# Patient Record
Sex: MALE | Race: WHITE | NOT HISPANIC OR LATINO | Employment: OTHER | URBAN - METROPOLITAN AREA
[De-identification: names, ages, dates, MRNs, and addresses within clinical notes are randomized per-mention and may not be internally consistent; named-entity substitution may affect disease eponyms.]

---

## 2017-10-02 ENCOUNTER — HOSPITAL ENCOUNTER (EMERGENCY)
Facility: HOSPITAL | Age: 62
Discharge: PSYCHIATRIC OR SUBSTANCE USE FACILITY | End: 2017-10-02
Attending: EMERGENCY MEDICINE | Admitting: EMERGENCY MEDICINE

## 2017-10-02 ENCOUNTER — HOSPITAL ENCOUNTER (INPATIENT)
Facility: HOSPITAL | Age: 62
LOS: 11 days | Discharge: NURSING, REHAB OR LONG-TERM CARE | End: 2017-10-13
Attending: SPECIALIST | Admitting: SPECIALIST

## 2017-10-02 VITALS
OXYGEN SATURATION: 98 % | DIASTOLIC BLOOD PRESSURE: 67 MMHG | SYSTOLIC BLOOD PRESSURE: 130 MMHG | HEART RATE: 68 BPM | BODY MASS INDEX: 24.11 KG/M2 | TEMPERATURE: 96.7 F | WEIGHT: 150 LBS | RESPIRATION RATE: 16 BRPM | HEIGHT: 66 IN

## 2017-10-02 DIAGNOSIS — F29 PSYCHOSIS, UNSPECIFIED PSYCHOSIS TYPE (HCC): Primary | ICD-10-CM

## 2017-10-02 DIAGNOSIS — R45.851 SUICIDAL IDEATION: ICD-10-CM

## 2017-10-02 LAB
ALBUMIN SERPL-MCNC: 3.9 G/DL (ref 3.5–5.2)
ALBUMIN/GLOB SERPL: 1.3 G/DL
ALP SERPL-CCNC: 68 U/L (ref 39–117)
ALT SERPL W P-5'-P-CCNC: 9 U/L (ref 1–41)
ANION GAP SERPL CALCULATED.3IONS-SCNC: 12.5 MMOL/L
AST SERPL-CCNC: 19 U/L (ref 1–40)
BASOPHILS # BLD AUTO: 0.02 10*3/MM3 (ref 0–0.2)
BASOPHILS NFR BLD AUTO: 0.3 % (ref 0–1.5)
BILIRUB SERPL-MCNC: 0.3 MG/DL (ref 0.1–1.2)
BILIRUB UR QL STRIP: NEGATIVE
BUN BLD-MCNC: 16 MG/DL (ref 8–23)
BUN/CREAT SERPL: 15.8 (ref 7–25)
CALCIUM SPEC-SCNC: 9.4 MG/DL (ref 8.6–10.5)
CHLORIDE SERPL-SCNC: 102 MMOL/L (ref 98–107)
CLARITY UR: ABNORMAL
CO2 SERPL-SCNC: 27.5 MMOL/L (ref 22–29)
COLOR UR: YELLOW
CREAT BLD-MCNC: 1.01 MG/DL (ref 0.76–1.27)
DEPRECATED RDW RBC AUTO: 43.5 FL (ref 37–54)
EOSINOPHIL # BLD AUTO: 0.06 10*3/MM3 (ref 0–0.7)
EOSINOPHIL NFR BLD AUTO: 1 % (ref 0.3–6.2)
ERYTHROCYTE [DISTWIDTH] IN BLOOD BY AUTOMATED COUNT: 12.7 % (ref 11.5–14.5)
GFR SERPL CREATININE-BSD FRML MDRD: 75 ML/MIN/1.73
GLOBULIN UR ELPH-MCNC: 2.9 GM/DL
GLUCOSE BLD-MCNC: 144 MG/DL (ref 65–99)
GLUCOSE UR STRIP-MCNC: NEGATIVE MG/DL
HCT VFR BLD AUTO: 42.4 % (ref 40.4–52.2)
HGB BLD-MCNC: 14.3 G/DL (ref 13.7–17.6)
HGB UR QL STRIP.AUTO: NEGATIVE
IMM GRANULOCYTES # BLD: 0 10*3/MM3 (ref 0–0.03)
IMM GRANULOCYTES NFR BLD: 0 % (ref 0–0.5)
KETONES UR QL STRIP: ABNORMAL
LEUKOCYTE ESTERASE UR QL STRIP.AUTO: NEGATIVE
LYMPHOCYTES # BLD AUTO: 1.52 10*3/MM3 (ref 0.9–4.8)
LYMPHOCYTES NFR BLD AUTO: 25.6 % (ref 19.6–45.3)
MCH RBC QN AUTO: 31.6 PG (ref 27–32.7)
MCHC RBC AUTO-ENTMCNC: 33.7 G/DL (ref 32.6–36.4)
MCV RBC AUTO: 93.8 FL (ref 79.8–96.2)
MONOCYTES # BLD AUTO: 0.48 10*3/MM3 (ref 0.2–1.2)
MONOCYTES NFR BLD AUTO: 8.1 % (ref 5–12)
NEUTROPHILS # BLD AUTO: 3.85 10*3/MM3 (ref 1.9–8.1)
NEUTROPHILS NFR BLD AUTO: 65 % (ref 42.7–76)
NITRITE UR QL STRIP: NEGATIVE
PH UR STRIP.AUTO: 7 [PH] (ref 5–8)
PHENYTOIN SERPL-MCNC: <0.8 MCG/ML (ref 10–20)
PLATELET # BLD AUTO: 189 10*3/MM3 (ref 140–500)
PMV BLD AUTO: 10.3 FL (ref 6–12)
POTASSIUM BLD-SCNC: 4 MMOL/L (ref 3.5–5.2)
PROT SERPL-MCNC: 6.8 G/DL (ref 6–8.5)
PROT UR QL STRIP: NEGATIVE
RBC # BLD AUTO: 4.52 10*6/MM3 (ref 4.6–6)
SODIUM BLD-SCNC: 142 MMOL/L (ref 136–145)
SP GR UR STRIP: 1.02 (ref 1–1.03)
UROBILINOGEN UR QL STRIP: ABNORMAL
WBC NRBC COR # BLD: 5.93 10*3/MM3 (ref 4.5–10.7)

## 2017-10-02 PROCEDURE — 93010 ELECTROCARDIOGRAM REPORT: CPT | Performed by: INTERNAL MEDICINE

## 2017-10-02 RX ORDER — ACETAMINOPHEN 325 MG/1
650 TABLET ORAL EVERY 4 HOURS PRN
Status: DISCONTINUED | OUTPATIENT
Start: 2017-10-02 | End: 2017-10-13 | Stop reason: HOSPADM

## 2017-10-02 RX ORDER — ALUMINA, MAGNESIA, AND SIMETHICONE 2400; 2400; 240 MG/30ML; MG/30ML; MG/30ML
15 SUSPENSION ORAL EVERY 6 HOURS PRN
Status: DISCONTINUED | OUTPATIENT
Start: 2017-10-02 | End: 2017-10-13 | Stop reason: HOSPADM

## 2017-10-02 RX ORDER — OLANZAPINE 10 MG/1
2.5 INJECTION, POWDER, LYOPHILIZED, FOR SOLUTION INTRAMUSCULAR ONCE
Status: COMPLETED | OUTPATIENT
Start: 2017-10-02 | End: 2017-10-02

## 2017-10-02 RX ADMIN — OLANZAPINE 2.5 MG: 10 INJECTION, POWDER, FOR SOLUTION INTRAMUSCULAR at 23:12

## 2017-10-02 NOTE — ED NOTES
Pt belonging placed in belonging bag. All cords were removed from Pts bedside. Light cover in place. Room made as safe as possible for Pt. Hospital security at bedside     Kathi Villalba RN  10/02/17 1932

## 2017-10-02 NOTE — ED PROVIDER NOTES
" EMERGENCY DEPARTMENT ENCOUNTER    CHIEF COMPLAINT  Chief Complaint: aggressive behavior  History given by: EMS  History limited by: uncooperative, dementia  Room Number: 08/08  PMD: Mo Chavarria DO      HPI:  Pt is a 61 y.o. male with history of depression who was sent to the ED from a NH for aggressive behavior. EMS reports NH staff reports pt has had suicidal ideation in the past.  No reported history of trauma, fever, vomiting, weakness.    Duration:  PTA  Onset: gradual  Timing: constant  Location: N/A  Radiation: N/A  Quality: \"aggressive behavior\"  Intensity/Severity: moderate  Progression: unchanged  Associated Symptoms: suicidal ideation  Aggravating Factors: unknown  Alleviating Factors: unknown  Previous Episodes: Pt has depression  Treatment before arrival: unknown    PAST MEDICAL HISTORY  Active Ambulatory Problems     Diagnosis Date Noted   • Dementia with behavioral disturbance 09/17/2016   • Gastroesophageal reflux disease without esophagitis 09/17/2016   • Coronary artery disease involving native coronary artery of native heart without angina pectoris 09/17/2016   • Seizure disorder 09/17/2016   • Cognitive impairment 09/17/2016   • Closed head injury 09/17/2016     Resolved Ambulatory Problems     Diagnosis Date Noted   • No Resolved Ambulatory Problems     Past Medical History:   Diagnosis Date   • Back pain    • Convulsion disorder    • Coronary artery disease    • Dementia    • Hyperlipidemia    • Hypertension    • Seizures        PAST SURGICAL HISTORY  Past Surgical History:   Procedure Laterality Date   • CARDIAC CATHETERIZATION     • CORONARY ARTERY BYPASS GRAFT Left    • VASCULAR SURGERY Left        FAMILY HISTORY  Family History   Problem Relation Age of Onset   • Arthritis Mother    • Depression Mother    • Alcohol abuse Mother    • Emphysema Mother    • Alzheimer's disease Mother    • Alzheimer's disease Father    • Alcohol abuse Father    • Bipolar disorder Sister    • Heart disease " Sister    • Cancer Brother    • Alcohol abuse Brother    • Drug abuse Brother    • Bipolar disorder Son    • Anxiety disorder Son    • Stroke Brother    • Cancer Brother    • Hypertension Brother    • Bipolar disorder Brother    • Anxiety disorder Brother    • Neuropathy Brother    • Heart disease Brother    • Drug abuse Brother    • Alcohol abuse Brother        SOCIAL HISTORY  Social History     Social History   • Marital status:      Spouse name: N/A   • Number of children: N/A   • Years of education: N/A     Occupational History   • Not on file.     Social History Main Topics   • Smoking status: Former Smoker   • Smokeless tobacco: Not on file   • Alcohol use No   • Drug use: Not on file   • Sexual activity: Not on file     Other Topics Concern   • Not on file     Social History Narrative       ALLERGIES  Review of patient's allergies indicates no known allergies.    REVIEW OF SYSTEMS  Review of Systems   Constitutional: Negative for activity change, appetite change and fever.   HENT: Negative for congestion and sore throat.    Eyes: Negative.    Respiratory: Negative for cough and shortness of breath.    Cardiovascular: Negative for chest pain and leg swelling.   Gastrointestinal: Negative for abdominal pain, diarrhea and vomiting.   Endocrine: Negative.    Genitourinary: Negative for decreased urine volume and dysuria.   Musculoskeletal: Negative for neck pain.   Skin: Negative for rash and wound.   Allergic/Immunologic: Negative.    Neurological: Negative for weakness, numbness and headaches.   Hematological: Negative.    Psychiatric/Behavioral: Positive for suicidal ideas.        Aggressive behavior   All other systems reviewed and are negative.      PHYSICAL EXAM  ED Triage Vitals   Temp Heart Rate Resp BP SpO2   10/02/17 1822 10/02/17 1822 10/02/17 1822 10/02/17 1822 10/02/17 1822   96.7 °F (35.9 °C) 77 18 130/60 97 %      Temp src Heart Rate Source Patient Position BP Location FiO2 (%)   10/02/17  1822 10/02/17 1822 -- -- --   Tympanic Monitor          Physical Exam   Constitutional: He appears distressed.   HENT:   Head: Normocephalic and atraumatic.   Eyes: EOM are normal. Pupils are equal, round, and reactive to light.   Neck: Normal range of motion.   Cardiovascular: Normal rate, regular rhythm and normal heart sounds.    No murmur heard.  Pulses:       Posterior tibial pulses are 2+ on the right side, and 2+ on the left side.   Pulmonary/Chest: Effort normal and breath sounds normal. No respiratory distress. He has no wheezes.   Abdominal: Soft. Bowel sounds are normal. There is no tenderness. There is no rebound and no guarding.   Musculoskeletal: Normal range of motion. He exhibits no edema, tenderness or deformity.   Neurological: He is alert.   Follows simple commands but answers no questions   Skin: Skin is warm and dry.   Psychiatric:   Flat affect, no verbal interaction,    Nursing note and vitals reviewed.      LAB RESULTS  Lab Results (last 24 hours)     Procedure Component Value Units Date/Time    Phenytoin Level, Total [[id number]]  (Abnormal) Collected:  10/02/17 1855    Specimen:  Blood from Arm, Left Updated:  10/02/17 1948     Phenytoin Level <0.8 (L) mcg/mL     CBC & Differential [[id number]] Collected:  10/02/17 1855    Specimen:  Blood Updated:  10/02/17 1923    Narrative:       The following orders were created for panel order CBC & Differential.  Procedure                               Abnormality         Status                     ---------                               -----------         ------                     CBC Auto Differential[[id number]]        Abnormal            Final result                 Please view results for these tests on the individual orders.    Comprehensive Metabolic Panel [[id number]]  (Abnormal) Collected:  10/02/17 1855    Specimen:  Blood from Arm, Left Updated:  10/02/17 1934     Glucose 144 (H) mg/dL      BUN 16 mg/dL      Creatinine 1.01 mg/dL      Sodium  142 mmol/L      Potassium 4.0 mmol/L      Chloride 102 mmol/L      CO2 27.5 mmol/L      Calcium 9.4 mg/dL      Total Protein 6.8 g/dL      Albumin 3.90 g/dL      ALT (SGPT) 9 U/L      AST (SGOT) 19 U/L       Specimen hemolyzed.  Results may be affected.        Alkaline Phosphatase 68 U/L      Total Bilirubin 0.3 mg/dL      eGFR Non African Amer 75 mL/min/1.73      Globulin 2.9 gm/dL      A/G Ratio 1.3 g/dL      BUN/Creatinine Ratio 15.8     Anion Gap 12.5 mmol/L     CBC Auto Differential [[id number]]  (Abnormal) Collected:  10/02/17 1855    Specimen:  Blood from Arm, Left Updated:  10/02/17 1923     WBC 5.93 10*3/mm3      RBC 4.52 (L) 10*6/mm3      Hemoglobin 14.3 g/dL      Hematocrit 42.4 %      MCV 93.8 fL      MCH 31.6 pg      MCHC 33.7 g/dL      RDW 12.7 %      RDW-SD 43.5 fl      MPV 10.3 fL      Platelets 189 10*3/mm3      Neutrophil % 65.0 %      Lymphocyte % 25.6 %      Monocyte % 8.1 %      Eosinophil % 1.0 %      Basophil % 0.3 %      Immature Grans % 0.0 %      Neutrophils, Absolute 3.85 10*3/mm3      Lymphocytes, Absolute 1.52 10*3/mm3      Monocytes, Absolute 0.48 10*3/mm3      Eosinophils, Absolute 0.06 10*3/mm3      Basophils, Absolute 0.02 10*3/mm3      Immature Grans, Absolute 0.00 10*3/mm3     Urinalysis With / Culture If Indicated - Urine, Clean Catch [[id number]]  (Abnormal) Collected:  10/02/17 1914    Specimen:  Urine from Urine, Catheter Updated:  10/02/17 1956     Color, UA Yellow     Appearance, UA Cloudy (A)     pH, UA 7.0     Specific Gravity, UA 1.023     Glucose, UA Negative     Ketones, UA Trace (A)     Bilirubin, UA Negative     Blood, UA Negative     Protein, UA Negative     Leuk Esterase, UA Negative     Nitrite, UA Negative     Urobilinogen, UA 1.0 E.U./dL    Narrative:       Urine microscopic not indicated.          I ordered the above labs and reviewed the results    PROCEDURES  Procedures    EKG           EKG time: 2251  Rhythm/Rate: SR 50s  P waves and CT: normal  QRS, axis:  normal   ST and T waves: normal     Interpreted Contemporaneously by me, independently viewed  No priors for comparison      PROGRESS AND CONSULTS  ED Course     1838 - Ordered labs for further evaluation.    2052 - Placed call to Psych.    2228 - Discussed pt's case with access staff, Hoda, who staffed pt's case with Dr. Palacios ([facility]), who agreed to admit the pt.    2241 - Ordered EKG for further evaluation. Ordered Zyprexa.    MEDICAL DECISION MAKING  Results were reviewed/discussed with the patient and they were also made aware of online access. Pt also made aware that some labs, such as cultures, will not be resulted during ER visit and follow up with PMD is necessary.     MDM  Number of Diagnoses or Management Options     Amount and/or Complexity of Data Reviewed  Clinical lab tests: ordered and reviewed (Phenytoin Level <0.8)  Tests in the medicine section of CPT®: reviewed and ordered (See EKG procedure note)  Decide to obtain previous medical records or to obtain history from someone other than the patient: yes  Review and summarize past medical records: yes           DIAGNOSIS  Final diagnoses:   Psychosis, unspecified psychosis type   Suicidal ideation       DISPOSITION  ADMISSION    Discussed treatment plan and reason for admission with pt/family and admitting physician.  Pt/family voiced understanding of the plan for admission for further testing/treatment as needed.         Latest Documented Vital Signs:  As of 11:03 PM  BP- 126/64 HR- 68 Temp- 96.7 °F (35.9 °C) (Tympanic) O2 sat- 96%    --  Documentation assistance provided by dl Gonzalez for Dr. Antonio.  Information recorded by the scribe was done at my direction and has been verified and validated by me.       Rakesh Gonzalez  10/02/17 5538       Noris Antonio MD  10/04/17 4067
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
## 2017-10-02 NOTE — ED NOTES
Nursing home reports that pt has had SI/HI and attempted to pull sharps container off of wall to harm self. Pt alert and oriented to self.            Ashleigh Alejo RN  10/02/17 3102

## 2017-10-03 LAB
CHOLEST SERPL-MCNC: 213 MG/DL (ref 0–200)
HDLC SERPL-MCNC: 53 MG/DL (ref 40–60)
LDLC SERPL CALC-MCNC: 142 MG/DL (ref 0–100)
LDLC/HDLC SERPL: 2.68 {RATIO}
TRIGL SERPL-MCNC: 90 MG/DL (ref 0–150)
VLDLC SERPL-MCNC: 18 MG/DL (ref 5–40)

## 2017-10-03 PROCEDURE — 80061 LIPID PANEL: CPT | Performed by: SPECIALIST

## 2017-10-03 RX ORDER — THIAMINE MONONITRATE (VIT B1) 100 MG
100 TABLET ORAL DAILY
Status: DISCONTINUED | OUTPATIENT
Start: 2017-10-03 | End: 2017-10-13 | Stop reason: HOSPADM

## 2017-10-03 RX ORDER — ATORVASTATIN CALCIUM 20 MG/1
40 TABLET, FILM COATED ORAL NIGHTLY
Status: DISCONTINUED | OUTPATIENT
Start: 2017-10-03 | End: 2017-10-13 | Stop reason: HOSPADM

## 2017-10-03 RX ORDER — TAMSULOSIN HYDROCHLORIDE 0.4 MG/1
0.4 CAPSULE ORAL NIGHTLY
Status: DISCONTINUED | OUTPATIENT
Start: 2017-10-03 | End: 2017-10-13 | Stop reason: HOSPADM

## 2017-10-03 RX ORDER — DONEPEZIL HYDROCHLORIDE 10 MG/1
10 TABLET, FILM COATED ORAL NIGHTLY
Status: DISCONTINUED | OUTPATIENT
Start: 2017-10-03 | End: 2017-10-13 | Stop reason: HOSPADM

## 2017-10-03 RX ORDER — RISPERIDONE 1 MG/1
1 TABLET ORAL EVERY 12 HOURS SCHEDULED
Status: DISCONTINUED | OUTPATIENT
Start: 2017-10-03 | End: 2017-10-10

## 2017-10-03 RX ORDER — ZONISAMIDE 25 MG/1
100 CAPSULE ORAL DAILY
Status: DISCONTINUED | OUTPATIENT
Start: 2017-10-03 | End: 2017-10-13 | Stop reason: HOSPADM

## 2017-10-03 RX ORDER — MULTIPLE VITAMINS W/ MINERALS TAB 9MG-400MCG
1 TAB ORAL DAILY
Status: DISCONTINUED | OUTPATIENT
Start: 2017-10-03 | End: 2017-10-13 | Stop reason: HOSPADM

## 2017-10-03 RX ORDER — PHENYTOIN 50 MG/1
50 TABLET, CHEWABLE ORAL 4 TIMES DAILY
Status: DISPENSED | OUTPATIENT
Start: 2017-10-03 | End: 2017-10-13

## 2017-10-03 RX ORDER — PANTOPRAZOLE SODIUM 40 MG/1
40 TABLET, DELAYED RELEASE ORAL EVERY MORNING
Status: DISCONTINUED | OUTPATIENT
Start: 2017-10-03 | End: 2017-10-13 | Stop reason: HOSPADM

## 2017-10-03 RX ADMIN — PHENYTOIN 50 MG: 50 TABLET, CHEWABLE ORAL at 18:59

## 2017-10-03 RX ADMIN — ATORVASTATIN CALCIUM 40 MG: 20 TABLET, FILM COATED ORAL at 22:21

## 2017-10-03 RX ADMIN — RISPERIDONE 1 MG: 1 TABLET, FILM COATED ORAL at 22:21

## 2017-10-03 RX ADMIN — MULTIPLE VITAMINS W/ MINERALS TAB 1 TABLET: TAB at 12:15

## 2017-10-03 RX ADMIN — PHENYTOIN 50 MG: 50 TABLET, CHEWABLE ORAL at 13:16

## 2017-10-03 RX ADMIN — DEXTROMETHORPHAN HYDROBROMIDE AND QUINIDINE SULFATE 1 CAPSULE: 20; 10 CAPSULE, GELATIN COATED ORAL at 13:16

## 2017-10-03 RX ADMIN — TAMSULOSIN HYDROCHLORIDE 0.4 MG: 0.4 CAPSULE ORAL at 22:21

## 2017-10-03 RX ADMIN — Medication 100 MG: at 12:16

## 2017-10-03 RX ADMIN — ZONISAMIDE 100 MG: 100 CAPSULE ORAL at 13:16

## 2017-10-03 RX ADMIN — PHENYTOIN 50 MG: 50 TABLET, CHEWABLE ORAL at 22:21

## 2017-10-03 RX ADMIN — RIVAROXABAN 20 MG: 20 TABLET, FILM COATED ORAL at 18:59

## 2017-10-03 RX ADMIN — RISPERIDONE 1 MG: 1 TABLET, FILM COATED ORAL at 12:16

## 2017-10-03 RX ADMIN — DONEPEZIL HYDROCHLORIDE 10 MG: 10 TABLET, FILM COATED ORAL at 22:21
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
## 2017-10-03 NOTE — NURSING NOTE
"Pt arrived to floor by stretcher, I was told because he refused to sit in wheelchair. Upon arrival, pt refusing to walk to bed, slide from stretcher to bed. Pt refusing to answer questions. When asked about orientation he stares forward. When asked his birthday he replies \" I don't have one\". Posture is tense, pt holding head off of pillow, jaw clenched. Side rails padded, sitter at bedside. Will continue to assess and monitor.    "

## 2017-10-03 NOTE — PLAN OF CARE
Problem:  Patient Care Overview (Adult)  Goal: Discharge Needs Assessment  Outcome: Ongoing (interventions implemented as appropriate)    10/03/17 1209 10/03/17 1214   Discharge Needs Assessment   Concerns To Be Addressed homicidal concerns;suicidal concerns;cognitive/perceptual concerns;mental health concerns --    Discharge Planning Comments --  Pt came from [facility] & has a 14-day hold per Aliyah ann/Alena. Pt's treatment will be reviewed ongoing. SW will explore follow-up & discharge options.

## 2017-10-03 NOTE — DISCHARGE PLACEMENT REQUEST
"Drew Day (61 y.o. Male)     Date of Birth Social Security Number Address Home Phone MRN    1955  [address] [place] [address] [phone] [id number]    [employer] Marital Status          Unknown        Admission Date Admission Type Admitting Provider Attending Provider Department, Room/Bed    10/2/17 Emergency Jos Patel III, MD Bensenhaver, Charles Brook III, MD [facility] CRISIS MGMT, 3330/1    Discharge Date Discharge Disposition Discharge Destination                      Attending Provider: Jos Patel III, MD     Allergies:  No Known Allergies    Isolation:  None   Infection:  None   Code Status:  FULL    Ht:  66\" (167.6 cm)   Wt:  138 lb (62.6 kg)    Admission Cmt:  None   Principal Problem:  None                Active Insurance as of 10/2/2017     Primary Coverage     Payor Plan Insurance Group Employer/Plan Group    MEDICARE MEDICARE A & B      Payor Plan Address Payor Plan Phone Number Effective From Effective To    [address] [phone] 4/1/2000     [place] [address]       Subscriber Name Subscriber Birth Date Member ID       DREW DAY 1955 [id number]           Secondary Coverage     Payor Plan Insurance Group Employer/Plan Group    [place]      Payor Plan Address Payor Plan Phone Number Effective From Effective To    [address] [phone] 10/2/2017     [place] [address]       Subscriber Name Subscriber Birth Date Member ID       DREW DAY 1955 [id number]                 Emergency Contacts      (Rel.) Home Phone Work Phone Mobile Phone    Padmini Michael (Guardian) [phone] -- --    Christine Day (Relative) [phone] -- --    Ankush Day (Brother) [phone] -- --    Liliana Day (Relative) [phone] -- --              "

## 2017-10-03 NOTE — PLAN OF CARE
Problem:  Patient Care Overview (Adult)  Goal: Plan of Care Review  Outcome: Ongoing (interventions implemented as appropriate)    10/03/17 1210 10/03/17 1734   Coping/Psychosocial Response Interventions   Plan Of Care Reviewed With patient --    Coping/Psychosocial   Patient Agreement with Plan of Care refuses to participate --    Outcome Evaluation   Outcome Summary/Follow up Plan --  Pt irritable and resistent to care this shift. Very quiet and often refuses to answer questions posed by staff. Unable to assess SI/HI, anxiety, depression, and hallucinations. Pt gave RN name but unable to give birthdate. When pt does speak it is garbled and difficult to understand. Pt has refused meals. Attempted to give pt meds with water. Pt refused. Pt compliant with medications when given in chocolate ice cream. Pt came out into dayroom for some of afternoon. Currently resting in bed. Will continue to monitor and provide support.       Goal: Interdisciplinary Rounds/Family Conference  Outcome: Ongoing (interventions implemented as appropriate)  Goal: Individualization and Mutuality  Outcome: Ongoing (interventions implemented as appropriate)  Goal: Discharge Needs Assessment  Outcome: Ongoing (interventions implemented as appropriate)    Problem:  Overarching Goals  Goal: Adheres to Safety Considerations for Self and Others  Outcome: Ongoing (interventions implemented as appropriate)  Intervention: Develop/maintain Individualized Safety Plan    10/03/17 1210 10/03/17 1600   Safety   Safety Measures --  safety rounds completed   Mental Health Homicide Risk   Homicidal Ideation other (see comments)  (unable to assess; pt mumbled smthg incoherent) --    Provide Emotional/Physical Safety   Suicidal Ideation other (see comments)  (unable to assess; pt confuse; did not answer) --          Goal: Optimized Coping Skills in Response to Life Stressors  Outcome: Ongoing (interventions implemented as appropriate)  Intervention: Promote  Effective Coping Strategies    10/03/17 1210   Coping Strategies   Supportive Measures active listening utilized;verbalization of feelings encouraged;self-care encouraged;positive reinforcement provided         Goal: Develops/Participates in Therapeutic [place] to Support Successful Transition  Outcome: Ongoing (interventions implemented as appropriate)  Intervention: Foster Therapeutic [place]    10/03/17 1210   Coping Strategies   Trust Relationship/Rapport care explained;choices provided;emotional support provided;empathic listening provided;questions answered;questions encouraged;reassurance provided;thoughts/feelings acknowledged       Intervention: Mutually Develop Transition Plan    10/03/17 1210   OTHER   Transition Support crisis management plan promoted           Problem: Depression  Goal: Treatment Goal: Demonstrate behavioral control of depressive symptoms, verbalize feelings of improved mood/affect, and adopt new coping skills prior to discharge  Outcome: Ongoing (interventions implemented as appropriate)  Goal: Verbalize thoughts and feelings associated with:  Outcome: Ongoing (interventions implemented as appropriate)  Goal: Refrain from harming self  Outcome: Ongoing (interventions implemented as appropriate)  Goal: Refrain from isolation  Outcome: Ongoing (interventions implemented as appropriate)  Goal: Refrain from self-neglect  Outcome: Ongoing (interventions implemented as appropriate)  Goal: Attend and participate in unit activities, including therapeutic, recreational, and educational groups  Outcome: Ongoing (interventions implemented as appropriate)  Goal: Complete daily ADLs, including personal hygiene independently, as able  Outcome: Ongoing (interventions implemented as appropriate)    Problem: Risk for Violence/Aggression Toward Others  Goal: Treatment Goal: Refrain from acts of violence/aggression during length of stay, and demonstrate improved impulse control at the time of  discharge  Outcome: Ongoing (interventions implemented as appropriate)  Goal: Verbalize thoughts and feelings associated with:  Outcome: Ongoing (interventions implemented as appropriate)  Goal: Refrain from harming others  Outcome: Ongoing (interventions implemented as appropriate)  Goal: Refrain from destructive acts on the environment or property  Outcome: Ongoing (interventions implemented as appropriate)  Goal: Control angry outbursts  Outcome: Ongoing (interventions implemented as appropriate)  Goal: Attend and participate in unit activities, including therapeutic, recreational, and educational groups  Outcome: Ongoing (interventions implemented as appropriate)  Goal: Identify appropriate positive anger management techniques  Outcome: Ongoing (interventions implemented as appropriate)    Problem: Fall Risk (Adult)  Goal: Identify Related Risk Factors and Signs and Symptoms  Outcome: Ongoing (interventions implemented as appropriate)  Goal: Absence of Falls  Outcome: Ongoing (interventions implemented as appropriate)    10/03/17 1734   Fall Risk (Adult)   Absence of Falls achieves outcome  (pt did not fall this shift)         Problem: Risk for Self Injury/Neglect  Goal: Treatment Goal: Remain safe during length of stay, learn and adopt new coping skills, and be free of self-injurious ideation, impulses and acts at the time of discharge  Outcome: Ongoing (interventions implemented as appropriate)  Goal: Verbalize thoughts and feelings associated with:  Outcome: Ongoing (interventions implemented as appropriate)  Goal: Refrain from harming self  Outcome: Ongoing (interventions implemented as appropriate)  Goal: Attend and participate in unit activities, including therapeutic, recreational, and educational groups  Outcome: Ongoing (interventions implemented as appropriate)  Goal: Recognize maladaptive responses and adopt new coping mechanisms  Outcome: Ongoing (interventions implemented as appropriate)  Goal:  Complete daily ADLs, including personal hygiene independently, as able  Outcome: Ongoing (interventions implemented as appropriate)
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
## 2017-10-03 NOTE — PLAN OF CARE
Problem: BH Patient Care Overview (Adult)  Goal: Plan of Care Review  Outcome: Ongoing (interventions implemented as appropriate)    10/03/17 0423   Coping/Psychosocial Response Interventions   Plan Of Care Reviewed With patient   Coping/Psychosocial   Patient Agreement with Plan of Care refuses to participate   Patient Care Overview   Progress unable to show any progress toward functional goals   Outcome Evaluation   Outcome Summary/Follow up Plan Pt agitated upon arrival to unit. Refusing assessment, pt not answering staff questions. Unable to complete most of assessment at this time. Will continue to monitor.          Problem: Risk for Violence/Aggression Toward Others  Goal: Refrain from harming others  Outcome: Ongoing (interventions implemented as appropriate)    Problem: Fall Risk (Adult)  Goal: Absence of Falls  Outcome: Ongoing (interventions implemented as appropriate)

## 2017-10-03 NOTE — CONSULTS
"Inpatient Consult to Hospitalist  Consult performed by: TANNA TELLES  Consult ordered by: OTIS DURAN III  Reason for consult: medical evaluation in a psychiatric patient with HTN/CAD/chronic AC for PAD          Patient Care Team:  Mo Chavarria DO as PCP - General (Family Medicine)    Chief complaint: none for me    Subjective     HPI Comments: Pt is a 62yo gentleman admitted to [facility] for dementia with behavioral disturbance. We have been asked to see for management of his multiple medical issues. Currently he is resting comfortably in common area and has no complaints. He reports chronic pain in legs due to PAD (\"they been all cut up\").       Review of Systems   Constitutional: Negative for appetite change, chills, diaphoresis and fever.   HENT: Negative for sinus pressure, sore throat, tinnitus, trouble swallowing and voice change.    Eyes: Negative for pain and visual disturbance.   Respiratory: Negative for cough, choking, chest tightness and shortness of breath.    Cardiovascular: Negative for chest pain, palpitations and leg swelling.   Gastrointestinal: Negative for abdominal pain, blood in stool, diarrhea, nausea and vomiting.   Endocrine: Negative for cold intolerance and heat intolerance.   Genitourinary: Negative for decreased urine volume, difficulty urinating, dysuria and hematuria.   Musculoskeletal: Negative for arthralgias, back pain and neck pain.   Skin: Negative for color change, pallor, rash and wound.   Allergic/Immunologic: Negative for environmental allergies, food allergies and immunocompromised state.   Neurological: Negative for tremors, seizures, syncope, facial asymmetry, speech difficulty and headaches.   Hematological: Negative for adenopathy. Does not bruise/bleed easily.   Psychiatric/Behavioral: Positive for agitation and behavioral problems. Negative for hallucinations and self-injury.        Past Medical History:   Diagnosis Date   • Back pain    • Convulsion " disorder    • Coronary artery disease    • Dementia    • Hyperlipidemia    • Hypertension    • Seizures    ,   Past Surgical History:   Procedure Laterality Date   • CARDIAC CATHETERIZATION     • CORONARY ARTERY BYPASS GRAFT Left    • VASCULAR SURGERY Left    ,   Family History   Problem Relation Age of Onset   • Arthritis Mother    • Depression Mother    • Alcohol abuse Mother    • Emphysema Mother    • Alzheimer's disease Mother    • Alzheimer's disease Father    • Alcohol abuse Father    • Bipolar disorder Sister    • Heart disease Sister    • Cancer Brother    • Alcohol abuse Brother    • Drug abuse Brother    • Bipolar disorder Son    • Anxiety disorder Son    • Stroke Brother    • Cancer Brother    • Hypertension Brother    • Bipolar disorder Brother    • Anxiety disorder Brother    • Neuropathy Brother    • Heart disease Brother    • Drug abuse Brother    • Alcohol abuse Brother    ,   Social History   Substance Use Topics   • Smoking status: Former Smoker   • Smokeless tobacco: None   • Alcohol use No   ,   Prescriptions Prior to Admission   Medication Sig Dispense Refill Last Dose   • donepezil (ARICEPT) 10 MG tablet Take 1 tablet by mouth every night. 30 tablet 5    • Multiple Vitamins-Minerals (MULTIVITAMIN ADULT) tablet Take 1 tablet by mouth daily. 30 tablet 5    • omeprazole (PriLOSEC) 40 MG capsule Take 1 capsule by mouth daily. 30 capsule 5    • PHENYTOIN INFATABS 50 MG chewable tablet Take 4 tablets bid 240 tablet 5    • risperiDONE (RisperDAL) 0.5 MG tablet Take 1 tablet by mouth daily. 30 tablet 5    • tamsulosin (FLOMAX) 0.4 MG capsule 24 hr capsule Take 1 capsule by mouth every night. 30 capsule 5    • thiamine (VITAMINE B-1) 100 MG tablet Take 1 tablet by mouth daily. 30 tablet 5    • vitamin B-12 (CYANOCOBALAMIN) 500 MCG tablet Take 1 tablet by mouth 2 (two) times a day. 60 tablet 5    • XARELTO 20 MG tablet Take 1 tablet by mouth daily with dinner. 30 tablet 5    • zonisamide (ZONEGRAN) 100  MG capsule Take 1 capsule by mouth daily. 30 capsule 5     and Allergies:  Review of patient's allergies indicates no known allergies.    Objective      Vital Signs  Temp:  [96.7 °F (35.9 °C)-98.6 °F (37 °C)] 98.6 °F (37 °C)  Heart Rate:  [53-77] 53  Resp:  [16-18] 16  BP: (126-147)/(60-74) 138/73    Physical Exam   Constitutional: He appears well-developed and well-nourished. No distress.   HENT:   Head: Normocephalic and atraumatic.   Mouth/Throat: Oropharynx is clear and moist. No oropharyngeal exudate.   Eyes: EOM are normal. Pupils are equal, round, and reactive to light. Right eye exhibits no discharge. Left eye exhibits no discharge. No scleral icterus.   Neck: Normal range of motion. Neck supple. No tracheal deviation present. No thyromegaly present.   Cardiovascular: Normal rate, regular rhythm and normal heart sounds.    No murmur heard.  Pulmonary/Chest: Effort normal and breath sounds normal. No respiratory distress.   Abdominal: Soft. Bowel sounds are normal. He exhibits no distension and no mass. There is no tenderness. No hernia.   Musculoskeletal: Normal range of motion. He exhibits no edema.   Poor pulses, no coolness, no erythema, old surgical scars present   Lymphadenopathy:     He has no cervical adenopathy.   Neurological: He is alert. No cranial nerve deficit. He exhibits normal muscle tone.   Skin: Skin is warm and dry. No rash noted. He is not diaphoretic.   Psychiatric:   Flat affect, slowed thought processes, paucity of speech   Nursing note and vitals reviewed.      Results Review:    I reviewed the patient's new clinical results.  I reviewed the patient's other test results and agree with the interpretation  I personally viewed and interpreted the patient's EKG/Telemetry data  Discussed with patient        Assessment/Plan     Active Problems:    Dementia with behavioral disturbance      Assessment:  (1. CAD  2. PAD, chronic AC  3. COPD and chronic hypoxic resp failure  4. HTN  5.  Hyperlipidemia  6. GERD/Hdz's  7. BPH  8. Seizure d/o  9. H/o TBI and cognitive impairment  10. Dementia with behavioral disturbance  11. Nursing home pt/[facility]).     Plan:   (Thanks for consult!  At present he does not appear to be on a statin  I will start Lipitor now--not for prevention, but because of his significant arterial disease history  Continue Xarelto  No other changes to home meds).       I discussed the patients findings and my recommendations with patient    Lorenzo Pandey MD  10/03/17  3:35 PM    Time: 35min

## 2017-10-03 NOTE — PLAN OF CARE
Problem: BH Patient Care Overview (Adult)  Goal: Interdisciplinary Rounds/Family Conference  Outcome: Ongoing (interventions implemented as appropriate)    10/03/17 0938   Interdisciplinary Rounds/Family Conf   Summary Treatment team met to discuss plan of care.  to reach out to [facility] care and inquire about patient's ability to return after discharge.   Participants ;nursing;pastoral care;psychiatrist;social work      Patient/Guardian Signature: __________________________________             Psychiatrist Signature: ______________________________________             Therapist Signature: ________________________________________              Nurse Signature: ___________________________________________              Staff Signature: ____________________________________________             Staff Signature: ____________________________________________              Staff Signature: ____________________________________________              Staff Signature:                                                                                                      Goal: Individualization and Mutuality  Outcome: Ongoing (interventions implemented as appropriate)    10/03/17 0938   Behavioral Health Screens   Patient Personal Strengths stable living environment;family/social support   Individualization   Patient Specific Goals Long-term goal for patient to be compliant with medications and treatments without violence in 12 days.         Problem: Depression  Goal: Treatment Goal: Demonstrate behavioral control of depressive symptoms, verbalize feelings of improved mood/affect, and adopt new coping skills prior to discharge  Outcome: Ongoing (interventions implemented as appropriate)  Goal: Verbalize thoughts and feelings associated with:  Outcome: Ongoing (interventions implemented as appropriate)  Goal: Refrain from harming self  Outcome: Ongoing (interventions implemented as appropriate)  Goal:  Refrain from isolation  Outcome: Ongoing (interventions implemented as appropriate)  Goal: Refrain from self-neglect  Outcome: Ongoing (interventions implemented as appropriate)  Goal: Attend and participate in unit activities, including therapeutic, recreational, and educational groups  Outcome: Ongoing (interventions implemented as appropriate)  Goal: Complete daily ADLs, including personal hygiene independently, as able  Outcome: Ongoing (interventions implemented as appropriate)    Problem: Risk for Violence/Aggression Toward Others  Goal: Treatment Goal: Refrain from acts of violence/aggression during length of stay, and demonstrate improved impulse control at the time of discharge  Outcome: Ongoing (interventions implemented as appropriate)  Goal: Verbalize thoughts and feelings associated with:  Outcome: Ongoing (interventions implemented as appropriate)  Anger/depression  Goal: Refrain from harming others  Outcome: Ongoing (interventions implemented as appropriate)  Goal: Refrain from destructive acts on the environment or property  Outcome: Ongoing (interventions implemented as appropriate)  Goal: Control angry outbursts  Outcome: Ongoing (interventions implemented as appropriate)  Goal: Attend and participate in unit activities, including therapeutic, recreational, and educational groups  Outcome: Ongoing (interventions implemented as appropriate)  Goal: Identify appropriate positive anger management techniques  Outcome: Ongoing (interventions implemented as appropriate)    Problem: Risk for Self Injury/Neglect  Goal: Treatment Goal: Remain safe during length of stay, learn and adopt new coping skills, and be free of self-injurious ideation, impulses and acts at the time of discharge  Outcome: Ongoing (interventions implemented as appropriate)  Goal: Verbalize thoughts and feelings associated with:  Outcome: Ongoing (interventions implemented as appropriate)  Anger/depression  Goal: Refrain from harming  self  Outcome: Ongoing (interventions implemented as appropriate)  Goal: Attend and participate in unit activities, including therapeutic, recreational, and educational groups  Outcome: Ongoing (interventions implemented as appropriate)  Goal: Recognize maladaptive responses and adopt new coping mechanisms  Outcome: Ongoing (interventions implemented as appropriate)  Goal: Complete daily ADLs, including personal hygiene independently, as able  Outcome: Ongoing (interventions implemented as appropriate)

## 2017-10-04 RX ADMIN — PHENYTOIN 50 MG: 50 TABLET, CHEWABLE ORAL at 20:12

## 2017-10-04 RX ADMIN — PHENYTOIN 50 MG: 50 TABLET, CHEWABLE ORAL at 12:00

## 2017-10-04 RX ADMIN — RISPERIDONE 1 MG: 1 TABLET, FILM COATED ORAL at 10:09

## 2017-10-04 RX ADMIN — MULTIPLE VITAMINS W/ MINERALS TAB 1 TABLET: TAB at 10:10

## 2017-10-04 RX ADMIN — RISPERIDONE 1 MG: 1 TABLET, FILM COATED ORAL at 20:12

## 2017-10-04 RX ADMIN — PHENYTOIN 50 MG: 50 TABLET, CHEWABLE ORAL at 10:10

## 2017-10-04 RX ADMIN — DEXTROMETHORPHAN HYDROBROMIDE AND QUINIDINE SULFATE 1 CAPSULE: 20; 10 CAPSULE, GELATIN COATED ORAL at 10:10

## 2017-10-04 RX ADMIN — PHENYTOIN 50 MG: 50 TABLET, CHEWABLE ORAL at 19:17

## 2017-10-04 RX ADMIN — RIVAROXABAN 20 MG: 20 TABLET, FILM COATED ORAL at 19:17

## 2017-10-04 RX ADMIN — PANTOPRAZOLE SODIUM 40 MG: 40 TABLET, DELAYED RELEASE ORAL at 10:10

## 2017-10-04 RX ADMIN — Medication 100 MG: at 10:09

## 2017-10-04 RX ADMIN — DONEPEZIL HYDROCHLORIDE 10 MG: 10 TABLET, FILM COATED ORAL at 20:12

## 2017-10-04 RX ADMIN — PANTOPRAZOLE SODIUM 40 MG: 40 TABLET, DELAYED RELEASE ORAL at 07:30

## 2017-10-04 RX ADMIN — ZONISAMIDE 100 MG: 100 CAPSULE ORAL at 10:10

## 2017-10-04 RX ADMIN — ATORVASTATIN CALCIUM 40 MG: 20 TABLET, FILM COATED ORAL at 20:12

## 2017-10-04 RX ADMIN — TAMSULOSIN HYDROCHLORIDE 0.4 MG: 0.4 CAPSULE ORAL at 20:12

## 2017-10-04 NOTE — PROGRESS NOTES
Continued Stay Note  [facility]     Patient Name: Drew Day  MRN: [id number]  Today's Date: 10/4/2017    Admit Date: 10/2/2017          Discharge Plan       10/04/17 1206    Case Management/Social Work Plan    Additional Comments SOFI rec'd a call from Liliana Day, pt's sister, ph. [phone] wanting to discuss his treatment & placement.  Pt's sister stated that she would like him to be moved to [place] if possible.  SOFI informed him that it may be best to leave pt in facility due to illnesses but advised that she would check w/[facility] representative to see if they had a facility closer to [place].  SW spoke w/Aliyah from [facility] who stated that they do not have a facility closer to [place] & that it may be difficult to find a Medicaid bed in the area.  Aliyah confirmed that patient still  has a 14-day Medicaid bedhold.              Discharge Codes     None            YOUNG Lloyd

## 2017-10-04 NOTE — PROGRESS NOTES
The patient has been somewhat inappropriate in his actions with peers and staff, cursing and refusing treatment.  He is noted to be furiously masturbating in his room upon approach by this physician this morning.  We may consider a change to a more calming second generation antipsychotic such as Seroquel if our current pharmacotherapeutic strategy does not succeed in addressing the patient's problematic behaviors.  Dr. Carney will cover the patient in my absence.

## 2017-10-04 NOTE — PLAN OF CARE
Problem:  Patient Care Overview (Adult)  Goal: Plan of Care Review  Outcome: Ongoing (interventions implemented as appropriate)    10/04/17 0030 10/04/17 0550   Coping/Psychosocial Response Interventions   Plan Of Care Reviewed With patient --    Coping/Psychosocial   Patient Agreement with Plan of Care refuses to participate --    Patient Care Overview   Progress --  no change   Outcome Evaluation   Outcome Summary/Follow up Plan --  Unable to assess anxiety, depression, SI/HI, and hallucinations r/t confusion. Remained in room throughout shift. Cooperative and med compliant. Continue to monitor and assess.       Goal: Interdisciplinary Rounds/Family Conference  Outcome: Ongoing (interventions implemented as appropriate)  Goal: Individualization and Mutuality  Outcome: Ongoing (interventions implemented as appropriate)  Goal: Discharge Needs Assessment  Outcome: Ongoing (interventions implemented as appropriate)    Problem:  Overarching Goals  Goal: Adheres to Safety Considerations for Self and Others  Outcome: Ongoing (interventions implemented as appropriate)  Intervention: Develop/maintain Individualized Safety Plan    10/04/17 0030   Safety   Safety Measures safety rounds completed;suicide assessment completed   Mental Health Homicide Risk   Homicidal Ideation other (see comments)  (unable to assess)   Provide Emotional/Physical Safety   Suicidal Ideation other (see comments)  (unable to assess)         Goal: Optimized Coping Skills in Response to Life Stressors  Outcome: Ongoing (interventions implemented as appropriate)  Intervention: Promote Effective Coping Strategies    10/04/17 0030   Coping Strategies   Supportive Measures active listening utilized;self-care encouraged;self-reflection promoted;self-responsibility promoted;verbalization of feelings encouraged         Goal: Develops/Participates in Therapeutic [place] to Support Successful Transition  Outcome: Ongoing (interventions implemented as  appropriate)  Intervention: Foster Therapeutic [place]    10/04/17 0030   Coping Strategies   Trust Relationship/Rapport care explained;choices provided;emotional support provided;empathic listening provided;questions answered;questions encouraged;reassurance provided;thoughts/feelings acknowledged       Intervention: Mutually Develop Transition Plan    10/04/17 0030   OTHER   Transition Support crisis management plan promoted

## 2017-10-04 NOTE — PLAN OF CARE
Problem:  Patient Care Overview (Adult)  Goal: Plan of Care Review  Outcome: Ongoing (interventions implemented as appropriate)    10/04/17 0550 10/04/17 1100 10/04/17 1500   Coping/Psychosocial Response Interventions   Plan Of Care Reviewed With --  patient --    Coping/Psychosocial   Patient Agreement with Plan of Care --  refuses to participate --    Patient Care Overview   Progress no change --  --    Outcome Evaluation   Outcome Summary/Follow up Plan --  --  Pt has remained confused, stayed in room majority of shift. Unable to understand what pt is saying, garbled speech. Unable to assess SI/HI, anxiety and depression because pt is hard to understand and not cooperative. Pt was compliant with medications after they were crushed in chocolate milk. Pt was masturbating in room in view of hallway and was told by staff that it was inappropriate behavior. Pt pretended to be asleep and would not respond to nurse. Will continue to moniitor pt for safety and any change in condtion.         Problem:  Overarching Goals  Goal: Adheres to Safety Considerations for Self and Others  Outcome: Ongoing (interventions implemented as appropriate)  Intervention: Develop/maintain Individualized Safety Plan    10/04/17 1100 10/04/17 1400   Safety   Safety Measures --  safety rounds completed   Mental Health Homicide Risk   Homicidal Ideation other (see comments)  (pt would not verbalize) --    Provide Emotional/Physical Safety   Suicidal Ideation other (see comments)  (pt will not verbalize) --          Goal: Optimized Coping Skills in Response to Life Stressors  Outcome: Ongoing (interventions implemented as appropriate)  Intervention: Promote Effective Coping Strategies    10/04/17 1100   Coping Strategies   Supportive Measures active listening utilized;self-care encouraged;verbalization of feelings encouraged;self-responsibility promoted         Goal: Develops/Participates in Therapeutic [place] to Support Successful  Transition  Outcome: Ongoing (interventions implemented as appropriate)  Intervention: Foster Therapeutic [place]    10/04/17 1100   Coping Strategies   Trust Relationship/Rapport care explained;questions encouraged;choices provided       Intervention: Mutually Develop Transition Plan    10/04/17 1100   OTHER   Transition Support crisis management plan promoted

## 2017-10-04 NOTE — PROGRESS NOTES
Continued Stay Note  [facility]     Patient Name: Drew Day  MRN: [id number]  Today's Date: 10/4/2017    Admit Date: 10/2/2017          Discharge Plan       10/04/17 1256    Case Management/Social Work Plan    Additional Comments SW called & spoke w/Padmini Alec, Court appointed guardian,  ph. [phone] ext. [phone] to discuss pt's treatment.  Ms. Michael stated that he does have a bed hold in place but to contact her when pt is ready for discharge.      10/04/17 1206    Case Management/Social Work Plan    Additional Comments SW rec'd a call from Liliana Day, pt's sister, ph. [phone] wanting to discuss his treatment & placement.  Pt's sister stated that she would like him to be moved to [place] if possible.  SW informed him that it may be best to leave pt in facility due to illnesses but advised that she would check w/[facility] representative to see if they had a facility closer to [place].  SW spoke w/Aliyah from [facility] who stated that they do not have a facility closer to [place] & that it may be difficult to find a Medicaid bed in the area.  Aliyah confirmed that patient still  has a 14-day Medicaid bedhold.              Discharge Codes     None            YOUNG Lloyd

## 2017-10-05 RX ADMIN — DEXTROMETHORPHAN HYDROBROMIDE AND QUINIDINE SULFATE 1 CAPSULE: 20; 10 CAPSULE, GELATIN COATED ORAL at 08:57

## 2017-10-05 RX ADMIN — TAMSULOSIN HYDROCHLORIDE 0.4 MG: 0.4 CAPSULE ORAL at 20:13

## 2017-10-05 RX ADMIN — PHENYTOIN 50 MG: 50 TABLET, CHEWABLE ORAL at 20:13

## 2017-10-05 RX ADMIN — PHENYTOIN 50 MG: 50 TABLET, CHEWABLE ORAL at 08:57

## 2017-10-05 RX ADMIN — PHENYTOIN 50 MG: 50 TABLET, CHEWABLE ORAL at 18:25

## 2017-10-05 RX ADMIN — RISPERIDONE 1 MG: 1 TABLET, FILM COATED ORAL at 20:13

## 2017-10-05 RX ADMIN — RIVAROXABAN 20 MG: 20 TABLET, FILM COATED ORAL at 18:25

## 2017-10-05 RX ADMIN — PANTOPRAZOLE SODIUM 40 MG: 40 TABLET, DELAYED RELEASE ORAL at 08:57

## 2017-10-05 RX ADMIN — Medication 100 MG: at 08:57

## 2017-10-05 RX ADMIN — ZONISAMIDE 100 MG: 100 CAPSULE ORAL at 08:57

## 2017-10-05 RX ADMIN — ATORVASTATIN CALCIUM 40 MG: 20 TABLET, FILM COATED ORAL at 20:12

## 2017-10-05 RX ADMIN — PHENYTOIN 50 MG: 50 TABLET, CHEWABLE ORAL at 12:11

## 2017-10-05 RX ADMIN — DONEPEZIL HYDROCHLORIDE 10 MG: 10 TABLET, FILM COATED ORAL at 20:12

## 2017-10-05 RX ADMIN — MULTIPLE VITAMINS W/ MINERALS TAB 1 TABLET: TAB at 08:57

## 2017-10-05 RX ADMIN — RISPERIDONE 1 MG: 1 TABLET, FILM COATED ORAL at 08:57

## 2017-10-05 NOTE — PLAN OF CARE
Problem:  Patient Care Overview (Adult)  Goal: Plan of Care Review  Outcome: Ongoing (interventions implemented as appropriate)    10/05/17 1035 10/05/17 1524   Coping/Psychosocial Response Interventions   Plan Of Care Reviewed With patient --    Coping/Psychosocial   Patient Agreement with Plan of Care refuses to participate --    Outcome Evaluation   Outcome Summary/Follow up Plan --  Pt irritable and isolative to room throughout most of shift. Pt continues to exhibit confusion and poverty of speech. Pt refuses to answer question and could not tell this RN his date of birth. Unable to assess SI/HI, anxiety, depression, hallucinations, or pain. Continues to take medications in ice cream or chocolate milk. Pt walked halls and had to be redirected out of pt rooms. Sat in dayroom watching tv for some of shift. Will continue to monitor and provide support.       Goal: Interdisciplinary Rounds/Family Conference  Outcome: Ongoing (interventions implemented as appropriate)  Goal: Individualization and Mutuality  Outcome: Ongoing (interventions implemented as appropriate)  Goal: Discharge Needs Assessment  Outcome: Ongoing (interventions implemented as appropriate)    Problem:  Overarching Goals  Goal: Adheres to Safety Considerations for Self and Others  Outcome: Ongoing (interventions implemented as appropriate)  Intervention: Develop/maintain Individualized Safety Plan    10/05/17 1035 10/05/17 1400   Safety   Safety Measures --  safety rounds completed   Mental Health Homicide Risk   Homicidal Ideation other (see comments)  (unable to assess) --    Provide Emotional/Physical Safety   Suicidal Ideation other (see comments)  (unable to assess) --          Goal: Optimized Coping Skills in Response to Life Stressors  Outcome: Ongoing (interventions implemented as appropriate)  Intervention: Promote Effective Coping Strategies    10/05/17 1035   Coping Strategies   Supportive Measures active listening  utilized;self-responsibility promoted;verbalization of feelings encouraged;positive reinforcement provided;self-care encouraged         Goal: Develops/Participates in Therapeutic [place] to Support Successful Transition  Outcome: Ongoing (interventions implemented as appropriate)  Intervention: Foster Therapeutic [place]    10/05/17 1035   Coping Strategies   Trust Relationship/Rapport care explained;choices provided;emotional support provided;empathic listening provided;questions answered;questions encouraged;reassurance provided;thoughts/feelings acknowledged       Intervention: Mutually Develop Transition Plan    10/05/17 1035   OTHER   Transition Support crisis management plan promoted           Problem: Depression  Goal: Treatment Goal: Demonstrate behavioral control of depressive symptoms, verbalize feelings of improved mood/affect, and adopt new coping skills prior to discharge  Outcome: Ongoing (interventions implemented as appropriate)  Goal: Verbalize thoughts and feelings associated with:  Outcome: Ongoing (interventions implemented as appropriate)  Goal: Refrain from harming self  Outcome: Ongoing (interventions implemented as appropriate)  Goal: Refrain from isolation  Outcome: Ongoing (interventions implemented as appropriate)  Goal: Refrain from self-neglect  Outcome: Ongoing (interventions implemented as appropriate)  Goal: Attend and participate in unit activities, including therapeutic, recreational, and educational groups  Outcome: Ongoing (interventions implemented as appropriate)  Goal: Complete daily ADLs, including personal hygiene independently, as able  Outcome: Ongoing (interventions implemented as appropriate)    Problem: Risk for Violence/Aggression Toward Others  Goal: Treatment Goal: Refrain from acts of violence/aggression during length of stay, and demonstrate improved impulse control at the time of discharge  Outcome: Ongoing (interventions implemented as appropriate)  Goal:  Verbalize thoughts and feelings associated with:  Outcome: Ongoing (interventions implemented as appropriate)  Goal: Refrain from harming others  Outcome: Ongoing (interventions implemented as appropriate)  Goal: Refrain from destructive acts on the environment or property  Outcome: Ongoing (interventions implemented as appropriate)  Goal: Control angry outbursts  Outcome: Ongoing (interventions implemented as appropriate)  Goal: Attend and participate in unit activities, including therapeutic, recreational, and educational groups  Outcome: Ongoing (interventions implemented as appropriate)  Goal: Identify appropriate positive anger management techniques  Outcome: Ongoing (interventions implemented as appropriate)    Problem: Fall Risk (Adult)  Goal: Identify Related Risk Factors and Signs and Symptoms  Outcome: Ongoing (interventions implemented as appropriate)  Goal: Absence of Falls  Outcome: Ongoing (interventions implemented as appropriate)    10/05/17 1524   Fall Risk (Adult)   Absence of Falls achieves outcome  (pt did not fall this shift)         Problem: Risk for Self Injury/Neglect  Goal: Treatment Goal: Remain safe during length of stay, learn and adopt new coping skills, and be free of self-injurious ideation, impulses and acts at the time of discharge  Outcome: Ongoing (interventions implemented as appropriate)  Goal: Verbalize thoughts and feelings associated with:  Outcome: Ongoing (interventions implemented as appropriate)  Goal: Refrain from harming self  Outcome: Ongoing (interventions implemented as appropriate)  Goal: Attend and participate in unit activities, including therapeutic, recreational, and educational groups  Outcome: Ongoing (interventions implemented as appropriate)  Goal: Recognize maladaptive responses and adopt new coping mechanisms  Outcome: Ongoing (interventions implemented as appropriate)  Goal: Complete daily ADLs, including personal hygiene independently, as able  Outcome:  Ongoing (interventions implemented as appropriate)

## 2017-10-05 NOTE — PROGRESS NOTES
Patient is seen evaluate and chart reviewed.  Progress is discussed with staff.  The patient is seen for Dr. Patel.    Staff reports that the patient is irritable and isolative.  He was in appropriately cursing with nursing staff earlier today.    Vital signs are as follows: Blood pressure is 103/68 pulse is 83 temperature is 96.9 respirations are 16.    The patient is found laying in his bed he is disheveled and unkempt. He is easily awakened. He is alert only to person.   He is difficult to understand. He reports that he slept well last night.  He denies any overt suicidal ideation homicidal ideation or audiovisual hallucinations.  Thought processes are disorganized. Judgment insight is nil.    No medication side effects are noted or voiced.    The 72 hour hold expires today and the patient's court-appointed guardian will need to sign the patient in.  We will continue current medications therapy and inpatient treatment plan for safety and stabilization.

## 2017-10-05 NOTE — PLAN OF CARE
Problem:  Patient Care Overview (Adult)  Goal: Plan of Care Review  Outcome: Ongoing (interventions implemented as appropriate)    10/04/17 2130 10/05/17 0333   Coping/Psychosocial Response Interventions   Plan Of Care Reviewed With patient --    Coping/Psychosocial   Patient Agreement with Plan of Care refuses to participate --    Patient Care Overview   Progress --  no change   Outcome Evaluation   Outcome Summary/Follow up Plan --  Pt remained in room throughout shift. Unable to assess anxiety, depression, SI/HI, and hallucinations r/t confusion and incoherent speech. Cooperative and med compliant. Continue to monitor and assess.        Goal: Interdisciplinary Rounds/Family Conference  Outcome: Ongoing (interventions implemented as appropriate)  Goal: Individualization and Mutuality  Outcome: Ongoing (interventions implemented as appropriate)    10/03/17 0938   Behavioral Health Screens   Patient Personal Strengths stable living environment;family/social support   Individualization   Patient Specific Goals Long-term goal for patient to be compliant with medications and treatments without violence in 12 days.       Goal: Discharge Needs Assessment  Outcome: Ongoing (interventions implemented as appropriate)    Problem:  Overarching Goals  Goal: Adheres to Safety Considerations for Self and Others  Outcome: Ongoing (interventions implemented as appropriate)  Intervention: Develop/maintain Individualized Safety Plan    10/04/17 2130   Safety   Safety Measures safety rounds completed   Mental Health Homicide Risk   Homicidal Ideation other (see comments)  (unable to assess)   Provide Emotional/Physical Safety   Suicidal Ideation other (see comments)  (unable to assess)         Goal: Optimized Coping Skills in Response to Life Stressors  Outcome: Ongoing (interventions implemented as appropriate)  Intervention: Promote Effective Coping Strategies    10/04/17 2130   Coping Strategies   Supportive Measures active  listening utilized;self-care encouraged;self-reflection promoted;self-responsibility promoted;verbalization of feelings encouraged         Goal: Develops/Participates in Therapeutic [place] to Support Successful Transition  Outcome: Ongoing (interventions implemented as appropriate)  Intervention: Foster Therapeutic [place]    10/04/17 2130   Coping Strategies   Trust Relationship/Rapport care explained;choices provided;questions encouraged;reassurance provided;thoughts/feelings acknowledged       Intervention: Mutually Develop Transition Plan    10/04/17 2130   OTHER   Transition Support crisis management plan promoted

## 2017-10-05 NOTE — PROGRESS NOTES
Continued Stay Note  [facility]     Patient Name: Drew Day  MRN: [id number]  Today's Date: 10/5/2017    Admit Date: 10/2/2017          Discharge Plan       10/05/17 1403    Case Management/Social Work Plan    Additional Comments [facility] left a voicemail message for Padmini Michael, ph. [phone], ext [phone] to give an update on pt's current treatment and 14-day hold.  Advised to contact [facility].              Discharge Codes     None            YOUNG Lloyd

## 2017-10-06 RX ADMIN — DONEPEZIL HYDROCHLORIDE 10 MG: 10 TABLET, FILM COATED ORAL at 20:20

## 2017-10-06 RX ADMIN — PHENYTOIN 50 MG: 50 TABLET, CHEWABLE ORAL at 20:21

## 2017-10-06 RX ADMIN — DEXTROMETHORPHAN HYDROBROMIDE AND QUINIDINE SULFATE 1 CAPSULE: 20; 10 CAPSULE, GELATIN COATED ORAL at 09:11

## 2017-10-06 RX ADMIN — PHENYTOIN 50 MG: 50 TABLET, CHEWABLE ORAL at 12:19

## 2017-10-06 RX ADMIN — Medication 100 MG: at 09:12

## 2017-10-06 RX ADMIN — MULTIPLE VITAMINS W/ MINERALS TAB 1 TABLET: TAB at 09:12

## 2017-10-06 RX ADMIN — RIVAROXABAN 20 MG: 20 TABLET, FILM COATED ORAL at 17:16

## 2017-10-06 RX ADMIN — RISPERIDONE 1 MG: 1 TABLET, FILM COATED ORAL at 09:11

## 2017-10-06 RX ADMIN — PANTOPRAZOLE SODIUM 40 MG: 40 TABLET, DELAYED RELEASE ORAL at 09:12

## 2017-10-06 RX ADMIN — ZONISAMIDE 25 MG: 100 CAPSULE ORAL at 09:12

## 2017-10-06 RX ADMIN — PHENYTOIN 50 MG: 50 TABLET, CHEWABLE ORAL at 17:16

## 2017-10-06 RX ADMIN — ATORVASTATIN CALCIUM 40 MG: 20 TABLET, FILM COATED ORAL at 20:20

## 2017-10-06 RX ADMIN — PHENYTOIN 50 MG: 50 TABLET, CHEWABLE ORAL at 09:13

## 2017-10-06 RX ADMIN — TAMSULOSIN HYDROCHLORIDE 0.4 MG: 0.4 CAPSULE ORAL at 20:21

## 2017-10-06 RX ADMIN — RISPERIDONE 1 MG: 1 TABLET, FILM COATED ORAL at 20:20

## 2017-10-06 NOTE — PROGRESS NOTES
Patient is seen, evaluated, and chart reviewed. Discussed with staff.  Patient is seen for Dr. Patel.    Staff reports that patient is essentially unchanged.  He continues to isolate to room and excessively masturbate.  Staff reports that he slept well last night.    Vital Signs (last 24 hours)       10/05 0700  -  10/06 0659 10/06 0700  -  10/06 1127   Most Recent    Temp (°F) 97 -  98.1       98.1 (36.7)    Heart Rate 72 -  78       78    Resp 16 -  18       18    /65 -  112/74       112/74    SpO2 (%) 97 -  98       98            Hospital Medications (active)       Dose Frequency Start End    acetaminophen (TYLENOL) tablet 650 mg 650 mg Every 4 Hours PRN 10/2/2017     Sig - Route: Take 2 tablets by mouth Every 4 (Four) Hours As Needed for Mild Pain  or Moderate Pain  (severe pain (7-10)). - Oral    aluminum-magnesium hydroxide-simethicone (MAALOX MAX) 400-400-40 MG/5ML suspension 15 mL 15 mL Every 6 Hours PRN 10/2/2017     Sig - Route: Take 15 mL by mouth Every 6 (Six) Hours As Needed for Indigestion or Heartburn. - Oral    atorvastatin (LIPITOR) tablet 40 mg 40 mg Nightly 10/3/2017     Sig - Route: Take 2 tablets by mouth Every Night. - Oral    dextromethorphan-quinidine (NUEDEXTA) capsule 1 capsule 1 capsule Daily 10/3/2017     Sig - Route: Take 1 capsule by mouth Daily. - Oral    donepezil (ARICEPT) tablet 10 mg 10 mg Nightly 10/3/2017     Sig - Route: Take 1 tablet by mouth Every Night. - Oral    magnesium hydroxide (MILK OF MAGNESIA) suspension 2400 mg/10mL 10 mL 10 mL Daily PRN 10/2/2017     Sig - Route: Take 10 mL by mouth Daily As Needed for Constipation. - Oral    multivitamin with minerals 1 tablet 1 tablet Daily 10/3/2017     Sig - Route: Take 1 tablet by mouth Daily. - Oral    pantoprazole (PROTONIX) EC tablet 40 mg 40 mg Every Morning 10/3/2017     Sig - Route: Take 1 tablet by mouth Every Morning. - Oral    phenytoin (DILANTIN) chewable tablet 50 mg 50 mg 4 Times Daily 10/3/2017  10/13/2017    Sig - Route: Chew 1 tablet 4 (Four) Times a Day. - Oral    risperiDONE (risperDAL) tablet 1 mg 1 mg Every 12 Hours Scheduled 10/3/2017     Sig - Route: Take 1 tablet by mouth Every 12 (Twelve) Hours. - Oral    rivaroxaban (XARELTO) tablet 20 mg 20 mg Daily With Dinner 10/3/2017     Sig - Route: Take 1 tablet by mouth Daily With Dinner. - Oral    tamsulosin (FLOMAX) 24 hr capsule 0.4 mg 0.4 mg Nightly 10/3/2017     Sig - Route: Take 1 capsule by mouth Every Night. - Oral    thiamine (VITAMIN B-1) tablet 100 mg 100 mg Daily 10/3/2017     Sig - Route: Take 1 tablet by mouth Daily. - Oral    zonisamide (ZONEGRAN) capsule 100 mg 100 mg Daily 10/3/2017     Sig - Route: Take 4 capsules by mouth Daily. - Oral            On examination, patient is found sitting in a chair in the day room.  He generally has a paucity of speech but is able to state his name.  He is otherwise nonsensical.  Appearance is disheveled.  There there is no evidence of psychosis or response to internal stimuli.     No medication side effects.  Patient is provided with supportive therapy.    Continue current medications, therapy, and inpatient treatment plan for safety and stabilization.

## 2017-10-06 NOTE — PLAN OF CARE
Problem:  Patient Care Overview (Adult)  Goal: Plan of Care Review  Outcome: Ongoing (interventions implemented as appropriate)    10/05/17 2115 10/06/17 0258   Coping/Psychosocial Response Interventions   Plan Of Care Reviewed With patient --    Coping/Psychosocial   Patient Agreement with Plan of Care unable to participate --    Outcome Evaluation   Outcome Summary/Follow up Plan --  Pt stayed in room/in bed this shift, cooperative and compliant with medications, will continue to monitor changes in mood and behaviors, provide feedback and support.         Problem:  Overarching Goals  Goal: Adheres to Safety Considerations for Self and Others  Outcome: Ongoing (interventions implemented as appropriate)  Intervention: Develop/maintain Individualized Safety Plan    10/05/17 2115 10/06/17 0258   Provide Emotional/Physical Safety   Suicidal Ideation other (see comments) --    Willingness to Contact Staff Member if Feeling Like Hurting Self --  yes   Mental Health Homicide Risk   Homicidal Ideation other (see comments) --    Willingness to Contact Staff Member if Feeling Like Hurting Others --  yes   Safety   Safety Measures safety rounds completed;suicide assessment completed --          Goal: Optimized Coping Skills in Response to Life Stressors  Outcome: Ongoing (interventions implemented as appropriate)  Intervention: Promote Effective Coping Strategies    10/05/17 2115   Coping Strategies   Supportive Measures active listening utilized;verbalization of feelings encouraged         Goal: Develops/Participates in Therapeutic [place] to Support Successful Transition  Outcome: Ongoing (interventions implemented as appropriate)  Intervention: Foster Therapeutic [place]    10/05/17 2115   Coping Strategies   Trust Relationship/Rapport care explained;choices provided;emotional support provided;empathic listening provided;questions answered;questions encouraged;reassurance provided;thoughts/feelings acknowledged        Intervention: Mutually Develop Transition Plan    10/05/17 7189   OTHER   Transition Support crisis management plan promoted

## 2017-10-06 NOTE — PLAN OF CARE
Problem:  Patient Care Overview (Adult)  Goal: Plan of Care Review  Outcome: Ongoing (interventions implemented as appropriate)    10/05/17 0333 10/06/17 1014 10/06/17 1533   Coping/Psychosocial Response Interventions   Plan Of Care Reviewed With --  patient --    Coping/Psychosocial   Patient Agreement with Plan of Care --  unable to participate --    Patient Care Overview   Progress no change --  --    Outcome Evaluation   Outcome Summary/Follow up Plan --  --  Pt has been cooperative and compliant with medications. Pt has stayed in dayroom the majority of shift. Pt was sexually inappropriate in AM, masturbating in room with door open and hallway in view. Will continue to monitor pt for safety and any changes in condition.         Problem:  Overarching Goals  Goal: Adheres to Safety Considerations for Self and Others  Outcome: Ongoing (interventions implemented as appropriate)  Intervention: Develop/maintain Individualized Safety Plan    10/06/17 1014 10/06/17 1400   Provide Emotional/Physical Safety   Suicidal Ideation no --    Willingness to Contact Staff Member if Feeling Like Hurting Self yes --    Mental Health Homicide Risk   Homicidal Ideation no --    Willingness to Contact Staff Member if Feeling Like Hurting Others yes --    Safety   Safety Measures --  safety rounds completed         Goal: Optimized Coping Skills in Response to Life Stressors  Outcome: Ongoing (interventions implemented as appropriate)  Intervention: Promote Effective Coping Strategies    10/06/17 1014   Coping Strategies   Supportive Measures active listening utilized;verbalization of feelings encouraged         Goal: Develops/Participates in Therapeutic [place] to Support Successful Transition  Outcome: Ongoing (interventions implemented as appropriate)  Intervention: Foster Therapeutic [place]    10/06/17 1014   Coping Strategies   Trust Relationship/Rapport care explained;choices provided;questions encouraged        Intervention: Mutually Develop Transition Plan    10/06/17 1014   OTHER   Transition Support crisis management plan verbalized

## 2017-10-07 RX ADMIN — PHENYTOIN 50 MG: 50 TABLET, CHEWABLE ORAL at 10:31

## 2017-10-07 RX ADMIN — ATORVASTATIN CALCIUM 40 MG: 20 TABLET, FILM COATED ORAL at 20:31

## 2017-10-07 RX ADMIN — RISPERIDONE 1 MG: 1 TABLET, FILM COATED ORAL at 20:32

## 2017-10-07 RX ADMIN — RIVAROXABAN 20 MG: 20 TABLET, FILM COATED ORAL at 17:15

## 2017-10-07 RX ADMIN — PHENYTOIN 50 MG: 50 TABLET, CHEWABLE ORAL at 12:33

## 2017-10-07 RX ADMIN — PHENYTOIN 50 MG: 50 TABLET, CHEWABLE ORAL at 20:32

## 2017-10-07 RX ADMIN — DEXTROMETHORPHAN HYDROBROMIDE AND QUINIDINE SULFATE 1 CAPSULE: 20; 10 CAPSULE, GELATIN COATED ORAL at 10:32

## 2017-10-07 RX ADMIN — DONEPEZIL HYDROCHLORIDE 10 MG: 10 TABLET, FILM COATED ORAL at 20:32

## 2017-10-07 RX ADMIN — ZONISAMIDE 100 MG: 100 CAPSULE ORAL at 10:32

## 2017-10-07 RX ADMIN — Medication 100 MG: at 10:32

## 2017-10-07 RX ADMIN — TAMSULOSIN HYDROCHLORIDE 0.4 MG: 0.4 CAPSULE ORAL at 20:32

## 2017-10-07 RX ADMIN — MULTIPLE VITAMINS W/ MINERALS TAB 1 TABLET: TAB at 10:32

## 2017-10-07 RX ADMIN — RISPERIDONE 1 MG: 1 TABLET, FILM COATED ORAL at 10:32

## 2017-10-07 RX ADMIN — PHENYTOIN 50 MG: 50 TABLET, CHEWABLE ORAL at 17:15

## 2017-10-07 NOTE — PLAN OF CARE
Problem:  Patient Care Overview (Adult)  Goal: Plan of Care Review  Outcome: Ongoing (interventions implemented as appropriate)    10/07/17 0452   Coping/Psychosocial Response Interventions   Plan Of Care Reviewed With patient   Coping/Psychosocial   Patient Agreement with Plan of Care unable to participate   Outcome Evaluation   Outcome Summary/Follow up Plan Pt is relatively cooperative w/ care but participates only minimally in assessment questions, is largely non-verbal. Speech gabled and quiet when he does speak. He was incontinent of urine early this HS. He took all prescribed HS meds, crushed in ice cream. He has been sleeping well this shift.        Goal: Individualization and Mutuality  Outcome: Ongoing (interventions implemented as appropriate)    10/07/17 0452   Behavioral Health Screens   Patient Personal Strengths community support;family/social support         Problem:  Overarching Goals  Goal: Adheres to Safety Considerations for Self and Others  Outcome: Ongoing (interventions implemented as appropriate)  Intervention: Develop/maintain Individualized Safety Plan    10/07/17 0452   Provide Emotional/Physical Safety   Suicidal Ideation no   Mental Health Homicide Risk   Homicidal Ideation no   Safety   Safety Measures suicide check-in completed;safety rounds completed         10/07/17 0452   Provide Emotional/Physical Safety   Suicidal Ideation no   Mental Health Homicide Risk   Homicidal Ideation no   Safety   Safety Measures suicide check-in completed;safety rounds completed         Goal: Optimized Coping Skills in Response to Life Stressors  Intervention: Promote Effective Coping Strategies    10/07/17 0452   Coping Strategies   Supportive Measures verbalization of feelings encouraged

## 2017-10-07 NOTE — PROGRESS NOTES
Patient is seen, evaluated, and chart reviewed. Discussed with staff.  Patient is seen for Dr. Patel.    Staff reports that patient is essentially unchanged.  He slept throughout the night.    On examination, patient is found asleep in his bed. He is resting comfortable, in NAD.  Appearance is disheveled and unkempt.   No medication side effects noted.  Continue current medications, therapy, and inpatient treatment plan for safety and stabilization.

## 2017-10-07 NOTE — PLAN OF CARE
"Problem:  Patient Care Overview (Adult)  Goal: Plan of Care Review  Outcome: Ongoing (interventions implemented as appropriate)    10/07/17 1035 10/07/17 1539   Coping/Psychosocial Response Interventions   Plan Of Care Reviewed With patient --    Coping/Psychosocial   Patient Agreement with Plan of Care agrees with comment (describe);other (see comments)  (confused) --    Patient Care Overview   Progress --  no change   Outcome Evaluation   Outcome Summary/Follow up Plan --  Patient has been cooperative with medications crushed in chocolate ice cream. He is alert to self only. He has remained mostly to self in room, but has walked the hallway some. He mumbles to self while walking the rossi.  He has garbled and nonsensical speech at times with conversation. He did state that he felt \"alright\" on assessment. No self harm or harm toward others noted. Will continue to monitor and provide support.        Goal: Interdisciplinary Rounds/Family Conference  Outcome: Ongoing (interventions implemented as appropriate)  Goal: Individualization and Mutuality  Outcome: Ongoing (interventions implemented as appropriate)  Goal: Discharge Needs Assessment  Outcome: Ongoing (interventions implemented as appropriate)    Problem:  Overarching Goals  Goal: Adheres to Safety Considerations for Self and Others  Outcome: Ongoing (interventions implemented as appropriate)  Intervention: Develop/maintain Individualized Safety Plan    10/06/17 1014 10/07/17 1035   Provide Emotional/Physical Safety   Suicidal Ideation --  no  (confused, no self harm behaviors noted at this time )   Willingness to Contact Staff Member if Feeling Like Hurting Self yes --    Mental Health Homicide Risk   Homicidal Ideation --  no  (confused,no harm toward others noted at this time)   Willingness to Contact Staff Member if Feeling Like Hurting Others yes --    Safety   Safety Measures --  safety rounds completed;suicide assessment completed         Goal: " Optimized Coping Skills in Response to Life Stressors  Outcome: Ongoing (interventions implemented as appropriate)  Intervention: Promote Effective Coping Strategies    10/07/17 1035   Coping Strategies   Supportive Measures active listening utilized;positive reinforcement provided;verbalization of feelings encouraged         Goal: Develops/Participates in Therapeutic [place] to Support Successful Transition  Outcome: Ongoing (interventions implemented as appropriate)  Intervention: Foster Therapeutic [place]    10/07/17 1035   Coping Strategies   Trust Relationship/Rapport care explained;choices provided;emotional support provided;questions answered;empathic listening provided;questions encouraged;reassurance provided;thoughts/feelings acknowledged       Intervention: Mutually Develop Transition Plan    10/07/17 1035   OTHER   Transition Support crisis management plan promoted           Problem: Depression  Goal: Treatment Goal: Demonstrate behavioral control of depressive symptoms, verbalize feelings of improved mood/affect, and adopt new coping skills prior to discharge  Outcome: Ongoing (interventions implemented as appropriate)  Goal: Verbalize thoughts and feelings associated with:  Outcome: Ongoing (interventions implemented as appropriate)  Goal: Refrain from harming self  Outcome: Ongoing (interventions implemented as appropriate)  Goal: Refrain from isolation  Outcome: Ongoing (interventions implemented as appropriate)  Goal: Refrain from self-neglect  Outcome: Ongoing (interventions implemented as appropriate)  Goal: Attend and participate in unit activities, including therapeutic, recreational, and educational groups  Outcome: Ongoing (interventions implemented as appropriate)  Goal: Complete daily ADLs, including personal hygiene independently, as able  Outcome: Ongoing (interventions implemented as appropriate)    Problem: Risk for Violence/Aggression Toward Others  Goal: Treatment Goal: Refrain from  acts of violence/aggression during length of stay, and demonstrate improved impulse control at the time of discharge  Outcome: Ongoing (interventions implemented as appropriate)  Goal: Verbalize thoughts and feelings associated with:  Outcome: Ongoing (interventions implemented as appropriate)  Goal: Refrain from harming others  Outcome: Ongoing (interventions implemented as appropriate)  Goal: Refrain from destructive acts on the environment or property  Outcome: Ongoing (interventions implemented as appropriate)  Goal: Control angry outbursts  Outcome: Ongoing (interventions implemented as appropriate)  Goal: Attend and participate in unit activities, including therapeutic, recreational, and educational groups  Outcome: Ongoing (interventions implemented as appropriate)  Goal: Identify appropriate positive anger management techniques  Outcome: Ongoing (interventions implemented as appropriate)    Problem: Fall Risk (Adult)  Intervention: Monitor/Assist with Self Care    10/06/17 1014 10/07/17 1035 10/07/17 1539   Activity   Activity Type --  --  activity adjusted per tolerance   Activity Level of Assistance --  --  independent   Monitor/Assist with Self Care   Ambulation --  0-->independent --    Transferring --  0-->independent --    Toileting --  0-->independent --    Bathing --  2-->assistive person  (at times ) --    Dressing --  2-->assistive person  (at times ) --    Eating --  0-->independent --    Communication --  2-->difficulty understanding (not related to language barrier) --    Swallowing (if score 2 or more for any item, consult Rehab Services) 0-->swallows foods/liquids without difficulty --  --    Musculoskeletal Interventions   Self-Care Promotion independence encouraged --  --        Intervention: Reduce Risk/Promote Restraint Free Environment    10/07/17 1035 10/07/17 1500   Safety Interventions   Safety/Security Measures bed alarm set --    Environmental Safety Modification --  clutter free  environment maintained;assistive device/personal items within reach   Restraint Interventions   Safety Promotion/Fall Prevention safety round/check completed;nonskid shoes/slippers when out of bed;fall prevention program maintained --        Intervention: Review Medications/Identify Contributors to Fall Risk    10/07/17 1539   Safety Interventions   Medication Review/Management medications reviewed         Goal: Identify Related Risk Factors and Signs and Symptoms  Outcome: Ongoing (interventions implemented as appropriate)    10/07/17 1539   Fall Risk   Fall Risk: Related Risk Factors confusion/agitation;fatigue/slow reaction;impaired vision;other (see comments)  (pt has been steady on feet, requiring no assistance )       Goal: Absence of Falls  Outcome: Ongoing (interventions implemented as appropriate)    10/07/17 1539   Fall Risk (Adult)   Absence of Falls making progress toward outcome         Problem: Risk for Self Injury/Neglect  Goal: Treatment Goal: Remain safe during length of stay, learn and adopt new coping skills, and be free of self-injurious ideation, impulses and acts at the time of discharge  Outcome: Ongoing (interventions implemented as appropriate)  Goal: Verbalize thoughts and feelings associated with:  Outcome: Ongoing (interventions implemented as appropriate)  Goal: Refrain from harming self  Outcome: Ongoing (interventions implemented as appropriate)  Goal: Attend and participate in unit activities, including therapeutic, recreational, and educational groups  Outcome: Ongoing (interventions implemented as appropriate)  Goal: Recognize maladaptive responses and adopt new coping mechanisms  Outcome: Ongoing (interventions implemented as appropriate)  Goal: Complete daily ADLs, including personal hygiene independently, as able  Outcome: Ongoing (interventions implemented as appropriate)

## 2017-10-08 RX ADMIN — ZONISAMIDE 100 MG: 100 CAPSULE ORAL at 10:31

## 2017-10-08 RX ADMIN — RISPERIDONE 1 MG: 1 TABLET, FILM COATED ORAL at 20:22

## 2017-10-08 RX ADMIN — DEXTROMETHORPHAN HYDROBROMIDE AND QUINIDINE SULFATE 1 CAPSULE: 20; 10 CAPSULE, GELATIN COATED ORAL at 10:30

## 2017-10-08 RX ADMIN — Medication 100 MG: at 10:31

## 2017-10-08 RX ADMIN — PHENYTOIN 50 MG: 50 TABLET, CHEWABLE ORAL at 20:22

## 2017-10-08 RX ADMIN — PHENYTOIN 50 MG: 50 TABLET, CHEWABLE ORAL at 12:45

## 2017-10-08 RX ADMIN — RISPERIDONE 1 MG: 1 TABLET, FILM COATED ORAL at 10:31

## 2017-10-08 RX ADMIN — PHENYTOIN 50 MG: 50 TABLET, CHEWABLE ORAL at 17:25

## 2017-10-08 RX ADMIN — RIVAROXABAN 20 MG: 20 TABLET, FILM COATED ORAL at 17:26

## 2017-10-08 RX ADMIN — MULTIPLE VITAMINS W/ MINERALS TAB 1 TABLET: TAB at 10:31

## 2017-10-08 RX ADMIN — TAMSULOSIN HYDROCHLORIDE 0.4 MG: 0.4 CAPSULE ORAL at 20:22

## 2017-10-08 RX ADMIN — DONEPEZIL HYDROCHLORIDE 10 MG: 10 TABLET, FILM COATED ORAL at 20:22

## 2017-10-08 RX ADMIN — ATORVASTATIN CALCIUM 40 MG: 20 TABLET, FILM COATED ORAL at 20:22

## 2017-10-08 RX ADMIN — PHENYTOIN 50 MG: 50 TABLET, CHEWABLE ORAL at 10:31

## 2017-10-08 NOTE — PLAN OF CARE
"Problem:  Patient Care Overview (Adult)  Goal: Plan of Care Review  Outcome: Ongoing (interventions implemented as appropriate)    10/08/17 1228 10/08/17 1602   Coping/Psychosocial Response Interventions   Plan Of Care Reviewed With --  patient   Coping/Psychosocial   Patient Agreement with Plan of Care agrees with comment (describe);other (see comments)  (confusion ) --    Patient Care Overview   Progress --  no change   Outcome Evaluation   Outcome Summary/Follow up Plan --  Patient alert to self. He denied anxiety and depression on assessment stating he felt \"good\", but he has been irritable on-and-off through out this shift asking staff to \"leave me alone\" and \"get out\" of his room. He has been cooperative with medications crushed in chocolate ice cream. He did come out for lunch in the dining room otherwise he has stayed to self in room. Unable to fully assess SI/HI or hallucinations as he started to mumble and garble words the more he conversed.  There have not been any signs of self harm or harm towards others.  He does mumble/talk to himself while walking the rossi or sitting in his room.  Will continue to monitor and provide support.          Goal: Interdisciplinary Rounds/Family Conference  Outcome: Ongoing (interventions implemented as appropriate)  Goal: Individualization and Mutuality  Outcome: Ongoing (interventions implemented as appropriate)  Goal: Discharge Needs Assessment  Outcome: Ongoing (interventions implemented as appropriate)    Problem:  Overarching Goals  Goal: Adheres to Safety Considerations for Self and Others  Outcome: Ongoing (interventions implemented as appropriate)  Intervention: Develop/maintain Individualized Safety Plan    10/06/17 1014 10/08/17 1228 10/08/17 1500   Provide Emotional/Physical Safety   Suicidal Ideation --  other (see comments)  (diff to fully assess r/t garbled speech, no self harm behav.) --    Mental Health Homicide Risk   Homicidal Ideation --  other (see " comments)  (cant full assess r/t poor speech,no harm toward others noted) --    Willingness to Contact Staff Member if Feeling Like Hurting Others yes --  --    Safety   Safety Measures --  --  safety rounds completed         Goal: Optimized Coping Skills in Response to Life Stressors  Outcome: Ongoing (interventions implemented as appropriate)  Intervention: Promote Effective Coping Strategies    10/08/17 1228   Coping Strategies   Supportive Measures active listening utilized;positive reinforcement provided;verbalization of feelings encouraged         Goal: Develops/Participates in Therapeutic [place] to Support Successful Transition  Outcome: Ongoing (interventions implemented as appropriate)  Intervention: Foster Therapeutic [place]    10/08/17 1228   Coping Strategies   Trust Relationship/Rapport care explained;choices provided;emotional support provided;empathic listening provided;questions answered;questions encouraged;reassurance provided;thoughts/feelings acknowledged       Intervention: Mutually Develop Transition Plan    10/08/17 1228   OTHER   Transition Support crisis management plan promoted           Problem: Depression  Goal: Treatment Goal: Demonstrate behavioral control of depressive symptoms, verbalize feelings of improved mood/affect, and adopt new coping skills prior to discharge  Outcome: Ongoing (interventions implemented as appropriate)  Goal: Verbalize thoughts and feelings associated with:  Outcome: Ongoing (interventions implemented as appropriate)  Goal: Refrain from harming self  Outcome: Ongoing (interventions implemented as appropriate)  Goal: Refrain from isolation  Outcome: Ongoing (interventions implemented as appropriate)  Goal: Refrain from self-neglect  Outcome: Ongoing (interventions implemented as appropriate)  Goal: Attend and participate in unit activities, including therapeutic, recreational, and educational groups  Outcome: Ongoing (interventions implemented as  appropriate)  Goal: Complete daily ADLs, including personal hygiene independently, as able  Outcome: Ongoing (interventions implemented as appropriate)    Problem: Risk for Violence/Aggression Toward Others  Goal: Treatment Goal: Refrain from acts of violence/aggression during length of stay, and demonstrate improved impulse control at the time of discharge  Outcome: Ongoing (interventions implemented as appropriate)  Goal: Verbalize thoughts and feelings associated with:  Outcome: Ongoing (interventions implemented as appropriate)  Goal: Refrain from harming others  Outcome: Ongoing (interventions implemented as appropriate)  Goal: Refrain from destructive acts on the environment or property  Outcome: Ongoing (interventions implemented as appropriate)  Goal: Control angry outbursts  Outcome: Ongoing (interventions implemented as appropriate)  Goal: Attend and participate in unit activities, including therapeutic, recreational, and educational groups  Outcome: Ongoing (interventions implemented as appropriate)  Goal: Identify appropriate positive anger management techniques  Outcome: Ongoing (interventions implemented as appropriate)    Problem: Fall Risk (Adult)  Intervention: Monitor/Assist with Self Care    10/07/17 1539 10/07/17 2230 10/08/17 1228   Activity   Activity Type activity adjusted per tolerance --  --    Activity Level of Assistance --  --  independent;assistance, stand-by   Monitor/Assist with Self Care   Ambulation --  --  0-->independent   Transferring --  --  0-->independent   Toileting --  --  0-->independent   Bathing --  --  2-->assistive person  (however refuses assistance)   Dressing --  --  2-->assistive person  (however refuses assistance )   Eating --  --  0-->independent   Communication --  --  2-->difficulty understanding (not related to language barrier)   Swallowing (if score 2 or more for any item, consult Rehab Services) --  --  0-->swallows foods/liquids without difficulty    Musculoskeletal Interventions   Self-Care Promotion --  independence encouraged;BADL personal objects within reach;BADL personal routines maintained --        Intervention: Reduce Risk/Promote Restraint Free Environment    10/07/17 1035 10/08/17 1500 10/08/17 1559   Safety Interventions   Safety/Security Measures bed alarm set --  --    Environmental Safety Modification --  --  assistive device/personal items within reach;clutter free environment maintained   Restraint Interventions   Safety Promotion/Fall Prevention --  safety round/check completed;nonskid shoes/slippers when out of bed --        Intervention: Review Medications/Identify Contributors to Fall Risk    10/07/17 1539   Safety Interventions   Medication Review/Management medications reviewed         Goal: Identify Related Risk Factors and Signs and Symptoms  Outcome: Ongoing (interventions implemented as appropriate)    10/07/17 1539   Fall Risk   Fall Risk: Related Risk Factors confusion/agitation;fatigue/slow reaction;impaired vision;other (see comments)  (pt has been steady on feet, requiring no assistance )       Goal: Absence of Falls  Outcome: Ongoing (interventions implemented as appropriate)    10/08/17 1602   Fall Risk (Adult)   Absence of Falls making progress toward outcome         Problem: Risk for Self Injury/Neglect  Goal: Treatment Goal: Remain safe during length of stay, learn and adopt new coping skills, and be free of self-injurious ideation, impulses and acts at the time of discharge  Outcome: Ongoing (interventions implemented as appropriate)  Goal: Verbalize thoughts and feelings associated with:  Outcome: Ongoing (interventions implemented as appropriate)  Goal: Refrain from harming self  Outcome: Ongoing (interventions implemented as appropriate)  Goal: Attend and participate in unit activities, including therapeutic, recreational, and educational groups  Outcome: Ongoing (interventions implemented as appropriate)  Goal:  Recognize maladaptive responses and adopt new coping mechanisms  Outcome: Ongoing (interventions implemented as appropriate)  Goal: Complete daily ADLs, including personal hygiene independently, as able  Outcome: Ongoing (interventions implemented as appropriate)

## 2017-10-08 NOTE — PROGRESS NOTES
"Patient is seen, evaluated, and chart reviewed. Discussed with staff.  Patient is seen for Dr. Patel.    Staff reports that patient is more irritable and agitated today.  He slept well last night.  He has been compliant with meds.    On examination, patient is found eating lunch in the day room.  He has minimal speech.  Appearance is disheveled.  Mood is \"ok.\"  Affect congruent.  No SI/HI/AVH. Thought processes are disorganized.  Judgement and insight is very poor.    No medication side effects.  Patient is provided with supportive therapy.  Dr. Patel will resume care tomorrow.  Continue current medications, therapy, and inpatient treatment plan for safety and stabilization.       "

## 2017-10-08 NOTE — PLAN OF CARE
Problem:  Patient Care Overview (Adult)  Goal: Plan of Care Review  Outcome: Ongoing (interventions implemented as appropriate)    10/07/17 2230 10/08/17 0347   Coping/Psychosocial Response Interventions   Plan Of Care Reviewed With patient --    Coping/Psychosocial   Patient Agreement with Plan of Care unable to participate --    Patient Care Overview   Progress --  no change   Outcome Evaluation   Outcome Summary/Follow up Plan --  Unable to assess anxiety, depression, SI/HI, and hallucinations r/t confusion and incoherent speech. Pt stayed in bed throughout most of shift. Alert & oriented to self only. Cooperative and med compliant. Continue to monitor and assess.       Goal: Interdisciplinary Rounds/Family Conference  Outcome: Ongoing (interventions implemented as appropriate)  Goal: Individualization and Mutuality  Outcome: Ongoing (interventions implemented as appropriate)    10/03/17 0938 10/07/17 0452   Behavioral Health Screens   Patient Personal Strengths --  community support;family/social support   Individualization   Patient Specific Goals Long-term goal for patient to be compliant with medications and treatments without violence in 12 days. --        Goal: Discharge Needs Assessment  Outcome: Ongoing (interventions implemented as appropriate)    Problem:  Overarching Goals  Goal: Adheres to Safety Considerations for Self and Others  Outcome: Ongoing (interventions implemented as appropriate)  Intervention: Develop/maintain Individualized Safety Plan    10/06/17 1014 10/07/17 2230 10/08/17 0100   Provide Emotional/Physical Safety   Suicidal Ideation --  other (see comments)  (unable to assess) --    Willingness to Contact Staff Member if Feeling Like Hurting Self yes --  --    Mental Health Homicide Risk   Homicidal Ideation --  other (see comments)  (unable to assess) --    Willingness to Contact Staff Member if Feeling Like Hurting Others yes --  --    Safety   Safety Measures --  --  safety  rounds completed         Goal: Optimized Coping Skills in Response to Life Stressors  Outcome: Ongoing (interventions implemented as appropriate)  Intervention: Promote Effective Coping Strategies    10/07/17 2230   Coping Strategies   Supportive Measures active listening utilized;positive reinforcement provided;self-care encouraged;self-reflection promoted;self-responsibility promoted;verbalization of feelings encouraged         Goal: Develops/Participates in Therapeutic [place] to Support Successful Transition  Outcome: Ongoing (interventions implemented as appropriate)  Intervention: Foster Therapeutic [place]    10/07/17 2230   Coping Strategies   Trust Relationship/Rapport care explained;choices provided;questions encouraged;thoughts/feelings acknowledged       Intervention: Mutually Develop Transition Plan    10/07/17 2230   OTHER   Transition Support crisis management plan promoted

## 2017-10-09 RX ADMIN — TAMSULOSIN HYDROCHLORIDE 0.4 MG: 0.4 CAPSULE ORAL at 20:13

## 2017-10-09 RX ADMIN — RIVAROXABAN 20 MG: 20 TABLET, FILM COATED ORAL at 18:02

## 2017-10-09 RX ADMIN — PHENYTOIN 50 MG: 50 TABLET, CHEWABLE ORAL at 12:27

## 2017-10-09 RX ADMIN — ATORVASTATIN CALCIUM 40 MG: 20 TABLET, FILM COATED ORAL at 20:13

## 2017-10-09 RX ADMIN — DEXTROMETHORPHAN HYDROBROMIDE AND QUINIDINE SULFATE 1 CAPSULE: 20; 10 CAPSULE, GELATIN COATED ORAL at 20:13

## 2017-10-09 RX ADMIN — MULTIPLE VITAMINS W/ MINERALS TAB 1 TABLET: TAB at 09:46

## 2017-10-09 RX ADMIN — Medication 100 MG: at 09:46

## 2017-10-09 RX ADMIN — PHENYTOIN 50 MG: 50 TABLET, CHEWABLE ORAL at 20:13

## 2017-10-09 RX ADMIN — PHENYTOIN 50 MG: 50 TABLET, CHEWABLE ORAL at 09:44

## 2017-10-09 RX ADMIN — PANTOPRAZOLE SODIUM 40 MG: 40 TABLET, DELAYED RELEASE ORAL at 09:24

## 2017-10-09 RX ADMIN — PHENYTOIN 50 MG: 50 TABLET, CHEWABLE ORAL at 18:02

## 2017-10-09 RX ADMIN — DONEPEZIL HYDROCHLORIDE 10 MG: 10 TABLET, FILM COATED ORAL at 20:13

## 2017-10-09 RX ADMIN — RISPERIDONE 1 MG: 1 TABLET, FILM COATED ORAL at 09:45

## 2017-10-09 RX ADMIN — ZONISAMIDE 100 MG: 100 CAPSULE ORAL at 09:46

## 2017-10-09 RX ADMIN — DEXTROMETHORPHAN HYDROBROMIDE AND QUINIDINE SULFATE 1 CAPSULE: 20; 10 CAPSULE, GELATIN COATED ORAL at 09:46

## 2017-10-09 RX ADMIN — RISPERIDONE 1 MG: 1 TABLET, FILM COATED ORAL at 20:13

## 2017-10-09 NOTE — PLAN OF CARE
Problem:  Patient Care Overview (Adult)  Goal: Plan of Care Review  Outcome: Ongoing (interventions implemented as appropriate)    10/09/17 1100 10/09/17 1456   Coping/Psychosocial Response Interventions   Plan Of Care Reviewed With patient --    Coping/Psychosocial   Patient Agreement with Plan of Care agrees with comment (describe)  (pt very confused) --    Outcome Evaluation   Outcome Summary/Follow up Plan --  Pt was irritable throughout much of shift. Pt alternated between sleeping in pt room and sleeping on chair in dayroom. Unable to assess SI/HI, anxiety, and depression d/t confusion and limited verbal response. Continuing to give meds in chocolate ice cream or milk. Pt refuses help with self care and becomes verbally angry when offered help. Pt easily redirectable. Will continue to monitor and provide support.       Goal: Interdisciplinary Rounds/Family Conference  Outcome: Ongoing (interventions implemented as appropriate)  Goal: Individualization and Mutuality  Outcome: Ongoing (interventions implemented as appropriate)  Goal: Discharge Needs Assessment  Outcome: Ongoing (interventions implemented as appropriate)    Problem:  Overarching Goals  Goal: Adheres to Safety Considerations for Self and Others  Outcome: Ongoing (interventions implemented as appropriate)  Intervention: Develop/maintain Individualized Safety Plan    10/09/17 1100 10/09/17 1400   Provide Emotional/Physical Safety   Suicidal Ideation other (see comments)  (unable to assess d/t confusion; no self harm beh. noted) --    Mental Health Homicide Risk   Homicidal Ideation other (see comments)  (unable to assess d/t confusion; no other harm noted) --    Safety   Safety Measures --  safety rounds completed         Goal: Optimized Coping Skills in Response to Life Stressors  Outcome: Ongoing (interventions implemented as appropriate)  Intervention: Promote Effective Coping Strategies    10/09/17 1100   Coping Strategies   Supportive  Measures active listening utilized;verbalization of feelings encouraged;self-care encouraged         Goal: Develops/Participates in Therapeutic [place] to Support Successful Transition  Outcome: Ongoing (interventions implemented as appropriate)  Intervention: Foster Therapeutic [place]    10/09/17 1100   Coping Strategies   Trust Relationship/Rapport care explained;choices provided;emotional support provided;empathic listening provided;questions answered;questions encouraged;reassurance provided;thoughts/feelings acknowledged       Intervention: Mutually Develop Transition Plan    10/09/17 1100   OTHER   Transition Support crisis management plan promoted           Problem: Depression  Goal: Treatment Goal: Demonstrate behavioral control of depressive symptoms, verbalize feelings of improved mood/affect, and adopt new coping skills prior to discharge  Outcome: Ongoing (interventions implemented as appropriate)  Goal: Verbalize thoughts and feelings associated with:  Outcome: Ongoing (interventions implemented as appropriate)  Goal: Refrain from harming self  Outcome: Ongoing (interventions implemented as appropriate)  Goal: Refrain from isolation  Outcome: Ongoing (interventions implemented as appropriate)  Goal: Refrain from self-neglect  Outcome: Ongoing (interventions implemented as appropriate)  Goal: Attend and participate in unit activities, including therapeutic, recreational, and educational groups  Outcome: Ongoing (interventions implemented as appropriate)  Goal: Complete daily ADLs, including personal hygiene independently, as able  Outcome: Ongoing (interventions implemented as appropriate)    Problem: Risk for Violence/Aggression Toward Others  Goal: Treatment Goal: Refrain from acts of violence/aggression during length of stay, and demonstrate improved impulse control at the time of discharge  Outcome: Ongoing (interventions implemented as appropriate)  Goal: Verbalize thoughts and feelings  associated with:  Outcome: Ongoing (interventions implemented as appropriate)  Goal: Refrain from harming others  Outcome: Ongoing (interventions implemented as appropriate)  Goal: Refrain from destructive acts on the environment or property  Outcome: Ongoing (interventions implemented as appropriate)  Goal: Control angry outbursts  Outcome: Ongoing (interventions implemented as appropriate)  Goal: Attend and participate in unit activities, including therapeutic, recreational, and educational groups  Outcome: Ongoing (interventions implemented as appropriate)  Goal: Identify appropriate positive anger management techniques  Outcome: Ongoing (interventions implemented as appropriate)    Problem: Fall Risk (Adult)  Goal: Identify Related Risk Factors and Signs and Symptoms  Outcome: Ongoing (interventions implemented as appropriate)    10/09/17 1456   Fall Risk   Fall Risk: Related Risk Factors confusion/agitation;neuro disease/injury;fatigue/slow reaction  (pt is steady on feet; will continue to monitor)       Goal: Absence of Falls  Outcome: Ongoing (interventions implemented as appropriate)    10/09/17 1456   Fall Risk (Adult)   Absence of Falls achieves outcome  (pt did not fall this shift)         Problem: Risk for Self Injury/Neglect  Goal: Treatment Goal: Remain safe during length of stay, learn and adopt new coping skills, and be free of self-injurious ideation, impulses and acts at the time of discharge  Outcome: Ongoing (interventions implemented as appropriate)  Goal: Verbalize thoughts and feelings associated with:  Outcome: Ongoing (interventions implemented as appropriate)  Goal: Refrain from harming self  Outcome: Ongoing (interventions implemented as appropriate)  Goal: Attend and participate in unit activities, including therapeutic, recreational, and educational groups  Outcome: Ongoing (interventions implemented as appropriate)  Goal: Recognize maladaptive responses and adopt new coping  mechanisms  Outcome: Ongoing (interventions implemented as appropriate)  Goal: Complete daily ADLs, including personal hygiene independently, as able  Outcome: Ongoing (interventions implemented as appropriate)

## 2017-10-09 NOTE — PROGRESS NOTES
The patient is been no real management problem.  He has been compliant with medications and generally has been staying in his room.  He has very little to say to this physician today but offers no complaints.  I will go ahead and increase as Nuedexta to twice a day dosing, and should he sustaining progress we will look to return the patient to [facility] in the next few days.

## 2017-10-09 NOTE — PROGRESS NOTES
Continued Stay Note  [facility]     Patient Name: Drew Day  MRN: [id number]  Today's Date: 10/9/2017    Admit Date: 10/2/2017          Discharge Plan       10/09/17 1346    Case Management/Social Work Plan    Additional Comments SOFI spoke w/Aliyah with [facility], ph. [phone] to discuss pt's upcoming discharge.  SOFI informed her that pt will most likely be discharged mid-week.  Aliyah stated that pt will be able to return to his facility and that she would notifiy them of his discharge.              Discharge Codes     None            YOUNG Lloyd

## 2017-10-10 RX ORDER — RISPERIDONE 1 MG/1
2 TABLET ORAL EVERY 12 HOURS SCHEDULED
Status: DISCONTINUED | OUTPATIENT
Start: 2017-10-10 | End: 2017-10-13 | Stop reason: HOSPADM

## 2017-10-10 RX ADMIN — ZONISAMIDE 100 MG: 100 CAPSULE ORAL at 08:55

## 2017-10-10 RX ADMIN — DONEPEZIL HYDROCHLORIDE 10 MG: 10 TABLET, FILM COATED ORAL at 20:33

## 2017-10-10 RX ADMIN — ATORVASTATIN CALCIUM 40 MG: 20 TABLET, FILM COATED ORAL at 20:32

## 2017-10-10 RX ADMIN — PHENYTOIN 50 MG: 50 TABLET, CHEWABLE ORAL at 17:58

## 2017-10-10 RX ADMIN — PHENYTOIN 50 MG: 50 TABLET, CHEWABLE ORAL at 20:33

## 2017-10-10 RX ADMIN — RISPERIDONE 2 MG: 1 TABLET, FILM COATED ORAL at 20:34

## 2017-10-10 RX ADMIN — PANTOPRAZOLE SODIUM 40 MG: 40 TABLET, DELAYED RELEASE ORAL at 08:55

## 2017-10-10 RX ADMIN — PHENYTOIN 50 MG: 50 TABLET, CHEWABLE ORAL at 12:10

## 2017-10-10 RX ADMIN — TAMSULOSIN HYDROCHLORIDE 0.4 MG: 0.4 CAPSULE ORAL at 20:38

## 2017-10-10 RX ADMIN — MULTIPLE VITAMINS W/ MINERALS TAB 1 TABLET: TAB at 08:56

## 2017-10-10 RX ADMIN — RISPERIDONE 1 MG: 1 TABLET, FILM COATED ORAL at 08:56

## 2017-10-10 RX ADMIN — DEXTROMETHORPHAN HYDROBROMIDE AND QUINIDINE SULFATE 1 CAPSULE: 20; 10 CAPSULE, GELATIN COATED ORAL at 20:33

## 2017-10-10 RX ADMIN — Medication 100 MG: at 08:56

## 2017-10-10 RX ADMIN — RIVAROXABAN 20 MG: 20 TABLET, FILM COATED ORAL at 17:58

## 2017-10-10 RX ADMIN — DEXTROMETHORPHAN HYDROBROMIDE AND QUINIDINE SULFATE 1 CAPSULE: 20; 10 CAPSULE, GELATIN COATED ORAL at 08:56

## 2017-10-10 NOTE — PLAN OF CARE
"Problem:  Patient Care Overview (Adult)  Goal: Plan of Care Review  Outcome: Ongoing (interventions implemented as appropriate)    10/09/17 2000 10/10/17 0325   Coping/Psychosocial Response Interventions   Plan Of Care Reviewed With patient --    Coping/Psychosocial   Patient Agreement with Plan of Care other (see comments)  (pt confused, mumbles) --    Outcome Evaluation   Outcome Summary/Follow up Plan --  Pt remained irritable this shift, cursing at staff, shaking clinched fist, stated \"stay out of my f'ing house.\" went into an empty room and changed clothes, left dirty clothes in floor, able to be redirected, cooperative and compliant with medications, will continue to monitor changes in mood and behaviors, provide feedback and support.         Problem:  Overarching Goals  Goal: Adheres to Safety Considerations for Self and Others  Outcome: Ongoing (interventions implemented as appropriate)  Intervention: Develop/maintain Individualized Safety Plan    10/09/17 2000   Provide Emotional/Physical Safety   Suicidal Ideation other (see comments)   Mental Health Homicide Risk   Homicidal Ideation other (see comments)   Safety   Safety Measures safety rounds completed;suicide assessment completed         Goal: Optimized Coping Skills in Response to Life Stressors  Outcome: Ongoing (interventions implemented as appropriate)  Intervention: Promote Effective Coping Strategies    10/09/17 2000   Coping Strategies   Supportive Measures active listening utilized;verbalization of feelings encouraged         Goal: Develops/Participates in Therapeutic [place] to Support Successful Transition  Outcome: Ongoing (interventions implemented as appropriate)  Intervention: Foster Therapeutic [place]    10/09/17 2000   Coping Strategies   Trust Relationship/Rapport care explained;choices provided;emotional support provided;empathic listening provided;questions answered;questions encouraged;reassurance provided;thoughts/feelings " acknowledged       Intervention: Mutually Develop Transition Plan    10/09/17 2000   OTHER   Transition Support crisis management plan promoted

## 2017-10-10 NOTE — PLAN OF CARE
Problem:  Patient Care Overview (Adult)  Goal: Plan of Care Review  Outcome: Ongoing (interventions implemented as appropriate)    10/09/17 0346 10/10/17 0930 10/10/17 1606   Coping/Psychosocial Response Interventions   Plan Of Care Reviewed With --  patient --    Coping/Psychosocial   Patient Agreement with Plan of Care --  other (see comments)  (pt mumbles, hard to understand) --    Patient Care Overview   Progress no change --  --    Outcome Evaluation   Outcome Summary/Follow up Plan --  --  Pt has been calm and cooperative this shift. Pt is confused and mumbles and was unable to rate depression and anxiety. pt has stayed out in dayroom for most of shift. Risperdal was increased to help with previous agitation. Pt is compliant with medications. Will continue to monitor pt for safety and any change in condition.         Problem:  Overarching Goals  Goal: Adheres to Safety Considerations for Self and Others  Outcome: Ongoing (interventions implemented as appropriate)  Intervention: Develop/maintain Individualized Safety Plan    10/06/17 1014 10/10/17 0930 10/10/17 1600   Provide Emotional/Physical Safety   Suicidal Ideation --  other (see comments)  (pt is confused, mumbles) --    Willingness to Contact Staff Member if Feeling Like Hurting Self --  yes --    Mental Health Homicide Risk   Homicidal Ideation --  other (see comments) --    Willingness to Contact Staff Member if Feeling Like Hurting Others yes --  --    Safety   Safety Measures --  --  safety rounds completed         Goal: Optimized Coping Skills in Response to Life Stressors  Outcome: Ongoing (interventions implemented as appropriate)  Intervention: Promote Effective Coping Strategies    10/10/17 0930   Coping Strategies   Supportive Measures active listening utilized;verbalization of feelings encouraged         Goal: Develops/Participates in Therapeutic [place] to Support Successful Transition  Outcome: Ongoing (interventions implemented as  appropriate)  Intervention: Foster Therapeutic [place]    10/10/17 0930   Coping Strategies   Trust Relationship/Rapport care explained;thoughts/feelings acknowledged;reassurance provided;questions encouraged       Intervention: Mutually Develop Transition Plan    10/10/17 0930   OTHER   Transition Support crisis management plan promoted

## 2017-10-10 NOTE — PROGRESS NOTES
The patient is calm on approach today, sitting in a chair in the day room.  His verbal interaction remains minimal.  The patient has had episodes of ongoing irritability however.  He had cursed at staff and shook his fist at them last evening.  He was however redirectable.  We had planned return to [facility] tomorrow, but I think it best to hold on this planned discharge and we'll go ahead and increase the patient's Risperdal to 2 mg twice daily in hopes of addressing some of the problematic behaviors which are ongoing.

## 2017-10-11 RX ADMIN — RISPERIDONE 2 MG: 1 TABLET, FILM COATED ORAL at 13:03

## 2017-10-11 RX ADMIN — RISPERIDONE 2 MG: 1 TABLET, FILM COATED ORAL at 20:09

## 2017-10-11 RX ADMIN — ZONISAMIDE 100 MG: 100 CAPSULE ORAL at 08:41

## 2017-10-11 RX ADMIN — PHENYTOIN 50 MG: 50 TABLET, CHEWABLE ORAL at 20:09

## 2017-10-11 RX ADMIN — PANTOPRAZOLE SODIUM 40 MG: 40 TABLET, DELAYED RELEASE ORAL at 08:42

## 2017-10-11 RX ADMIN — PHENYTOIN 50 MG: 50 TABLET, CHEWABLE ORAL at 17:48

## 2017-10-11 RX ADMIN — PHENYTOIN 50 MG: 50 TABLET, CHEWABLE ORAL at 08:42

## 2017-10-11 RX ADMIN — PHENYTOIN 50 MG: 50 TABLET, CHEWABLE ORAL at 13:03

## 2017-10-11 RX ADMIN — TAMSULOSIN HYDROCHLORIDE 0.4 MG: 0.4 CAPSULE ORAL at 20:09

## 2017-10-11 RX ADMIN — Medication 100 MG: at 08:42

## 2017-10-11 RX ADMIN — RIVAROXABAN 20 MG: 20 TABLET, FILM COATED ORAL at 17:48

## 2017-10-11 RX ADMIN — ATORVASTATIN CALCIUM 40 MG: 20 TABLET, FILM COATED ORAL at 20:09

## 2017-10-11 RX ADMIN — MULTIPLE VITAMINS W/ MINERALS TAB 1 TABLET: TAB at 08:41

## 2017-10-11 RX ADMIN — DEXTROMETHORPHAN HYDROBROMIDE AND QUINIDINE SULFATE 1 CAPSULE: 20; 10 CAPSULE, GELATIN COATED ORAL at 08:42

## 2017-10-11 RX ADMIN — DEXTROMETHORPHAN HYDROBROMIDE AND QUINIDINE SULFATE 1 CAPSULE: 20; 10 CAPSULE, GELATIN COATED ORAL at 20:09

## 2017-10-11 RX ADMIN — DONEPEZIL HYDROCHLORIDE 10 MG: 10 TABLET, FILM COATED ORAL at 20:09

## 2017-10-11 NOTE — PLAN OF CARE
"Problem:  Patient Care Overview (Adult)  Goal: Plan of Care Review  Outcome: Ongoing (interventions implemented as appropriate)    10/09/17 0346 10/11/17 1040 10/11/17 1431   Coping/Psychosocial Response Interventions   Plan Of Care Reviewed With --  patient --    Coping/Psychosocial   Patient Agreement with Plan of Care --  agrees with comment (describe)  (pt is confused) --    Patient Care Overview   Progress no change --  --    Outcome Evaluation   Outcome Summary/Follow up Plan --  --  Pt has stayed isolated to room for majority of shift. Pt has slept off and on today, and refused lunch tray. Pt was compliant with medications. Pt was agitated in AM, cursing at staff and telling this nurse to \"shut up and go away\". Will continue to monitor pt for safety and any change in condition.         Problem:  Overarching Goals  Goal: Adheres to Safety Considerations for Self and Others  Outcome: Ongoing (interventions implemented as appropriate)  Intervention: Develop/maintain Individualized Safety Plan    10/11/17 1040 10/11/17 1400   Provide Emotional/Physical Safety   Suicidal Ideation other (see comments)  (pt confused, unable to verbalize) --    Willingness to Contact Staff Member if Feeling Like Hurting Self other (see comments)  (pt confused, unaable to verbalize) --    Mental Health Homicide Risk   Homicidal Ideation other (see comments)  (pt confused, unable to verbalize) --    Willingness to Contact Staff Member if Feeling Like Hurting Others other (see comments)  (pt confused, unable to verbalize) --    Safety   Safety Measures --  safety rounds completed         Goal: Optimized Coping Skills in Response to Life Stressors  Outcome: Ongoing (interventions implemented as appropriate)  Intervention: Promote Effective Coping Strategies    10/11/17 1040   Coping Strategies   Supportive Measures active listening utilized;verbalization of feelings encouraged         Goal: Develops/Participates in Therapeutic " [place] to Support Successful Transition  Outcome: Ongoing (interventions implemented as appropriate)  Intervention: Foster Therapeutic [place]    10/11/17 1040   Coping Strategies   Trust Relationship/Rapport care explained;reassurance provided;questions encouraged       Intervention: Mutually Develop Transition Plan    10/11/17 1040   OTHER   Transition Support crisis management plan verbalized         10/11/17 1040   OTHER   Transition Support crisis management plan verbalized

## 2017-10-11 NOTE — PROGRESS NOTES
The patient's behaviors remain occasionally problematic.  He does seem to be tolerating the increased dose of Risperdal well however and his behavior has certainly improved with initiation of Nuedexta and increased dose of Risperdal.  It is our hope to see a more general trend of improvement with less episodes of threatening behavior prior to any consideration of return to his previous facility.

## 2017-10-11 NOTE — PLAN OF CARE
Problem:  Patient Care Overview (Adult)  Goal: Plan of Care Review  Outcome: Ongoing (interventions implemented as appropriate)    10/11/17 0330   Outcome Evaluation   Outcome Summary/Follow up Plan Pt out in milieu most of beginning of shift watching TV, less irritable, remains confused and hard to understand, pt was trying to open double doors seperating unit, easily redirectable without frustration, Tolerated increased risperdal with no side effects/issues, went to bed on own with no problems, cooperative and compliant with medications (crushed in chocolate ice cream) will continue to monitor changes in mood and behaviors, provide feedback and support.         Problem:  Overarching Goals  Goal: Adheres to Safety Considerations for Self and Others  Outcome: Ongoing (interventions implemented as appropriate)  Intervention: Develop/maintain Individualized Safety Plan    10/06/17 1014 10/10/17 0930 10/10/17 2030   Provide Emotional/Physical Safety   Suicidal Ideation --  --  other (see comments)   Willingness to Contact Staff Member if Feeling Like Hurting Self --  yes --    Mental Health Homicide Risk   Homicidal Ideation --  --  other (see comments)   Willingness to Contact Staff Member if Feeling Like Hurting Others yes --  --    Safety   Safety Measures --  --  safety rounds completed;suicide assessment completed         Goal: Optimized Coping Skills in Response to Life Stressors  Outcome: Ongoing (interventions implemented as appropriate)  Intervention: Promote Effective Coping Strategies    10/10/17 2030   Coping Strategies   Supportive Measures active listening utilized;verbalization of feelings encouraged         Goal: Develops/Participates in Therapeutic [place] to Support Successful Transition  Outcome: Ongoing (interventions implemented as appropriate)  Intervention: Foster Therapeutic [place]    10/10/17 2030   Coping Strategies   Trust Relationship/Rapport care explained;choices provided;emotional  support provided;empathic listening provided;questions answered;questions encouraged;reassurance provided;thoughts/feelings acknowledged       Intervention: Mutually Develop Transition Plan    10/10/17 2030   OTHER   Transition Support crisis management plan promoted

## 2017-10-11 NOTE — PROGRESS NOTES
Continued Stay Note  [facility]     Patient Name: Drew Day  MRN: [id number]  Today's Date: 10/11/2017    Admit Date: 10/2/2017          Discharge Plan       10/11/17 1154    Case Management/Social Work Plan    Additional Comments SW spoke w/Aliyah w/[facility], ph. [phone] to inform her that pt will not be discharged today due to medication adjustments.  Informed her that pt will most likely be discharged by the weekend or early next week.  Aliyah stated that she will continue to follow pt's case. SOFI also called and spoke w/pt's sister Liliana, ph. [phone] to inform her that pt will be returning to [facility] in [place].  Also, informed her that pt has been aggressive w/Nurses and that the Doctor is adjusting his medication.              Discharge Codes     None            YOUNG Lloyd

## 2017-10-12 RX ADMIN — ZONISAMIDE 100 MG: 100 CAPSULE ORAL at 08:36

## 2017-10-12 RX ADMIN — RISPERIDONE 2 MG: 1 TABLET, FILM COATED ORAL at 08:36

## 2017-10-12 RX ADMIN — MULTIPLE VITAMINS W/ MINERALS TAB 1 TABLET: TAB at 08:36

## 2017-10-12 RX ADMIN — DEXTROMETHORPHAN HYDROBROMIDE AND QUINIDINE SULFATE 1 CAPSULE: 20; 10 CAPSULE, GELATIN COATED ORAL at 20:08

## 2017-10-12 RX ADMIN — DONEPEZIL HYDROCHLORIDE 10 MG: 10 TABLET, FILM COATED ORAL at 20:08

## 2017-10-12 RX ADMIN — RISPERIDONE 2 MG: 1 TABLET, FILM COATED ORAL at 20:08

## 2017-10-12 RX ADMIN — PHENYTOIN 50 MG: 50 TABLET, CHEWABLE ORAL at 20:08

## 2017-10-12 RX ADMIN — PHENYTOIN 50 MG: 50 TABLET, CHEWABLE ORAL at 13:14

## 2017-10-12 RX ADMIN — PANTOPRAZOLE SODIUM 40 MG: 40 TABLET, DELAYED RELEASE ORAL at 08:35

## 2017-10-12 RX ADMIN — PHENYTOIN 50 MG: 50 TABLET, CHEWABLE ORAL at 08:36

## 2017-10-12 RX ADMIN — Medication 100 MG: at 08:36

## 2017-10-12 RX ADMIN — TAMSULOSIN HYDROCHLORIDE 0.4 MG: 0.4 CAPSULE ORAL at 20:08

## 2017-10-12 RX ADMIN — ATORVASTATIN CALCIUM 40 MG: 20 TABLET, FILM COATED ORAL at 20:08

## 2017-10-12 RX ADMIN — RIVAROXABAN 20 MG: 20 TABLET, FILM COATED ORAL at 18:38

## 2017-10-12 RX ADMIN — DEXTROMETHORPHAN HYDROBROMIDE AND QUINIDINE SULFATE 1 CAPSULE: 20; 10 CAPSULE, GELATIN COATED ORAL at 08:36

## 2017-10-12 RX ADMIN — PHENYTOIN 50 MG: 50 TABLET, CHEWABLE ORAL at 18:38

## 2017-10-12 NOTE — PROGRESS NOTES
The patient is a bit groggy today, but staff reports no management issues.  He is quiet during attempted interview and does not respond to questions posed by this physician.  As his behavioral issues seem to be resolving, we may consider discharge as early as tomorrow.

## 2017-10-12 NOTE — PLAN OF CARE
Problem:  Patient Care Overview (Adult)  Goal: Plan of Care Review  Outcome: Ongoing (interventions implemented as appropriate)    10/11/17 2115 10/12/17 0406   Coping/Psychosocial Response Interventions   Plan Of Care Reviewed With patient --    Coping/Psychosocial   Patient Agreement with Plan of Care unable to participate --    Patient Care Overview   Progress --  no change   Outcome Evaluation   Outcome Summary/Follow up Plan --  Pt compliant with medication crushed in ice cream. Pt stayed in bed throughout entire shift. Continues with incoherent speech. Continue to monitor and assess.        Goal: Interdisciplinary Rounds/Family Conference  Outcome: Ongoing (interventions implemented as appropriate)  Goal: Individualization and Mutuality  Outcome: Ongoing (interventions implemented as appropriate)    10/03/17 0938 10/07/17 0452   Behavioral Health Screens   Patient Personal Strengths --  community support;family/social support   Individualization   Patient Specific Goals Long-term goal for patient to be compliant with medications and treatments without violence in 12 days. --        Goal: Discharge Needs Assessment  Outcome: Ongoing (interventions implemented as appropriate)    Problem:  Overarching Goals  Goal: Adheres to Safety Considerations for Self and Others  Outcome: Ongoing (interventions implemented as appropriate)  Intervention: Develop/maintain Individualized Safety Plan    10/11/17 1040 10/11/17 2115   Provide Emotional/Physical Safety   Suicidal Ideation --  other (see comments)  (unable to assess)   Willingness to Contact Staff Member if Feeling Like Hurting Self other (see comments)  (pt confused, unaable to verbalize) --    Mental Health Homicide Risk   Homicidal Ideation --  (unable to assess)   Willingness to Contact Staff Member if Feeling Like Hurting Others other (see comments)  (pt confused, unable to verbalize) --    Safety   Safety Measures --  safety rounds completed         Goal:  Optimized Coping Skills in Response to Life Stressors  Outcome: Ongoing (interventions implemented as appropriate)  Intervention: Promote Effective Coping Strategies    10/11/17 2115   Coping Strategies   Supportive Measures active listening utilized;positive reinforcement provided;self-care encouraged;self-reflection promoted;self-responsibility promoted;verbalization of feelings encouraged         Goal: Develops/Participates in Therapeutic [place] to Support Successful Transition  Outcome: Ongoing (interventions implemented as appropriate)  Intervention: Foster Therapeutic [place]    10/11/17 2115   Coping Strategies   Trust Relationship/Rapport care explained;choices provided;emotional support provided;questions encouraged;reassurance provided;thoughts/feelings acknowledged       Intervention: Mutually Develop Transition Plan    10/11/17 2115   OTHER   Transition Support crisis management plan promoted

## 2017-10-12 NOTE — PLAN OF CARE
Problem: BH Patient Care Overview (Adult)  Goal: Interdisciplinary Rounds/Family Conference  Outcome: Ongoing (interventions implemented as appropriate)    10/12/17 0936   Interdisciplinary Rounds/Family Conf   Summary Treatment team met to review pt's plan of care. Medications have been adjusted and pt is demonstrating complaince and appropriate behaviors. Plan to transfer back to [facility] within next 1-3 days.    Participants art therapy;;psychiatrist;social work      Patient/Guardian Signature: __________________________________             Psychiatrist Signature: ______________________________________             Therapist Signature: ________________________________________              Nurse Signature: ___________________________________________              Staff Signature: ____________________________________________             Staff Signature: ____________________________________________              Staff Signature: ____________________________________________              Staff Signature:                                                                                                      Goal: Individualization and Mutuality  Outcome: Ongoing (interventions implemented as appropriate)    10/12/17 0936   Behavioral Health Screens   Patient Personal Strengths community support;family/social support   Individualization   Patient Specific Goals Long term goal remains for pt to be compliant with medications and treatements without violence in 12 days.          Problem: Depression  Goal: Verbalize thoughts and feelings associated with:  Outcome: Ongoing (interventions implemented as appropriate)  Goal: Refrain from harming self  Outcome: Ongoing (interventions implemented as appropriate)  Goal: Refrain from isolation  Outcome: Ongoing (interventions implemented as appropriate)  Goal: Attend and participate in unit activities, including therapeutic, recreational, and educational  groups  Outcome: Ongoing (interventions implemented as appropriate)  Goal: Complete daily ADLs, including personal hygiene independently, as able  Outcome: Ongoing (interventions implemented as appropriate)    Problem: Risk for Violence/Aggression Toward Others  Goal: Verbalize thoughts and feelings associated with:  Outcome: Ongoing (interventions implemented as appropriate)  Goal: Refrain from harming others  Outcome: Ongoing (interventions implemented as appropriate)  Goal: Refrain from destructive acts on the environment or property  Outcome: Ongoing (interventions implemented as appropriate)  Goal: Control angry outbursts  Outcome: Ongoing (interventions implemented as appropriate)  Goal: Attend and participate in unit activities, including therapeutic, recreational, and educational groups  Outcome: Ongoing (interventions implemented as appropriate)  Goal: Identify appropriate positive anger management techniques  Outcome: Ongoing (interventions implemented as appropriate)    Problem: Risk for Self Injury/Neglect  Goal: Verbalize thoughts and feelings associated with:  Outcome: Ongoing (interventions implemented as appropriate)  Goal: Refrain from harming self  Outcome: Ongoing (interventions implemented as appropriate)  Goal: Attend and participate in unit activities, including therapeutic, recreational, and educational groups  Outcome: Ongoing (interventions implemented as appropriate)  Goal: Recognize maladaptive responses and adopt new coping mechanisms  Outcome: Ongoing (interventions implemented as appropriate)  Goal: Complete daily ADLs, including personal hygiene independently, as able  Outcome: Ongoing (interventions implemented as appropriate)

## 2017-10-12 NOTE — PLAN OF CARE
Problem: BH Patient Care Overview (Adult)  Goal: Plan of Care Review  Outcome: Ongoing (interventions implemented as appropriate)    10/12/17 6549   Coping/Psychosocial Response Interventions   Plan Of Care Reviewed With patient   Coping/Psychosocial   Patient Agreement with Plan of Care unable to participate   Patient Care Overview   Progress no change   Outcome Evaluation   Outcome Summary/Follow up Plan PT accepts medication chrushed in ice cream. Difficult to communicate with due to his confusion. Redirectable, likes to rest in bed. Unable to assess mental health issues. Will continue to monitor behavior and provide support .       Goal: Interdisciplinary Rounds/Family Conference  Outcome: Ongoing (interventions implemented as appropriate)  Goal: Individualization and Mutuality  Outcome: Ongoing (interventions implemented as appropriate)  Goal: Discharge Needs Assessment  Outcome: Ongoing (interventions implemented as appropriate)    Problem:  Overarching Goals  Goal: Adheres to Safety Considerations for Self and Others  Outcome: Ongoing (interventions implemented as appropriate)  Goal: Optimized Coping Skills in Response to Life Stressors  Outcome: Ongoing (interventions implemented as appropriate)  Goal: Develops/Participates in Therapeutic [place] to Support Successful Transition  Outcome: Ongoing (interventions implemented as appropriate)

## 2017-10-13 VITALS
RESPIRATION RATE: 18 BRPM | TEMPERATURE: 98.9 F | HEART RATE: 58 BPM | BODY MASS INDEX: 22.27 KG/M2 | OXYGEN SATURATION: 99 % | SYSTOLIC BLOOD PRESSURE: 135 MMHG | WEIGHT: 138 LBS | DIASTOLIC BLOOD PRESSURE: 76 MMHG

## 2017-10-13 RX ORDER — RISPERIDONE 2 MG/1
2 TABLET ORAL EVERY 12 HOURS SCHEDULED
Qty: 60 TABLET | Refills: 1 | Status: ON HOLD | OUTPATIENT
Start: 2017-10-13 | End: 2019-11-22 | Stop reason: DRUGHIGH

## 2017-10-13 RX ORDER — DONEPEZIL HYDROCHLORIDE 10 MG/1
10 TABLET, FILM COATED ORAL NIGHTLY
Qty: 30 TABLET | Refills: 1 | Status: SHIPPED | OUTPATIENT
Start: 2017-10-13 | End: 2021-01-01

## 2017-10-13 RX ORDER — ATORVASTATIN CALCIUM 40 MG/1
40 TABLET, FILM COATED ORAL NIGHTLY
Qty: 30 TABLET | Refills: 0 | Status: SHIPPED | OUTPATIENT
Start: 2017-10-13

## 2017-10-13 RX ADMIN — PANTOPRAZOLE SODIUM 40 MG: 40 TABLET, DELAYED RELEASE ORAL at 12:30

## 2017-10-13 RX ADMIN — ZONISAMIDE 100 MG: 100 CAPSULE ORAL at 12:29

## 2017-10-13 RX ADMIN — DEXTROMETHORPHAN HYDROBROMIDE AND QUINIDINE SULFATE 1 CAPSULE: 20; 10 CAPSULE, GELATIN COATED ORAL at 12:30

## 2017-10-13 RX ADMIN — Medication 100 MG: at 12:31

## 2017-10-13 RX ADMIN — PHENYTOIN 50 MG: 50 TABLET, CHEWABLE ORAL at 12:31

## 2017-10-13 RX ADMIN — MULTIPLE VITAMINS W/ MINERALS TAB 1 TABLET: TAB at 12:31

## 2017-10-13 RX ADMIN — RISPERIDONE 2 MG: 1 TABLET, FILM COATED ORAL at 12:31

## 2017-10-13 NOTE — PLAN OF CARE
Problem:  Patient Care Overview (Adult)  Goal: Plan of Care Review  Outcome: Ongoing (interventions implemented as appropriate)    10/13/17 0323   Coping/Psychosocial Response Interventions   Plan Of Care Reviewed With patient   Coping/Psychosocial   Patient Agreement with Plan of Care unable to participate   Patient Care Overview   Progress no change   Outcome Evaluation   Outcome Summary/Follow up Plan Pt remains confused and agitated at times. Pt is continuing to take medicatins crushed in ice cream and had been compliant. Unable to assess SI, HI, anxiety and depression d/t confusion. Pt is unable to answer assessment questions appropriately. Remains free from harm. Will continue to monitor.        Goal: Individualization and Mutuality  Outcome: Ongoing (interventions implemented as appropriate)    10/13/17 0323   Behavioral Health Screens   Patient Personal Strengths family/social support         Problem:  Overarching Goals  Goal: Adheres to Safety Considerations for Self and Others  Outcome: Ongoing (interventions implemented as appropriate)  Intervention: Develop/maintain Individualized Safety Plan    10/12/17 2100 10/13/17 0100   Provide Emotional/Physical Safety   Suicidal Ideation no  (Can't assess d/t confusion. Pt does not appear in distress) --    Willingness to Contact Staff Member if Feeling Like Hurting Self yes --    Mental Health Homicide Risk   Homicidal Ideation no  (Can't assess d/t confusion. Pt does not appear in distress) --    Willingness to Contact Staff Member if Feeling Like Hurting Others yes --    Safety   Safety Measures --  safety rounds completed         Goal: Optimized Coping Skills in Response to Life Stressors  Outcome: Ongoing (interventions implemented as appropriate)  Intervention: Promote Effective Coping Strategies    10/12/17 2100   Coping Strategies   Supportive Measures active listening utilized;verbalization of feelings encouraged         Goal: Develops/Participates in  Therapeutic [place] to Support Successful Transition  Outcome: Ongoing (interventions implemented as appropriate)  Intervention: Foster Therapeutic [place]    10/12/17 2100   Coping Strategies   Trust Relationship/Rapport thoughts/feelings acknowledged;care explained       Intervention: Mutually Develop Transition Plan    10/12/17 2100   OTHER   Transition Support crisis management plan promoted           Problem: Fall Risk (Adult)  Intervention: Monitor/Assist with Self Care    10/07/17 1539 10/12/17 2100   Activity   Activity Type activity adjusted per tolerance --    Activity Level of Assistance --  independent   Monitor/Assist with Self Care   Ambulation --  0-->independent   Transferring --  0-->independent   Toileting --  0-->independent   Bathing --  0-->independent   Dressing --  0-->independent   Eating --  0-->independent   Communication --  0-->understands/communicates without difficulty   Swallowing (if score 2 or more for any item, consult Rehab Services) --  0-->swallows foods/liquids without difficulty   Musculoskeletal Interventions   Self-Care Promotion --  independence encouraged       Intervention: Reduce Risk/Promote Restraint Free Environment    10/12/17 2100 10/13/17 [facility]   Safety Interventions   Safety/Security Measures bed alarm set --    Environmental Safety Modification --  assistive device/personal items within reach;clutter free environment maintained;room organization consistent   Restraint Interventions   Safety Promotion/Fall Prevention safety round/check completed --        Intervention: Review Medications/Identify Contributors to Fall Risk    10/13/17 0323   Safety Interventions   Medication Review/Management medications reviewed         Goal: Identify Related Risk Factors and Signs and Symptoms  Outcome: Ongoing (interventions implemented as appropriate)    10/13/17 0323   Fall Risk   Fall Risk: Related Risk Factors age-related changes;confusion/agitation;fatigue/slow reaction    Fall Risk: Signs and Symptoms presence of risk factors       Goal: Absence of Falls  Outcome: Ongoing (interventions implemented as appropriate)    10/13/17 0323   Fall Risk (Adult)   Absence of Falls making progress toward outcome

## 2017-10-13 NOTE — PROGRESS NOTES
[facility]    Physicians Statement of Medical Necessity for Ambulance Transportation    It is medically necessary for:    Patient Name: Drew Day    Insurance Information:  Medicare A & B, [place] Medicaid    To be transported by ambulance:    From [facility], [address] [place] [id number]    To (specify level of care if nursing facility): [address] [address]    Date of Service: 10/13/17    For dialysis patients state date dialysis began:     Diagnosis:     Past Medical/Surgical History:  Past Medical History:   Diagnosis Date   • Back pain    • Convulsion disorder    • Coronary artery disease    • Dementia    • Hyperlipidemia    • Hypertension    • Seizures       Past Surgical History:   Procedure Laterality Date   • CARDIAC CATHETERIZATION     • CORONARY ARTERY BYPASS GRAFT Left    • VASCULAR SURGERY Left         Current Objective Medical Evidence(including physical exam finding to support reason for limitations):      Other: Dementia with behavioral disturbances.    Physician Signature:           (RN,NP,PA,CAN, Discharge Planner) Date/Time:      Printed Name:    __________________________________    [facility]   Phone: [phone] Phone: [phone] Phone: [phone]   Fax: [phone] Fax: [phone] Fax: [phone]

## 2017-10-13 NOTE — PROGRESS NOTES
Continued Stay Note  [facility]     Patient Name: Drew Day  MRN: [id number]  Today's Date: 10/13/2017    Admit Date: 10/2/2017          Discharge Plan       10/13/17 1351    Final Note    Final Note Pt will be discharged per Dr. Patel's orders.  Pt will return to [facility].  SOFI contacted [facility], ph. [phone] to inform that he will be returning today.  SOFI also contacted pt's sister, Liliana Day, ph. [phone] to inform her that pt would be returning to [facility] today.  SOFI left a vm for Padmini Michael, APS worker, ph. [phone] ext. [phone]  informing her that pt would be returning today.  SOFI also faxed face sheet, AVS, discharge summary, and 30 day to [facility] at [phone].              Discharge Codes       10/13/17 1354    Discharge Codes    Discharge Codes 83  R- To skilled nursing facility        Expected Discharge Date and Time     Expected Discharge Date Expected Discharge Time    Oct 13, 2017             YOUNG Lloyd

## 2017-10-13 NOTE — DISCHARGE SUMMARY
DATES OF ADMISSION: 10/2/2017-10/13/2017    REASON FOR ADMISSION: The patient is a 61-year-old white male with a history of traumatic brain injury and dementia admitted from [facility] in [facility] with behavioral changes.    LABS:  See chart    HOSPITAL COURSE:  Patient was admitted to the crisis management unit and placed on Johnson 2 programming.  Suicide precautions were discontinued after 24 hours as the patient was not voicing any thoughts of suicide.  Given the patient's history of dramatic brain injury he was begun on Nuedexta 1020 with dosage titration to 2 times daily dosing.  He did continue to have some issues of mildly threatening behavior.  Accordingly, we'll restart all was increased to 2 mg twice daily, and Aricept titrated to 10 mg at bedtime.  With these medication changes, the patient seemed to respond well with less in the way of uncooperative and/or threatening behaviors.  Although he did not sleep prickly well on the evening of 1012 he otherwise had a good night and was felt to be at or near his psychiatric baseline.  Discharge was ordered.    FINAL DIAGNOSIS:  Primary dementia mixed with behavioral disturbance, history of traumatic brain injury, seizure disorder, benign prostatic hypertrophy, GERD, dyslipidemia    DISPOSITION ON DISCHARGE:  The patient is discharged to [facility] in [facility].  A full listing of his medications is provided below.    PROGNOSIS: Matthewed     ShayDrew   Home Medication Instructions ELIEZER:[id number]    Printed on:10/13/17 0936   Medication Information                      atorvastatin (LIPITOR) 40 MG tablet  Take 1 tablet by mouth Every Night.             dextromethorphan-quinidine (NUEDEXTA) 20-10 MG capsule capsule  Take 1 capsule by mouth Every 12 (Twelve) Hours.             donepezil (ARICEPT) 10 MG tablet  Take 1 tablet by mouth Every Night.             Multiple Vitamins-Minerals (MULTIVITAMIN ADULT) tablet  Take 1 tablet by  mouth daily.             omeprazole (PriLOSEC) 40 MG capsule  Take 1 capsule by mouth daily.             PHENYTOIN INFATABS 50 MG chewable tablet  Take 4 tablets bid             risperiDONE (risperDAL) 2 MG tablet  Take 1 tablet by mouth Every 12 (Twelve) Hours.             tamsulosin (FLOMAX) 0.4 MG capsule 24 hr capsule  Take 1 capsule by mouth every night.             thiamine (VITAMINE B-1) 100 MG tablet  Take 1 tablet by mouth daily.             vitamin B-12 (CYANOCOBALAMIN) 500 MCG tablet  Take 1 tablet by mouth 2 (two) times a day.             XARELTO 20 MG tablet  Take 1 tablet by mouth daily with dinner.             zonisamide (ZONEGRAN) 100 MG capsule  Take 1 capsule by mouth daily.

## 2017-10-16 ENCOUNTER — TELEPHONE (OUTPATIENT)
Dept: PSYCHIATRY | Facility: HOSPITAL | Age: 62
End: 2017-10-16

## 2019-11-22 ENCOUNTER — HOSPITAL ENCOUNTER (OUTPATIENT)
Facility: HOSPITAL | Age: 64
Setting detail: OBSERVATION
Discharge: NURSING, REHAB OR LONG-TERM CARE | End: 2019-11-23
Attending: EMERGENCY MEDICINE | Admitting: HOSPITALIST

## 2019-11-22 ENCOUNTER — APPOINTMENT (OUTPATIENT)
Dept: GENERAL RADIOLOGY | Facility: HOSPITAL | Age: 64
End: 2019-11-22

## 2019-11-22 DIAGNOSIS — K92.2 ACUTE UPPER GI BLEED: Primary | ICD-10-CM

## 2019-11-22 LAB
ABO GROUP BLD: NORMAL
ABO GROUP BLD: NORMAL
ALBUMIN SERPL-MCNC: 4 G/DL (ref 3.5–5.2)
ALBUMIN/GLOB SERPL: 1.3 G/DL
ALP SERPL-CCNC: 122 U/L (ref 39–117)
ALT SERPL W P-5'-P-CCNC: 15 U/L (ref 1–41)
AMYLASE SERPL-CCNC: 94 U/L (ref 28–100)
ANION GAP SERPL CALCULATED.3IONS-SCNC: 8.4 MMOL/L (ref 5–15)
APTT PPP: 27.7 SECONDS (ref 24.3–38.1)
AST SERPL-CCNC: 21 U/L (ref 1–40)
BASOPHILS # BLD AUTO: 0.01 10*3/MM3 (ref 0–0.2)
BASOPHILS NFR BLD AUTO: 0.1 % (ref 0–1.5)
BILIRUB SERPL-MCNC: 0.2 MG/DL (ref 0.2–1.2)
BLD GP AB SCN SERPL QL: NEGATIVE
BUN BLD-MCNC: 13 MG/DL (ref 8–23)
BUN/CREAT SERPL: 15.3 (ref 7–25)
CALCIUM SPEC-SCNC: 9 MG/DL (ref 8.6–10.5)
CHLORIDE SERPL-SCNC: 107 MMOL/L (ref 98–107)
CO2 SERPL-SCNC: 29.6 MMOL/L (ref 22–29)
CREAT BLD-MCNC: 0.85 MG/DL (ref 0.76–1.27)
DEPRECATED RDW RBC AUTO: 45.8 FL (ref 37–54)
EOSINOPHIL # BLD AUTO: 0.02 10*3/MM3 (ref 0–0.4)
EOSINOPHIL NFR BLD AUTO: 0.3 % (ref 0.3–6.2)
ERYTHROCYTE [DISTWIDTH] IN BLOOD BY AUTOMATED COUNT: 12.6 % (ref 12.3–15.4)
GFR SERPL CREATININE-BSD FRML MDRD: 90 ML/MIN/1.73
GLOBULIN UR ELPH-MCNC: 3.2 GM/DL
GLUCOSE BLD-MCNC: 99 MG/DL (ref 65–99)
HCT VFR BLD AUTO: 38.1 % (ref 37.5–51)
HCT VFR BLD AUTO: 43.4 % (ref 37.5–51)
HGB BLD-MCNC: 12.6 G/DL (ref 13–17.7)
HGB BLD-MCNC: 14.3 G/DL (ref 13–17.7)
IMM GRANULOCYTES # BLD AUTO: 0 10*3/MM3 (ref 0–0.05)
IMM GRANULOCYTES NFR BLD AUTO: 0 % (ref 0–0.5)
INR PPP: 1.11 (ref 0.9–1.1)
LIPASE SERPL-CCNC: 27 U/L (ref 13–60)
LYMPHOCYTES # BLD AUTO: 1.28 10*3/MM3 (ref 0.7–3.1)
LYMPHOCYTES NFR BLD AUTO: 16.2 % (ref 19.6–45.3)
MCH RBC QN AUTO: 32.4 PG (ref 26.6–33)
MCHC RBC AUTO-ENTMCNC: 32.9 G/DL (ref 31.5–35.7)
MCV RBC AUTO: 98.4 FL (ref 79–97)
MONOCYTES # BLD AUTO: 0.54 10*3/MM3 (ref 0.1–0.9)
MONOCYTES NFR BLD AUTO: 6.8 % (ref 5–12)
NEUTROPHILS # BLD AUTO: 6.04 10*3/MM3 (ref 1.7–7)
NEUTROPHILS NFR BLD AUTO: 76.6 % (ref 42.7–76)
PLATELET # BLD AUTO: 156 10*3/MM3 (ref 140–450)
PMV BLD AUTO: 9.9 FL (ref 6–12)
POTASSIUM BLD-SCNC: 4.3 MMOL/L (ref 3.5–5.2)
PROT SERPL-MCNC: 7.2 G/DL (ref 6–8.5)
PROTHROMBIN TIME: 14 SECONDS (ref 12.1–15)
RBC # BLD AUTO: 4.41 10*6/MM3 (ref 4.14–5.8)
RH BLD: POSITIVE
RH BLD: POSITIVE
SODIUM BLD-SCNC: 145 MMOL/L (ref 136–145)
T&S EXPIRATION DATE: NORMAL
WBC NRBC COR # BLD: 7.89 10*3/MM3 (ref 3.4–10.8)

## 2019-11-22 PROCEDURE — 86900 BLOOD TYPING SEROLOGIC ABO: CPT

## 2019-11-22 PROCEDURE — 83690 ASSAY OF LIPASE: CPT | Performed by: EMERGENCY MEDICINE

## 2019-11-22 PROCEDURE — 99284 EMERGENCY DEPT VISIT MOD MDM: CPT

## 2019-11-22 PROCEDURE — 96372 THER/PROPH/DIAG INJ SC/IM: CPT

## 2019-11-22 PROCEDURE — 85018 HEMOGLOBIN: CPT | Performed by: NURSE PRACTITIONER

## 2019-11-22 PROCEDURE — 25010000002 INFLUENZA VAC SUBUNIT QUAD 0.5 ML SUSPENSION PREFILLED SYRINGE: Performed by: HOSPITALIST

## 2019-11-22 PROCEDURE — G0378 HOSPITAL OBSERVATION PER HR: HCPCS

## 2019-11-22 PROCEDURE — 74022 RADEX COMPL AQT ABD SERIES: CPT

## 2019-11-22 PROCEDURE — 86900 BLOOD TYPING SEROLOGIC ABO: CPT | Performed by: EMERGENCY MEDICINE

## 2019-11-22 PROCEDURE — 86901 BLOOD TYPING SEROLOGIC RH(D): CPT | Performed by: EMERGENCY MEDICINE

## 2019-11-22 PROCEDURE — 96374 THER/PROPH/DIAG INJ IV PUSH: CPT

## 2019-11-22 PROCEDURE — 99284 EMERGENCY DEPT VISIT MOD MDM: CPT | Performed by: EMERGENCY MEDICINE

## 2019-11-22 PROCEDURE — 96375 TX/PRO/DX INJ NEW DRUG ADDON: CPT

## 2019-11-22 PROCEDURE — 99203 OFFICE O/P NEW LOW 30 MIN: CPT | Performed by: INTERNAL MEDICINE

## 2019-11-22 PROCEDURE — 85610 PROTHROMBIN TIME: CPT | Performed by: EMERGENCY MEDICINE

## 2019-11-22 PROCEDURE — 85025 COMPLETE CBC W/AUTO DIFF WBC: CPT | Performed by: EMERGENCY MEDICINE

## 2019-11-22 PROCEDURE — 25010000002 ONDANSETRON PER 1 MG: Performed by: EMERGENCY MEDICINE

## 2019-11-22 PROCEDURE — 96376 TX/PRO/DX INJ SAME DRUG ADON: CPT

## 2019-11-22 PROCEDURE — 86901 BLOOD TYPING SEROLOGIC RH(D): CPT

## 2019-11-22 PROCEDURE — 86850 RBC ANTIBODY SCREEN: CPT | Performed by: EMERGENCY MEDICINE

## 2019-11-22 PROCEDURE — 99220 PR INITIAL OBSERVATION CARE/DAY 70 MINUTES: CPT | Performed by: NURSE PRACTITIONER

## 2019-11-22 PROCEDURE — 96361 HYDRATE IV INFUSION ADD-ON: CPT

## 2019-11-22 PROCEDURE — 85730 THROMBOPLASTIN TIME PARTIAL: CPT | Performed by: EMERGENCY MEDICINE

## 2019-11-22 PROCEDURE — 25010000002 ENOXAPARIN PER 10 MG: Performed by: HOSPITALIST

## 2019-11-22 PROCEDURE — G0008 ADMIN INFLUENZA VIRUS VAC: HCPCS | Performed by: HOSPITALIST

## 2019-11-22 PROCEDURE — 80053 COMPREHEN METABOLIC PANEL: CPT | Performed by: EMERGENCY MEDICINE

## 2019-11-22 PROCEDURE — 85014 HEMATOCRIT: CPT | Performed by: NURSE PRACTITIONER

## 2019-11-22 PROCEDURE — 82150 ASSAY OF AMYLASE: CPT | Performed by: NURSE PRACTITIONER

## 2019-11-22 PROCEDURE — 90674 CCIIV4 VAC NO PRSV 0.5 ML IM: CPT | Performed by: HOSPITALIST

## 2019-11-22 RX ORDER — ONDANSETRON 4 MG/1
4 TABLET, FILM COATED ORAL EVERY 6 HOURS PRN
Status: DISCONTINUED | OUTPATIENT
Start: 2019-11-22 | End: 2019-11-23 | Stop reason: HOSPADM

## 2019-11-22 RX ORDER — ACETAMINOPHEN 160 MG/5ML
650 SOLUTION ORAL EVERY 4 HOURS PRN
Status: DISCONTINUED | OUTPATIENT
Start: 2019-11-22 | End: 2019-11-23 | Stop reason: HOSPADM

## 2019-11-22 RX ORDER — MEMANTINE HYDROCHLORIDE 5 MG/1
10 TABLET ORAL 2 TIMES DAILY
Status: DISCONTINUED | OUTPATIENT
Start: 2019-11-22 | End: 2019-11-23 | Stop reason: HOSPADM

## 2019-11-22 RX ORDER — SODIUM CHLORIDE 9 MG/ML
100 INJECTION, SOLUTION INTRAVENOUS CONTINUOUS
Status: DISCONTINUED | OUTPATIENT
Start: 2019-11-22 | End: 2019-11-23

## 2019-11-22 RX ORDER — SODIUM CHLORIDE 0.9 % (FLUSH) 0.9 %
10 SYRINGE (ML) INJECTION AS NEEDED
Status: DISCONTINUED | OUTPATIENT
Start: 2019-11-22 | End: 2019-11-23 | Stop reason: HOSPADM

## 2019-11-22 RX ORDER — WATER / MINERAL OIL / WHITE PETROLATUM 16 OZ
CREAM TOPICAL
Status: DISCONTINUED | OUTPATIENT
Start: 2019-11-22 | End: 2019-11-23 | Stop reason: HOSPADM

## 2019-11-22 RX ORDER — MEMANTINE HYDROCHLORIDE 10 MG/1
10 TABLET ORAL 2 TIMES DAILY
COMMUNITY
End: 2021-01-01

## 2019-11-22 RX ORDER — SODIUM CHLORIDE 9 MG/ML
40 INJECTION, SOLUTION INTRAVENOUS AS NEEDED
Status: DISCONTINUED | OUTPATIENT
Start: 2019-11-22 | End: 2019-11-23 | Stop reason: HOSPADM

## 2019-11-22 RX ORDER — ACETAMINOPHEN 325 MG/1
650 TABLET ORAL EVERY 6 HOURS PRN
Status: DISCONTINUED | OUTPATIENT
Start: 2019-11-22 | End: 2019-11-23 | Stop reason: HOSPADM

## 2019-11-22 RX ORDER — CHOLECALCIFEROL (VITAMIN D3) 125 MCG
5 CAPSULE ORAL NIGHTLY PRN
Status: DISCONTINUED | OUTPATIENT
Start: 2019-11-22 | End: 2019-11-23 | Stop reason: HOSPADM

## 2019-11-22 RX ORDER — IPRATROPIUM BROMIDE AND ALBUTEROL SULFATE 2.5; .5 MG/3ML; MG/3ML
3 SOLUTION RESPIRATORY (INHALATION)
Status: DISCONTINUED | OUTPATIENT
Start: 2019-11-22 | End: 2019-11-22

## 2019-11-22 RX ORDER — ACETAMINOPHEN 325 MG/1
650 TABLET ORAL EVERY 6 HOURS PRN
COMMUNITY

## 2019-11-22 RX ORDER — RISPERIDONE 1 MG/1
1 TABLET ORAL EVERY 12 HOURS SCHEDULED
Status: DISCONTINUED | OUTPATIENT
Start: 2019-11-22 | End: 2019-11-23 | Stop reason: HOSPADM

## 2019-11-22 RX ORDER — LORAZEPAM 0.5 MG/1
0.25 TABLET ORAL 2 TIMES DAILY
Status: DISCONTINUED | OUTPATIENT
Start: 2019-11-22 | End: 2019-11-23 | Stop reason: HOSPADM

## 2019-11-22 RX ORDER — ACETAMINOPHEN 325 MG/1
650 TABLET ORAL EVERY 4 HOURS PRN
Status: DISCONTINUED | OUTPATIENT
Start: 2019-11-22 | End: 2019-11-23 | Stop reason: HOSPADM

## 2019-11-22 RX ORDER — THIAMINE MONONITRATE (VIT B1) 100 MG
100 TABLET ORAL DAILY
Status: DISCONTINUED | OUTPATIENT
Start: 2019-11-22 | End: 2019-11-23 | Stop reason: HOSPADM

## 2019-11-22 RX ORDER — ONDANSETRON 2 MG/ML
4 INJECTION INTRAMUSCULAR; INTRAVENOUS EVERY 6 HOURS PRN
Status: DISCONTINUED | OUTPATIENT
Start: 2019-11-22 | End: 2019-11-23 | Stop reason: HOSPADM

## 2019-11-22 RX ORDER — ACETAMINOPHEN 650 MG/1
650 SUPPOSITORY RECTAL EVERY 4 HOURS PRN
Status: DISCONTINUED | OUTPATIENT
Start: 2019-11-22 | End: 2019-11-23 | Stop reason: HOSPADM

## 2019-11-22 RX ORDER — ATORVASTATIN CALCIUM 40 MG/1
40 TABLET, FILM COATED ORAL NIGHTLY
Status: DISCONTINUED | OUTPATIENT
Start: 2019-11-22 | End: 2019-11-23 | Stop reason: HOSPADM

## 2019-11-22 RX ORDER — BENZTROPINE MESYLATE 0.5 MG/1
0.5 TABLET ORAL 2 TIMES DAILY
COMMUNITY

## 2019-11-22 RX ORDER — LORAZEPAM 0.5 MG/1
0.25 TABLET ORAL 2 TIMES DAILY
Status: ON HOLD | COMMUNITY
End: 2021-01-01 | Stop reason: SDUPTHER

## 2019-11-22 RX ORDER — DONEPEZIL HYDROCHLORIDE 5 MG/1
10 TABLET, FILM COATED ORAL NIGHTLY
Status: DISCONTINUED | OUTPATIENT
Start: 2019-11-22 | End: 2019-11-23 | Stop reason: HOSPADM

## 2019-11-22 RX ORDER — CALCIUM CARBONATE 200(500)MG
1 TABLET,CHEWABLE ORAL 2 TIMES DAILY
COMMUNITY

## 2019-11-22 RX ORDER — IPRATROPIUM BROMIDE AND ALBUTEROL SULFATE 2.5; .5 MG/3ML; MG/3ML
3 SOLUTION RESPIRATORY (INHALATION) EVERY 4 HOURS PRN
Status: DISCONTINUED | OUTPATIENT
Start: 2019-11-22 | End: 2019-11-23 | Stop reason: HOSPADM

## 2019-11-22 RX ORDER — PHENYTOIN 125 MG/5ML
300 SUSPENSION ORAL 2 TIMES DAILY
Status: DISCONTINUED | OUTPATIENT
Start: 2019-11-22 | End: 2019-11-23 | Stop reason: HOSPADM

## 2019-11-22 RX ORDER — CALCIUM CARBONATE 200(500)MG
1 TABLET,CHEWABLE ORAL 2 TIMES DAILY
Status: DISCONTINUED | OUTPATIENT
Start: 2019-11-22 | End: 2019-11-23 | Stop reason: HOSPADM

## 2019-11-22 RX ORDER — TAMSULOSIN HYDROCHLORIDE 0.4 MG/1
0.4 CAPSULE ORAL NIGHTLY
Status: DISCONTINUED | OUTPATIENT
Start: 2019-11-22 | End: 2019-11-23 | Stop reason: HOSPADM

## 2019-11-22 RX ORDER — ZONISAMIDE 100 MG/1
100 CAPSULE ORAL DAILY
Status: DISCONTINUED | OUTPATIENT
Start: 2019-11-22 | End: 2019-11-23 | Stop reason: HOSPADM

## 2019-11-22 RX ORDER — RANITIDINE 150 MG/1
150 TABLET ORAL DAILY
COMMUNITY
End: 2021-01-01 | Stop reason: HOSPADM

## 2019-11-22 RX ORDER — RISPERIDONE 0.5 MG/1
0.5 TABLET ORAL 2 TIMES DAILY
COMMUNITY

## 2019-11-22 RX ORDER — ONDANSETRON 2 MG/ML
8 INJECTION INTRAMUSCULAR; INTRAVENOUS ONCE
Status: COMPLETED | OUTPATIENT
Start: 2019-11-22 | End: 2019-11-22

## 2019-11-22 RX ORDER — SODIUM CHLORIDE 0.9 % (FLUSH) 0.9 %
10 SYRINGE (ML) INJECTION EVERY 12 HOURS SCHEDULED
Status: DISCONTINUED | OUTPATIENT
Start: 2019-11-22 | End: 2019-11-23 | Stop reason: HOSPADM

## 2019-11-22 RX ORDER — PANTOPRAZOLE SODIUM 40 MG/10ML
40 INJECTION, POWDER, LYOPHILIZED, FOR SOLUTION INTRAVENOUS
Status: DISCONTINUED | OUTPATIENT
Start: 2019-11-22 | End: 2019-11-23 | Stop reason: HOSPADM

## 2019-11-22 RX ORDER — PANTOPRAZOLE SODIUM 40 MG/10ML
80 INJECTION, POWDER, LYOPHILIZED, FOR SOLUTION INTRAVENOUS ONCE
Status: COMPLETED | OUTPATIENT
Start: 2019-11-22 | End: 2019-11-22

## 2019-11-22 RX ORDER — BENZTROPINE MESYLATE 1 MG/1
0.5 TABLET ORAL 2 TIMES DAILY
Status: DISCONTINUED | OUTPATIENT
Start: 2019-11-22 | End: 2019-11-23 | Stop reason: HOSPADM

## 2019-11-22 RX ADMIN — Medication 100 MG: at 13:26

## 2019-11-22 RX ADMIN — SODIUM CHLORIDE 100 ML/HR: 9 INJECTION, SOLUTION INTRAVENOUS at 12:20

## 2019-11-22 RX ADMIN — MEMANTINE 10 MG: 5 TABLET ORAL at 21:42

## 2019-11-22 RX ADMIN — SODIUM CHLORIDE 100 ML/HR: 9 INJECTION, SOLUTION INTRAVENOUS at 22:42

## 2019-11-22 RX ADMIN — ONDANSETRON 8 MG: 2 INJECTION, SOLUTION INTRAMUSCULAR; INTRAVENOUS at 07:03

## 2019-11-22 RX ADMIN — RISPERIDONE 1 MG: 1 TABLET ORAL at 12:22

## 2019-11-22 RX ADMIN — INFLUENZA A VIRUS A/IDAHO/07/2018 (H1N1) ANTIGEN (MDCK CELL DERIVED, PROPIOLACTONE INACTIVATED, INFLUENZA A VIRUS A/INDIANA/08/2018 (H3N2) ANTIGEN (MDCK CELL DERIVED, PROPIOLACTONE INACTIVATED), INFLUENZA B VIRUS B/SINGAPORE/INFTT-16-0610/2016 ANTIGEN (MDCK CELL DERIVED, PROPIOLACTONE INACTIVATED), INFLUENZA B VIRUS B/IOWA/06/2017 ANTIGEN (MDCK CELL DERIVED, PROPIOLACTONE INACTIVATED) 0.5 ML: 15; 15; 15; 15 INJECTION, SUSPENSION INTRAMUSCULAR at 18:04

## 2019-11-22 RX ADMIN — PHENYTOIN 300 MG: 125 SUSPENSION ORAL at 21:35

## 2019-11-22 RX ADMIN — BENZTROPINE MESYLATE 0.5 MG: 1 TABLET ORAL at 21:42

## 2019-11-22 RX ADMIN — PANTOPRAZOLE SODIUM 80 MG: 40 INJECTION, POWDER, FOR SOLUTION INTRAVENOUS at 07:52

## 2019-11-22 RX ADMIN — SODIUM CHLORIDE 250 ML/HR: 9 INJECTION, SOLUTION INTRAVENOUS at 07:03

## 2019-11-22 RX ADMIN — BENZTROPINE MESYLATE 0.5 MG: 1 TABLET ORAL at 13:27

## 2019-11-22 RX ADMIN — MEMANTINE 10 MG: 5 TABLET ORAL at 12:22

## 2019-11-22 RX ADMIN — ATORVASTATIN CALCIUM 40 MG: 40 TABLET, FILM COATED ORAL at 21:42

## 2019-11-22 RX ADMIN — PANTOPRAZOLE SODIUM 40 MG: 40 INJECTION, POWDER, FOR SOLUTION INTRAVENOUS at 10:12

## 2019-11-22 RX ADMIN — ENOXAPARIN SODIUM 40 MG: 40 INJECTION SUBCUTANEOUS at 12:23

## 2019-11-22 RX ADMIN — SODIUM CHLORIDE 250 ML/HR: 9 INJECTION, SOLUTION INTRAVENOUS at 08:23

## 2019-11-22 RX ADMIN — ANTACID TABLETS 1 TABLET: 500 TABLET, CHEWABLE ORAL at 21:42

## 2019-11-22 RX ADMIN — TAMSULOSIN HYDROCHLORIDE 0.4 MG: 0.4 CAPSULE ORAL at 21:42

## 2019-11-22 RX ADMIN — PHENYTOIN 300 MG: 125 SUSPENSION ORAL at 12:23

## 2019-11-22 RX ADMIN — LORAZEPAM 0.25 MG: 0.5 TABLET ORAL at 21:47

## 2019-11-22 RX ADMIN — ZONISAMIDE 100 MG: 100 CAPSULE ORAL at 13:26

## 2019-11-22 RX ADMIN — RISPERIDONE 1 MG: 1 TABLET ORAL at 21:42

## 2019-11-22 RX ADMIN — ANTACID TABLETS 1 TABLET: 500 TABLET, CHEWABLE ORAL at 12:21

## 2019-11-22 RX ADMIN — LORAZEPAM 0.25 MG: 0.5 TABLET ORAL at 12:26

## 2019-11-22 RX ADMIN — Medication: at 12:26

## 2019-11-22 RX ADMIN — DONEPEZIL HYDROCHLORIDE 10 MG: 5 TABLET, FILM COATED ORAL at 21:42

## 2019-11-22 NOTE — PLAN OF CARE
Problem: Fall Risk (Adult)  Goal: Identify Related Risk Factors and Signs and Symptoms  Outcome: Ongoing (interventions implemented as appropriate)    Goal: Absence of Fall  Outcome: Ongoing (interventions implemented as appropriate)    Goal: Identify Related Risk Factors and Signs and Symptoms  Outcome: Ongoing (interventions implemented as appropriate)    Goal: Absence of Fall  Outcome: Ongoing (interventions implemented as appropriate)      Problem: Patient Care Overview  Goal: Plan of Care Review  Outcome: Ongoing (interventions implemented as appropriate)      Problem: Gastrointestinal Bleeding (Adult)  Goal: Signs and Symptoms of Listed Potential Problems Will be Absent, Minimized or Managed (Gastrointestinal Bleeding)  Outcome: Ongoing (interventions implemented as appropriate)      Problem: Skin Injury Risk (Adult)  Goal: Identify Related Risk Factors and Signs and Symptoms  Outcome: Ongoing (interventions implemented as appropriate)    Goal: Skin Health and Integrity  Outcome: Ongoing (interventions implemented as appropriate)

## 2019-11-22 NOTE — ED NOTES
Pt presents to ED via EMS from [facility] with nurse c/o dark stools and coffee ground emesis. Pt was found this AM in bed with soaked sheets with dark tarry stools. Pt is non verbal, which is his baseline. Pt unable to cooperate with exam. Dark stool noted on rectum. Pt rests comfortably on ed stretcher.      Criselda Sheriff, RN  11/22/19 0637

## 2019-11-22 NOTE — PROGRESS NOTES
Dr. Nj's note reviewed. Repeat H&H now, if no change, resume regular diet, monitor overnight then anticipate d/c tomorrow

## 2019-11-22 NOTE — ED PROVIDER NOTES
"Subjective     History provided by:  Patient, nursing home and medical records    History of Present Illness    · Chief complaint: Dark stool and emesis that is heme positive    · Location: Occurred at the nursing home.    · Quality/Severity: Patient was found this morning at the nursing home covered in emesis and stool that was very dark and heme positive.  The emesis was described as coffee-ground.    · Timing/Onset: Patient found this way a couple hours ago    · Modifying Factors: None known.    · Associated symptoms: None known as the patient is averbal.    · Narrative: The patient is a 63-year-old white male with a history of dementia and traumatic brain injury who is nonverbal and a resident of the [facility] in [facility].  This morning the patient was found laying in emesis and stool that was dark and heme positive.  The patient is unable to elaborate on the history of the present illness.  The patient's not on any anticoagulants.  Review of his past medical history in epic shows he has a history of GERD, trying to find no mention of previous GI bleeds.  He has a history of coronary artery disease and status post CABG, he also has a history of seizures.    Review of Systems   Reason unable to perform ROS: The patient has dementia and is averbal.     Past Medical History:   Diagnosis Date   • Back pain    • Convulsion disorder (CMS/HCC)    • Coronary artery disease    • Dementia (CMS/HCC)    • Hyperlipidemia    • Hypertension    • Seizures (CMS/HCC)      /76   Pulse 64   Temp 97.9 °F (36.6 °C) (Axillary)   Resp 26   Ht 177.8 cm (70\")   Wt 81.5 kg (179 lb 11.2 oz)   SpO2 96%   BMI 25.78 kg/m²     Past Medical History:   Diagnosis Date   • Back pain    • Convulsion disorder (CMS/HCC)    • Coronary artery disease    • Dementia (CMS/HCC)    • Hyperlipidemia    • Hypertension    • Seizures (CMS/HCC)        No Known Allergies    Past Surgical History:   Procedure Laterality Date   • " CARDIAC CATHETERIZATION     • CORONARY ARTERY BYPASS GRAFT Left    • VASCULAR SURGERY Left        Family History   Problem Relation Age of Onset   • Arthritis Mother    • Depression Mother    • Alcohol abuse Mother    • Emphysema Mother    • Alzheimer's disease Mother    • Alzheimer's disease Father    • Alcohol abuse Father    • Bipolar disorder Sister    • Heart disease Sister    • Cancer Brother    • Alcohol abuse Brother    • Drug abuse Brother    • Bipolar disorder Son    • Anxiety disorder Son    • Stroke Brother    • Cancer Brother    • Hypertension Brother    • Bipolar disorder Brother    • Anxiety disorder Brother    • Neuropathy Brother    • Heart disease Brother    • Drug abuse Brother    • Alcohol abuse Brother        Social History     Socioeconomic History   • Marital status:      Spouse name: Not on file   • Number of children: Not on file   • Years of education: Not on file   • Highest education level: Not on file   Tobacco Use   • Smoking status: Former Smoker   Substance and Sexual Activity   • Alcohol use: No           Objective   Physical Exam   Constitutional: He appears well-developed and well-nourished.   The patient appears in no acute distress.  Review of his vital signs: He is afebrile with temperature 97.9, blood pressure normal 136/77, heart rate normal 71, he is tachypnea with a respiratory rate of 26 with a normal oxygen saturation of 94% on room air.   HENT:   Head: Normocephalic and atraumatic.   Right Ear: External ear normal.   Left Ear: External ear normal.   Nose: Nose normal.   Mouth/Throat: Oropharynx is clear and moist.   Eyes: Conjunctivae and EOM are normal. Pupils are equal, round, and reactive to light. No scleral icterus.   Neck: Normal range of motion. Neck supple.   Cardiovascular: Normal rate and regular rhythm.   No murmur heard.  Pulmonary/Chest: Effort normal and breath sounds normal.   Abdominal: Soft. Bowel sounds are normal. He exhibits no distension.  There is no tenderness. There is no guarding.   Genitourinary:   Genitourinary Comments: Stool is dark brown and heme positive   Musculoskeletal: He exhibits no edema, tenderness or deformity.   Lymphadenopathy:     He has no cervical adenopathy.   Neurological: He is alert. No cranial nerve deficit or sensory deficit. He exhibits normal muscle tone.   The patient is a verbal.   Skin: Skin is warm and dry. Capillary refill takes less than 2 seconds. No rash noted. No erythema. No pallor.   Psychiatric: He has a normal mood and affect.   The patient is a verbal and appears not to be upset or agitated.   Nursing note and vitals reviewed.      Procedures           ED Course  ED Course as of Nov 22 0744 Fri Nov 22, 2019   0731 Review of the patient's laboratory studies: His CMP has normal electrolytes, normal renal function tests, and with the exception of a slightly elevated alk phos of 122 normal liver function test.  CBC was essentially normal.  Lipase normal.  [TP]   0737 The patient was administered normal saline at 250 cc/h.  He was administered Zofran 8 mg IV.  He was administered Protonix 80 mg IV.  [TP]   0738 07:40 patient discussed with KEYSHA Joya hospitalist, who agreed to admit the patient observation for further evaluation.  [TP]      ED Course User Index  [TP] Alden Abrams MD                  MDM  Number of Diagnoses or Management Options  Acute upper GI bleed: new and requires workup     Amount and/or Complexity of Data Reviewed  Clinical lab tests: ordered and reviewed  Tests in the radiology section of CPT®: ordered and reviewed  Discuss the patient with other providers: yes  Independent visualization of images, tracings, or specimens: yes    Risk of Complications, Morbidity, and/or Mortality  Presenting problems: high  Diagnostic procedures: high  Management options: high    Patient Progress  Patient progress: stable      Final diagnoses:   Acute upper GI bleed             Labs Reviewed    PROTIME-INR - Abnormal; Notable for the following components:       Result Value    INR 1.11 (*)     All other components within normal limits    Narrative:     Therapeutic Ranges for INR: 2.0-3.0 (PT 20-30)                              2.5-3.5 (PT 25-34)   COMPREHENSIVE METABOLIC PANEL - Abnormal; Notable for the following components:    CO2 29.6 (*)     Alkaline Phosphatase 122 (*)     All other components within normal limits    Narrative:     GFR Normal >60  Chronic Kidney Disease <60  Kidney Failure <15   CBC WITH AUTO DIFFERENTIAL - Abnormal; Notable for the following components:    MCV 98.4 (*)     Neutrophil % 76.6 (*)     Lymphocyte % 16.2 (*)     All other components within normal limits   APTT - Normal    Narrative:     PTT = The equivalent PTT values for the therapeutic range of heparin levels at 0.1 to 0.7 U/ml are 53 to 110 seconds.   LIPASE - Normal   TYPE AND SCREEN   ABORH 2ND SPECIMEN VERIFICATION   CBC AND DIFFERENTIAL    Narrative:     The following orders were created for panel order CBC & Differential.  Procedure                               Abnormality         Status                     ---------                               -----------         ------                     CBC Auto Differential[[id number]]        Abnormal            Final result                 Please view results for these tests on the individual orders.     XR Abdomen 2 View With Chest 1 View   ED Interpretation   1. Negative abdomen. Normal bowel gas pattern.   2. Moderately large volume stool, greatest in the low rectum.   3. No active disease in the chest. CABG. Old healed left rib fractures.       This report was finalized on 11/22/2019 7:30 AM by Dr. Mo Gayle MD.          Final Result   1. Negative abdomen. Normal bowel gas pattern.   2. Moderately large volume stool, greatest in the low rectum.   3. No active disease in the chest. CABG. Old healed left rib fractures.       This report was finalized on  11/22/2019 7:30 AM by Dr. Mo Gayle MD.                 Medication List      No changes were made to your prescriptions during this visit.            Alden Abrams MD  11/22/19 0701

## 2019-11-22 NOTE — CONSULTS
Patient Care Team:  Taim Escalante MD as PCP - General (Internal Medicine)    CHIEF COMPLAINT: Coffee Grounds emesis    HISTORY OF PRESENT ILLNESS:    · As Per ER MD.....The patient is a 63-year-old white male with a history of dementia and traumatic brain injury who is nonverbal and a resident of the [facility] in [facility].  This morning the patient was found laying in emesis and stool that was dark and heme positive.  The patient is unable to elaborate on the history of the present illness.  The patient's not on any anticoagulants.  Review of his past medical history in epic shows he has a history of GERD, trying to find no mention of previous GI bleeds.  He has a history of coronary artery disease and status post CABG, he also has a history of seizures.   Presently in room with blanket over his Head . Pleasant and smiles but non verbal no further emesis and abd soft and non distended. HGB Normal and normocytic. Normal BUN.      Past Medical History:   Diagnosis Date   • Back pain    • Convulsion disorder (CMS/HCC)    • Coronary artery disease    • Dementia (CMS/HCC)    • Hyperlipidemia    • Hypertension    • Seizures (CMS/HCC)      Past Surgical History:   Procedure Laterality Date   • CARDIAC CATHETERIZATION     • CORONARY ARTERY BYPASS GRAFT Left    • VASCULAR SURGERY Left      Family History   Problem Relation Age of Onset   • Arthritis Mother    • Depression Mother    • Alcohol abuse Mother    • Emphysema Mother    • Alzheimer's disease Mother    • Alzheimer's disease Father    • Alcohol abuse Father    • Bipolar disorder Sister    • Heart disease Sister    • Cancer Brother    • Alcohol abuse Brother    • Drug abuse Brother    • Bipolar disorder Son    • Anxiety disorder Son    • Stroke Brother    • Cancer Brother    • Hypertension Brother    • Bipolar disorder Brother    • Anxiety disorder Brother    • Neuropathy Brother    • Heart disease Brother    • Drug abuse Brother    • Alcohol  abuse Brother      Social History     Tobacco Use   • Smoking status: Former Smoker   Substance Use Topics   • Alcohol use: No   • Drug use: Not on file     Medications Prior to Admission   Medication Sig Dispense Refill Last Dose   • acetaminophen (TYLENOL) 325 MG tablet Take 650 mg by mouth Every 6 (Six) Hours As Needed for Mild Pain .   11/21/2019 at 1330   • atorvastatin (LIPITOR) 40 MG tablet Take 1 tablet by mouth Every Night. 30 tablet 0 11/21/2019 at 2100   • benztropine (COGENTIN) 0.5 MG tablet Take 0.5 mg by mouth 2 (Two) Times a Day.   11/21/2019 at 2100   • calcium carbonate (TUMS) 500 MG chewable tablet Chew 1 tablet 2 (Two) Times a Day.   11/21/2019 at 2100   • donepezil (ARICEPT) 10 MG tablet Take 1 tablet by mouth Every Night. 30 tablet 1 11/21/2019 at 2100   • LORazepam (ATIVAN) 0.5 MG tablet Take 0.25 mg by mouth 2 (Two) Times a Day.   11/21/2019 at 2100   • memantine (NAMENDA) 10 MG tablet Take 10 mg by mouth 2 (Two) Times a Day.   11/21/2019 at 2100   • Menthol-Zinc Oxide (REMEDY CALAZIME EX) Apply  topically 2 (Two) Times a Day.   11/21/2019 at 2100   • Multiple Vitamins-Minerals (MULTIVITAMIN ADULT) tablet Take 1 tablet by mouth daily. 30 tablet 5 11/21/2019 at 0800   • PHENYTOIN INFATABS 50 MG chewable tablet Take 4 tablets bid (Patient taking differently: Chew 300 mg 2 (Two) Times a Day.) 240 tablet 5 11/21/2019 at 2100   • raNITIdine (ZANTAC) 150 MG tablet Take 150 mg by mouth Daily.   11/21/2019 at 0730   • risperiDONE (risperDAL) 2 MG tablet Take 1 tablet by mouth Every 12 (Twelve) Hours. (Patient taking differently: Take 1 mg by mouth Every 12 (Twelve) Hours.) 60 tablet 1 11/21/2019 at 2100   • tamsulosin (FLOMAX) 0.4 MG capsule 24 hr capsule Take 1 capsule by mouth every night. 30 capsule 5 11/21/2019 at 2100   • XARELTO 20 MG tablet Take 1 tablet by mouth daily with dinner. 30 tablet 5 11/21/2019 at 0900   • zonisamide (ZONEGRAN) 100 MG capsule Take 1 capsule by mouth daily. 30 capsule  "5 11/21/2019 at 2100   • thiamine (VITAMINE B-1) 100 MG tablet Take 1 tablet by mouth daily. 30 tablet 5      Allergies:  Eggs or egg-derived products and Fish-derived products    REVIEW OF SYSTEMS:  Please see the above history of present illness for pertinent positives and negatives.  The remainder of the patient's systems have been reviewed and are negative.     Vital Signs  Temp:  [97.9 °F (36.6 °C)-98.7 °F (37.1 °C)] 98.7 °F (37.1 °C)  Heart Rate:  [60-72] 69  Resp:  [20-26] 20  BP: (123-136)/(66-77) 131/73    Flowsheet Rows      First Filed Value   Admission Height  177.8 cm (70\") Documented at 11/22/2019 0630   Admission Weight  81.5 kg (179 lb 11.2 oz) Documented at 11/22/2019 0630           Physical Exam:  Physical Exam   Constitutional: Patient appears well-developed and well-nourished and in no acute distress   HEENT:   Head: Normocephalic and atraumatic.   Eyes:  Pupils are equal, round, and reactive to light. EOM are intact. Sclerae are anicteric and non-injected.  Mouth and Throat: Patient has moist mucous membranes. Oropharynx is clear of any erythema or exudate.     Neck: Neck supple. No JVD present. No thyromegaly present. No lymphadenopathy present.  Cardiovascular: Regular rate, regular rhythm, S1 normal and S2 normal.  Exam reveals no gallop and no friction rub.  No murmur heard.  Pulmonary/Chest: Lungs are clear to auscultation bilaterally. No respiratory distress. No wheezes. No rhonchi. No rales.   Abdominal: Soft. Bowel sounds are normal. No distension and no mass. There is no hepatosplenomegaly. There is NO tenderness.   Musculoskeletal: Normal Muscle tone  Extremities: No edema. Pulses are palpable in all 4 extremities.  Neurological: Patient is alert and oriented to person, place, and time. Cranial nerves II-XII are grossly intact with no focal deficits.  Skin: Skin is warm. No rash noted. Nails show no clubbing.  No cyanosis or erythema. Moderate Contractures    Debilities/Disabilities " Identified: None  Emotional Behavior: Appropriate     Results Review:    I reviewed the patient's new clinical results.  Lab Results (most recent)     Procedure Component Value Units Date/Time    Amylase [[id number]]  (Normal) Collected:  11/22/19 0650    Specimen:  Blood Updated:  11/22/19 1010     Amylase 94 U/L     aPTT [[id number]]  (Normal) Collected:  11/22/19 0650    Specimen:  Blood Updated:  11/22/19 0733     PTT 27.7 seconds     Narrative:       PTT = The equivalent PTT values for the therapeutic range of heparin levels at 0.1 to 0.7 U/ml are 53 to 110 seconds.    Protime-INR [[id number]]  (Abnormal) Collected:  11/22/19 0650    Specimen:  Blood Updated:  11/22/19 0733     Protime 14.0 Seconds      INR 1.11    Narrative:       Therapeutic Ranges for INR: 2.0-3.0 (PT 20-30)                              2.5-3.5 (PT 25-34)    Comprehensive Metabolic Panel [[id number]]  (Abnormal) Collected:  11/22/19 0650    Specimen:  Blood Updated:  11/22/19 0719     Glucose 99 mg/dL      BUN 13 mg/dL      Creatinine 0.85 mg/dL      Sodium 145 mmol/L      Potassium 4.3 mmol/L      Chloride 107 mmol/L      CO2 29.6 mmol/L      Calcium 9.0 mg/dL      Total Protein 7.2 g/dL      Albumin 4.00 g/dL      ALT (SGPT) 15 U/L      AST (SGOT) 21 U/L      Alkaline Phosphatase 122 U/L      Total Bilirubin 0.2 mg/dL      eGFR Non African Amer 90 mL/min/1.73      Globulin 3.2 gm/dL      A/G Ratio 1.3 g/dL      BUN/Creatinine Ratio 15.3     Anion Gap 8.4 mmol/L     Narrative:       GFR Normal >60  Chronic Kidney Disease <60  Kidney Failure <15    Lipase [[id number]]  (Normal) Collected:  11/22/19 0650    Specimen:  Blood Updated:  11/22/19 0719     Lipase 27 U/L     CBC & Differential [[id number]] Collected:  11/22/19 0650    Specimen:  Blood Updated:  11/22/19 0700    Narrative:       The following orders were created for panel order CBC & Differential.  Procedure                               Abnormality         Status                      ---------                               -----------         ------                     CBC Auto Differential[[id number]]        Abnormal            Final result                 Please view results for these tests on the individual orders.    CBC Auto Differential [[id number]]  (Abnormal) Collected:  11/22/19 0650    Specimen:  Blood Updated:  11/22/19 0700     WBC 7.89 10*3/mm3      RBC 4.41 10*6/mm3      Hemoglobin 14.3 g/dL      Hematocrit 43.4 %      MCV 98.4 fL      MCH 32.4 pg      MCHC 32.9 g/dL      RDW 12.6 %      RDW-SD 45.8 fl      MPV 9.9 fL      Platelets 156 10*3/mm3      Neutrophil % 76.6 %      Lymphocyte % 16.2 %      Monocyte % 6.8 %      Eosinophil % 0.3 %      Basophil % 0.1 %      Immature Grans % 0.0 %      Neutrophils, Absolute 6.04 10*3/mm3      Lymphocytes, Absolute 1.28 10*3/mm3      Monocytes, Absolute 0.54 10*3/mm3      Eosinophils, Absolute 0.02 10*3/mm3      Basophils, Absolute 0.01 10*3/mm3      Immature Grans, Absolute 0.00 10*3/mm3           Imaging Results (Most Recent)     Procedure Component Value Units Date/Time    XR Abdomen 2 View With Chest 1 View [[id number]] Collected:  11/22/19 0728     Updated:  11/22/19 0736    Narrative:       ACUTE ABDOMEN SERIES, 11/22/2019         HISTORY:  63-year-old male in the ED after being found down at nursing care  facility. Blood in stool. Emesis.     TECHNIQUE:  Flat and upright abdomen series with AP portable chest x-ray.     FINDINGS:  Bowel gas pattern is within normal limits. No bowel dilatation to  suggest obstruction or significant adynamic ileus. Moderately large  volume stool within the colon, greatest in the lower rectum. No visible  free intraperitoneal air. The stomach is nondistended.     Chest x-ray shows no active disease. CABG. Heart size and pulmonary  vascularity are normal. Old healed left rib fractures. No visible  pulmonary edema, focal infiltrate, pleural effusion or pneumothorax.       Impression:       1. Negative  abdomen. Normal bowel gas pattern.  2. Moderately large volume stool, greatest in the low rectum.  3. No active disease in the chest. CABG. Old healed left rib fractures.     This report was finalized on 11/22/2019 7:30 AM by Dr. Mo Gayle MD.           reviewed    ECG/EMG Results (most recent)     None        reviewed    Assessment/Plan     Emesis  Constipation  Closed head injury    With out further UGI distress and normal Labs am infavor of resuming diet and observing in house, continueing his baseline bowel regimen for his Constipation. No urgent indication fro endoscopy at this point. Will follow    I discussed the patients findings and my recommendations with patient and Nursing.     Rufus Nj MD  11/22/19  10:13 AM    Time: Not recorded

## 2019-11-23 VITALS
TEMPERATURE: 98.7 F | DIASTOLIC BLOOD PRESSURE: 55 MMHG | HEIGHT: 70 IN | RESPIRATION RATE: 18 BRPM | OXYGEN SATURATION: 97 % | WEIGHT: 162.7 LBS | BODY MASS INDEX: 23.29 KG/M2 | HEART RATE: 65 BPM | SYSTOLIC BLOOD PRESSURE: 114 MMHG

## 2019-11-23 LAB
ALBUMIN SERPL-MCNC: 3.8 G/DL (ref 3.5–5.2)
ALBUMIN/GLOB SERPL: 1.4 G/DL
ALP SERPL-CCNC: 124 U/L (ref 39–117)
ALT SERPL W P-5'-P-CCNC: 11 U/L (ref 1–41)
ANION GAP SERPL CALCULATED.3IONS-SCNC: 9.5 MMOL/L (ref 5–15)
AST SERPL-CCNC: 17 U/L (ref 1–40)
BASOPHILS # BLD AUTO: 0.02 10*3/MM3 (ref 0–0.2)
BASOPHILS NFR BLD AUTO: 0.5 % (ref 0–1.5)
BILIRUB SERPL-MCNC: 0.3 MG/DL (ref 0.2–1.2)
BUN BLD-MCNC: 8 MG/DL (ref 8–23)
BUN/CREAT SERPL: 10.4 (ref 7–25)
CALCIUM SPEC-SCNC: 8.3 MG/DL (ref 8.6–10.5)
CHLORIDE SERPL-SCNC: 108 MMOL/L (ref 98–107)
CO2 SERPL-SCNC: 25.5 MMOL/L (ref 22–29)
CREAT BLD-MCNC: 0.77 MG/DL (ref 0.76–1.27)
DEPRECATED RDW RBC AUTO: 46.6 FL (ref 37–54)
EOSINOPHIL # BLD AUTO: 0.06 10*3/MM3 (ref 0–0.4)
EOSINOPHIL NFR BLD AUTO: 1.4 % (ref 0.3–6.2)
ERYTHROCYTE [DISTWIDTH] IN BLOOD BY AUTOMATED COUNT: 12.7 % (ref 12.3–15.4)
GFR SERPL CREATININE-BSD FRML MDRD: 101 ML/MIN/1.73
GLOBULIN UR ELPH-MCNC: 2.7 GM/DL
GLUCOSE BLD-MCNC: 94 MG/DL (ref 65–99)
HCT VFR BLD AUTO: 40.4 % (ref 37.5–51)
HGB BLD-MCNC: 13 G/DL (ref 13–17.7)
IMM GRANULOCYTES # BLD AUTO: 0.01 10*3/MM3 (ref 0–0.05)
IMM GRANULOCYTES NFR BLD AUTO: 0.2 % (ref 0–0.5)
LYMPHOCYTES # BLD AUTO: 0.76 10*3/MM3 (ref 0.7–3.1)
LYMPHOCYTES NFR BLD AUTO: 17.8 % (ref 19.6–45.3)
MCH RBC QN AUTO: 32 PG (ref 26.6–33)
MCHC RBC AUTO-ENTMCNC: 32.2 G/DL (ref 31.5–35.7)
MCV RBC AUTO: 99.5 FL (ref 79–97)
MONOCYTES # BLD AUTO: 0.36 10*3/MM3 (ref 0.1–0.9)
MONOCYTES NFR BLD AUTO: 8.5 % (ref 5–12)
NEUTROPHILS # BLD AUTO: 3.05 10*3/MM3 (ref 1.7–7)
NEUTROPHILS NFR BLD AUTO: 71.6 % (ref 42.7–76)
NRBC BLD AUTO-RTO: 0 /100 WBC (ref 0–0.2)
PLATELET # BLD AUTO: 133 10*3/MM3 (ref 140–450)
PMV BLD AUTO: 10 FL (ref 6–12)
POTASSIUM BLD-SCNC: 4 MMOL/L (ref 3.5–5.2)
PROT SERPL-MCNC: 6.5 G/DL (ref 6–8.5)
RBC # BLD AUTO: 4.06 10*6/MM3 (ref 4.14–5.8)
SODIUM BLD-SCNC: 143 MMOL/L (ref 136–145)
WBC NRBC COR # BLD: 4.26 10*3/MM3 (ref 3.4–10.8)

## 2019-11-23 PROCEDURE — 96372 THER/PROPH/DIAG INJ SC/IM: CPT

## 2019-11-23 PROCEDURE — 96361 HYDRATE IV INFUSION ADD-ON: CPT

## 2019-11-23 PROCEDURE — 25010000002 ENOXAPARIN PER 10 MG: Performed by: HOSPITALIST

## 2019-11-23 PROCEDURE — 85025 COMPLETE CBC W/AUTO DIFF WBC: CPT | Performed by: NURSE PRACTITIONER

## 2019-11-23 PROCEDURE — 99217 PR OBSERVATION CARE DISCHARGE MANAGEMENT: CPT | Performed by: HOSPITALIST

## 2019-11-23 PROCEDURE — 80053 COMPREHEN METABOLIC PANEL: CPT | Performed by: NURSE PRACTITIONER

## 2019-11-23 PROCEDURE — G0378 HOSPITAL OBSERVATION PER HR: HCPCS

## 2019-11-23 PROCEDURE — 96376 TX/PRO/DX INJ SAME DRUG ADON: CPT

## 2019-11-23 PROCEDURE — 99213 OFFICE O/P EST LOW 20 MIN: CPT | Performed by: INTERNAL MEDICINE

## 2019-11-23 RX ADMIN — LORAZEPAM 0.25 MG: 0.5 TABLET ORAL at 08:58

## 2019-11-23 RX ADMIN — RISPERIDONE 1 MG: 1 TABLET ORAL at 10:28

## 2019-11-23 RX ADMIN — MEMANTINE 10 MG: 5 TABLET ORAL at 08:56

## 2019-11-23 RX ADMIN — Medication: at 08:58

## 2019-11-23 RX ADMIN — ZONISAMIDE 100 MG: 100 CAPSULE ORAL at 08:58

## 2019-11-23 RX ADMIN — SODIUM CHLORIDE 100 ML/HR: 9 INJECTION, SOLUTION INTRAVENOUS at 08:54

## 2019-11-23 RX ADMIN — Medication 100 MG: at 08:57

## 2019-11-23 RX ADMIN — ANTACID TABLETS 1 TABLET: 500 TABLET, CHEWABLE ORAL at 08:55

## 2019-11-23 RX ADMIN — ENOXAPARIN SODIUM 40 MG: 40 INJECTION SUBCUTANEOUS at 10:29

## 2019-11-23 RX ADMIN — BENZTROPINE MESYLATE 0.5 MG: 1 TABLET ORAL at 08:56

## 2019-11-23 RX ADMIN — PHENYTOIN 300 MG: 125 SUSPENSION ORAL at 08:57

## 2019-11-23 RX ADMIN — PANTOPRAZOLE SODIUM 40 MG: 40 INJECTION, POWDER, FOR SOLUTION INTRAVENOUS at 06:01

## 2019-11-23 NOTE — PLAN OF CARE
Problem: Fall Risk (Adult)  Goal: Identify Related Risk Factors and Signs and Symptoms  Outcome: Outcome(s) achieved Date Met: 11/23/19    Goal: Absence of Fall  Outcome: Outcome(s) achieved Date Met: 11/23/19    Goal: Identify Related Risk Factors and Signs and Symptoms  Outcome: Outcome(s) achieved Date Met: 11/23/19    Goal: Absence of Fall  Outcome: Outcome(s) achieved Date Met: 11/23/19      Problem: Patient Care Overview  Goal: Plan of Care Review  Outcome: Outcome(s) achieved Date Met: 11/23/19      Problem: Gastrointestinal Bleeding (Adult)  Goal: Signs and Symptoms of Listed Potential Problems Will be Absent, Minimized or Managed (Gastrointestinal Bleeding)  Outcome: Outcome(s) achieved Date Met: 11/23/19      Problem: Skin Injury Risk (Adult)  Goal: Skin Health and Integrity  Outcome: Outcome(s) achieved Date Met: 11/23/19

## 2019-11-23 NOTE — PROGRESS NOTES
GI Daily Progress Note    Assessment/Plan:      Acute upper GI bleed       LOS: 0 days     Drew Day is a 63 y.o. male who was admitted with Hematemesis. He reports the symptoms are improving with treatment. No further episodes and his HGB is 13 on IVFs. Apparantly tolerating diet- non verbal but awake and pleasant    Subjective:    Patient expresses Non Verbal  Patient denies abdominal pain, vomiting and bloody stools    Objective:    Vital signs in last 24 hours:  Temp:  [98.1 °F (36.7 °C)-98.8 °F (37.1 °C)] 98.1 °F (36.7 °C)  Heart Rate:  [58-63] 59  Resp:  [18-20] 18  BP: (123-135)/(59-86) 123/65    Intake/Output last 3 shifts:  I/O last 3 completed shifts:  In: 2830 [P.O.:240; I.V.:2590]  Out: -   Intake/Output this shift:  I/O this shift:  In: 641.7 [P.O.:360; I.V.:281.7]  Out: -     Physical Exam:Abdomen  Sounds Normal Active Bowel Sounds   Distension Soft   Tenderness Nontender     Imaging Results (Last 72 Hours)     Procedure Component Value Units Date/Time    XR Abdomen 2 View With Chest 1 View [[id number]] Collected:  11/22/19 0728     Updated:  11/22/19 0736    Narrative:       ACUTE ABDOMEN SERIES, 11/22/2019         HISTORY:  63-year-old male in the ED after being found down at nursing care  facility. Blood in stool. Emesis.     TECHNIQUE:  Flat and upright abdomen series with AP portable chest x-ray.     FINDINGS:  Bowel gas pattern is within normal limits. No bowel dilatation to  suggest obstruction or significant adynamic ileus. Moderately large  volume stool within the colon, greatest in the lower rectum. No visible  free intraperitoneal air. The stomach is nondistended.     Chest x-ray shows no active disease. CABG. Heart size and pulmonary  vascularity are normal. Old healed left rib fractures. No visible  pulmonary edema, focal infiltrate, pleural effusion or pneumothorax.       Impression:       1. Negative abdomen. Normal bowel gas pattern.  2. Moderately large volume stool, greatest in the  low rectum.  3. No active disease in the chest. CABG. Old healed left rib fractures.     This report was finalized on 11/22/2019 7:30 AM by Dr. Mo Gayle MD.             WBC   Date Value Ref Range Status   11/23/2019 4.26 3.40 - 10.80 10*3/mm3 Final     RBC   Date Value Ref Range Status   11/23/2019 4.06 (L) 4.14 - 5.80 10*6/mm3 Final     Hemoglobin   Date Value Ref Range Status   11/23/2019 13.0 13.0 - 17.7 g/dL Final     Hematocrit   Date Value Ref Range Status   11/23/2019 40.4 37.5 - 51.0 % Final     MCV   Date Value Ref Range Status   11/23/2019 99.5 (H) 79.0 - 97.0 fL Final     MCH   Date Value Ref Range Status   11/23/2019 32.0 26.6 - 33.0 pg Final     MCHC   Date Value Ref Range Status   11/23/2019 32.2 31.5 - 35.7 g/dL Final     RDW   Date Value Ref Range Status   11/23/2019 12.7 12.3 - 15.4 % Final     RDW-SD   Date Value Ref Range Status   11/23/2019 46.6 37.0 - 54.0 fl Final     MPV   Date Value Ref Range Status   11/23/2019 10.0 6.0 - 12.0 fL Final     Platelets   Date Value Ref Range Status   11/23/2019 133 (L) 140 - 450 10*3/mm3 Final     Neutrophil %   Date Value Ref Range Status   11/23/2019 71.6 42.7 - 76.0 % Final     Lymphocyte %   Date Value Ref Range Status   11/23/2019 17.8 (L) 19.6 - 45.3 % Final     Monocyte %   Date Value Ref Range Status   11/23/2019 8.5 5.0 - 12.0 % Final     Eosinophil %   Date Value Ref Range Status   11/23/2019 1.4 0.3 - 6.2 % Final     Basophil %   Date Value Ref Range Status   11/23/2019 0.5 0.0 - 1.5 % Final     Immature Grans %   Date Value Ref Range Status   11/23/2019 0.2 0.0 - 0.5 % Final     Neutrophils, Absolute   Date Value Ref Range Status   11/23/2019 3.05 1.70 - 7.00 10*3/mm3 Final     Lymphocytes, Absolute   Date Value Ref Range Status   11/23/2019 0.76 0.70 - 3.10 10*3/mm3 Final     Monocytes, Absolute   Date Value Ref Range Status   11/23/2019 0.36 0.10 - 0.90 10*3/mm3 Final     Eosinophils, Absolute   Date Value Ref Range Status   11/23/2019  0.06 0.00 - 0.40 10*3/mm3 Final     Basophils, Absolute   Date Value Ref Range Status   11/23/2019 0.02 0.00 - 0.20 10*3/mm3 Final     Immature Grans, Absolute   Date Value Ref Range Status   11/23/2019 0.01 0.00 - 0.05 10*3/mm3 Final     nRBC   Date Value Ref Range Status   11/23/2019 0.0 0.0 - 0.2 /100 WBC Final       Glucose   Date Value Ref Range Status   11/23/2019 94 65 - 99 mg/dL Final     Sodium   Date Value Ref Range Status   11/23/2019 143 136 - 145 mmol/L Final     Potassium   Date Value Ref Range Status   11/23/2019 4.0 3.5 - 5.2 mmol/L Final     CO2   Date Value Ref Range Status   11/23/2019 25.5 22.0 - 29.0 mmol/L Final     Chloride   Date Value Ref Range Status   11/23/2019 108 (H) 98 - 107 mmol/L Final     Anion Gap   Date Value Ref Range Status   11/23/2019 9.5 5.0 - 15.0 mmol/L Final     Creatinine   Date Value Ref Range Status   11/23/2019 0.77 0.76 - 1.27 mg/dL Final     BUN   Date Value Ref Range Status   11/23/2019 8 8 - 23 mg/dL Final     BUN/Creatinine Ratio   Date Value Ref Range Status   11/23/2019 10.4 7.0 - 25.0 Final     Calcium   Date Value Ref Range Status   11/23/2019 8.3 (L) 8.6 - 10.5 mg/dL Final     eGFR Non  Amer   Date Value Ref Range Status   11/23/2019 101 >60 mL/min/1.73 Final     Alkaline Phosphatase   Date Value Ref Range Status   11/23/2019 124 (H) 39 - 117 U/L Final     Total Protein   Date Value Ref Range Status   11/23/2019 6.5 6.0 - 8.5 g/dL Final     ALT (SGPT)   Date Value Ref Range Status   11/23/2019 11 1 - 41 U/L Final     AST (SGOT)   Date Value Ref Range Status   11/23/2019 17 1 - 40 U/L Final     Total Bilirubin   Date Value Ref Range Status   11/23/2019 0.3 0.2 - 1.2 mg/dL Final     Albumin   Date Value Ref Range Status   11/23/2019 3.80 3.50 - 5.20 g/dL Final     Globulin   Date Value Ref Range Status   11/23/2019 2.7 gm/dL Final     Emesis  Constipation  Closed head injury    Would D/C IVFs and follow but ADC tomorrow

## 2019-11-23 NOTE — NURSING NOTE
Continued Stay Note  TAHIR Ramirez     Patient Name: Drew Day  MRN: [id number]  Today's Date: 11/23/2019    Admit Date: 11/22/2019    Discharge Plan     Row Name 11/23/19 1104       Plan    Plan  Back to [facility]    Patient/Family in Agreement with Plan  -- spoke with Rebecca at [facility] and she states he will return back to their facitly when stable.    Plan Comments  Into room, introuduced self and role of CM. Attempted to discuss discharge disposition with patient at bedside and he us mentally not able to understand what I was saying or answer any questions. Called and spoke with Rebecca at [facility] and she states that this is patient's base line, and that he will return back to the facility nettles stable. Rebecca states patient has a bed hld at the facility. Name and number placed on white board in room. CM will continue to follow for needs.        Discharge Codes    No documentation.             Mag Strong RN

## 2019-11-23 NOTE — DISCHARGE SUMMARY
"Drew Day  1955  [id number]    Hospitalists Discharge Summary    Date of Admission: 11/22/2019  Date of Discharge:  11/23/2019    Primary Discharge Diagnoses:    1.  Suspected GI Bleed    Secondary Discharge Diagnoses:    1.  Hx/O TBI  2.  Hx/O Dementia w/ Behavioral Disturbance  3.  Hx/O Seizure Disorder  4.  GERD  5.  CAD w/ GABG x4  6.  COPD  7.  BPH    History of Present Illness (taken from H&P):    Patient is a 63-year-old male that presented from [facility] to the emergency department secondary to sudden onset of dark emesis, dark stools that were found to be Hemoccult positive in the ER.  ER provider notes patient is keating of the [facility] and has had a traumatic brain injury and is nonverbal at his baseline, therefore patient is noncontributory to HPI, all information taken from chart,  although on exam patient makes noises, makes eye contact.  ER provider notes no recent illness noted, no history of GI bleed.  The patient appears in no apparent distress at time of exam. Patient is noted to be on Xarelto for PAD and vascular procedures in the past.     He has a known history of dementia with behavioral disturbance, traumatic brain injury, CAD/HLD with history of CABG x4 vessels, PAD, seizure disorder, hypertension, COPD, GERD, BPH.     There is no other mention of f/c/headache/rhinorrhea/nasal congestion/lightheadedness/syncopal sensation/cough/soa/chest pain/abdominal pain/recent illness/sick exposures/change in bladder habits/no weight change/change in medications or any other new concerns.    Hospital Course:    Mr. Day was admitted to the Med/Surg unit and treated w/ IVF.  He was seen in consultation by Dr. Nj.  No emesis or dark stool noted during stay.  Further, Hgb remained stable and did not \"dilute\" following IVF administration.  Xarelto was held at admission, and I will continue to hold this at discharge until seen at follow-up.     PCP  Patient Care Team:  Boris, " Tami YOUNGER MD as PCP - General (Internal Medicine)    Consults:   Consults     Date and Time Order Name Status Description    11/22/2019 0821 Inpatient Gastroenterology Consult Completed           Operations and Procedures Performed:       Xr Abdomen 2 View With Chest 1 View    Result Date: 11/22/2019  Narrative: ACUTE ABDOMEN SERIES, 11/22/2019     HISTORY: 63-year-old male in the ED after being found down at nursing care facility. Blood in stool. Emesis.  TECHNIQUE: Flat and upright abdomen series with AP portable chest x-ray.  FINDINGS: Bowel gas pattern is within normal limits. No bowel dilatation to suggest obstruction or significant adynamic ileus. Moderately large volume stool within the colon, greatest in the lower rectum. No visible free intraperitoneal air. The stomach is nondistended.  Chest x-ray shows no active disease. CABG. Heart size and pulmonary vascularity are normal. Old healed left rib fractures. No visible pulmonary edema, focal infiltrate, pleural effusion or pneumothorax.      Impression: 1. Negative abdomen. Normal bowel gas pattern. 2. Moderately large volume stool, greatest in the low rectum. 3. No active disease in the chest. CABG. Old healed left rib fractures.  This report was finalized on 11/22/2019 7:30 AM by Dr. Mo Gayle MD.        Allergies:  is allergic to eggs or egg-derived products and fish-derived products.      Discharge Medications:     Discharge Medications      Changes to Medications      Instructions Start Date   PHENYTOIN INFATABS 50 MG chewable tablet  Generic drug:  phenytoin  What changed:    · how much to take  · how to take this  · when to take this  · additional instructions   Take 4 tablets bid         Continue These Medications      Instructions Start Date   acetaminophen 325 MG tablet  Commonly known as:  TYLENOL   650 mg, Oral, Every 6 Hours PRN      atorvastatin 40 MG tablet  Commonly known as:  LIPITOR   40 mg, Oral, Nightly      benztropine 0.5 MG  tablet  Commonly known as:  COGENTIN   0.5 mg, Oral, 2 Times Daily      calcium carbonate 500 MG chewable tablet  Commonly known as:  TUMS   1 tablet, Oral, 2 Times Daily      donepezil 10 MG tablet  Commonly known as:  ARICEPT   10 mg, Oral, Nightly      LORazepam 0.5 MG tablet  Commonly known as:  ATIVAN   0.25 mg, Oral, 2 Times Daily      memantine 10 MG tablet  Commonly known as:  NAMENDA   10 mg, Oral, 2 Times Daily      MULTIVITAMIN ADULT tablet   1 tablet, Oral, Daily      raNITIdine 150 MG tablet  Commonly known as:  ZANTAC   150 mg, Oral, Daily      REMEDY CALAZIME EX   Apply externally, 2 Times Daily      risperiDONE 1 MG tablet  Commonly known as:  risperDAL   1 mg, Oral, 2 Times Daily      tamsulosin 0.4 MG capsule 24 hr capsule  Commonly known as:  FLOMAX   1 capsule, Oral, Nightly      thiamine 100 MG tablet  Commonly known as:  VITAMIN B-1   100 mg, Oral, Daily      zonisamide 100 MG capsule  Commonly known as:  ZONEGRAN   100 mg, Oral, Daily         Stop These Medications    XARELTO 20 MG tablet  Generic drug:  rivaroxaban            Last Lab Results:   Lab Results (most recent)     Procedure Component Value Units Date/Time    Comprehensive Metabolic Panel [[id number]]  (Abnormal) Collected:  11/23/19 0456    Specimen:  Blood Updated:  11/23/19 0523     Glucose 94 mg/dL      BUN 8 mg/dL      Creatinine 0.77 mg/dL      Sodium 143 mmol/L      Potassium 4.0 mmol/L      Chloride 108 mmol/L      CO2 25.5 mmol/L      Calcium 8.3 mg/dL      Total Protein 6.5 g/dL      Albumin 3.80 g/dL      ALT (SGPT) 11 U/L      AST (SGOT) 17 U/L      Alkaline Phosphatase 124 U/L      Total Bilirubin 0.3 mg/dL      eGFR Non African Amer 101 mL/min/1.73      Globulin 2.7 gm/dL      A/G Ratio 1.4 g/dL      BUN/Creatinine Ratio 10.4     Anion Gap 9.5 mmol/L     Narrative:       GFR Normal >60  Chronic Kidney Disease <60  Kidney Failure <15    CBC & Differential [[id number]] Collected:  11/23/19 0456    Specimen:  Blood Updated:   11/23/19 0504    Narrative:       The following orders were created for panel order CBC & Differential.  Procedure                               Abnormality         Status                     ---------                               -----------         ------                     CBC Auto Differential[[id number]]        Abnormal            Final result                 Please view results for these tests on the individual orders.    CBC Auto Differential [[id number]]  (Abnormal) Collected:  11/23/19 0456    Specimen:  Blood Updated:  11/23/19 0504     WBC 4.26 10*3/mm3      RBC 4.06 10*6/mm3      Hemoglobin 13.0 g/dL      Hematocrit 40.4 %      MCV 99.5 fL      MCH 32.0 pg      MCHC 32.2 g/dL      RDW 12.7 %      RDW-SD 46.6 fl      MPV 10.0 fL      Platelets 133 10*3/mm3      Neutrophil % 71.6 %      Lymphocyte % 17.8 %      Monocyte % 8.5 %      Eosinophil % 1.4 %      Basophil % 0.5 %      Immature Grans % 0.2 %      Neutrophils, Absolute 3.05 10*3/mm3      Lymphocytes, Absolute 0.76 10*3/mm3      Monocytes, Absolute 0.36 10*3/mm3      Eosinophils, Absolute 0.06 10*3/mm3      Basophils, Absolute 0.02 10*3/mm3      Immature Grans, Absolute 0.01 10*3/mm3      nRBC 0.0 /100 WBC     Hemoglobin & Hematocrit, Blood [[id number]]  (Abnormal) Collected:  11/22/19 1439    Specimen:  Blood Updated:  11/22/19 1443     Hemoglobin 12.6 g/dL      Hematocrit 38.1 %     Amylase [[id number]]  (Normal) Collected:  11/22/19 0650    Specimen:  Blood Updated:  11/22/19 1010     Amylase 94 U/L     aPTT [[id number]]  (Normal) Collected:  11/22/19 0650    Specimen:  Blood Updated:  11/22/19 0733     PTT 27.7 seconds     Narrative:       PTT = The equivalent PTT values for the therapeutic range of heparin levels at 0.1 to 0.7 U/ml are 53 to 110 seconds.    Protime-INR [[id number]]  (Abnormal) Collected:  11/22/19 0650    Specimen:  Blood Updated:  11/22/19 0733     Protime 14.0 Seconds      INR 1.11    Narrative:       Therapeutic Ranges for  INR: 2.0-3.0 (PT 20-30)                              2.5-3.5 (PT 25-34)    Comprehensive Metabolic Panel [[id number]]  (Abnormal) Collected:  11/22/19 0650    Specimen:  Blood Updated:  11/22/19 0719     Glucose 99 mg/dL      BUN 13 mg/dL      Creatinine 0.85 mg/dL      Sodium 145 mmol/L      Potassium 4.3 mmol/L      Chloride 107 mmol/L      CO2 29.6 mmol/L      Calcium 9.0 mg/dL      Total Protein 7.2 g/dL      Albumin 4.00 g/dL      ALT (SGPT) 15 U/L      AST (SGOT) 21 U/L      Alkaline Phosphatase 122 U/L      Total Bilirubin 0.2 mg/dL      eGFR Non African Amer 90 mL/min/1.73      Globulin 3.2 gm/dL      A/G Ratio 1.3 g/dL      BUN/Creatinine Ratio 15.3     Anion Gap 8.4 mmol/L     Narrative:       GFR Normal >60  Chronic Kidney Disease <60  Kidney Failure <15    Lipase [[id number]]  (Normal) Collected:  11/22/19 0650    Specimen:  Blood Updated:  11/22/19 0719     Lipase 27 U/L     CBC & Differential [[id number]] Collected:  11/22/19 0650    Specimen:  Blood Updated:  11/22/19 0700    Narrative:       The following orders were created for panel order CBC & Differential.  Procedure                               Abnormality         Status                     ---------                               -----------         ------                     CBC Auto Differential[[id number]]        Abnormal            Final result                 Please view results for these tests on the individual orders.    CBC Auto Differential [[id number]]  (Abnormal) Collected:  11/22/19 0650    Specimen:  Blood Updated:  11/22/19 0700     WBC 7.89 10*3/mm3      RBC 4.41 10*6/mm3      Hemoglobin 14.3 g/dL      Hematocrit 43.4 %      MCV 98.4 fL      MCH 32.4 pg      MCHC 32.9 g/dL      RDW 12.6 %      RDW-SD 45.8 fl      MPV 9.9 fL      Platelets 156 10*3/mm3      Neutrophil % 76.6 %      Lymphocyte % 16.2 %      Monocyte % 6.8 %      Eosinophil % 0.3 %      Basophil % 0.1 %      Immature Grans % 0.0 %      Neutrophils, Absolute 6.04 10*3/mm3       Lymphocytes, Absolute 1.28 10*3/mm3      Monocytes, Absolute 0.54 10*3/mm3      Eosinophils, Absolute 0.02 10*3/mm3      Basophils, Absolute 0.01 10*3/mm3      Immature Grans, Absolute 0.00 10*3/mm3         Imaging Results (Most Recent)     Procedure Component Value Units Date/Time    XR Abdomen 2 View With Chest 1 View [[id number]] Collected:  11/22/19 0728     Updated:  11/22/19 0736    Narrative:       ACUTE ABDOMEN SERIES, 11/22/2019         HISTORY:  63-year-old male in the ED after being found down at nursing care  facility. Blood in stool. Emesis.     TECHNIQUE:  Flat and upright abdomen series with AP portable chest x-ray.     FINDINGS:  Bowel gas pattern is within normal limits. No bowel dilatation to  suggest obstruction or significant adynamic ileus. Moderately large  volume stool within the colon, greatest in the lower rectum. No visible  free intraperitoneal air. The stomach is nondistended.     Chest x-ray shows no active disease. CABG. Heart size and pulmonary  vascularity are normal. Old healed left rib fractures. No visible  pulmonary edema, focal infiltrate, pleural effusion or pneumothorax.       Impression:       1. Negative abdomen. Normal bowel gas pattern.  2. Moderately large volume stool, greatest in the low rectum.  3. No active disease in the chest. CABG. Old healed left rib fractures.     This report was finalized on 11/22/2019 7:30 AM by Dr. Mo Gayle MD.             PROCEDURES      Condition on Discharge:  Stable    Physical Exam at Discharge  Vital Signs  Temp:  [98.1 °F (36.7 °C)-98.8 °F (37.1 °C)] 98.7 °F (37.1 °C)  Heart Rate:  [58-65] 65  Resp:  [18-20] 18  BP: (114-135)/(55-86) 114/55    Physical Exam:  Physical Exam   Constitutional: Patient appears well-developed and well-nourished and in no acute distress   HEENT:   Head: Normocephalic and atraumatic.   Eyes:  No conjunctival pallor noted bilaterally.  Cardiovascular: Regular rate, regular rhythm, S1 normal and S2  normal.  Exam reveals no gallop and no friction rub.  No murmur heard.  Pulmonary/Chest: Lungs are clear to auscultation bilaterally. No respiratory distress. No wheezes. No rhonchi. No rales.   Abdominal: Soft. Bowel sounds are normal. There is no tenderness.   Musculoskeletal: Normal Muscle tone  Extremities: No edema. Radial pulses are 2+.  Neurological: Cranial nerves II-XII are grossly intact with no focal deficits.    Discharge Disposition  Return to Facility    Visiting Nurse:    No     Home PT/OT:  No     Home Safety Evaluation:  No     DME  None    Discharge Diet:      Dietary Orders (From admission, onward)    Start     Ordered    11/22/19 1430  Diet Pureed; [place] / Syrup Thick  Diet Effective Now     Question Answer Comment   Diet Texture / Consistency Pureed    Fluid Consistency [place] / Syrup Thick        11/22/19 1430          Activity at Discharge:  As tolerated      Follow-up Appointments  No future appointments.  Additional Instructions for the Follow-ups that You Need to Schedule     Discharge Follow-up with PCP   As directed       Currently Documented PCP:    Tami Escalante MD    PCP Phone Number:    [phone]     Follow Up Details:  Next week               Test Results Pending at Discharge  None     Koko Mathis MD  11/23/19  12:18 PM

## 2019-11-23 NOTE — NURSING NOTE
Case Management Discharge Note      Final Note: Discharge to [facility].         Destination      No service has been selected for the patient.      Durable Medical Equipment      No service has been selected for the patient.      Dialysis/Infusion      No service has been selected for the patient.      Home Medical Care      No service has been selected for the patient.      Therapy      No service has been selected for the patient.      Community Resources      No service has been selected for the patient.             Final Discharge Disposition Code: 04 - [facility] care [facility]

## 2019-11-23 NOTE — PLAN OF CARE
Problem: Patient Care Overview  Goal: Plan of Care Review  Outcome: Ongoing (interventions implemented as appropriate)   11/23/19 0531   Coping/Psychosocial   Plan of Care Reviewed With patient   Plan of Care Review   Progress no change   OTHER   Outcome Summary VSS, non verbal,GI panel needed, IVF's, meds crushed and given with applesauce, complete care     Goal: Individualization and Mutuality  Outcome: Ongoing (interventions implemented as appropriate)      Problem: Gastrointestinal Bleeding (Adult)  Goal: Signs and Symptoms of Listed Potential Problems Will be Absent, Minimized or Managed (Gastrointestinal Bleeding)  Outcome: Ongoing (interventions implemented as appropriate)      Problem: Skin Injury Risk (Adult)  Goal: Identify Related Risk Factors and Signs and Symptoms  Outcome: Outcome(s) achieved Date Met: 11/23/19    Goal: Skin Health and Integrity  Outcome: Ongoing (interventions implemented as appropriate)

## 2019-11-26 ENCOUNTER — LAB REQUISITION (OUTPATIENT)
Dept: LAB | Facility: HOSPITAL | Age: 64
End: 2019-11-26

## 2019-11-26 DIAGNOSIS — Z00.00 ROUTINE GENERAL MEDICAL EXAMINATION AT A HEALTH CARE FACILITY: ICD-10-CM

## 2019-11-26 LAB
BASOPHILS # BLD AUTO: 0.03 10*3/MM3 (ref 0–0.2)
BASOPHILS NFR BLD AUTO: 0.5 % (ref 0–1.5)
DEPRECATED RDW RBC AUTO: 43.1 FL (ref 37–54)
EOSINOPHIL # BLD AUTO: 0.04 10*3/MM3 (ref 0–0.4)
EOSINOPHIL NFR BLD AUTO: 0.6 % (ref 0.3–6.2)
ERYTHROCYTE [DISTWIDTH] IN BLOOD BY AUTOMATED COUNT: 12.6 % (ref 12.3–15.4)
HCT VFR BLD AUTO: 37 % (ref 37.5–51)
HGB BLD-MCNC: 12.9 G/DL (ref 13–17.7)
IMM GRANULOCYTES # BLD AUTO: 0.02 10*3/MM3 (ref 0–0.05)
IMM GRANULOCYTES NFR BLD AUTO: 0.3 % (ref 0–0.5)
LYMPHOCYTES # BLD AUTO: 1.37 10*3/MM3 (ref 0.7–3.1)
LYMPHOCYTES NFR BLD AUTO: 20.9 % (ref 19.6–45.3)
MCH RBC QN AUTO: 33.2 PG (ref 26.6–33)
MCHC RBC AUTO-ENTMCNC: 34.9 G/DL (ref 31.5–35.7)
MCV RBC AUTO: 95.4 FL (ref 79–97)
MONOCYTES # BLD AUTO: 0.62 10*3/MM3 (ref 0.1–0.9)
MONOCYTES NFR BLD AUTO: 9.5 % (ref 5–12)
NEUTROPHILS # BLD AUTO: 4.46 10*3/MM3 (ref 1.7–7)
NEUTROPHILS NFR BLD AUTO: 68.2 % (ref 42.7–76)
NRBC BLD AUTO-RTO: 0 /100 WBC (ref 0–0.2)
PLATELET # BLD AUTO: 170 10*3/MM3 (ref 140–450)
PMV BLD AUTO: 10.2 FL (ref 6–12)
RBC # BLD AUTO: 3.88 10*6/MM3 (ref 4.14–5.8)
WBC NRBC COR # BLD: 6.54 10*3/MM3 (ref 3.4–10.8)

## 2019-11-26 PROCEDURE — 85025 COMPLETE CBC W/AUTO DIFF WBC: CPT

## 2020-01-01 ENCOUNTER — APPOINTMENT (OUTPATIENT)
Dept: CT IMAGING | Facility: HOSPITAL | Age: 65
End: 2020-01-01

## 2020-01-01 ENCOUNTER — HOSPITAL ENCOUNTER (EMERGENCY)
Facility: HOSPITAL | Age: 65
Discharge: NURSING, REHAB OR LONG-TERM CARE | End: 2020-11-25
Attending: EMERGENCY MEDICINE | Admitting: EMERGENCY MEDICINE

## 2020-01-01 VITALS
TEMPERATURE: 100.5 F | DIASTOLIC BLOOD PRESSURE: 78 MMHG | OXYGEN SATURATION: 97 % | HEART RATE: 74 BPM | SYSTOLIC BLOOD PRESSURE: 124 MMHG | RESPIRATION RATE: 18 BRPM

## 2020-01-01 DIAGNOSIS — S16.1XXA STRAIN OF NECK MUSCLE, INITIAL ENCOUNTER: ICD-10-CM

## 2020-01-01 DIAGNOSIS — S01.01XA LACERATION OF SCALP, INITIAL ENCOUNTER: ICD-10-CM

## 2020-01-01 DIAGNOSIS — S00.93XA CONTUSION OF HEAD, UNSPECIFIED PART OF HEAD, INITIAL ENCOUNTER: Primary | ICD-10-CM

## 2020-01-01 PROCEDURE — 70450 CT HEAD/BRAIN W/O DYE: CPT

## 2020-01-01 PROCEDURE — 99283 EMERGENCY DEPT VISIT LOW MDM: CPT

## 2020-01-01 PROCEDURE — 72125 CT NECK SPINE W/O DYE: CPT

## 2020-01-01 RX ORDER — LIDOCAINE HYDROCHLORIDE AND EPINEPHRINE 10; 10 MG/ML; UG/ML
10 INJECTION, SOLUTION INFILTRATION; PERINEURAL ONCE
Status: COMPLETED | OUTPATIENT
Start: 2020-01-01 | End: 2020-01-01

## 2020-01-01 RX ADMIN — LIDOCAINE HYDROCHLORIDE,EPINEPHRINE BITARTRATE 10 ML: 10; .01 INJECTION, SOLUTION INFILTRATION; PERINEURAL at 09:10

## 2020-11-25 NOTE — ED NOTES
Advised CCP nurse Rebecca that patient would need to be transported back to facility      Areils Richards RN  11/25/20 2452

## 2020-11-25 NOTE — ED NOTES
Attempted to call report. Called facility x3. Placed on hold 1x, hung up on a second time, placed on hold on 3rd call. Advised  that pt was en route and I needed to give report. Gave report to Arelis Howard, RN  11/25/20 8240

## 2020-11-25 NOTE — ED PROVIDER NOTES
EMERGENCY DEPARTMENT ENCOUNTER    Room Number:  04/04  Date of encounter:  11/25/2020  PCP: Provider, No Known  Historian: EMS and nursing home report      PPE    Patient was placed in face mask in first look. Patient was wearing facemask when I entered the room and throughout our encounter. I wore full protective equipment throughout this patient encounter including a face mask, and gloves. Hand hygiene was performed before donning protective equipment and after removal when leaving the room.        HPI:  Chief Complaint: Unwitnessed fall  A complete HPI/ROS/PMH/PSH/SH/FH are unobtainable due to: Patient is nonverbal with history of dementia    Context: Drew Day is a 64 y.o. male who arrives to the ED via EMS from [facility].  Per report, patient had a unwitnessed fall.  States staff found him beside his bed.  Patient is nonverbal, has a history of dementia therefore unable to obtain full HPI or review of symptoms.        PAST MEDICAL HISTORY  Active Ambulatory Problems     Diagnosis Date Noted   • Dementia with behavioral disturbance (CMS/HCC) 09/17/2016   • Gastroesophageal reflux disease without esophagitis 09/17/2016   • Coronary artery disease involving native coronary artery of native heart without angina pectoris 09/17/2016   • Seizure disorder (CMS/HCC) 09/17/2016   • Cognitive impairment 09/17/2016   • Closed head injury 09/17/2016   • Acute upper GI bleed 11/22/2019     Resolved Ambulatory Problems     Diagnosis Date Noted   • No Resolved Ambulatory Problems     Past Medical History:   Diagnosis Date   • Back pain    • Convulsion disorder (CMS/HCC)    • Coronary artery disease    • Dementia (CMS/HCC)    • Hyperlipidemia    • Hypertension    • Seizures (CMS/HCC)          PAST SURGICAL HISTORY  Past Surgical History:   Procedure Laterality Date   • CARDIAC CATHETERIZATION     • CORONARY ARTERY BYPASS GRAFT Left    • VASCULAR SURGERY Left          FAMILY HISTORY  Family History    Problem Relation Age of Onset   • Arthritis Mother    • Depression Mother    • Alcohol abuse Mother    • Emphysema Mother    • Alzheimer's disease Mother    • Alzheimer's disease Father    • Alcohol abuse Father    • Bipolar disorder Sister    • Heart disease Sister    • Cancer Brother    • Alcohol abuse Brother    • Drug abuse Brother    • Bipolar disorder Son    • Anxiety disorder Son    • Stroke Brother    • Cancer Brother    • Hypertension Brother    • Bipolar disorder Brother    • Anxiety disorder Brother    • Neuropathy Brother    • Heart disease Brother    • Drug abuse Brother    • Alcohol abuse Brother          SOCIAL HISTORY  Social History     Socioeconomic History   • Marital status:      Spouse name: Not on file   • Number of children: Not on file   • Years of education: Not on file   • Highest education level: Not on file   Tobacco Use   • Smoking status: Former Smoker   Substance and Sexual Activity   • Alcohol use: No         ALLERGIES  Eggs or egg-derived products and Fish-derived products        REVIEW OF SYSTEMS  Review of Systems     Unable to obtain secondary to dementia and nonverbal       PHYSICAL EXAM    ED Triage Vitals [11/25/20 0701]   Temp Heart Rate Resp BP SpO2   100.5 °F (38.1 °C) 74 18 124/78 97 %       Physical Exam  GENERAL: Disheveled appearance, non-toxic appearing, not distressed  HENT: normocephalic, atraumatic  EYES: no scleral icterus, PERRL  CV: regular rhythm, regular rate, no murmur  RESPIRATORY: normal effort, CTAB  ABDOMEN: soft   MUSCULOSKELETAL: no deformity  Patient does not moan or respond when his C-spine, thoracic spine or lumbar spine are palpated  NEURO: alert, moves all extremities, follows commands, mental status normal/baseline  SKIN: warm, dry, no rash  2 cm laceration left parietal  Psych: Appropriate mood and affect  Nursing notes and vital signs reviewed        RADIOLOGY  Ct Head Without Contrast    Result Date: 11/25/2020  CT SCAN OF THE BRAIN  WITHOUT CONTRAST  HISTORY: Fell out of bed with trauma to left side of head. Laceration.  The CT scan was performed as an emergency procedure through the brain without contrast. There is extensive diffuse atrophy and chronic small vessel ischemic change. There is no evidence of acute intracranial hemorrhage or mass effect and the visualized sinuses and mastoid air cells are clear.  CT SCAN OF CERVICAL SPINE  HISTORY: Fell. Neck pain.  CT scan was performed as an emergency procedure through the cervical spine and demonstrates the followin. There is no evidence of acute fracture with particular reference to the odontoid. There is reversal of the cervical lordosis centered at C4-C5. 2. There are scattered degenerative changes throughout the cervical spine. This includes spurring of the lateral facets and uncovertebral joints. There is some associated bony foraminal encroachment, particularly at C3-C4 and C4-C5 bilaterally and at C5-C6 on the right.      Radiation dose reduction techniques were utilized, including automated exposure control and exposure modulation based on body size.       Ct Cervical Spine Without Contrast    Result Date: 2020  CT SCAN OF THE BRAIN WITHOUT CONTRAST  HISTORY: Fell out of bed with trauma to left side of head. Laceration.  The CT scan was performed as an emergency procedure through the brain without contrast. There is extensive diffuse atrophy and chronic small vessel ischemic change. There is no evidence of acute intracranial hemorrhage or mass effect and the visualized sinuses and mastoid air cells are clear.  CT SCAN OF CERVICAL SPINE  HISTORY: Fell. Neck pain.  CT scan was performed as an emergency procedure through the cervical spine and demonstrates the followin. There is no evidence of acute fracture with particular reference to the odontoid. There is reversal of the cervical lordosis centered at C4-C5. 2. There are scattered degenerative changes throughout the  cervical spine. This includes spurring of the lateral facets and uncovertebral joints. There is some associated bony foraminal encroachment, particularly at C3-C4 and C4-C5 bilaterally and at C5-C6 on the right.      Radiation dose reduction techniques were utilized, including automated exposure control and exposure modulation based on body size.         I ordered the above noted radiological studies and viewed the images on the PACS system.       MEDICAL RECORD REVIEW  Medical records reviewed in epic, patient was last admitted in 2019 for suspected GI bleed.      PROCEDURES    Laceration Repair    Date/Time: 11/25/2020 9:16 AM  Performed by: Gem Gill APRN  Authorized by: Ankush Zuniga MD     Consent:     Consent obtained:  Emergent situation    Risks discussed:  Infection    Alternatives discussed:  No treatment  Anesthesia (see MAR for exact dosages):     Anesthesia method:  Local infiltration    Local anesthetic:  Lidocaine 1% WITH epi  Laceration details:     Location:  Scalp    Scalp location:  L parietal    Length (cm):  2  Treatment:     Area cleansed with:  Shur-Clens and saline    Amount of cleaning:  Standard    Irrigation solution:  Sterile saline  Skin repair:     Repair method:  Staples    Number of staples:  3  Approximation:     Approximation:  Close  Post-procedure details:     Dressing:  Open (no dressing)    Patient tolerance of procedure:  Tolerated well, no immediate complications            DIFFERENTIAL DIAGNOSIS  Differential Diagnosis for head injury include but are not limited to the following:  Cerebral contusion, Concussion ( with or without LOC), Head contusion, Skull fracture, Hematoma, Intracranial Hemorrhage, Laceration, Post Concussion Syndrome, cervical strain/fracture        PROGRESS, DATA ANALYSIS, CONSULTS, AND MEDICAL DECISION MAKING        ED Course as of Nov 25 0919   Wed Nov 25, 2020   0835  Discussed with Dr. Fabian regarding the CT head and C-spine  results which revealed nothing acute        [MS]   0916 Reviewed pt's history and workup with Dr. Zuniga.  After a bedside evaluation, they agree with the plan of care.          [MS]   0916   Imaging:  CT head: Negative  CT C-spine: Negative     Given history, exam and work-up low suspicious for intracranial hemorrhage, skull fracture, spine fracture or other acute spinal injuries, extremity fracture.    Patient will be discharged back to [facility], report will be called if there, with strict return to ER precautions and instructions to follow-up with PMD 7 days to have staples removed    [MS]      ED Course User Index  [MS] Gem Gill, KEYSHA       Patient discharged in stable condition.    DIAGNOSIS  Final diagnoses:   Contusion of head, unspecified part of head, initial encounter   Laceration of scalp, initial encounter   Strain of neck muscle, initial encounter       FOLLOW UP   Your PMD    Call today        RX     Medication List      Changed    Phenytoin Infatabs 50 MG chewable tablet  Generic drug: phenytoin  Take 4 tablets bid  What changed:   · how much to take  · how to take this  · when to take this  · additional instructions                MEDICATIONS GIVEN IN ED    Medications   lidocaine-EPINEPHrine (XYLOCAINE W/EPI) 1 %-1:[id number] injection 10 mL (has no administration in time range)           COURSE & MEDICAL DECISION MAKING  Any/All labs and Any/All Imaging studies that were ordered were reviewed and are noted above.  Results were reviewed/discussed with the patient and they were also made aware of online assess.   Pt also made aware that some labs, such as cultures, will not be resulted during ER visit and follow up with PMD is necessary.        Gem Gill APRN  11/25/20 [id number]

## 2020-11-25 NOTE — ED NOTES
Contacted [facility], spoke with SABI Boggs. Stated that patient has history of falling out of the bed. Pt was found in floor by CNA. Per the MD, pt was sent for evaluation to ER. Pt has noticeable laceration to left side of his head. Pt is in advanced stage dementia and is non verbal at baseline.      Arelis Richards RN  11/25/20 0729       Arelis Richards RN  11/25/20 0731

## 2020-11-25 NOTE — ED NOTES
Pt to ED from [facility] per [facility] with reports of unwitnessed fall.  Pt sat himself up on floor and alerted staff.  Laceration noted to left side of scalp.  No blood thinner use.      Pt arrives to ED wet, EMS reports nursing home staff poured ice and water on scalp to stop bleeding.      Pt awake, alert and at neuro baseline (nonverbal).    All triage performed with this RN wearing appropriate PPE.  Pt placed in mask upon arrival to ED.     Melodie Dorman, RN  11/25/20 0703

## 2020-11-25 NOTE — DISCHARGE INSTRUCTIONS
Follow up with your primary care doctor within 48-72 hours. Rest. Take Acetaminophen (Tylenol) every 4-6 hours, as needed, for pain.  Clean scalp laceration twice a day with soap and water, monitor for signs and symptoms of infection including redness, swelling, drainage.  Staples will need to be removed in 7 days.  Often individuals develop a headache associated with nausea in the days/hours after a head injury. This is called a concussion and does not warrant a return to the ED UNLESS: you develop significant worsening of pain, profuse vomiting, dizziness, changes in vision, difficulty walking/speaking, weakness or numbness to your extremities.

## 2020-11-25 NOTE — ED PROVIDER NOTES
I have supervised the care provided by the midlevel provider.    We have discussed this patient's history, physical exam, and treatment plan.   I have reviewed the note and have personally examined the patient and agree with the plan of care.  See attached attending note.  My personal findings are below:    Patient was sent to the ED from the nursing home after an unwitnessed fall.  He was noted to have a scalp laceration.  Patient has dementia and cannot provide any history.    On exam: Awake.  Nonverbal.  Laceration on the left parietal scalp.  Heart is regular.  Lungs are clear.  Chest, back, and extremities appear to be nontender.    CT of the head and cervical spine are negative acute.  Patient's laceration will be repaired.     Ankush Zuniga MD  11/25/20 4442

## 2021-01-01 ENCOUNTER — APPOINTMENT (OUTPATIENT)
Dept: GENERAL RADIOLOGY | Facility: HOSPITAL | Age: 66
End: 2021-01-01

## 2021-01-01 ENCOUNTER — ANESTHESIA EVENT (OUTPATIENT)
Dept: GASTROENTEROLOGY | Facility: HOSPITAL | Age: 66
End: 2021-01-01

## 2021-01-01 ENCOUNTER — APPOINTMENT (OUTPATIENT)
Dept: CARDIOLOGY | Facility: HOSPITAL | Age: 66
End: 2021-01-01

## 2021-01-01 ENCOUNTER — HOSPITAL ENCOUNTER (INPATIENT)
Facility: HOSPITAL | Age: 66
LOS: 7 days | Discharge: HOSPICE | End: 2021-09-13
Attending: EMERGENCY MEDICINE | Admitting: INTERNAL MEDICINE

## 2021-01-01 ENCOUNTER — APPOINTMENT (OUTPATIENT)
Dept: CT IMAGING | Facility: HOSPITAL | Age: 66
End: 2021-01-01

## 2021-01-01 ENCOUNTER — HOSPITAL ENCOUNTER (INPATIENT)
Facility: HOSPITAL | Age: 66
LOS: 9 days | End: 2021-09-22
Attending: INTERNAL MEDICINE | Admitting: INTERNAL MEDICINE

## 2021-01-01 ENCOUNTER — ANESTHESIA (OUTPATIENT)
Dept: GASTROENTEROLOGY | Facility: HOSPITAL | Age: 66
End: 2021-01-01

## 2021-01-01 ENCOUNTER — HOSPITAL ENCOUNTER (INPATIENT)
Facility: HOSPITAL | Age: 66
LOS: 5 days | Discharge: NURSING, REHAB OR LONG-TERM CARE | End: 2021-04-26
Attending: EMERGENCY MEDICINE | Admitting: INTERNAL MEDICINE

## 2021-01-01 ENCOUNTER — TELEPHONE (OUTPATIENT)
Dept: GASTROENTEROLOGY | Facility: CLINIC | Age: 66
End: 2021-01-01

## 2021-01-01 ENCOUNTER — PREP FOR SURGERY (OUTPATIENT)
Dept: OTHER | Facility: HOSPITAL | Age: 66
End: 2021-01-01

## 2021-01-01 ENCOUNTER — HOSPITAL ENCOUNTER (INPATIENT)
Facility: HOSPITAL | Age: 66
LOS: 4 days | Discharge: NURSING, REHAB OR LONG-TERM CARE | End: 2021-06-13
Attending: EMERGENCY MEDICINE | Admitting: INTERNAL MEDICINE

## 2021-01-01 ENCOUNTER — HOSPITAL ENCOUNTER (INPATIENT)
Facility: HOSPITAL | Age: 66
LOS: 5 days | Discharge: NURSING, REHAB OR LONG-TERM CARE | End: 2021-06-01
Attending: EMERGENCY MEDICINE | Admitting: INTERNAL MEDICINE

## 2021-01-01 ENCOUNTER — BULK ORDERING (OUTPATIENT)
Dept: CASE MANAGEMENT | Facility: OTHER | Age: 66
End: 2021-01-01

## 2021-01-01 VITALS
BODY MASS INDEX: 19.11 KG/M2 | WEIGHT: 133.5 LBS | TEMPERATURE: 97.6 F | OXYGEN SATURATION: 100 % | HEART RATE: 63 BPM | DIASTOLIC BLOOD PRESSURE: 57 MMHG | SYSTOLIC BLOOD PRESSURE: 113 MMHG | RESPIRATION RATE: 16 BRPM | HEIGHT: 70 IN

## 2021-01-01 VITALS
BODY MASS INDEX: 19.57 KG/M2 | RESPIRATION RATE: 16 BRPM | WEIGHT: 136.7 LBS | TEMPERATURE: 97.3 F | HEIGHT: 70 IN | SYSTOLIC BLOOD PRESSURE: 106 MMHG | HEART RATE: 61 BPM | OXYGEN SATURATION: 100 % | DIASTOLIC BLOOD PRESSURE: 51 MMHG

## 2021-01-01 VITALS
TEMPERATURE: 98.1 F | HEIGHT: 67 IN | SYSTOLIC BLOOD PRESSURE: 86 MMHG | RESPIRATION RATE: 16 BRPM | OXYGEN SATURATION: 92 % | WEIGHT: 134.04 LBS | DIASTOLIC BLOOD PRESSURE: 50 MMHG | HEART RATE: 92 BPM | BODY MASS INDEX: 21.04 KG/M2

## 2021-01-01 VITALS
TEMPERATURE: 98.2 F | RESPIRATION RATE: 18 BRPM | HEIGHT: 70 IN | BODY MASS INDEX: 18.9 KG/M2 | DIASTOLIC BLOOD PRESSURE: 64 MMHG | OXYGEN SATURATION: 97 % | SYSTOLIC BLOOD PRESSURE: 100 MMHG | WEIGHT: 132 LBS | HEART RATE: 72 BPM

## 2021-01-01 VITALS — OXYGEN SATURATION: 94 % | RESPIRATION RATE: 26 BRPM | TEMPERATURE: 100.2 F

## 2021-01-01 DIAGNOSIS — D64.9 ACUTE ANEMIA: ICD-10-CM

## 2021-01-01 DIAGNOSIS — K92.2 GASTROINTESTINAL HEMORRHAGE, UNSPECIFIED GASTROINTESTINAL HEMORRHAGE TYPE: ICD-10-CM

## 2021-01-01 DIAGNOSIS — K92.0 HEMATEMESIS, PRESENCE OF NAUSEA NOT SPECIFIED: Primary | ICD-10-CM

## 2021-01-01 DIAGNOSIS — J18.9 PNEUMONIA OF BOTH LUNGS DUE TO INFECTIOUS ORGANISM, UNSPECIFIED PART OF LUNG: ICD-10-CM

## 2021-01-01 DIAGNOSIS — K92.0 COFFEE GROUND EMESIS: Primary | ICD-10-CM

## 2021-01-01 DIAGNOSIS — R00.0 TACHYCARDIA: ICD-10-CM

## 2021-01-01 DIAGNOSIS — Z79.01 CURRENT USE OF LONG TERM ANTICOAGULATION: ICD-10-CM

## 2021-01-01 DIAGNOSIS — D64.9 ACUTE ON CHRONIC ANEMIA: ICD-10-CM

## 2021-01-01 DIAGNOSIS — Z87.898 HISTORY OF SEIZURES: ICD-10-CM

## 2021-01-01 DIAGNOSIS — U07.1 COVID-19 VIRUS DETECTED: ICD-10-CM

## 2021-01-01 DIAGNOSIS — R41.89 COGNITIVE IMPAIRMENT: ICD-10-CM

## 2021-01-01 DIAGNOSIS — F02.80 DEMENTIA ASSOCIATED WITH OTHER UNDERLYING DISEASE WITHOUT BEHAVIORAL DISTURBANCE (HCC): ICD-10-CM

## 2021-01-01 DIAGNOSIS — S06.9X0S TRAUMATIC BRAIN INJURY, WITHOUT LOSS OF CONSCIOUSNESS, SEQUELA (HCC): ICD-10-CM

## 2021-01-01 DIAGNOSIS — Z86.79 HISTORY OF CORONARY ARTERY DISEASE: ICD-10-CM

## 2021-01-01 DIAGNOSIS — E87.20 LACTIC ACIDOSIS: ICD-10-CM

## 2021-01-01 DIAGNOSIS — G40.909 SEIZURE DISORDER (HCC): ICD-10-CM

## 2021-01-01 DIAGNOSIS — Z86.79 HISTORY OF HYPERTENSION: ICD-10-CM

## 2021-01-01 DIAGNOSIS — K21.01 GASTROESOPHAGEAL REFLUX DISEASE WITH ESOPHAGITIS AND HEMORRHAGE: ICD-10-CM

## 2021-01-01 DIAGNOSIS — Z87.820 HISTORY OF TRAUMATIC BRAIN INJURY: ICD-10-CM

## 2021-01-01 DIAGNOSIS — A41.9 SEPSIS WITHOUT ACUTE ORGAN DYSFUNCTION, DUE TO UNSPECIFIED ORGANISM (HCC): ICD-10-CM

## 2021-01-01 DIAGNOSIS — Z79.01 CHRONIC ANTICOAGULATION: ICD-10-CM

## 2021-01-01 DIAGNOSIS — J96.01 ACUTE RESPIRATORY FAILURE WITH HYPOXIA (HCC): Primary | ICD-10-CM

## 2021-01-01 DIAGNOSIS — Z23 IMMUNIZATION DUE: ICD-10-CM

## 2021-01-01 LAB
ABO GROUP BLD: NORMAL
ALBUMIN SERPL-MCNC: 2.7 G/DL (ref 3.5–5.2)
ALBUMIN SERPL-MCNC: 2.9 G/DL (ref 3.5–5.2)
ALBUMIN SERPL-MCNC: 3 G/DL (ref 3.5–5.2)
ALBUMIN SERPL-MCNC: 3.4 G/DL (ref 3.5–5.2)
ALBUMIN SERPL-MCNC: 3.4 G/DL (ref 3.5–5.2)
ALBUMIN SERPL-MCNC: 3.5 G/DL (ref 3.5–5.2)
ALBUMIN SERPL-MCNC: 3.6 G/DL (ref 3.5–5.2)
ALBUMIN SERPL-MCNC: 3.8 G/DL (ref 3.5–5.2)
ALBUMIN SERPL-MCNC: 3.8 G/DL (ref 3.5–5.2)
ALBUMIN SERPL-MCNC: 3.9 G/DL (ref 3.5–5.2)
ALBUMIN SERPL-MCNC: 4 G/DL (ref 3.5–5.2)
ALBUMIN SERPL-MCNC: 4.1 G/DL (ref 3.5–5.2)
ALBUMIN/GLOB SERPL: 1.3 G/DL
ALBUMIN/GLOB SERPL: 1.4 G/DL
ALBUMIN/GLOB SERPL: 1.5 G/DL
ALBUMIN/GLOB SERPL: 1.6 G/DL
ALP SERPL-CCNC: 102 U/L (ref 39–117)
ALP SERPL-CCNC: 112 U/L (ref 39–117)
ALP SERPL-CCNC: 119 U/L (ref 39–117)
ALP SERPL-CCNC: 126 U/L (ref 39–117)
ALP SERPL-CCNC: 127 U/L (ref 39–117)
ALP SERPL-CCNC: 128 U/L (ref 39–117)
ALP SERPL-CCNC: 130 U/L (ref 39–117)
ALP SERPL-CCNC: 139 U/L (ref 39–117)
ALP SERPL-CCNC: 83 U/L (ref 39–117)
ALP SERPL-CCNC: 85 U/L (ref 39–117)
ALP SERPL-CCNC: 86 U/L (ref 39–117)
ALP SERPL-CCNC: 87 U/L (ref 39–117)
ALP SERPL-CCNC: 93 U/L (ref 39–117)
ALP SERPL-CCNC: 97 U/L (ref 39–117)
ALT SERPL W P-5'-P-CCNC: 10 U/L (ref 1–41)
ALT SERPL W P-5'-P-CCNC: 12 U/L (ref 1–41)
ALT SERPL W P-5'-P-CCNC: 13 U/L (ref 1–41)
ALT SERPL W P-5'-P-CCNC: 14 U/L (ref 1–41)
ALT SERPL W P-5'-P-CCNC: 14 U/L (ref 1–41)
ALT SERPL W P-5'-P-CCNC: 16 U/L (ref 1–41)
ALT SERPL W P-5'-P-CCNC: 17 U/L (ref 1–41)
ALT SERPL W P-5'-P-CCNC: 28 U/L (ref 1–41)
AMPHET+METHAMPHET UR QL: NEGATIVE
ANION GAP SERPL CALCULATED.3IONS-SCNC: 10 MMOL/L (ref 5–15)
ANION GAP SERPL CALCULATED.3IONS-SCNC: 12.6 MMOL/L (ref 5–15)
ANION GAP SERPL CALCULATED.3IONS-SCNC: 4.2 MMOL/L (ref 5–15)
ANION GAP SERPL CALCULATED.3IONS-SCNC: 4.3 MMOL/L (ref 5–15)
ANION GAP SERPL CALCULATED.3IONS-SCNC: 5.3 MMOL/L (ref 5–15)
ANION GAP SERPL CALCULATED.3IONS-SCNC: 5.7 MMOL/L (ref 5–15)
ANION GAP SERPL CALCULATED.3IONS-SCNC: 6 MMOL/L (ref 5–15)
ANION GAP SERPL CALCULATED.3IONS-SCNC: 6 MMOL/L (ref 5–15)
ANION GAP SERPL CALCULATED.3IONS-SCNC: 6.2 MMOL/L (ref 5–15)
ANION GAP SERPL CALCULATED.3IONS-SCNC: 6.4 MMOL/L (ref 5–15)
ANION GAP SERPL CALCULATED.3IONS-SCNC: 6.9 MMOL/L (ref 5–15)
ANION GAP SERPL CALCULATED.3IONS-SCNC: 7.2 MMOL/L (ref 5–15)
ANION GAP SERPL CALCULATED.3IONS-SCNC: 7.3 MMOL/L (ref 5–15)
ANION GAP SERPL CALCULATED.3IONS-SCNC: 7.5 MMOL/L (ref 5–15)
ANION GAP SERPL CALCULATED.3IONS-SCNC: 7.6 MMOL/L (ref 5–15)
ANION GAP SERPL CALCULATED.3IONS-SCNC: 7.6 MMOL/L (ref 5–15)
ANION GAP SERPL CALCULATED.3IONS-SCNC: 7.8 MMOL/L (ref 5–15)
ANION GAP SERPL CALCULATED.3IONS-SCNC: 8.6 MMOL/L (ref 5–15)
ANION GAP SERPL CALCULATED.3IONS-SCNC: 8.6 MMOL/L (ref 5–15)
ANION GAP SERPL CALCULATED.3IONS-SCNC: 9 MMOL/L (ref 5–15)
ANION GAP SERPL CALCULATED.3IONS-SCNC: 9.4 MMOL/L (ref 5–15)
ANION GAP SERPL CALCULATED.3IONS-SCNC: 9.5 MMOL/L (ref 5–15)
ANISOCYTOSIS BLD QL: ABNORMAL
APTT PPP: 135.9 SECONDS (ref 22.7–35.4)
APTT PPP: 143.4 SECONDS (ref 22.7–35.4)
APTT PPP: 24.3 SECONDS (ref 22.7–35.4)
APTT PPP: 29.6 SECONDS (ref 22.7–35.4)
APTT PPP: 47 SECONDS (ref 22.7–35.4)
APTT PPP: 84 SECONDS (ref 22.7–35.4)
APTT PPP: 89.2 SECONDS (ref 22.7–35.4)
ARTERIAL PATENCY WRIST A: POSITIVE
AST SERPL-CCNC: 14 U/L (ref 1–40)
AST SERPL-CCNC: 15 U/L (ref 1–40)
AST SERPL-CCNC: 17 U/L (ref 1–40)
AST SERPL-CCNC: 18 U/L (ref 1–40)
AST SERPL-CCNC: 19 U/L (ref 1–40)
AST SERPL-CCNC: 20 U/L (ref 1–40)
AST SERPL-CCNC: 20 U/L (ref 1–40)
AST SERPL-CCNC: 21 U/L (ref 1–40)
AST SERPL-CCNC: 22 U/L (ref 1–40)
AST SERPL-CCNC: 23 U/L (ref 1–40)
AST SERPL-CCNC: 40 U/L (ref 1–40)
AST SERPL-CCNC: 51 U/L (ref 1–40)
ATMOSPHERIC PRESS: 744.7 MMHG
ATMOSPHERIC PRESS: 745 MMHG
ATMOSPHERIC PRESS: 749.9 MMHG
ATMOSPHERIC PRESS: 750 MMHG
ATMOSPHERIC PRESS: 754.2 MMHG
BACTERIA BLD CULT: ABNORMAL
BACTERIA BLD CULT: NORMAL
BACTERIA SPEC AEROBE CULT: ABNORMAL
BACTERIA SPEC AEROBE CULT: ABNORMAL
BACTERIA SPEC AEROBE CULT: NORMAL
BACTERIA SPEC AEROBE CULT: NORMAL
BACTERIA UR QL AUTO: ABNORMAL /HPF
BARBITURATES UR QL SCN: POSITIVE
BASE EXCESS BLDA CALC-SCNC: -0.9 MMOL/L (ref 0–2)
BASE EXCESS BLDA CALC-SCNC: -1 MMOL/L (ref 0–2)
BASE EXCESS BLDA CALC-SCNC: -2.5 MMOL/L (ref 0–2)
BASE EXCESS BLDA CALC-SCNC: 2.4 MMOL/L (ref 0–2)
BASE EXCESS BLDA CALC-SCNC: 5.2 MMOL/L (ref 0–2)
BASOPHILS # BLD AUTO: 0.02 10*3/MM3 (ref 0–0.2)
BASOPHILS # BLD AUTO: 0.03 10*3/MM3 (ref 0–0.2)
BASOPHILS # BLD AUTO: 0.04 10*3/MM3 (ref 0–0.2)
BASOPHILS # BLD AUTO: 0.05 10*3/MM3 (ref 0–0.2)
BASOPHILS # BLD MANUAL: 0.17 10*3/MM3 (ref 0–0.2)
BASOPHILS NFR BLD AUTO: 0.3 % (ref 0–1.5)
BASOPHILS NFR BLD AUTO: 0.4 % (ref 0–1.5)
BASOPHILS NFR BLD AUTO: 0.5 % (ref 0–1.5)
BASOPHILS NFR BLD AUTO: 0.6 % (ref 0–1.5)
BASOPHILS NFR BLD AUTO: 0.7 % (ref 0–1.5)
BASOPHILS NFR BLD AUTO: 0.8 % (ref 0–1.5)
BASOPHILS NFR BLD AUTO: 1.1 % (ref 0–1.5)
BDY SITE: ABNORMAL
BENZODIAZ UR QL SCN: POSITIVE
BH BB BLOOD EXPIRATION DATE: NORMAL
BH BB BLOOD EXPIRATION DATE: NORMAL
BH BB BLOOD TYPE BARCODE: 8400
BH BB BLOOD TYPE BARCODE: 8400
BH BB DISPENSE STATUS: NORMAL
BH BB DISPENSE STATUS: NORMAL
BH BB PRODUCT CODE: NORMAL
BH BB PRODUCT CODE: NORMAL
BH BB UNIT NUMBER: NORMAL
BH BB UNIT NUMBER: NORMAL
BH CV LOW VAS LEFT COMMON FEMORAL SPONT: 1
BH CV LOW VAS LEFT DISTAL FEMORAL SPONT: 1
BH CV LOW VAS LEFT GASTRONEMIUS VESSEL: 1
BH CV LOW VAS LEFT LESSER SAPH VESSEL: 1
BH CV LOW VAS LEFT MID FEMORAL SPONT: 1
BH CV LOW VAS LEFT POPLITEAL SPONT: 1
BH CV LOW VAS LEFT POPLITEAL SPONT: 1
BH CV LOW VAS LEFT PROFUNDA FEMORAL SPONT: 1
BH CV LOW VAS LEFT PROXIMAL FEMORAL SPONT: 1
BH CV LOW VAS LEFT SAPHENOFEMORAL JUNCTION SPONT: 1
BH CV LOW VAS RIGHT GREATER SAPH AK VESSEL: 1
BH CV LOWER VASCULAR LEFT COMMON FEMORAL AUGMENT: NORMAL
BH CV LOWER VASCULAR LEFT COMMON FEMORAL AUGMENT: NORMAL
BH CV LOWER VASCULAR LEFT COMMON FEMORAL COMPETENT: NORMAL
BH CV LOWER VASCULAR LEFT COMMON FEMORAL COMPRESS: NORMAL
BH CV LOWER VASCULAR LEFT COMMON FEMORAL PHASIC: NORMAL
BH CV LOWER VASCULAR LEFT COMMON FEMORAL SPONT: NORMAL
BH CV LOWER VASCULAR LEFT COMMON FEMORAL THROMBUS: NORMAL
BH CV LOWER VASCULAR LEFT DISTAL FEMORAL COMPRESS: NORMAL
BH CV LOWER VASCULAR LEFT DISTAL FEMORAL THROMBUS: NORMAL
BH CV LOWER VASCULAR LEFT GASTRONEMIUS COMPRESS: NORMAL
BH CV LOWER VASCULAR LEFT GASTRONEMIUS COMPRESS: NORMAL
BH CV LOWER VASCULAR LEFT GASTRONEMIUS THROMBUS: NORMAL
BH CV LOWER VASCULAR LEFT GREATER SAPH AK COMPRESS: NORMAL
BH CV LOWER VASCULAR LEFT GREATER SAPH BK COMPRESS: NORMAL
BH CV LOWER VASCULAR LEFT LESSER SAPH COMPRESS: NORMAL
BH CV LOWER VASCULAR LEFT LESSER SAPH COMPRESS: NORMAL
BH CV LOWER VASCULAR LEFT LESSER SAPH THROMBUS: NORMAL
BH CV LOWER VASCULAR LEFT MID FEMORAL AUGMENT: NORMAL
BH CV LOWER VASCULAR LEFT MID FEMORAL COMPRESS: NORMAL
BH CV LOWER VASCULAR LEFT MID FEMORAL PHASIC: NORMAL
BH CV LOWER VASCULAR LEFT MID FEMORAL SPONT: NORMAL
BH CV LOWER VASCULAR LEFT MID FEMORAL THROMBUS: NORMAL
BH CV LOWER VASCULAR LEFT PERONEAL COMPRESS: NORMAL
BH CV LOWER VASCULAR LEFT PERONEAL COMPRESS: NORMAL
BH CV LOWER VASCULAR LEFT POPLITEAL AUGMENT: NORMAL
BH CV LOWER VASCULAR LEFT POPLITEAL AUGMENT: NORMAL
BH CV LOWER VASCULAR LEFT POPLITEAL COMPRESS: NORMAL
BH CV LOWER VASCULAR LEFT POPLITEAL COMPRESS: NORMAL
BH CV LOWER VASCULAR LEFT POPLITEAL PHASIC: NORMAL
BH CV LOWER VASCULAR LEFT POPLITEAL SPONT: NORMAL
BH CV LOWER VASCULAR LEFT POPLITEAL THROMBUS: NORMAL
BH CV LOWER VASCULAR LEFT POPLITEAL THROMBUS: NORMAL
BH CV LOWER VASCULAR LEFT POSTERIOR TIBIAL COMPRESS: NORMAL
BH CV LOWER VASCULAR LEFT POSTERIOR TIBIAL COMPRESS: NORMAL
BH CV LOWER VASCULAR LEFT PROFUNDA FEMORAL COMPRESS: NORMAL
BH CV LOWER VASCULAR LEFT PROFUNDA FEMORAL SPONT: NORMAL
BH CV LOWER VASCULAR LEFT PROFUNDA FEMORAL THROMBUS: NORMAL
BH CV LOWER VASCULAR LEFT PROXIMAL FEMORAL COMPRESS: NORMAL
BH CV LOWER VASCULAR LEFT PROXIMAL FEMORAL THROMBUS: NORMAL
BH CV LOWER VASCULAR LEFT SAPHENOFEMORAL JUNCTION COMPRESS: NORMAL
BH CV LOWER VASCULAR LEFT SAPHENOFEMORAL JUNCTION THROMBUS: NORMAL
BH CV LOWER VASCULAR RIGHT COMMON FEMORAL AUGMENT: NORMAL
BH CV LOWER VASCULAR RIGHT COMMON FEMORAL COMPETENT: NORMAL
BH CV LOWER VASCULAR RIGHT COMMON FEMORAL COMPRESS: NORMAL
BH CV LOWER VASCULAR RIGHT COMMON FEMORAL PHASIC: NORMAL
BH CV LOWER VASCULAR RIGHT COMMON FEMORAL SPONT: NORMAL
BH CV LOWER VASCULAR RIGHT DISTAL FEMORAL COMPRESS: NORMAL
BH CV LOWER VASCULAR RIGHT GASTRONEMIUS COMPRESS: NORMAL
BH CV LOWER VASCULAR RIGHT GREATER SAPH AK COMPRESS: NORMAL
BH CV LOWER VASCULAR RIGHT GREATER SAPH AK COMPRESS: NORMAL
BH CV LOWER VASCULAR RIGHT GREATER SAPH AK THROMBUS: NORMAL
BH CV LOWER VASCULAR RIGHT GREATER SAPH BK COMPRESS: NORMAL
BH CV LOWER VASCULAR RIGHT LESSER SAPH COMPRESS: NORMAL
BH CV LOWER VASCULAR RIGHT LESSER SAPH COMPRESS: NORMAL
BH CV LOWER VASCULAR RIGHT MID FEMORAL AUGMENT: NORMAL
BH CV LOWER VASCULAR RIGHT MID FEMORAL COMPETENT: NORMAL
BH CV LOWER VASCULAR RIGHT MID FEMORAL COMPRESS: NORMAL
BH CV LOWER VASCULAR RIGHT MID FEMORAL PHASIC: NORMAL
BH CV LOWER VASCULAR RIGHT MID FEMORAL SPONT: NORMAL
BH CV LOWER VASCULAR RIGHT PERONEAL COMPRESS: NORMAL
BH CV LOWER VASCULAR RIGHT POPLITEAL AUGMENT: NORMAL
BH CV LOWER VASCULAR RIGHT POPLITEAL COMPETENT: NORMAL
BH CV LOWER VASCULAR RIGHT POPLITEAL COMPRESS: NORMAL
BH CV LOWER VASCULAR RIGHT POPLITEAL PHASIC: NORMAL
BH CV LOWER VASCULAR RIGHT POPLITEAL SPONT: NORMAL
BH CV LOWER VASCULAR RIGHT POSTERIOR TIBIAL COMPRESS: NORMAL
BH CV LOWER VASCULAR RIGHT PROFUNDA FEMORAL COMPRESS: NORMAL
BH CV LOWER VASCULAR RIGHT PROXIMAL FEMORAL COMPRESS: NORMAL
BH CV LOWER VASCULAR RIGHT SAPHENOFEMORAL JUNCTION COMPRESS: NORMAL
BILIRUB CONJ SERPL-MCNC: <0.2 MG/DL (ref 0–0.3)
BILIRUB INDIRECT SERPL-MCNC: ABNORMAL MG/DL
BILIRUB SERPL-MCNC: 0.2 MG/DL (ref 0–1.2)
BILIRUB SERPL-MCNC: 0.3 MG/DL (ref 0–1.2)
BILIRUB SERPL-MCNC: <0.2 MG/DL (ref 0–1.2)
BILIRUB UR QL STRIP: NEGATIVE
BLD GP AB SCN SERPL QL: NEGATIVE
BOTTLE TYPE: ABNORMAL
BOTTLE TYPE: NORMAL
BUN SERPL-MCNC: 11 MG/DL (ref 8–23)
BUN SERPL-MCNC: 12 MG/DL (ref 8–23)
BUN SERPL-MCNC: 12 MG/DL (ref 8–23)
BUN SERPL-MCNC: 13 MG/DL (ref 8–23)
BUN SERPL-MCNC: 14 MG/DL (ref 8–23)
BUN SERPL-MCNC: 14 MG/DL (ref 8–23)
BUN SERPL-MCNC: 15 MG/DL (ref 8–23)
BUN SERPL-MCNC: 18 MG/DL (ref 8–23)
BUN SERPL-MCNC: 19 MG/DL (ref 8–23)
BUN SERPL-MCNC: 2 MG/DL (ref 8–23)
BUN SERPL-MCNC: 20 MG/DL (ref 8–23)
BUN SERPL-MCNC: 21 MG/DL (ref 8–23)
BUN SERPL-MCNC: 25 MG/DL (ref 8–23)
BUN SERPL-MCNC: 25 MG/DL (ref 8–23)
BUN SERPL-MCNC: 35 MG/DL (ref 8–23)
BUN SERPL-MCNC: 5 MG/DL (ref 8–23)
BUN SERPL-MCNC: 5 MG/DL (ref 8–23)
BUN SERPL-MCNC: 6 MG/DL (ref 8–23)
BUN SERPL-MCNC: 7 MG/DL (ref 8–23)
BUN SERPL-MCNC: 7 MG/DL (ref 8–23)
BUN SERPL-MCNC: 9 MG/DL (ref 8–23)
BUN SERPL-MCNC: <2 MG/DL (ref 8–23)
BUN/CREAT SERPL: 10 (ref 7–25)
BUN/CREAT SERPL: 12.5 (ref 7–25)
BUN/CREAT SERPL: 12.7 (ref 7–25)
BUN/CREAT SERPL: 15.3 (ref 7–25)
BUN/CREAT SERPL: 18 (ref 7–25)
BUN/CREAT SERPL: 19.2 (ref 7–25)
BUN/CREAT SERPL: 20.7 (ref 7–25)
BUN/CREAT SERPL: 21.3 (ref 7–25)
BUN/CREAT SERPL: 21.3 (ref 7–25)
BUN/CREAT SERPL: 24 (ref 7–25)
BUN/CREAT SERPL: 24.1 (ref 7–25)
BUN/CREAT SERPL: 25 (ref 7–25)
BUN/CREAT SERPL: 3.6 (ref 7–25)
BUN/CREAT SERPL: 30.6 (ref 7–25)
BUN/CREAT SERPL: 31.1 (ref 7–25)
BUN/CREAT SERPL: 32.9 (ref 7–25)
BUN/CREAT SERPL: 34.4 (ref 7–25)
BUN/CREAT SERPL: 37.3 (ref 7–25)
BUN/CREAT SERPL: 46.1 (ref 7–25)
BUN/CREAT SERPL: 7.6 (ref 7–25)
BUN/CREAT SERPL: 8.1 (ref 7–25)
BUN/CREAT SERPL: ABNORMAL
CALCIUM SPEC-SCNC: 7.7 MG/DL (ref 8.6–10.5)
CALCIUM SPEC-SCNC: 7.8 MG/DL (ref 8.6–10.5)
CALCIUM SPEC-SCNC: 7.8 MG/DL (ref 8.6–10.5)
CALCIUM SPEC-SCNC: 7.9 MG/DL (ref 8.6–10.5)
CALCIUM SPEC-SCNC: 7.9 MG/DL (ref 8.6–10.5)
CALCIUM SPEC-SCNC: 8 MG/DL (ref 8.6–10.5)
CALCIUM SPEC-SCNC: 8.1 MG/DL (ref 8.6–10.5)
CALCIUM SPEC-SCNC: 8.1 MG/DL (ref 8.6–10.5)
CALCIUM SPEC-SCNC: 8.2 MG/DL (ref 8.6–10.5)
CALCIUM SPEC-SCNC: 8.2 MG/DL (ref 8.6–10.5)
CALCIUM SPEC-SCNC: 8.3 MG/DL (ref 8.6–10.5)
CALCIUM SPEC-SCNC: 8.3 MG/DL (ref 8.6–10.5)
CALCIUM SPEC-SCNC: 8.4 MG/DL (ref 8.6–10.5)
CALCIUM SPEC-SCNC: 8.5 MG/DL (ref 8.6–10.5)
CALCIUM SPEC-SCNC: 8.5 MG/DL (ref 8.6–10.5)
CALCIUM SPEC-SCNC: 8.6 MG/DL (ref 8.6–10.5)
CALCIUM SPEC-SCNC: 9 MG/DL (ref 8.6–10.5)
CALCIUM SPEC-SCNC: 9.2 MG/DL (ref 8.6–10.5)
CALCIUM SPEC-SCNC: 9.2 MG/DL (ref 8.6–10.5)
CANNABINOIDS SERPL QL: NEGATIVE
CHLORIDE SERPL-SCNC: 105 MMOL/L (ref 98–107)
CHLORIDE SERPL-SCNC: 107 MMOL/L (ref 98–107)
CHLORIDE SERPL-SCNC: 108 MMOL/L (ref 98–107)
CHLORIDE SERPL-SCNC: 108 MMOL/L (ref 98–107)
CHLORIDE SERPL-SCNC: 109 MMOL/L (ref 98–107)
CHLORIDE SERPL-SCNC: 110 MMOL/L (ref 98–107)
CHLORIDE SERPL-SCNC: 111 MMOL/L (ref 98–107)
CHLORIDE SERPL-SCNC: 111 MMOL/L (ref 98–107)
CHLORIDE SERPL-SCNC: 112 MMOL/L (ref 98–107)
CHLORIDE SERPL-SCNC: 112 MMOL/L (ref 98–107)
CHLORIDE SERPL-SCNC: 113 MMOL/L (ref 98–107)
CHLORIDE SERPL-SCNC: 114 MMOL/L (ref 98–107)
CHLORIDE SERPL-SCNC: 115 MMOL/L (ref 98–107)
CLARITY UR: ABNORMAL
CLARITY UR: CLEAR
CO2 SERPL-SCNC: 19.4 MMOL/L (ref 22–29)
CO2 SERPL-SCNC: 20 MMOL/L (ref 22–29)
CO2 SERPL-SCNC: 21 MMOL/L (ref 22–29)
CO2 SERPL-SCNC: 21.4 MMOL/L (ref 22–29)
CO2 SERPL-SCNC: 21.6 MMOL/L (ref 22–29)
CO2 SERPL-SCNC: 22.1 MMOL/L (ref 22–29)
CO2 SERPL-SCNC: 22.4 MMOL/L (ref 22–29)
CO2 SERPL-SCNC: 22.7 MMOL/L (ref 22–29)
CO2 SERPL-SCNC: 22.8 MMOL/L (ref 22–29)
CO2 SERPL-SCNC: 23 MMOL/L (ref 22–29)
CO2 SERPL-SCNC: 23 MMOL/L (ref 22–29)
CO2 SERPL-SCNC: 23.5 MMOL/L (ref 22–29)
CO2 SERPL-SCNC: 23.7 MMOL/L (ref 22–29)
CO2 SERPL-SCNC: 23.8 MMOL/L (ref 22–29)
CO2 SERPL-SCNC: 24.3 MMOL/L (ref 22–29)
CO2 SERPL-SCNC: 25.4 MMOL/L (ref 22–29)
CO2 SERPL-SCNC: 25.4 MMOL/L (ref 22–29)
CO2 SERPL-SCNC: 25.6 MMOL/L (ref 22–29)
CO2 SERPL-SCNC: 25.7 MMOL/L (ref 22–29)
CO2 SERPL-SCNC: 26.2 MMOL/L (ref 22–29)
CO2 SERPL-SCNC: 26.5 MMOL/L (ref 22–29)
CO2 SERPL-SCNC: 27.8 MMOL/L (ref 22–29)
COCAINE UR QL: NEGATIVE
COLOR UR: YELLOW
CREAT SERPL-MCNC: 0.45 MG/DL (ref 0.76–1.27)
CREAT SERPL-MCNC: 0.48 MG/DL (ref 0.76–1.27)
CREAT SERPL-MCNC: 0.49 MG/DL (ref 0.76–1.27)
CREAT SERPL-MCNC: 0.5 MG/DL (ref 0.76–1.27)
CREAT SERPL-MCNC: 0.51 MG/DL (ref 0.76–1.27)
CREAT SERPL-MCNC: 0.55 MG/DL (ref 0.76–1.27)
CREAT SERPL-MCNC: 0.56 MG/DL (ref 0.76–1.27)
CREAT SERPL-MCNC: 0.56 MG/DL (ref 0.76–1.27)
CREAT SERPL-MCNC: 0.59 MG/DL (ref 0.76–1.27)
CREAT SERPL-MCNC: 0.6 MG/DL (ref 0.76–1.27)
CREAT SERPL-MCNC: 0.61 MG/DL (ref 0.76–1.27)
CREAT SERPL-MCNC: 0.62 MG/DL (ref 0.76–1.27)
CREAT SERPL-MCNC: 0.66 MG/DL (ref 0.76–1.27)
CREAT SERPL-MCNC: 0.67 MG/DL (ref 0.76–1.27)
CREAT SERPL-MCNC: 0.73 MG/DL (ref 0.76–1.27)
CREAT SERPL-MCNC: 0.76 MG/DL (ref 0.76–1.27)
CREAT SERPL-MCNC: 0.76 MG/DL (ref 0.76–1.27)
CREAT SERPL-MCNC: 0.83 MG/DL (ref 0.76–1.27)
CREAT SERPL-MCNC: 0.87 MG/DL (ref 0.76–1.27)
CREAT SERPL-MCNC: 0.89 MG/DL (ref 0.76–1.27)
CROSSMATCH INTERPRETATION: NORMAL
CROSSMATCH INTERPRETATION: NORMAL
CRP SERPL-MCNC: 1.58 MG/DL (ref 0–0.5)
CRP SERPL-MCNC: 10.46 MG/DL (ref 0–0.5)
CRP SERPL-MCNC: 18.82 MG/DL (ref 0–0.5)
CRP SERPL-MCNC: 19.15 MG/DL (ref 0–0.5)
CRP SERPL-MCNC: 6.43 MG/DL (ref 0–0.5)
D DIMER PPP FEU-MCNC: 0.89 MCGFEU/ML (ref 0–0.49)
D DIMER PPP FEU-MCNC: 2.95 MCGFEU/ML (ref 0–0.49)
D DIMER PPP FEU-MCNC: 4.66 MCGFEU/ML (ref 0–0.49)
D-LACTATE SERPL-SCNC: 1.2 MMOL/L (ref 0.5–2)
D-LACTATE SERPL-SCNC: 1.3 MMOL/L (ref 0.5–2)
D-LACTATE SERPL-SCNC: 1.9 MMOL/L (ref 0.5–2)
D-LACTATE SERPL-SCNC: 2.1 MMOL/L (ref 0.5–2)
D-LACTATE SERPL-SCNC: 2.2 MMOL/L (ref 0.5–2)
D-LACTATE SERPL-SCNC: 2.7 MMOL/L (ref 0.5–2)
D-LACTATE SERPL-SCNC: 4.3 MMOL/L (ref 0.5–2)
DEPRECATED RDW RBC AUTO: 39.3 FL (ref 37–54)
DEPRECATED RDW RBC AUTO: 40.3 FL (ref 37–54)
DEPRECATED RDW RBC AUTO: 40.8 FL (ref 37–54)
DEPRECATED RDW RBC AUTO: 40.9 FL (ref 37–54)
DEPRECATED RDW RBC AUTO: 41 FL (ref 37–54)
DEPRECATED RDW RBC AUTO: 41.8 FL (ref 37–54)
DEPRECATED RDW RBC AUTO: 42.2 FL (ref 37–54)
DEPRECATED RDW RBC AUTO: 42.8 FL (ref 37–54)
DEPRECATED RDW RBC AUTO: 43.1 FL (ref 37–54)
DEPRECATED RDW RBC AUTO: 43.2 FL (ref 37–54)
DEPRECATED RDW RBC AUTO: 43.8 FL (ref 37–54)
DEPRECATED RDW RBC AUTO: 45.4 FL (ref 37–54)
DEPRECATED RDW RBC AUTO: 45.5 FL (ref 37–54)
DEPRECATED RDW RBC AUTO: 45.7 FL (ref 37–54)
DEPRECATED RDW RBC AUTO: 46.1 FL (ref 37–54)
DEPRECATED RDW RBC AUTO: 47.6 FL (ref 37–54)
DEPRECATED RDW RBC AUTO: 47.7 FL (ref 37–54)
DEPRECATED RDW RBC AUTO: 47.9 FL (ref 37–54)
DEPRECATED RDW RBC AUTO: 49.1 FL (ref 37–54)
DEPRECATED RDW RBC AUTO: 49.2 FL (ref 37–54)
DEPRECATED RDW RBC AUTO: 50.3 FL (ref 37–54)
DEPRECATED RDW RBC AUTO: 51.1 FL (ref 37–54)
DEPRECATED RDW RBC AUTO: 52 FL (ref 37–54)
ELLIPTOCYTES BLD QL SMEAR: ABNORMAL
EOSINOPHIL # BLD AUTO: 0 10*3/MM3 (ref 0–0.4)
EOSINOPHIL # BLD AUTO: 0.01 10*3/MM3 (ref 0–0.4)
EOSINOPHIL # BLD AUTO: 0.04 10*3/MM3 (ref 0–0.4)
EOSINOPHIL # BLD AUTO: 0.06 10*3/MM3 (ref 0–0.4)
EOSINOPHIL # BLD AUTO: 0.07 10*3/MM3 (ref 0–0.4)
EOSINOPHIL # BLD AUTO: 0.08 10*3/MM3 (ref 0–0.4)
EOSINOPHIL # BLD AUTO: 0.08 10*3/MM3 (ref 0–0.4)
EOSINOPHIL # BLD AUTO: 0.09 10*3/MM3 (ref 0–0.4)
EOSINOPHIL # BLD AUTO: 0.23 10*3/MM3 (ref 0–0.4)
EOSINOPHIL # BLD MANUAL: 0.12 10*3/MM3 (ref 0–0.4)
EOSINOPHIL # BLD MANUAL: 0.16 10*3/MM3 (ref 0–0.4)
EOSINOPHIL NFR BLD AUTO: 0 % (ref 0.3–6.2)
EOSINOPHIL NFR BLD AUTO: 0.1 % (ref 0.3–6.2)
EOSINOPHIL NFR BLD AUTO: 0.5 % (ref 0.3–6.2)
EOSINOPHIL NFR BLD AUTO: 0.9 % (ref 0.3–6.2)
EOSINOPHIL NFR BLD AUTO: 1 % (ref 0.3–6.2)
EOSINOPHIL NFR BLD AUTO: 1.4 % (ref 0.3–6.2)
EOSINOPHIL NFR BLD AUTO: 1.7 % (ref 0.3–6.2)
EOSINOPHIL NFR BLD AUTO: 1.8 % (ref 0.3–6.2)
EOSINOPHIL NFR BLD AUTO: 2.1 % (ref 0.3–6.2)
EOSINOPHIL NFR BLD AUTO: 2.5 % (ref 0.3–6.2)
EOSINOPHIL NFR BLD AUTO: 2.6 % (ref 0.3–6.2)
EOSINOPHIL NFR BLD MANUAL: 2 % (ref 0.3–6.2)
EOSINOPHIL NFR BLD MANUAL: 3.2 % (ref 0.3–6.2)
ERYTHROCYTE [DISTWIDTH] IN BLOOD BY AUTOMATED COUNT: 13.6 % (ref 12.3–15.4)
ERYTHROCYTE [DISTWIDTH] IN BLOOD BY AUTOMATED COUNT: 13.8 % (ref 12.3–15.4)
ERYTHROCYTE [DISTWIDTH] IN BLOOD BY AUTOMATED COUNT: 13.9 % (ref 12.3–15.4)
ERYTHROCYTE [DISTWIDTH] IN BLOOD BY AUTOMATED COUNT: 14 % (ref 12.3–15.4)
ERYTHROCYTE [DISTWIDTH] IN BLOOD BY AUTOMATED COUNT: 14.1 % (ref 12.3–15.4)
ERYTHROCYTE [DISTWIDTH] IN BLOOD BY AUTOMATED COUNT: 14.2 % (ref 12.3–15.4)
ERYTHROCYTE [DISTWIDTH] IN BLOOD BY AUTOMATED COUNT: 14.4 % (ref 12.3–15.4)
ERYTHROCYTE [DISTWIDTH] IN BLOOD BY AUTOMATED COUNT: 14.4 % (ref 12.3–15.4)
ERYTHROCYTE [DISTWIDTH] IN BLOOD BY AUTOMATED COUNT: 14.5 % (ref 12.3–15.4)
ERYTHROCYTE [DISTWIDTH] IN BLOOD BY AUTOMATED COUNT: 14.5 % (ref 12.3–15.4)
ERYTHROCYTE [DISTWIDTH] IN BLOOD BY AUTOMATED COUNT: 14.6 % (ref 12.3–15.4)
ERYTHROCYTE [DISTWIDTH] IN BLOOD BY AUTOMATED COUNT: 15.2 % (ref 12.3–15.4)
ERYTHROCYTE [DISTWIDTH] IN BLOOD BY AUTOMATED COUNT: 15.5 % (ref 12.3–15.4)
ERYTHROCYTE [DISTWIDTH] IN BLOOD BY AUTOMATED COUNT: 15.8 % (ref 12.3–15.4)
ERYTHROCYTE [DISTWIDTH] IN BLOOD BY AUTOMATED COUNT: 15.8 % (ref 12.3–15.4)
ERYTHROCYTE [DISTWIDTH] IN BLOOD BY AUTOMATED COUNT: 16 % (ref 12.3–15.4)
ERYTHROCYTE [DISTWIDTH] IN BLOOD BY AUTOMATED COUNT: 16.2 % (ref 12.3–15.4)
ERYTHROCYTE [DISTWIDTH] IN BLOOD BY AUTOMATED COUNT: 16.5 % (ref 12.3–15.4)
EXPIRATION DATE: ABNORMAL
EXPIRATION DATE: ABNORMAL
FECAL OCCULT BLOOD SCREEN, POC: POSITIVE
FECAL OCCULT BLOOD SCREEN, POC: POSITIVE
FERRITIN SERPL-MCNC: 38.9 NG/ML (ref 30–400)
FERRITIN SERPL-MCNC: 63.3 NG/ML (ref 30–400)
FERRITIN SERPL-MCNC: 72.6 NG/ML (ref 30–400)
FERRITIN SERPL-MCNC: 9.94 NG/ML (ref 30–400)
FERRITIN SERPL-MCNC: 92.1 NG/ML (ref 30–400)
FERRITIN SERPL-MCNC: 94.9 NG/ML (ref 30–400)
GFR SERPL CREATININE-BSD FRML MDRD: 102 ML/MIN/1.73
GFR SERPL CREATININE-BSD FRML MDRD: 102 ML/MIN/1.73
GFR SERPL CREATININE-BSD FRML MDRD: 107 ML/MIN/1.73
GFR SERPL CREATININE-BSD FRML MDRD: 118 ML/MIN/1.73
GFR SERPL CREATININE-BSD FRML MDRD: 120 ML/MIN/1.73
GFR SERPL CREATININE-BSD FRML MDRD: 129 ML/MIN/1.73
GFR SERPL CREATININE-BSD FRML MDRD: 132 ML/MIN/1.73
GFR SERPL CREATININE-BSD FRML MDRD: 134 ML/MIN/1.73
GFR SERPL CREATININE-BSD FRML MDRD: 137 ML/MIN/1.73
GFR SERPL CREATININE-BSD FRML MDRD: 146 ML/MIN/1.73
GFR SERPL CREATININE-BSD FRML MDRD: 146 ML/MIN/1.73
GFR SERPL CREATININE-BSD FRML MDRD: 149 ML/MIN/1.73
GFR SERPL CREATININE-BSD FRML MDRD: 85 ML/MIN/1.73
GFR SERPL CREATININE-BSD FRML MDRD: 88 ML/MIN/1.73
GFR SERPL CREATININE-BSD FRML MDRD: 92 ML/MIN/1.73
GFR SERPL CREATININE-BSD FRML MDRD: >150 ML/MIN/1.73
GLOBULIN UR ELPH-MCNC: 2.1 GM/DL
GLOBULIN UR ELPH-MCNC: 2.3 GM/DL
GLOBULIN UR ELPH-MCNC: 2.4 GM/DL
GLOBULIN UR ELPH-MCNC: 2.5 GM/DL
GLOBULIN UR ELPH-MCNC: 2.5 GM/DL
GLOBULIN UR ELPH-MCNC: 2.6 GM/DL
GLOBULIN UR ELPH-MCNC: 2.6 GM/DL
GLOBULIN UR ELPH-MCNC: 2.7 GM/DL
GLOBULIN UR ELPH-MCNC: 2.7 GM/DL
GLUCOSE BLDC GLUCOMTR-MCNC: 103 MG/DL (ref 70–130)
GLUCOSE BLDC GLUCOMTR-MCNC: 109 MG/DL (ref 70–130)
GLUCOSE BLDC GLUCOMTR-MCNC: 110 MG/DL (ref 70–130)
GLUCOSE BLDC GLUCOMTR-MCNC: 111 MG/DL (ref 70–130)
GLUCOSE BLDC GLUCOMTR-MCNC: 113 MG/DL (ref 70–130)
GLUCOSE BLDC GLUCOMTR-MCNC: 116 MG/DL (ref 70–130)
GLUCOSE BLDC GLUCOMTR-MCNC: 117 MG/DL (ref 70–130)
GLUCOSE BLDC GLUCOMTR-MCNC: 122 MG/DL (ref 70–130)
GLUCOSE BLDC GLUCOMTR-MCNC: 123 MG/DL (ref 70–130)
GLUCOSE BLDC GLUCOMTR-MCNC: 125 MG/DL (ref 70–130)
GLUCOSE BLDC GLUCOMTR-MCNC: 127 MG/DL (ref 70–130)
GLUCOSE BLDC GLUCOMTR-MCNC: 127 MG/DL (ref 70–130)
GLUCOSE BLDC GLUCOMTR-MCNC: 128 MG/DL (ref 70–130)
GLUCOSE BLDC GLUCOMTR-MCNC: 130 MG/DL (ref 70–130)
GLUCOSE BLDC GLUCOMTR-MCNC: 131 MG/DL (ref 70–130)
GLUCOSE BLDC GLUCOMTR-MCNC: 132 MG/DL (ref 70–130)
GLUCOSE BLDC GLUCOMTR-MCNC: 132 MG/DL (ref 70–130)
GLUCOSE BLDC GLUCOMTR-MCNC: 141 MG/DL (ref 70–130)
GLUCOSE BLDC GLUCOMTR-MCNC: 145 MG/DL (ref 70–130)
GLUCOSE BLDC GLUCOMTR-MCNC: 146 MG/DL (ref 70–130)
GLUCOSE BLDC GLUCOMTR-MCNC: 163 MG/DL (ref 70–130)
GLUCOSE BLDC GLUCOMTR-MCNC: 82 MG/DL (ref 70–130)
GLUCOSE BLDC GLUCOMTR-MCNC: 97 MG/DL (ref 70–130)
GLUCOSE BLDC GLUCOMTR-MCNC: 98 MG/DL (ref 70–130)
GLUCOSE SERPL-MCNC: 100 MG/DL (ref 65–99)
GLUCOSE SERPL-MCNC: 103 MG/DL (ref 65–99)
GLUCOSE SERPL-MCNC: 105 MG/DL (ref 65–99)
GLUCOSE SERPL-MCNC: 110 MG/DL (ref 65–99)
GLUCOSE SERPL-MCNC: 112 MG/DL (ref 65–99)
GLUCOSE SERPL-MCNC: 114 MG/DL (ref 65–99)
GLUCOSE SERPL-MCNC: 116 MG/DL (ref 65–99)
GLUCOSE SERPL-MCNC: 117 MG/DL (ref 65–99)
GLUCOSE SERPL-MCNC: 129 MG/DL (ref 65–99)
GLUCOSE SERPL-MCNC: 131 MG/DL (ref 65–99)
GLUCOSE SERPL-MCNC: 152 MG/DL (ref 65–99)
GLUCOSE SERPL-MCNC: 81 MG/DL (ref 65–99)
GLUCOSE SERPL-MCNC: 85 MG/DL (ref 65–99)
GLUCOSE SERPL-MCNC: 85 MG/DL (ref 65–99)
GLUCOSE SERPL-MCNC: 86 MG/DL (ref 65–99)
GLUCOSE SERPL-MCNC: 88 MG/DL (ref 65–99)
GLUCOSE SERPL-MCNC: 89 MG/DL (ref 65–99)
GLUCOSE SERPL-MCNC: 90 MG/DL (ref 65–99)
GLUCOSE SERPL-MCNC: 91 MG/DL (ref 65–99)
GLUCOSE SERPL-MCNC: 95 MG/DL (ref 65–99)
GLUCOSE UR STRIP-MCNC: NEGATIVE MG/DL
GRAM STN SPEC: ABNORMAL
GRAM STN SPEC: ABNORMAL
HCO3 BLDA-SCNC: 21.5 MMOL/L (ref 22–28)
HCO3 BLDA-SCNC: 23.1 MMOL/L (ref 22–28)
HCO3 BLDA-SCNC: 25.9 MMOL/L (ref 22–28)
HCO3 BLDA-SCNC: 26.2 MMOL/L (ref 22–28)
HCO3 BLDA-SCNC: 28.3 MMOL/L (ref 22–28)
HCT VFR BLD AUTO: 21 % (ref 37.5–51)
HCT VFR BLD AUTO: 21.1 % (ref 37.5–51)
HCT VFR BLD AUTO: 23.2 % (ref 37.5–51)
HCT VFR BLD AUTO: 24.4 % (ref 37.5–51)
HCT VFR BLD AUTO: 24.5 % (ref 37.5–51)
HCT VFR BLD AUTO: 24.7 % (ref 37.5–51)
HCT VFR BLD AUTO: 24.8 % (ref 37.5–51)
HCT VFR BLD AUTO: 24.9 % (ref 37.5–51)
HCT VFR BLD AUTO: 25 % (ref 37.5–51)
HCT VFR BLD AUTO: 25.6 % (ref 37.5–51)
HCT VFR BLD AUTO: 26.8 % (ref 37.5–51)
HCT VFR BLD AUTO: 26.8 % (ref 37.5–51)
HCT VFR BLD AUTO: 27 % (ref 37.5–51)
HCT VFR BLD AUTO: 27.3 % (ref 37.5–51)
HCT VFR BLD AUTO: 27.4 % (ref 37.5–51)
HCT VFR BLD AUTO: 27.9 % (ref 37.5–51)
HCT VFR BLD AUTO: 28 % (ref 37.5–51)
HCT VFR BLD AUTO: 28.2 % (ref 37.5–51)
HCT VFR BLD AUTO: 28.4 % (ref 37.5–51)
HCT VFR BLD AUTO: 28.4 % (ref 37.5–51)
HCT VFR BLD AUTO: 28.5 % (ref 37.5–51)
HCT VFR BLD AUTO: 28.7 % (ref 37.5–51)
HCT VFR BLD AUTO: 28.9 % (ref 37.5–51)
HCT VFR BLD AUTO: 29.1 % (ref 37.5–51)
HCT VFR BLD AUTO: 29.5 % (ref 37.5–51)
HCT VFR BLD AUTO: 29.9 % (ref 37.5–51)
HCT VFR BLD AUTO: 30 % (ref 37.5–51)
HCT VFR BLD AUTO: 30.1 % (ref 37.5–51)
HCT VFR BLD AUTO: 30.3 % (ref 37.5–51)
HCT VFR BLD AUTO: 30.3 % (ref 37.5–51)
HCT VFR BLD AUTO: 30.4 % (ref 37.5–51)
HCT VFR BLD AUTO: 30.7 % (ref 37.5–51)
HCT VFR BLD AUTO: 30.7 % (ref 37.5–51)
HCT VFR BLD AUTO: 31 % (ref 37.5–51)
HCT VFR BLD AUTO: 34.6 % (ref 37.5–51)
HCT VFR BLD AUTO: 34.8 % (ref 37.5–51)
HCT VFR BLD AUTO: 35.8 % (ref 37.5–51)
HCT VFR BLD AUTO: 42.7 % (ref 37.5–51)
HGB BLD-MCNC: 10 G/DL (ref 13–17.7)
HGB BLD-MCNC: 10 G/DL (ref 13–17.7)
HGB BLD-MCNC: 10.1 G/DL (ref 13–17.7)
HGB BLD-MCNC: 11 G/DL (ref 13–17.7)
HGB BLD-MCNC: 11.3 G/DL (ref 13–17.7)
HGB BLD-MCNC: 11.7 G/DL (ref 13–17.7)
HGB BLD-MCNC: 14.2 G/DL (ref 13–17.7)
HGB BLD-MCNC: 6.6 G/DL (ref 13–17.7)
HGB BLD-MCNC: 6.7 G/DL (ref 13–17.7)
HGB BLD-MCNC: 7.5 G/DL (ref 13–17.7)
HGB BLD-MCNC: 7.7 G/DL (ref 13–17.7)
HGB BLD-MCNC: 7.7 G/DL (ref 13–17.7)
HGB BLD-MCNC: 7.8 G/DL (ref 13–17.7)
HGB BLD-MCNC: 8 G/DL (ref 13–17.7)
HGB BLD-MCNC: 8 G/DL (ref 13–17.7)
HGB BLD-MCNC: 8.1 G/DL (ref 13–17.7)
HGB BLD-MCNC: 8.2 G/DL (ref 13–17.7)
HGB BLD-MCNC: 8.5 G/DL (ref 13–17.7)
HGB BLD-MCNC: 8.5 G/DL (ref 13–17.7)
HGB BLD-MCNC: 8.6 G/DL (ref 13–17.7)
HGB BLD-MCNC: 8.9 G/DL (ref 13–17.7)
HGB BLD-MCNC: 9 G/DL (ref 13–17.7)
HGB BLD-MCNC: 9.1 G/DL (ref 13–17.7)
HGB BLD-MCNC: 9.3 G/DL (ref 13–17.7)
HGB BLD-MCNC: 9.3 G/DL (ref 13–17.7)
HGB BLD-MCNC: 9.4 G/DL (ref 13–17.7)
HGB BLD-MCNC: 9.4 G/DL (ref 13–17.7)
HGB BLD-MCNC: 9.6 G/DL (ref 13–17.7)
HGB BLD-MCNC: 9.6 G/DL (ref 13–17.7)
HGB BLD-MCNC: 9.7 G/DL (ref 13–17.7)
HGB BLD-MCNC: 9.7 G/DL (ref 13–17.7)
HGB BLD-MCNC: 9.8 G/DL (ref 13–17.7)
HGB BLD-MCNC: 9.9 G/DL (ref 13–17.7)
HGB UR QL STRIP.AUTO: ABNORMAL
HGB UR QL STRIP.AUTO: ABNORMAL
HGB UR QL STRIP.AUTO: NEGATIVE
HGB UR QL STRIP.AUTO: NEGATIVE
HOLD SPECIMEN: NORMAL
HYALINE CASTS UR QL AUTO: ABNORMAL /LPF
HYPOCHROMIA BLD QL: ABNORMAL
HYPOCHROMIA BLD QL: ABNORMAL
IMM GRANULOCYTES # BLD AUTO: 0.01 10*3/MM3 (ref 0–0.05)
IMM GRANULOCYTES # BLD AUTO: 0.01 10*3/MM3 (ref 0–0.05)
IMM GRANULOCYTES # BLD AUTO: 0.02 10*3/MM3 (ref 0–0.05)
IMM GRANULOCYTES # BLD AUTO: 0.03 10*3/MM3 (ref 0–0.05)
IMM GRANULOCYTES # BLD AUTO: 0.04 10*3/MM3 (ref 0–0.05)
IMM GRANULOCYTES # BLD AUTO: 0.08 10*3/MM3 (ref 0–0.05)
IMM GRANULOCYTES NFR BLD AUTO: 0.2 % (ref 0–0.5)
IMM GRANULOCYTES NFR BLD AUTO: 0.4 % (ref 0–0.5)
IMM GRANULOCYTES NFR BLD AUTO: 0.5 % (ref 0–0.5)
IMM GRANULOCYTES NFR BLD AUTO: 0.5 % (ref 0–0.5)
IMM GRANULOCYTES NFR BLD AUTO: 0.6 % (ref 0–0.5)
IMM GRANULOCYTES NFR BLD AUTO: 0.7 % (ref 0–0.5)
IMM GRANULOCYTES NFR BLD AUTO: 0.9 % (ref 0–0.5)
INHALED O2 CONCENTRATION: 100 %
INHALED O2 CONCENTRATION: 21 %
INHALED O2 CONCENTRATION: 30 %
INHALED O2 CONCENTRATION: 30 %
INHALED O2 CONCENTRATION: 35 %
INR PPP: 1.07 (ref 0.9–1.1)
INR PPP: 1.08 (ref 0.9–1.1)
INR PPP: 1.14 (ref 0.9–1.1)
INR PPP: 1.23 (ref 0.9–1.1)
INR PPP: 1.24 (ref 0.9–1.1)
INR PPP: 1.34 (ref 0.9–1.1)
INR PPP: 1.36 (ref 0.9–1.1)
INR PPP: 1.46 (ref 0.9–1.1)
INR PPP: 1.67 (ref 0.9–1.1)
IRON 24H UR-MRATE: 12 MCG/DL (ref 59–158)
IRON 24H UR-MRATE: 26 MCG/DL (ref 59–158)
IRON SATN MFR SERPL: 11 % (ref 20–50)
IRON SATN MFR SERPL: 4 % (ref 20–50)
ISOLATED FROM: ABNORMAL
KETONES UR QL STRIP: ABNORMAL
KETONES UR QL STRIP: NEGATIVE
LAB AP CASE REPORT: NORMAL
LEUKOCYTE ESTERASE UR QL STRIP.AUTO: ABNORMAL
LEUKOCYTE ESTERASE UR QL STRIP.AUTO: NEGATIVE
LIPASE SERPL-CCNC: 22 U/L (ref 13–60)
LIPASE SERPL-CCNC: 25 U/L (ref 13–60)
LIPASE SERPL-CCNC: 29 U/L (ref 13–60)
LYMPHOCYTES # BLD AUTO: 0.79 10*3/MM3 (ref 0.7–3.1)
LYMPHOCYTES # BLD AUTO: 0.91 10*3/MM3 (ref 0.7–3.1)
LYMPHOCYTES # BLD AUTO: 0.92 10*3/MM3 (ref 0.7–3.1)
LYMPHOCYTES # BLD AUTO: 0.92 10*3/MM3 (ref 0.7–3.1)
LYMPHOCYTES # BLD AUTO: 0.96 10*3/MM3 (ref 0.7–3.1)
LYMPHOCYTES # BLD AUTO: 0.99 10*3/MM3 (ref 0.7–3.1)
LYMPHOCYTES # BLD AUTO: 1.01 10*3/MM3 (ref 0.7–3.1)
LYMPHOCYTES # BLD AUTO: 1.06 10*3/MM3 (ref 0.7–3.1)
LYMPHOCYTES # BLD AUTO: 1.14 10*3/MM3 (ref 0.7–3.1)
LYMPHOCYTES # BLD AUTO: 1.15 10*3/MM3 (ref 0.7–3.1)
LYMPHOCYTES # BLD AUTO: 1.22 10*3/MM3 (ref 0.7–3.1)
LYMPHOCYTES # BLD AUTO: 1.23 10*3/MM3 (ref 0.7–3.1)
LYMPHOCYTES # BLD AUTO: 1.36 10*3/MM3 (ref 0.7–3.1)
LYMPHOCYTES # BLD AUTO: 1.45 10*3/MM3 (ref 0.7–3.1)
LYMPHOCYTES # BLD AUTO: 1.94 10*3/MM3 (ref 0.7–3.1)
LYMPHOCYTES # BLD MANUAL: 0.68 10*3/MM3 (ref 0.7–3.1)
LYMPHOCYTES # BLD MANUAL: 0.74 10*3/MM3 (ref 0.7–3.1)
LYMPHOCYTES # BLD MANUAL: 0.78 10*3/MM3 (ref 0.7–3.1)
LYMPHOCYTES # BLD MANUAL: 0.8 10*3/MM3 (ref 0.7–3.1)
LYMPHOCYTES # BLD MANUAL: 1 10*3/MM3 (ref 0.7–3.1)
LYMPHOCYTES NFR BLD AUTO: 11.6 % (ref 19.6–45.3)
LYMPHOCYTES NFR BLD AUTO: 12.5 % (ref 19.6–45.3)
LYMPHOCYTES NFR BLD AUTO: 13.4 % (ref 19.6–45.3)
LYMPHOCYTES NFR BLD AUTO: 16.7 % (ref 19.6–45.3)
LYMPHOCYTES NFR BLD AUTO: 16.9 % (ref 19.6–45.3)
LYMPHOCYTES NFR BLD AUTO: 17.3 % (ref 19.6–45.3)
LYMPHOCYTES NFR BLD AUTO: 18.1 % (ref 19.6–45.3)
LYMPHOCYTES NFR BLD AUTO: 20.5 % (ref 19.6–45.3)
LYMPHOCYTES NFR BLD AUTO: 22.2 % (ref 19.6–45.3)
LYMPHOCYTES NFR BLD AUTO: 26.9 % (ref 19.6–45.3)
LYMPHOCYTES NFR BLD AUTO: 28.1 % (ref 19.6–45.3)
LYMPHOCYTES NFR BLD AUTO: 34.7 % (ref 19.6–45.3)
LYMPHOCYTES NFR BLD AUTO: 35.5 % (ref 19.6–45.3)
LYMPHOCYTES NFR BLD AUTO: 6.7 % (ref 19.6–45.3)
LYMPHOCYTES NFR BLD AUTO: 7.1 % (ref 19.6–45.3)
LYMPHOCYTES NFR BLD MANUAL: 10 % (ref 19.6–45.3)
LYMPHOCYTES NFR BLD MANUAL: 16 % (ref 19.6–45.3)
LYMPHOCYTES NFR BLD MANUAL: 18.9 % (ref 19.6–45.3)
LYMPHOCYTES NFR BLD MANUAL: 21.6 % (ref 19.6–45.3)
LYMPHOCYTES NFR BLD MANUAL: 3.1 % (ref 5–12)
LYMPHOCYTES NFR BLD MANUAL: 3.2 % (ref 5–12)
LYMPHOCYTES NFR BLD MANUAL: 4.3 % (ref 5–12)
LYMPHOCYTES NFR BLD MANUAL: 6.4 % (ref 19.6–45.3)
LYMPHOCYTES NFR BLD MANUAL: 7 % (ref 5–12)
Lab: 148
Lab: 164
MAGNESIUM SERPL-MCNC: 1.6 MG/DL (ref 1.6–2.4)
MAGNESIUM SERPL-MCNC: 1.8 MG/DL (ref 1.6–2.4)
MAGNESIUM SERPL-MCNC: 1.9 MG/DL (ref 1.6–2.4)
MAGNESIUM SERPL-MCNC: 2 MG/DL (ref 1.6–2.4)
MAGNESIUM SERPL-MCNC: 2.1 MG/DL (ref 1.6–2.4)
MAXIMAL PREDICTED HEART RATE: 153 BPM
MAXIMAL PREDICTED HEART RATE: 153 BPM
MCH RBC QN AUTO: 24.8 PG (ref 26.6–33)
MCH RBC QN AUTO: 25.1 PG (ref 26.6–33)
MCH RBC QN AUTO: 26.4 PG (ref 26.6–33)
MCH RBC QN AUTO: 26.6 PG (ref 26.6–33)
MCH RBC QN AUTO: 26.6 PG (ref 26.6–33)
MCH RBC QN AUTO: 26.7 PG (ref 26.6–33)
MCH RBC QN AUTO: 26.9 PG (ref 26.6–33)
MCH RBC QN AUTO: 27 PG (ref 26.6–33)
MCH RBC QN AUTO: 27.2 PG (ref 26.6–33)
MCH RBC QN AUTO: 27.3 PG (ref 26.6–33)
MCH RBC QN AUTO: 27.5 PG (ref 26.6–33)
MCH RBC QN AUTO: 27.6 PG (ref 26.6–33)
MCH RBC QN AUTO: 27.7 PG (ref 26.6–33)
MCH RBC QN AUTO: 27.8 PG (ref 26.6–33)
MCH RBC QN AUTO: 28.1 PG (ref 26.6–33)
MCH RBC QN AUTO: 29 PG (ref 26.6–33)
MCH RBC QN AUTO: 29.2 PG (ref 26.6–33)
MCH RBC QN AUTO: 29.6 PG (ref 26.6–33)
MCH RBC QN AUTO: 29.8 PG (ref 26.6–33)
MCH RBC QN AUTO: 30 PG (ref 26.6–33)
MCHC RBC AUTO-ENTMCNC: 30.5 G/DL (ref 31.5–35.7)
MCHC RBC AUTO-ENTMCNC: 31 G/DL (ref 31.5–35.7)
MCHC RBC AUTO-ENTMCNC: 31.2 G/DL (ref 31.5–35.7)
MCHC RBC AUTO-ENTMCNC: 31.3 G/DL (ref 31.5–35.7)
MCHC RBC AUTO-ENTMCNC: 31.3 G/DL (ref 31.5–35.7)
MCHC RBC AUTO-ENTMCNC: 31.4 G/DL (ref 31.5–35.7)
MCHC RBC AUTO-ENTMCNC: 31.9 G/DL (ref 31.5–35.7)
MCHC RBC AUTO-ENTMCNC: 32 G/DL (ref 31.5–35.7)
MCHC RBC AUTO-ENTMCNC: 32.1 G/DL (ref 31.5–35.7)
MCHC RBC AUTO-ENTMCNC: 32.2 G/DL (ref 31.5–35.7)
MCHC RBC AUTO-ENTMCNC: 32.2 G/DL (ref 31.5–35.7)
MCHC RBC AUTO-ENTMCNC: 32.3 G/DL (ref 31.5–35.7)
MCHC RBC AUTO-ENTMCNC: 32.3 G/DL (ref 31.5–35.7)
MCHC RBC AUTO-ENTMCNC: 32.5 G/DL (ref 31.5–35.7)
MCHC RBC AUTO-ENTMCNC: 32.6 G/DL (ref 31.5–35.7)
MCHC RBC AUTO-ENTMCNC: 32.7 G/DL (ref 31.5–35.7)
MCHC RBC AUTO-ENTMCNC: 32.9 G/DL (ref 31.5–35.7)
MCHC RBC AUTO-ENTMCNC: 33.3 G/DL (ref 31.5–35.7)
MCHC RBC AUTO-ENTMCNC: 33.7 G/DL (ref 31.5–35.7)
MCV RBC AUTO: 79.3 FL (ref 79–97)
MCV RBC AUTO: 79.9 FL (ref 79–97)
MCV RBC AUTO: 82.1 FL (ref 79–97)
MCV RBC AUTO: 82.5 FL (ref 79–97)
MCV RBC AUTO: 83.1 FL (ref 79–97)
MCV RBC AUTO: 83.3 FL (ref 79–97)
MCV RBC AUTO: 83.6 FL (ref 79–97)
MCV RBC AUTO: 83.9 FL (ref 79–97)
MCV RBC AUTO: 84.2 FL (ref 79–97)
MCV RBC AUTO: 84.5 FL (ref 79–97)
MCV RBC AUTO: 84.6 FL (ref 79–97)
MCV RBC AUTO: 85 FL (ref 79–97)
MCV RBC AUTO: 85.2 FL (ref 79–97)
MCV RBC AUTO: 85.3 FL (ref 79–97)
MCV RBC AUTO: 86.5 FL (ref 79–97)
MCV RBC AUTO: 87.1 FL (ref 79–97)
MCV RBC AUTO: 87.9 FL (ref 79–97)
MCV RBC AUTO: 88.6 FL (ref 79–97)
MCV RBC AUTO: 89 FL (ref 79–97)
MCV RBC AUTO: 89.1 FL (ref 79–97)
MCV RBC AUTO: 89.1 FL (ref 79–97)
MCV RBC AUTO: 89.8 FL (ref 79–97)
MCV RBC AUTO: 90.5 FL (ref 79–97)
METHADONE UR QL SCN: NEGATIVE
MICROCYTES BLD QL: ABNORMAL
MODALITY: ABNORMAL
MONOCYTES # BLD AUTO: 0.11 10*3/MM3 (ref 0.1–0.9)
MONOCYTES # BLD AUTO: 0.12 10*3/MM3 (ref 0.1–0.9)
MONOCYTES # BLD AUTO: 0.29 10*3/MM3 (ref 0.1–0.9)
MONOCYTES # BLD AUTO: 0.33 10*3/MM3 (ref 0.1–0.9)
MONOCYTES # BLD AUTO: 0.39 10*3/MM3 (ref 0.1–0.9)
MONOCYTES # BLD AUTO: 0.46 10*3/MM3 (ref 0.1–0.9)
MONOCYTES # BLD AUTO: 0.46 10*3/MM3 (ref 0.1–0.9)
MONOCYTES # BLD AUTO: 0.47 10*3/MM3 (ref 0.1–0.9)
MONOCYTES # BLD AUTO: 0.48 10*3/MM3 (ref 0.1–0.9)
MONOCYTES # BLD AUTO: 0.52 10*3/MM3 (ref 0.1–0.9)
MONOCYTES # BLD AUTO: 0.56 10*3/MM3 (ref 0.1–0.9)
MONOCYTES # BLD AUTO: 0.57 10*3/MM3 (ref 0.1–0.9)
MONOCYTES # BLD AUTO: 0.6 10*3/MM3 (ref 0.1–0.9)
MONOCYTES # BLD AUTO: 0.61 10*3/MM3 (ref 0.1–0.9)
MONOCYTES # BLD AUTO: 0.67 10*3/MM3 (ref 0.1–0.9)
MONOCYTES # BLD AUTO: 0.69 10*3/MM3 (ref 0.1–0.9)
MONOCYTES # BLD AUTO: 0.75 10*3/MM3 (ref 0.1–0.9)
MONOCYTES # BLD AUTO: 0.79 10*3/MM3 (ref 0.1–0.9)
MONOCYTES # BLD AUTO: 0.83 10*3/MM3 (ref 0.1–0.9)
MONOCYTES NFR BLD AUTO: 10.1 % (ref 5–12)
MONOCYTES NFR BLD AUTO: 10.4 % (ref 5–12)
MONOCYTES NFR BLD AUTO: 10.9 % (ref 5–12)
MONOCYTES NFR BLD AUTO: 11.3 % (ref 5–12)
MONOCYTES NFR BLD AUTO: 12 % (ref 5–12)
MONOCYTES NFR BLD AUTO: 14.8 % (ref 5–12)
MONOCYTES NFR BLD AUTO: 3 % (ref 5–12)
MONOCYTES NFR BLD AUTO: 5.6 % (ref 5–12)
MONOCYTES NFR BLD AUTO: 5.7 % (ref 5–12)
MONOCYTES NFR BLD AUTO: 5.8 % (ref 5–12)
MONOCYTES NFR BLD AUTO: 7.6 % (ref 5–12)
MONOCYTES NFR BLD AUTO: 7.9 % (ref 5–12)
MONOCYTES NFR BLD AUTO: 8.4 % (ref 5–12)
MONOCYTES NFR BLD AUTO: 8.7 % (ref 5–12)
MONOCYTES NFR BLD AUTO: 8.8 % (ref 5–12)
NEGATIVE CONTROL: NEGATIVE
NEGATIVE CONTROL: NEGATIVE
NEUTROPHILS # BLD AUTO: 13.79 10*3/MM3 (ref 1.7–7)
NEUTROPHILS # BLD AUTO: 2.7 10*3/MM3 (ref 1.7–7)
NEUTROPHILS # BLD AUTO: 2.73 10*3/MM3 (ref 1.7–7)
NEUTROPHILS # BLD AUTO: 3.83 10*3/MM3 (ref 1.7–7)
NEUTROPHILS # BLD AUTO: 6.48 10*3/MM3 (ref 1.7–7)
NEUTROPHILS NFR BLD AUTO: 1.34 10*3/MM3 (ref 1.7–7)
NEUTROPHILS NFR BLD AUTO: 12.3 10*3/MM3 (ref 1.7–7)
NEUTROPHILS NFR BLD AUTO: 12.43 10*3/MM3 (ref 1.7–7)
NEUTROPHILS NFR BLD AUTO: 2.06 10*3/MM3 (ref 1.7–7)
NEUTROPHILS NFR BLD AUTO: 2.19 10*3/MM3 (ref 1.7–7)
NEUTROPHILS NFR BLD AUTO: 2.55 10*3/MM3 (ref 1.7–7)
NEUTROPHILS NFR BLD AUTO: 2.72 10*3/MM3 (ref 1.7–7)
NEUTROPHILS NFR BLD AUTO: 3.42 10*3/MM3 (ref 1.7–7)
NEUTROPHILS NFR BLD AUTO: 3.76 10*3/MM3 (ref 1.7–7)
NEUTROPHILS NFR BLD AUTO: 4.69 10*3/MM3 (ref 1.7–7)
NEUTROPHILS NFR BLD AUTO: 4.93 10*3/MM3 (ref 1.7–7)
NEUTROPHILS NFR BLD AUTO: 50.5 % (ref 42.7–76)
NEUTROPHILS NFR BLD AUTO: 50.6 % (ref 42.7–76)
NEUTROPHILS NFR BLD AUTO: 54.2 % (ref 42.7–76)
NEUTROPHILS NFR BLD AUTO: 59.8 % (ref 42.7–76)
NEUTROPHILS NFR BLD AUTO: 6.02 10*3/MM3 (ref 1.7–7)
NEUTROPHILS NFR BLD AUTO: 6.73 10*3/MM3 (ref 1.7–7)
NEUTROPHILS NFR BLD AUTO: 66.3 % (ref 42.7–76)
NEUTROPHILS NFR BLD AUTO: 67.5 % (ref 42.7–76)
NEUTROPHILS NFR BLD AUTO: 68.7 % (ref 42.7–76)
NEUTROPHILS NFR BLD AUTO: 7.06 10*3/MM3 (ref 1.7–7)
NEUTROPHILS NFR BLD AUTO: 71.3 % (ref 42.7–76)
NEUTROPHILS NFR BLD AUTO: 72.7 % (ref 42.7–76)
NEUTROPHILS NFR BLD AUTO: 74.1 % (ref 42.7–76)
NEUTROPHILS NFR BLD AUTO: 77.2 % (ref 42.7–76)
NEUTROPHILS NFR BLD AUTO: 78.6 % (ref 42.7–76)
NEUTROPHILS NFR BLD AUTO: 83.9 % (ref 42.7–76)
NEUTROPHILS NFR BLD AUTO: 86.3 % (ref 42.7–76)
NEUTROPHILS NFR BLD AUTO: 86.5 % (ref 42.7–76)
NEUTROPHILS NFR BLD AUTO: 9.09 10*3/MM3 (ref 1.7–7)
NEUTROPHILS NFR BLD MANUAL: 74.7 % (ref 42.7–76)
NEUTROPHILS NFR BLD MANUAL: 75.3 % (ref 42.7–76)
NEUTROPHILS NFR BLD MANUAL: 81 % (ref 42.7–76)
NEUTROPHILS NFR BLD MANUAL: 83 % (ref 42.7–76)
NEUTROPHILS NFR BLD MANUAL: 88.3 % (ref 42.7–76)
NITRITE UR QL STRIP: NEGATIVE
NRBC BLD AUTO-RTO: 0 /100 WBC (ref 0–0.2)
NRBC BLD AUTO-RTO: 0.1 /100 WBC (ref 0–0.2)
NRBC BLD AUTO-RTO: 0.1 /100 WBC (ref 0–0.2)
NRBC BLD AUTO-RTO: 0.2 /100 WBC (ref 0–0.2)
NT-PROBNP SERPL-MCNC: 160.5 PG/ML (ref 0–900)
O2 A-A PPRESDIFF RESPIRATORY: 0.3 MMHG
O2 A-A PPRESDIFF RESPIRATORY: 0.4 MMHG
O2 A-A PPRESDIFF RESPIRATORY: 0.5 MMHG
O2 A-A PPRESDIFF RESPIRATORY: 0.5 MMHG
O2 A-A PPRESDIFF RESPIRATORY: 0.7 MMHG
OPIATES UR QL: NEGATIVE
OTHER CELLS %: ABNORMAL
OVALOCYTES BLD QL SMEAR: ABNORMAL
OVALOCYTES BLD QL SMEAR: ABNORMAL
OXYCODONE UR QL SCN: NEGATIVE
PATH REPORT.FINAL DX SPEC: NORMAL
PATH REPORT.GROSS SPEC: NORMAL
PCO2 BLDA: 33.7 MM HG (ref 35–45)
PCO2 BLDA: 35 MM HG (ref 35–45)
PCO2 BLDA: 35.2 MM HG (ref 35–45)
PCO2 BLDA: 36.6 MM HG (ref 35–45)
PCO2 BLDA: 49.9 MM HG (ref 35–45)
PEEP RESPIRATORY: 5 CM[H2O]
PH BLDA: 7.32 PH UNITS (ref 7.35–7.45)
PH BLDA: 7.41 PH UNITS (ref 7.35–7.45)
PH BLDA: 7.42 PH UNITS (ref 7.35–7.45)
PH BLDA: 7.46 PH UNITS (ref 7.35–7.45)
PH BLDA: 7.52 PH UNITS (ref 7.35–7.45)
PH UR STRIP.AUTO: 5.5 [PH] (ref 5–8)
PH UR STRIP.AUTO: 6 [PH] (ref 5–8)
PH UR STRIP.AUTO: 7 [PH] (ref 5–8)
PH UR STRIP.AUTO: 8.5 [PH] (ref 5–8)
PHENYTOIN SERPL-MCNC: 15.5 MCG/ML (ref 10–20)
PHOSPHATE SERPL-MCNC: 1.3 MG/DL (ref 2.5–4.5)
PHOSPHATE SERPL-MCNC: 2.1 MG/DL (ref 2.5–4.5)
PHOSPHATE SERPL-MCNC: 2.5 MG/DL (ref 2.5–4.5)
PHOSPHATE SERPL-MCNC: 3 MG/DL (ref 2.5–4.5)
PLAT MORPH BLD: NORMAL
PLATELET # BLD AUTO: 101 10*3/MM3 (ref 140–450)
PLATELET # BLD AUTO: 106 10*3/MM3 (ref 140–450)
PLATELET # BLD AUTO: 119 10*3/MM3 (ref 140–450)
PLATELET # BLD AUTO: 127 10*3/MM3 (ref 140–450)
PLATELET # BLD AUTO: 128 10*3/MM3 (ref 140–450)
PLATELET # BLD AUTO: 134 10*3/MM3 (ref 140–450)
PLATELET # BLD AUTO: 135 10*3/MM3 (ref 140–450)
PLATELET # BLD AUTO: 139 10*3/MM3 (ref 140–450)
PLATELET # BLD AUTO: 148 10*3/MM3 (ref 140–450)
PLATELET # BLD AUTO: 156 10*3/MM3 (ref 140–450)
PLATELET # BLD AUTO: 164 10*3/MM3 (ref 140–450)
PLATELET # BLD AUTO: 167 10*3/MM3 (ref 140–450)
PLATELET # BLD AUTO: 167 10*3/MM3 (ref 140–450)
PLATELET # BLD AUTO: 168 10*3/MM3 (ref 140–450)
PLATELET # BLD AUTO: 176 10*3/MM3 (ref 140–450)
PLATELET # BLD AUTO: 178 10*3/MM3 (ref 140–450)
PLATELET # BLD AUTO: 181 10*3/MM3 (ref 140–450)
PLATELET # BLD AUTO: 186 10*3/MM3 (ref 140–450)
PLATELET # BLD AUTO: 197 10*3/MM3 (ref 140–450)
PLATELET # BLD AUTO: 198 10*3/MM3 (ref 140–450)
PLATELET # BLD AUTO: 244 10*3/MM3 (ref 140–450)
PLATELET # BLD AUTO: 255 10*3/MM3 (ref 140–450)
PLATELET # BLD AUTO: 269 10*3/MM3 (ref 140–450)
PMV BLD AUTO: 10 FL (ref 6–12)
PMV BLD AUTO: 10 FL (ref 6–12)
PMV BLD AUTO: 10.1 FL (ref 6–12)
PMV BLD AUTO: 10.2 FL (ref 6–12)
PMV BLD AUTO: 10.2 FL (ref 6–12)
PMV BLD AUTO: 10.4 FL (ref 6–12)
PMV BLD AUTO: 10.5 FL (ref 6–12)
PMV BLD AUTO: 10.6 FL (ref 6–12)
PMV BLD AUTO: 10.7 FL (ref 6–12)
PMV BLD AUTO: 10.9 FL (ref 6–12)
PMV BLD AUTO: 11 FL (ref 6–12)
PMV BLD AUTO: 11.1 FL (ref 6–12)
PMV BLD AUTO: 11.1 FL (ref 6–12)
PMV BLD AUTO: 11.2 FL (ref 6–12)
PMV BLD AUTO: 11.4 FL (ref 6–12)
PMV BLD AUTO: 11.5 FL (ref 6–12)
PMV BLD AUTO: 9.7 FL (ref 6–12)
PMV BLD AUTO: 9.8 FL (ref 6–12)
PMV BLD AUTO: 9.9 FL (ref 6–12)
PO2 BLDA: 463.9 MM HG (ref 80–100)
PO2 BLDA: 52.7 MM HG (ref 80–100)
PO2 BLDA: 58.4 MM HG (ref 80–100)
PO2 BLDA: 66 MM HG (ref 80–100)
PO2 BLDA: 88.2 MM HG (ref 80–100)
POIKILOCYTOSIS BLD QL SMEAR: ABNORMAL
POIKILOCYTOSIS BLD QL SMEAR: ABNORMAL
POLYCHROMASIA BLD QL SMEAR: ABNORMAL
POSITIVE CONTROL: POSITIVE
POSITIVE CONTROL: POSITIVE
POTASSIUM SERPL-SCNC: 3 MMOL/L (ref 3.5–5.2)
POTASSIUM SERPL-SCNC: 3.3 MMOL/L (ref 3.5–5.2)
POTASSIUM SERPL-SCNC: 3.3 MMOL/L (ref 3.5–5.2)
POTASSIUM SERPL-SCNC: 3.4 MMOL/L (ref 3.5–5.2)
POTASSIUM SERPL-SCNC: 3.5 MMOL/L (ref 3.5–5.2)
POTASSIUM SERPL-SCNC: 3.6 MMOL/L (ref 3.5–5.2)
POTASSIUM SERPL-SCNC: 3.7 MMOL/L (ref 3.5–5.2)
POTASSIUM SERPL-SCNC: 3.8 MMOL/L (ref 3.5–5.2)
POTASSIUM SERPL-SCNC: 4 MMOL/L (ref 3.5–5.2)
POTASSIUM SERPL-SCNC: 4 MMOL/L (ref 3.5–5.2)
POTASSIUM SERPL-SCNC: 4.1 MMOL/L (ref 3.5–5.2)
POTASSIUM SERPL-SCNC: 4.3 MMOL/L (ref 3.5–5.2)
POTASSIUM SERPL-SCNC: 4.4 MMOL/L (ref 3.5–5.2)
POTASSIUM SERPL-SCNC: 5.3 MMOL/L (ref 3.5–5.2)
PROCALCITONIN SERPL-MCNC: 0.13 NG/ML (ref 0–0.25)
PROT SERPL-MCNC: 5 G/DL (ref 6–8.5)
PROT SERPL-MCNC: 5 G/DL (ref 6–8.5)
PROT SERPL-MCNC: 5.2 G/DL (ref 6–8.5)
PROT SERPL-MCNC: 5.3 G/DL (ref 6–8.5)
PROT SERPL-MCNC: 5.5 G/DL (ref 6–8.5)
PROT SERPL-MCNC: 5.8 G/DL (ref 6–8.5)
PROT SERPL-MCNC: 5.9 G/DL (ref 6–8.5)
PROT SERPL-MCNC: 6 G/DL (ref 6–8.5)
PROT SERPL-MCNC: 6.1 G/DL (ref 6–8.5)
PROT SERPL-MCNC: 6.2 G/DL (ref 6–8.5)
PROT SERPL-MCNC: 6.3 G/DL (ref 6–8.5)
PROT SERPL-MCNC: 6.6 G/DL (ref 6–8.5)
PROT SERPL-MCNC: 6.6 G/DL (ref 6–8.5)
PROT SERPL-MCNC: 6.7 G/DL (ref 6–8.5)
PROT UR QL STRIP: ABNORMAL
PROT UR QL STRIP: ABNORMAL
PROT UR QL STRIP: NEGATIVE
PROT UR QL STRIP: NEGATIVE
PROTHROMBIN TIME: 13.7 SECONDS (ref 11.7–14.2)
PROTHROMBIN TIME: 13.8 SECONDS (ref 11.7–14.2)
PROTHROMBIN TIME: 14.4 SECONDS (ref 11.7–14.2)
PROTHROMBIN TIME: 15.3 SECONDS (ref 11.7–14.2)
PROTHROMBIN TIME: 15.4 SECONDS (ref 11.7–14.2)
PROTHROMBIN TIME: 16.4 SECONDS (ref 11.7–14.2)
PROTHROMBIN TIME: 16.6 SECONDS (ref 11.7–14.2)
PROTHROMBIN TIME: 17.5 SECONDS (ref 11.7–14.2)
PROTHROMBIN TIME: 19.5 SECONDS (ref 11.7–14.2)
QT INTERVAL: 279 MS
RBC # BLD AUTO: 2.66 10*6/MM3 (ref 4.14–5.8)
RBC # BLD AUTO: 2.72 10*6/MM3 (ref 4.14–5.8)
RBC # BLD AUTO: 2.88 10*6/MM3 (ref 4.14–5.8)
RBC # BLD AUTO: 2.96 10*6/MM3 (ref 4.14–5.8)
RBC # BLD AUTO: 3.04 10*6/MM3 (ref 4.14–5.8)
RBC # BLD AUTO: 3.15 10*6/MM3 (ref 4.14–5.8)
RBC # BLD AUTO: 3.31 10*6/MM3 (ref 4.14–5.8)
RBC # BLD AUTO: 3.36 10*6/MM3 (ref 4.14–5.8)
RBC # BLD AUTO: 3.37 10*6/MM3 (ref 4.14–5.8)
RBC # BLD AUTO: 3.37 10*6/MM3 (ref 4.14–5.8)
RBC # BLD AUTO: 3.38 10*6/MM3 (ref 4.14–5.8)
RBC # BLD AUTO: 3.42 10*6/MM3 (ref 4.14–5.8)
RBC # BLD AUTO: 3.43 10*6/MM3 (ref 4.14–5.8)
RBC # BLD AUTO: 3.46 10*6/MM3 (ref 4.14–5.8)
RBC # BLD AUTO: 3.48 10*6/MM3 (ref 4.14–5.8)
RBC # BLD AUTO: 3.56 10*6/MM3 (ref 4.14–5.8)
RBC # BLD AUTO: 3.57 10*6/MM3 (ref 4.14–5.8)
RBC # BLD AUTO: 3.66 10*6/MM3 (ref 4.14–5.8)
RBC # BLD AUTO: 4.07 10*6/MM3 (ref 4.14–5.8)
RBC # BLD AUTO: 4.28 10*6/MM3 (ref 4.14–5.8)
RBC # BLD AUTO: 4.79 10*6/MM3 (ref 4.14–5.8)
RBC # UR: ABNORMAL /HPF
RBC MORPH BLD: NORMAL
REF LAB TEST METHOD: ABNORMAL
RH BLD: POSITIVE
SAO2 % BLDCOA: 100 % (ref 92–99)
SAO2 % BLDCOA: 88.9 % (ref 92–99)
SAO2 % BLDCOA: 90.5 % (ref 92–99)
SAO2 % BLDCOA: 93.4 % (ref 92–99)
SAO2 % BLDCOA: 97.7 % (ref 92–99)
SARS-COV-2 ORF1AB RESP QL NAA+PROBE: NOT DETECTED
SARS-COV-2 RNA RESP QL NAA+PROBE: DETECTED
SET MECH RESP RATE: 18
SODIUM SERPL-SCNC: 139 MMOL/L (ref 136–145)
SODIUM SERPL-SCNC: 140 MMOL/L (ref 136–145)
SODIUM SERPL-SCNC: 140 MMOL/L (ref 136–145)
SODIUM SERPL-SCNC: 141 MMOL/L (ref 136–145)
SODIUM SERPL-SCNC: 142 MMOL/L (ref 136–145)
SODIUM SERPL-SCNC: 143 MMOL/L (ref 136–145)
SODIUM SERPL-SCNC: 144 MMOL/L (ref 136–145)
SODIUM SERPL-SCNC: 145 MMOL/L (ref 136–145)
SODIUM SERPL-SCNC: 146 MMOL/L (ref 136–145)
SP GR UR STRIP: 1.02 (ref 1–1.03)
SP GR UR STRIP: 1.02 (ref 1–1.03)
SP GR UR STRIP: 1.03 (ref 1–1.03)
SP GR UR STRIP: 1.03 (ref 1–1.03)
SQUAMOUS #/AREA URNS HPF: ABNORMAL /HPF
STRESS TARGET HR: 130 BPM
STRESS TARGET HR: 130 BPM
T&S EXPIRATION DATE: NORMAL
TIBC SERPL-MCNC: 241 MCG/DL (ref 298–536)
TIBC SERPL-MCNC: 307 MCG/DL (ref 298–536)
TOTAL RATE: 22 BREATHS/MINUTE
TOTAL RATE: 24 BREATHS/MINUTE
TOTAL RATE: 26 BREATHS/MINUTE
TOTAL RATE: 27 BREATHS/MINUTE
TOTAL RATE: 34 BREATHS/MINUTE
TRANSFERRIN SERPL-MCNC: 162 MG/DL (ref 200–360)
TRANSFERRIN SERPL-MCNC: 206 MG/DL (ref 200–360)
TRIGL SERPL-MCNC: 102 MG/DL (ref 0–150)
TROPONIN T SERPL-MCNC: <0.01 NG/ML (ref 0–0.03)
UNIT  ABO: NORMAL
UNIT  ABO: NORMAL
UNIT  RH: NORMAL
UNIT  RH: NORMAL
UROBILINOGEN UR QL STRIP: ABNORMAL
UROBILINOGEN UR QL STRIP: NORMAL
VENTILATOR MODE: ABNORMAL
VT ON VENT VENT: 447 ML
VT ON VENT VENT: 484 ML
VT ON VENT VENT: 496 ML
VT ON VENT VENT: 502 ML
WBC # BLD AUTO: 10.84 10*3/MM3 (ref 3.4–10.8)
WBC # BLD AUTO: 14.23 10*3/MM3 (ref 3.4–10.8)
WBC # BLD AUTO: 14.36 10*3/MM3 (ref 3.4–10.8)
WBC # BLD AUTO: 15.51 10*3/MM3 (ref 3.4–10.8)
WBC # BLD AUTO: 15.62 10*3/MM3 (ref 3.4–10.8)
WBC # BLD AUTO: 2.65 10*3/MM3 (ref 3.4–10.8)
WBC # BLD AUTO: 3.61 10*3/MM3 (ref 3.4–10.8)
WBC # BLD AUTO: 3.63 10*3/MM3 (ref 3.4–10.8)
WBC # BLD AUTO: 3.64 10*3/MM3 (ref 3.4–10.8)
WBC # BLD AUTO: 3.85 10*3/MM3 (ref 3.4–10.8)
WBC # BLD AUTO: 4.05 10*3/MM3 (ref 3.4–10.8)
WBC # BLD AUTO: 4.08 10*3/MM3 (ref 3.4–10.8)
WBC # BLD AUTO: 4.54 10*3/MM3 (ref 3.4–10.8)
WBC # BLD AUTO: 4.61 10*3/MM3 (ref 3.4–10.8)
WBC # BLD AUTO: 5.05 10*3/MM3 (ref 3.4–10.8)
WBC # BLD AUTO: 5.07 10*3/MM3 (ref 3.4–10.8)
WBC # BLD AUTO: 5.27 10*3/MM3 (ref 3.4–10.8)
WBC # BLD AUTO: 6.33 10*3/MM3 (ref 3.4–10.8)
WBC # BLD AUTO: 6.79 10*3/MM3 (ref 3.4–10.8)
WBC # BLD AUTO: 8 10*3/MM3 (ref 3.4–10.8)
WBC # BLD AUTO: 8.55 10*3/MM3 (ref 3.4–10.8)
WBC # BLD AUTO: 8.75 10*3/MM3 (ref 3.4–10.8)
WBC # BLD AUTO: 9.15 10*3/MM3 (ref 3.4–10.8)
WBC MORPH BLD: NORMAL
WBC UR QL AUTO: ABNORMAL /HPF
WHOLE BLOOD HOLD SPECIMEN: NORMAL

## 2021-01-01 PROCEDURE — 85025 COMPLETE CBC W/AUTO DIFF WBC: CPT | Performed by: EMERGENCY MEDICINE

## 2021-01-01 PROCEDURE — 25010000002 FENTANYL CITRATE (PF) 50 MCG/ML SOLUTION: Performed by: INTERNAL MEDICINE

## 2021-01-01 PROCEDURE — 84484 ASSAY OF TROPONIN QUANT: CPT | Performed by: INTERNAL MEDICINE

## 2021-01-01 PROCEDURE — 83690 ASSAY OF LIPASE: CPT | Performed by: EMERGENCY MEDICINE

## 2021-01-01 PROCEDURE — 25010000002 ENOXAPARIN PER 10 MG: Performed by: HOSPITALIST

## 2021-01-01 PROCEDURE — 99232 SBSQ HOSP IP/OBS MODERATE 35: CPT | Performed by: INTERNAL MEDICINE

## 2021-01-01 PROCEDURE — 85014 HEMATOCRIT: CPT | Performed by: HOSPITALIST

## 2021-01-01 PROCEDURE — 86140 C-REACTIVE PROTEIN: CPT | Performed by: INTERNAL MEDICINE

## 2021-01-01 PROCEDURE — 36430 TRANSFUSION BLD/BLD COMPNT: CPT

## 2021-01-01 PROCEDURE — 85610 PROTHROMBIN TIME: CPT | Performed by: INTERNAL MEDICINE

## 2021-01-01 PROCEDURE — U0005 INFEC AGEN DETEC AMPLI PROBE: HCPCS | Performed by: EMERGENCY MEDICINE

## 2021-01-01 PROCEDURE — 92611 MOTION FLUOROSCOPY/SWALLOW: CPT

## 2021-01-01 PROCEDURE — 25010000002 LORAZEPAM PER 2 MG: Performed by: INTERNAL MEDICINE

## 2021-01-01 PROCEDURE — 25010000002 HYDROMORPHONE 1 MG/ML SOLUTION: Performed by: INTERNAL MEDICINE

## 2021-01-01 PROCEDURE — 80048 BASIC METABOLIC PNL TOTAL CA: CPT | Performed by: INTERNAL MEDICINE

## 2021-01-01 PROCEDURE — 80076 HEPATIC FUNCTION PANEL: CPT | Performed by: INTERNAL MEDICINE

## 2021-01-01 PROCEDURE — 87040 BLOOD CULTURE FOR BACTERIA: CPT | Performed by: EMERGENCY MEDICINE

## 2021-01-01 PROCEDURE — 86901 BLOOD TYPING SEROLOGIC RH(D): CPT | Performed by: EMERGENCY MEDICINE

## 2021-01-01 PROCEDURE — 86900 BLOOD TYPING SEROLOGIC ABO: CPT | Performed by: NURSE PRACTITIONER

## 2021-01-01 PROCEDURE — 83690 ASSAY OF LIPASE: CPT | Performed by: NURSE PRACTITIONER

## 2021-01-01 PROCEDURE — 25010000003 MAGNESIUM SULFATE 4 GM/100ML SOLUTION: Performed by: INTERNAL MEDICINE

## 2021-01-01 PROCEDURE — 87040 BLOOD CULTURE FOR BACTERIA: CPT | Performed by: PHYSICIAN ASSISTANT

## 2021-01-01 PROCEDURE — 85025 COMPLETE CBC W/AUTO DIFF WBC: CPT | Performed by: INTERNAL MEDICINE

## 2021-01-01 PROCEDURE — 99285 EMERGENCY DEPT VISIT HI MDM: CPT

## 2021-01-01 PROCEDURE — 82803 BLOOD GASES ANY COMBINATION: CPT

## 2021-01-01 PROCEDURE — 83605 ASSAY OF LACTIC ACID: CPT | Performed by: EMERGENCY MEDICINE

## 2021-01-01 PROCEDURE — 85610 PROTHROMBIN TIME: CPT | Performed by: EMERGENCY MEDICINE

## 2021-01-01 PROCEDURE — 25010000002 PROPOFOL 10 MG/ML EMULSION: Performed by: EMERGENCY MEDICINE

## 2021-01-01 PROCEDURE — 84484 ASSAY OF TROPONIN QUANT: CPT | Performed by: EMERGENCY MEDICINE

## 2021-01-01 PROCEDURE — 99222 1ST HOSP IP/OBS MODERATE 55: CPT | Performed by: INTERNAL MEDICINE

## 2021-01-01 PROCEDURE — 94799 UNLISTED PULMONARY SVC/PX: CPT

## 2021-01-01 PROCEDURE — 25010000002 IRON SUCROSE PER 1 MG: Performed by: HOSPITALIST

## 2021-01-01 PROCEDURE — 93970 EXTREMITY STUDY: CPT

## 2021-01-01 PROCEDURE — 36600 WITHDRAWAL OF ARTERIAL BLOOD: CPT

## 2021-01-01 PROCEDURE — 85018 HEMOGLOBIN: CPT | Performed by: INTERNAL MEDICINE

## 2021-01-01 PROCEDURE — 86900 BLOOD TYPING SEROLOGIC ABO: CPT | Performed by: EMERGENCY MEDICINE

## 2021-01-01 PROCEDURE — 85014 HEMATOCRIT: CPT | Performed by: INTERNAL MEDICINE

## 2021-01-01 PROCEDURE — 80053 COMPREHEN METABOLIC PANEL: CPT | Performed by: NURSE PRACTITIONER

## 2021-01-01 PROCEDURE — 0DJ08ZZ INSPECTION OF UPPER INTESTINAL TRACT, VIA NATURAL OR ARTIFICIAL OPENING ENDOSCOPIC: ICD-10-PCS | Performed by: INTERNAL MEDICINE

## 2021-01-01 PROCEDURE — 99284 EMERGENCY DEPT VISIT MOD MDM: CPT

## 2021-01-01 PROCEDURE — 25010000002 HYDROMORPHONE PER 4 MG: Performed by: INTERNAL MEDICINE

## 2021-01-01 PROCEDURE — 25010000002 HEPARIN (PORCINE) PER 1000 UNITS: Performed by: INTERNAL MEDICINE

## 2021-01-01 PROCEDURE — 94003 VENT MGMT INPAT SUBQ DAY: CPT

## 2021-01-01 PROCEDURE — 71045 X-RAY EXAM CHEST 1 VIEW: CPT

## 2021-01-01 PROCEDURE — 25010000002 IOPAMIDOL 61 % SOLUTION: Performed by: EMERGENCY MEDICINE

## 2021-01-01 PROCEDURE — 85018 HEMOGLOBIN: CPT | Performed by: HOSPITALIST

## 2021-01-01 PROCEDURE — 85014 HEMATOCRIT: CPT | Performed by: NURSE PRACTITIONER

## 2021-01-01 PROCEDURE — 25010000002 ONDANSETRON PER 1 MG: Performed by: EMERGENCY MEDICINE

## 2021-01-01 PROCEDURE — 82728 ASSAY OF FERRITIN: CPT | Performed by: INTERNAL MEDICINE

## 2021-01-01 PROCEDURE — 25010000002 PHENYLEPHRINE 10 MG/ML SOLUTION: Performed by: INTERNAL MEDICINE

## 2021-01-01 PROCEDURE — 80053 COMPREHEN METABOLIC PANEL: CPT | Performed by: EMERGENCY MEDICINE

## 2021-01-01 PROCEDURE — 80053 COMPREHEN METABOLIC PANEL: CPT | Performed by: HOSPITALIST

## 2021-01-01 PROCEDURE — 25010000002 ENOXAPARIN PER 10 MG: Performed by: INTERNAL MEDICINE

## 2021-01-01 PROCEDURE — 25010000002 PROPOFOL 10 MG/ML EMULSION: Performed by: NURSE ANESTHETIST, CERTIFIED REGISTERED

## 2021-01-01 PROCEDURE — U0005 INFEC AGEN DETEC AMPLI PROBE: HCPCS | Performed by: NURSE PRACTITIONER

## 2021-01-01 PROCEDURE — 87040 BLOOD CULTURE FOR BACTERIA: CPT | Performed by: HOSPITALIST

## 2021-01-01 PROCEDURE — XW033E5 INTRODUCTION OF REMDESIVIR ANTI-INFECTIVE INTO PERIPHERAL VEIN, PERCUTANEOUS APPROACH, NEW TECHNOLOGY GROUP 5: ICD-10-PCS | Performed by: INTERNAL MEDICINE

## 2021-01-01 PROCEDURE — 80185 ASSAY OF PHENYTOIN TOTAL: CPT | Performed by: NURSE PRACTITIONER

## 2021-01-01 PROCEDURE — 85730 THROMBOPLASTIN TIME PARTIAL: CPT | Performed by: INTERNAL MEDICINE

## 2021-01-01 PROCEDURE — U0004 COV-19 TEST NON-CDC HGH THRU: HCPCS | Performed by: NURSE PRACTITIONER

## 2021-01-01 PROCEDURE — G0378 HOSPITAL OBSERVATION PER HR: HCPCS

## 2021-01-01 PROCEDURE — 74018 RADEX ABDOMEN 1 VIEW: CPT

## 2021-01-01 PROCEDURE — 82962 GLUCOSE BLOOD TEST: CPT

## 2021-01-01 PROCEDURE — 86901 BLOOD TYPING SEROLOGIC RH(D): CPT | Performed by: NURSE PRACTITIONER

## 2021-01-01 PROCEDURE — 85027 COMPLETE CBC AUTOMATED: CPT | Performed by: NURSE PRACTITIONER

## 2021-01-01 PROCEDURE — 83735 ASSAY OF MAGNESIUM: CPT | Performed by: HOSPITALIST

## 2021-01-01 PROCEDURE — U0003 INFECTIOUS AGENT DETECTION BY NUCLEIC ACID (DNA OR RNA); SEVERE ACUTE RESPIRATORY SYNDROME CORONAVIRUS 2 (SARS-COV-2) (CORONAVIRUS DISEASE [COVID-19]), AMPLIFIED PROBE TECHNIQUE, MAKING USE OF HIGH THROUGHPUT TECHNOLOGIES AS DESCRIBED BY CMS-2020-01-R: HCPCS | Performed by: EMERGENCY MEDICINE

## 2021-01-01 PROCEDURE — 99221 1ST HOSP IP/OBS SF/LOW 40: CPT | Performed by: INTERNAL MEDICINE

## 2021-01-01 PROCEDURE — 93978 VASCULAR STUDY: CPT

## 2021-01-01 PROCEDURE — 43239 EGD BIOPSY SINGLE/MULTIPLE: CPT | Performed by: INTERNAL MEDICINE

## 2021-01-01 PROCEDURE — 83880 ASSAY OF NATRIURETIC PEPTIDE: CPT | Performed by: EMERGENCY MEDICINE

## 2021-01-01 PROCEDURE — 25010000002 ENOXAPARIN PER 10 MG: Performed by: SURGERY

## 2021-01-01 PROCEDURE — 36415 COLL VENOUS BLD VENIPUNCTURE: CPT

## 2021-01-01 PROCEDURE — 85018 HEMOGLOBIN: CPT | Performed by: NURSE PRACTITIONER

## 2021-01-01 PROCEDURE — 0W3P8ZZ CONTROL BLEEDING IN GASTROINTESTINAL TRACT, VIA NATURAL OR ARTIFICIAL OPENING ENDOSCOPIC: ICD-10-PCS | Performed by: INTERNAL MEDICINE

## 2021-01-01 PROCEDURE — 71275 CT ANGIOGRAPHY CHEST: CPT

## 2021-01-01 PROCEDURE — 83605 ASSAY OF LACTIC ACID: CPT | Performed by: NURSE PRACTITIONER

## 2021-01-01 PROCEDURE — 88305 TISSUE EXAM BY PATHOLOGIST: CPT | Performed by: INTERNAL MEDICINE

## 2021-01-01 PROCEDURE — 85025 COMPLETE CBC W/AUTO DIFF WBC: CPT | Performed by: HOSPITALIST

## 2021-01-01 PROCEDURE — 86850 RBC ANTIBODY SCREEN: CPT | Performed by: EMERGENCY MEDICINE

## 2021-01-01 PROCEDURE — 43235 EGD DIAGNOSTIC BRUSH WASH: CPT | Performed by: INTERNAL MEDICINE

## 2021-01-01 PROCEDURE — 0 IOPAMIDOL PER 1 ML: Performed by: INTERNAL MEDICINE

## 2021-01-01 PROCEDURE — 84100 ASSAY OF PHOSPHORUS: CPT | Performed by: INTERNAL MEDICINE

## 2021-01-01 PROCEDURE — 80307 DRUG TEST PRSMV CHEM ANLYZR: CPT | Performed by: INTERNAL MEDICINE

## 2021-01-01 PROCEDURE — 25010000002 DEXAMETHASONE PER 1 MG: Performed by: INTERNAL MEDICINE

## 2021-01-01 PROCEDURE — 63710000001 DIPHENHYDRAMINE PER 50 MG: Performed by: HOSPITALIST

## 2021-01-01 PROCEDURE — 85610 PROTHROMBIN TIME: CPT | Performed by: HOSPITALIST

## 2021-01-01 PROCEDURE — 25010000002 PHENYLEPHRINE PER 1 ML: Performed by: NURSE ANESTHETIST, CERTIFIED REGISTERED

## 2021-01-01 PROCEDURE — 83735 ASSAY OF MAGNESIUM: CPT | Performed by: INTERNAL MEDICINE

## 2021-01-01 PROCEDURE — 84478 ASSAY OF TRIGLYCERIDES: CPT | Performed by: INTERNAL MEDICINE

## 2021-01-01 PROCEDURE — 86900 BLOOD TYPING SEROLOGIC ABO: CPT

## 2021-01-01 PROCEDURE — 94002 VENT MGMT INPAT INIT DAY: CPT

## 2021-01-01 PROCEDURE — 25010000002 CEFEPIME PER 500 MG: Performed by: EMERGENCY MEDICINE

## 2021-01-01 PROCEDURE — 80048 BASIC METABOLIC PNL TOTAL CA: CPT | Performed by: HOSPITALIST

## 2021-01-01 PROCEDURE — 81001 URINALYSIS AUTO W/SCOPE: CPT | Performed by: PHYSICIAN ASSISTANT

## 2021-01-01 PROCEDURE — 43255 EGD CONTROL BLEEDING ANY: CPT | Performed by: INTERNAL MEDICINE

## 2021-01-01 PROCEDURE — 85027 COMPLETE CBC AUTOMATED: CPT | Performed by: INTERNAL MEDICINE

## 2021-01-01 PROCEDURE — 87150 DNA/RNA AMPLIFIED PROBE: CPT | Performed by: EMERGENCY MEDICINE

## 2021-01-01 PROCEDURE — 92610 EVALUATE SWALLOWING FUNCTION: CPT

## 2021-01-01 PROCEDURE — 25010000002 MORPHINE PER 10 MG: Performed by: INTERNAL MEDICINE

## 2021-01-01 PROCEDURE — 82270 OCCULT BLOOD FECES: CPT | Performed by: NURSE PRACTITIONER

## 2021-01-01 PROCEDURE — 85007 BL SMEAR W/DIFF WBC COUNT: CPT | Performed by: INTERNAL MEDICINE

## 2021-01-01 PROCEDURE — 63710000001 INSULIN LISPRO (HUMAN) PER 5 UNITS: Performed by: INTERNAL MEDICINE

## 2021-01-01 PROCEDURE — 99231 SBSQ HOSP IP/OBS SF/LOW 25: CPT | Performed by: INTERNAL MEDICINE

## 2021-01-01 PROCEDURE — 81003 URINALYSIS AUTO W/O SCOPE: CPT | Performed by: INTERNAL MEDICINE

## 2021-01-01 PROCEDURE — 87150 DNA/RNA AMPLIFIED PROBE: CPT | Performed by: PHYSICIAN ASSISTANT

## 2021-01-01 PROCEDURE — 85025 COMPLETE CBC W/AUTO DIFF WBC: CPT | Performed by: NURSE PRACTITIONER

## 2021-01-01 PROCEDURE — 86850 RBC ANTIBODY SCREEN: CPT | Performed by: NURSE PRACTITIONER

## 2021-01-01 PROCEDURE — 74177 CT ABD & PELVIS W/CONTRAST: CPT

## 2021-01-01 PROCEDURE — 93010 ELECTROCARDIOGRAM REPORT: CPT | Performed by: INTERNAL MEDICINE

## 2021-01-01 PROCEDURE — 86923 COMPATIBILITY TEST ELECTRIC: CPT

## 2021-01-01 PROCEDURE — 25010000002 ENOXAPARIN PER 10 MG: Performed by: NURSE PRACTITIONER

## 2021-01-01 PROCEDURE — P9612 CATHETERIZE FOR URINE SPEC: HCPCS

## 2021-01-01 PROCEDURE — 84466 ASSAY OF TRANSFERRIN: CPT | Performed by: HOSPITALIST

## 2021-01-01 PROCEDURE — 85379 FIBRIN DEGRADATION QUANT: CPT | Performed by: INTERNAL MEDICINE

## 2021-01-01 PROCEDURE — 81001 URINALYSIS AUTO W/SCOPE: CPT | Performed by: NURSE PRACTITIONER

## 2021-01-01 PROCEDURE — 84466 ASSAY OF TRANSFERRIN: CPT | Performed by: INTERNAL MEDICINE

## 2021-01-01 PROCEDURE — 85730 THROMBOPLASTIN TIME PARTIAL: CPT | Performed by: EMERGENCY MEDICINE

## 2021-01-01 PROCEDURE — 84484 ASSAY OF TROPONIN QUANT: CPT | Performed by: NURSE PRACTITIONER

## 2021-01-01 PROCEDURE — 74230 X-RAY XM SWLNG FUNCJ C+: CPT

## 2021-01-01 PROCEDURE — 99232 SBSQ HOSP IP/OBS MODERATE 35: CPT | Performed by: NURSE PRACTITIONER

## 2021-01-01 PROCEDURE — 83690 ASSAY OF LIPASE: CPT | Performed by: PHYSICIAN ASSISTANT

## 2021-01-01 PROCEDURE — 84132 ASSAY OF SERUM POTASSIUM: CPT | Performed by: INTERNAL MEDICINE

## 2021-01-01 PROCEDURE — 81001 URINALYSIS AUTO W/SCOPE: CPT | Performed by: EMERGENCY MEDICINE

## 2021-01-01 PROCEDURE — 25010000002 PROPOFOL 10 MG/ML EMULSION: Performed by: ANESTHESIOLOGY

## 2021-01-01 PROCEDURE — 5A1955Z RESPIRATORY VENTILATION, GREATER THAN 96 CONSECUTIVE HOURS: ICD-10-PCS | Performed by: INTERNAL MEDICINE

## 2021-01-01 PROCEDURE — 25010000002 PROPOFOL 10 MG/ML EMULSION

## 2021-01-01 PROCEDURE — 82270 OCCULT BLOOD FECES: CPT | Performed by: EMERGENCY MEDICINE

## 2021-01-01 PROCEDURE — 25010000002 VANCOMYCIN 10 G RECONSTITUTED SOLUTION: Performed by: EMERGENCY MEDICINE

## 2021-01-01 PROCEDURE — 94760 N-INVAS EAR/PLS OXIMETRY 1: CPT

## 2021-01-01 PROCEDURE — P9016 RBC LEUKOCYTES REDUCED: HCPCS

## 2021-01-01 PROCEDURE — 83735 ASSAY OF MAGNESIUM: CPT | Performed by: EMERGENCY MEDICINE

## 2021-01-01 PROCEDURE — 84145 PROCALCITONIN (PCT): CPT | Performed by: EMERGENCY MEDICINE

## 2021-01-01 PROCEDURE — 25010000002 CEFTRIAXONE PER 250 MG: Performed by: NURSE PRACTITIONER

## 2021-01-01 PROCEDURE — U0004 COV-19 TEST NON-CDC HGH THRU: HCPCS | Performed by: EMERGENCY MEDICINE

## 2021-01-01 PROCEDURE — 70450 CT HEAD/BRAIN W/O DYE: CPT

## 2021-01-01 PROCEDURE — 87147 CULTURE TYPE IMMUNOLOGIC: CPT | Performed by: EMERGENCY MEDICINE

## 2021-01-01 PROCEDURE — 93005 ELECTROCARDIOGRAM TRACING: CPT | Performed by: EMERGENCY MEDICINE

## 2021-01-01 PROCEDURE — 83540 ASSAY OF IRON: CPT | Performed by: INTERNAL MEDICINE

## 2021-01-01 PROCEDURE — 83540 ASSAY OF IRON: CPT | Performed by: HOSPITALIST

## 2021-01-01 PROCEDURE — 25010000002 PHENYLEPHRINE PER 1 ML: Performed by: ANESTHESIOLOGY

## 2021-01-01 PROCEDURE — 85025 COMPLETE CBC W/AUTO DIFF WBC: CPT

## 2021-01-01 DEVICE — DEV CLIP ENDO RESOLUTION360 CONTRL ROT 235CM: Type: IMPLANTABLE DEVICE | Site: ESOPHAGUS | Status: FUNCTIONAL

## 2021-01-01 RX ORDER — ONDANSETRON 2 MG/ML
4 INJECTION INTRAMUSCULAR; INTRAVENOUS EVERY 6 HOURS PRN
Status: DISCONTINUED | OUTPATIENT
Start: 2021-01-01 | End: 2021-01-01 | Stop reason: HOSPADM

## 2021-01-01 RX ORDER — ACETAMINOPHEN 325 MG/1
650 TABLET ORAL EVERY 4 HOURS PRN
Status: CANCELLED | OUTPATIENT
Start: 2021-01-01

## 2021-01-01 RX ORDER — PROPOFOL 10 MG/ML
VIAL (ML) INTRAVENOUS AS NEEDED
Status: DISCONTINUED | OUTPATIENT
Start: 2021-01-01 | End: 2021-01-01 | Stop reason: SURG

## 2021-01-01 RX ORDER — LORAZEPAM 2 MG/ML
1 INJECTION INTRAMUSCULAR
Status: CANCELLED | OUTPATIENT
Start: 2021-01-01 | End: 2021-01-01

## 2021-01-01 RX ORDER — HEPARIN SODIUM 5000 [USP'U]/ML
40-80 INJECTION, SOLUTION INTRAVENOUS; SUBCUTANEOUS EVERY 6 HOURS PRN
Status: DISCONTINUED | OUTPATIENT
Start: 2021-01-01 | End: 2021-01-01

## 2021-01-01 RX ORDER — POTASSIUM CHLORIDE 1.5 G/1.77G
40 POWDER, FOR SOLUTION ORAL AS NEEDED
Status: DISCONTINUED | OUTPATIENT
Start: 2021-01-01 | End: 2021-01-01 | Stop reason: HOSPADM

## 2021-01-01 RX ORDER — LORAZEPAM 2 MG/ML
0.5 INJECTION INTRAMUSCULAR
Status: CANCELLED | OUTPATIENT
Start: 2021-01-01 | End: 2021-01-01

## 2021-01-01 RX ORDER — GLYCOPYRROLATE 0.2 MG/ML
0.4 INJECTION INTRAMUSCULAR; INTRAVENOUS
Status: DISCONTINUED | OUTPATIENT
Start: 2021-01-01 | End: 2021-01-01 | Stop reason: HOSPADM

## 2021-01-01 RX ORDER — ACETAMINOPHEN 325 MG/1
650 TABLET ORAL EVERY 4 HOURS PRN
Status: DISCONTINUED | OUTPATIENT
Start: 2021-01-01 | End: 2021-01-01 | Stop reason: HOSPADM

## 2021-01-01 RX ORDER — ALUMINA, MAGNESIA, AND SIMETHICONE 2400; 2400; 240 MG/30ML; MG/30ML; MG/30ML
15 SUSPENSION ORAL EVERY 6 HOURS PRN
Status: DISCONTINUED | OUTPATIENT
Start: 2021-01-01 | End: 2021-01-01 | Stop reason: HOSPADM

## 2021-01-01 RX ORDER — ACETAMINOPHEN 650 MG/1
650 SUPPOSITORY RECTAL EVERY 4 HOURS PRN
Status: DISCONTINUED | OUTPATIENT
Start: 2021-01-01 | End: 2021-01-01 | Stop reason: HOSPADM

## 2021-01-01 RX ORDER — PHENYTOIN 50 MG/1
200 TABLET, CHEWABLE ORAL 2 TIMES DAILY
Status: DISCONTINUED | OUTPATIENT
Start: 2021-01-01 | End: 2021-01-01 | Stop reason: HOSPADM

## 2021-01-01 RX ORDER — LORAZEPAM 2 MG/ML
2 CONCENTRATE ORAL
Status: ACTIVE | OUTPATIENT
Start: 2021-01-01 | End: 2021-01-01

## 2021-01-01 RX ORDER — CALCIUM CARBONATE 200(500)MG
2 TABLET,CHEWABLE ORAL 2 TIMES DAILY PRN
Status: DISCONTINUED | OUTPATIENT
Start: 2021-01-01 | End: 2021-01-01 | Stop reason: HOSPADM

## 2021-01-01 RX ORDER — HALOPERIDOL 5 MG/ML
1 INJECTION INTRAMUSCULAR EVERY 4 HOURS PRN
Status: DISCONTINUED | OUTPATIENT
Start: 2021-01-01 | End: 2021-01-01 | Stop reason: HOSPADM

## 2021-01-01 RX ORDER — MORPHINE SULFATE 4 MG/ML
4 INJECTION, SOLUTION INTRAMUSCULAR; INTRAVENOUS
Status: DISCONTINUED | OUTPATIENT
Start: 2021-01-01 | End: 2021-01-01 | Stop reason: HOSPADM

## 2021-01-01 RX ORDER — ACETAMINOPHEN 160 MG/5ML
650 SOLUTION ORAL EVERY 4 HOURS PRN
Status: DISCONTINUED | OUTPATIENT
Start: 2021-01-01 | End: 2021-01-01 | Stop reason: HOSPADM

## 2021-01-01 RX ORDER — MORPHINE SULFATE 4 MG/ML
4 INJECTION, SOLUTION INTRAMUSCULAR; INTRAVENOUS
Status: CANCELLED | OUTPATIENT
Start: 2021-01-01 | End: 2021-01-01

## 2021-01-01 RX ORDER — GLYCOPYRROLATE 0.2 MG/ML
0.2 INJECTION INTRAMUSCULAR; INTRAVENOUS
Status: DISCONTINUED | OUTPATIENT
Start: 2021-01-01 | End: 2021-01-01 | Stop reason: HOSPADM

## 2021-01-01 RX ORDER — BENZTROPINE MESYLATE 0.5 MG/1
0.5 TABLET ORAL 2 TIMES DAILY
Status: DISCONTINUED | OUTPATIENT
Start: 2021-01-01 | End: 2021-01-01 | Stop reason: HOSPADM

## 2021-01-01 RX ORDER — LORAZEPAM 2 MG/ML
1 INJECTION INTRAMUSCULAR
Status: DISCONTINUED | OUTPATIENT
Start: 2021-01-01 | End: 2021-01-01 | Stop reason: HOSPADM

## 2021-01-01 RX ORDER — MORPHINE SULFATE 4 MG/ML
4 INJECTION, SOLUTION INTRAMUSCULAR; INTRAVENOUS
Status: ACTIVE | OUTPATIENT
Start: 2021-01-01 | End: 2021-01-01

## 2021-01-01 RX ORDER — HYDROMORPHONE HCL 110MG/55ML
1.5 PATIENT CONTROLLED ANALGESIA SYRINGE INTRAVENOUS
Status: DISCONTINUED | OUTPATIENT
Start: 2021-01-01 | End: 2021-01-01 | Stop reason: HOSPADM

## 2021-01-01 RX ORDER — DIPHENOXYLATE HYDROCHLORIDE AND ATROPINE SULFATE 2.5; .025 MG/1; MG/1
1 TABLET ORAL
Status: DISCONTINUED | OUTPATIENT
Start: 2021-01-01 | End: 2021-01-01 | Stop reason: HOSPADM

## 2021-01-01 RX ORDER — MAGNESIUM SULFATE HEPTAHYDRATE 40 MG/ML
2 INJECTION, SOLUTION INTRAVENOUS AS NEEDED
Status: DISCONTINUED | OUTPATIENT
Start: 2021-01-01 | End: 2021-01-01

## 2021-01-01 RX ORDER — UREA 10 %
3 LOTION (ML) TOPICAL NIGHTLY PRN
Status: DISCONTINUED | OUTPATIENT
Start: 2021-01-01 | End: 2021-01-01 | Stop reason: HOSPADM

## 2021-01-01 RX ORDER — ALUMINA, MAGNESIA, AND SIMETHICONE 2400; 2400; 240 MG/30ML; MG/30ML; MG/30ML
15 SUSPENSION ORAL EVERY 6 HOURS PRN
Status: CANCELLED | OUTPATIENT
Start: 2021-01-01

## 2021-01-01 RX ORDER — FENTANYL CITRATE 50 UG/ML
100 INJECTION, SOLUTION INTRAMUSCULAR; INTRAVENOUS
Status: CANCELLED | OUTPATIENT
Start: 2021-01-01

## 2021-01-01 RX ORDER — LORAZEPAM 2 MG/ML
0.5 INJECTION INTRAMUSCULAR
Status: DISCONTINUED | OUTPATIENT
Start: 2021-01-01 | End: 2021-01-01 | Stop reason: HOSPADM

## 2021-01-01 RX ORDER — LORAZEPAM 2 MG/ML
1 INJECTION INTRAMUSCULAR
Status: ACTIVE | OUTPATIENT
Start: 2021-01-01 | End: 2021-01-01

## 2021-01-01 RX ORDER — PROPOFOL 10 MG/ML
VIAL (ML) INTRAVENOUS
Status: COMPLETED
Start: 2021-01-01 | End: 2021-01-01

## 2021-01-01 RX ORDER — SODIUM CHLORIDE 9 MG/ML
150 INJECTION, SOLUTION INTRAVENOUS CONTINUOUS
Status: DISCONTINUED | OUTPATIENT
Start: 2021-01-01 | End: 2021-01-01

## 2021-01-01 RX ORDER — SODIUM CHLORIDE 9 MG/ML
125 INJECTION, SOLUTION INTRAVENOUS CONTINUOUS
Status: DISCONTINUED | OUTPATIENT
Start: 2021-01-01 | End: 2021-01-01

## 2021-01-01 RX ORDER — MORPHINE SULFATE 20 MG/ML
20 SOLUTION ORAL
Status: ACTIVE | OUTPATIENT
Start: 2021-01-01 | End: 2021-01-01

## 2021-01-01 RX ORDER — LORAZEPAM 2 MG/ML
2 CONCENTRATE ORAL
Status: DISCONTINUED | OUTPATIENT
Start: 2021-01-01 | End: 2021-01-01 | Stop reason: HOSPADM

## 2021-01-01 RX ORDER — NICOTINE POLACRILEX 4 MG
15 LOZENGE BUCCAL
Status: DISCONTINUED | OUTPATIENT
Start: 2021-01-01 | End: 2021-01-01

## 2021-01-01 RX ORDER — MORPHINE SULFATE 2 MG/ML
6 INJECTION, SOLUTION INTRAMUSCULAR; INTRAVENOUS
Status: DISCONTINUED | OUTPATIENT
Start: 2021-01-01 | End: 2021-01-01 | Stop reason: HOSPADM

## 2021-01-01 RX ORDER — HEPARIN SODIUM 10000 [USP'U]/100ML
18 INJECTION, SOLUTION INTRAVENOUS
Status: DISCONTINUED | OUTPATIENT
Start: 2021-01-01 | End: 2021-01-01

## 2021-01-01 RX ORDER — MORPHINE SULFATE 20 MG/ML
20 SOLUTION ORAL
Status: DISCONTINUED | OUTPATIENT
Start: 2021-01-01 | End: 2021-01-01 | Stop reason: HOSPADM

## 2021-01-01 RX ORDER — SUCRALFATE 1 G/1
1 TABLET ORAL
Status: DISCONTINUED | OUTPATIENT
Start: 2021-01-01 | End: 2021-01-01 | Stop reason: HOSPADM

## 2021-01-01 RX ORDER — HALOPERIDOL 2 MG/ML
1 SOLUTION ORAL EVERY 4 HOURS PRN
Status: DISCONTINUED | OUTPATIENT
Start: 2021-01-01 | End: 2021-01-01 | Stop reason: HOSPADM

## 2021-01-01 RX ORDER — HYDROMORPHONE HCL 110MG/55ML
1.5 PATIENT CONTROLLED ANALGESIA SYRINGE INTRAVENOUS
Status: DISPENSED | OUTPATIENT
Start: 2021-01-01 | End: 2021-01-01

## 2021-01-01 RX ORDER — LORAZEPAM 2 MG/ML
0.5 INJECTION INTRAMUSCULAR
Status: ACTIVE | OUTPATIENT
Start: 2021-01-01 | End: 2021-01-01

## 2021-01-01 RX ORDER — SODIUM CHLORIDE, SODIUM LACTATE, POTASSIUM CHLORIDE, CALCIUM CHLORIDE 600; 310; 30; 20 MG/100ML; MG/100ML; MG/100ML; MG/100ML
30 INJECTION, SOLUTION INTRAVENOUS CONTINUOUS
Status: DISCONTINUED | OUTPATIENT
Start: 2021-01-01 | End: 2021-01-01

## 2021-01-01 RX ORDER — HALOPERIDOL 1 MG/1
1 TABLET ORAL EVERY 4 HOURS PRN
Status: DISCONTINUED | OUTPATIENT
Start: 2021-01-01 | End: 2021-01-01 | Stop reason: HOSPADM

## 2021-01-01 RX ORDER — DIPHENOXYLATE HYDROCHLORIDE AND ATROPINE SULFATE 2.5; .025 MG/1; MG/1
1 TABLET ORAL
Status: CANCELLED | OUTPATIENT
Start: 2021-01-01

## 2021-01-01 RX ORDER — DEXAMETHASONE SODIUM PHOSPHATE 10 MG/ML
6 INJECTION INTRAMUSCULAR; INTRAVENOUS EVERY 12 HOURS
Status: DISCONTINUED | OUTPATIENT
Start: 2021-01-01 | End: 2021-01-01

## 2021-01-01 RX ORDER — POTASSIUM CHLORIDE 7.45 MG/ML
10 INJECTION INTRAVENOUS
Status: DISCONTINUED | OUTPATIENT
Start: 2021-01-01 | End: 2021-01-01 | Stop reason: HOSPADM

## 2021-01-01 RX ORDER — PANTOPRAZOLE SODIUM 40 MG/10ML
40 INJECTION, POWDER, LYOPHILIZED, FOR SOLUTION INTRAVENOUS
Status: DISCONTINUED | OUTPATIENT
Start: 2021-01-01 | End: 2021-01-01

## 2021-01-01 RX ORDER — NITROGLYCERIN 0.4 MG/1
0.4 TABLET SUBLINGUAL
Status: DISCONTINUED | OUTPATIENT
Start: 2021-01-01 | End: 2021-01-01 | Stop reason: HOSPADM

## 2021-01-01 RX ORDER — ACETAMINOPHEN 160 MG/5ML
650 SOLUTION ORAL EVERY 4 HOURS PRN
Status: CANCELLED | OUTPATIENT
Start: 2021-01-01

## 2021-01-01 RX ORDER — MULTIPLE VITAMINS W/ MINERALS TAB 9MG-400MCG
1 TAB ORAL DAILY
Status: DISCONTINUED | OUTPATIENT
Start: 2021-01-01 | End: 2021-01-01 | Stop reason: HOSPADM

## 2021-01-01 RX ORDER — ONDANSETRON 2 MG/ML
4 INJECTION INTRAMUSCULAR; INTRAVENOUS ONCE
Status: COMPLETED | OUTPATIENT
Start: 2021-01-01 | End: 2021-01-01

## 2021-01-01 RX ORDER — LANSOPRAZOLE
30 KIT
Status: DISCONTINUED | OUTPATIENT
Start: 2021-01-01 | End: 2021-01-01 | Stop reason: HOSPADM

## 2021-01-01 RX ORDER — GLYCOPYRROLATE 0.2 MG/ML
INJECTION INTRAMUSCULAR; INTRAVENOUS AS NEEDED
Status: DISCONTINUED | OUTPATIENT
Start: 2021-01-01 | End: 2021-01-01 | Stop reason: SURG

## 2021-01-01 RX ORDER — MORPHINE SULFATE 10 MG/ML
6 INJECTION INTRAMUSCULAR; INTRAVENOUS; SUBCUTANEOUS
Status: DISCONTINUED | OUTPATIENT
Start: 2021-01-01 | End: 2021-01-01 | Stop reason: HOSPADM

## 2021-01-01 RX ORDER — ACETAMINOPHEN 650 MG/1
650 SUPPOSITORY RECTAL EVERY 4 HOURS PRN
Status: CANCELLED | OUTPATIENT
Start: 2021-01-01

## 2021-01-01 RX ORDER — MEMANTINE HYDROCHLORIDE 10 MG/1
10 TABLET ORAL 2 TIMES DAILY
Status: DISCONTINUED | OUTPATIENT
Start: 2021-01-01 | End: 2021-01-01 | Stop reason: HOSPADM

## 2021-01-01 RX ORDER — PANTOPRAZOLE SODIUM 40 MG/10ML
80 INJECTION, POWDER, LYOPHILIZED, FOR SOLUTION INTRAVENOUS ONCE
Status: COMPLETED | OUTPATIENT
Start: 2021-01-01 | End: 2021-01-01

## 2021-01-01 RX ORDER — LANSOPRAZOLE 30 MG/1
30 TABLET, ORALLY DISINTEGRATING, DELAYED RELEASE ORAL 2 TIMES DAILY
COMMUNITY
End: 2021-01-01 | Stop reason: HOSPADM

## 2021-01-01 RX ORDER — MORPHINE SULFATE 20 MG/ML
10 SOLUTION ORAL
Status: ACTIVE | OUTPATIENT
Start: 2021-01-01 | End: 2021-01-01

## 2021-01-01 RX ORDER — ZONISAMIDE 100 MG/1
100 CAPSULE ORAL DAILY
Status: DISCONTINUED | OUTPATIENT
Start: 2021-01-01 | End: 2021-01-01 | Stop reason: HOSPADM

## 2021-01-01 RX ORDER — ZONISAMIDE 100 MG/1
100 CAPSULE ORAL DAILY
Qty: 3 CAPSULE | Refills: 0 | Status: SHIPPED | OUTPATIENT
Start: 2021-01-01

## 2021-01-01 RX ORDER — LORAZEPAM 2 MG/ML
1 INJECTION INTRAMUSCULAR
Status: DISPENSED | OUTPATIENT
Start: 2021-01-01 | End: 2021-01-01

## 2021-01-01 RX ORDER — MORPHINE SULFATE 10 MG/ML
6 INJECTION INTRAMUSCULAR; INTRAVENOUS; SUBCUTANEOUS
Status: CANCELLED | OUTPATIENT
Start: 2021-01-01 | End: 2021-01-01

## 2021-01-01 RX ORDER — LORAZEPAM 2 MG/ML
2 INJECTION INTRAMUSCULAR
Status: DISCONTINUED | OUTPATIENT
Start: 2021-01-01 | End: 2021-01-01 | Stop reason: HOSPADM

## 2021-01-01 RX ORDER — LORAZEPAM 2 MG/ML
2 INJECTION INTRAMUSCULAR
Status: CANCELLED | OUTPATIENT
Start: 2021-01-01 | End: 2021-01-01

## 2021-01-01 RX ORDER — OXYCODONE HCL 5 MG/5 ML
5 SOLUTION, ORAL ORAL EVERY 6 HOURS PRN
Status: CANCELLED | OUTPATIENT
Start: 2021-01-01 | End: 2021-01-01

## 2021-01-01 RX ORDER — SUCRALFATE 1 G/1
1 TABLET ORAL
Start: 2021-01-01

## 2021-01-01 RX ORDER — OXYCODONE HCL 5 MG/5 ML
5 SOLUTION, ORAL ORAL EVERY 6 HOURS PRN
Status: DISCONTINUED | OUTPATIENT
Start: 2021-01-01 | End: 2021-01-01 | Stop reason: HOSPADM

## 2021-01-01 RX ORDER — PANTOPRAZOLE SODIUM 40 MG/10ML
40 INJECTION, POWDER, LYOPHILIZED, FOR SOLUTION INTRAVENOUS ONCE
Status: COMPLETED | OUTPATIENT
Start: 2021-01-01 | End: 2021-01-01

## 2021-01-01 RX ORDER — BISACODYL 5 MG/1
5 TABLET, DELAYED RELEASE ORAL DAILY PRN
Status: DISCONTINUED | OUTPATIENT
Start: 2021-01-01 | End: 2021-01-01 | Stop reason: HOSPADM

## 2021-01-01 RX ORDER — CEFTRIAXONE SODIUM 1 G/50ML
1 INJECTION, SOLUTION INTRAVENOUS EVERY 24 HOURS
Status: DISCONTINUED | OUTPATIENT
Start: 2021-01-01 | End: 2021-01-01

## 2021-01-01 RX ORDER — LORAZEPAM 2 MG/ML
1 CONCENTRATE ORAL
Status: DISCONTINUED | OUTPATIENT
Start: 2021-01-01 | End: 2021-01-01 | Stop reason: HOSPADM

## 2021-01-01 RX ORDER — RISPERIDONE 1 MG/1
1 TABLET ORAL EVERY 12 HOURS SCHEDULED
Status: DISCONTINUED | OUTPATIENT
Start: 2021-01-01 | End: 2021-01-01 | Stop reason: HOSPADM

## 2021-01-01 RX ORDER — ACETAMINOPHEN 650 MG/1
650 SUPPOSITORY RECTAL ONCE
Status: COMPLETED | OUTPATIENT
Start: 2021-01-01 | End: 2021-01-01

## 2021-01-01 RX ORDER — LORAZEPAM 2 MG/ML
0.5 CONCENTRATE ORAL
Status: DISCONTINUED | OUTPATIENT
Start: 2021-01-01 | End: 2021-01-01 | Stop reason: HOSPADM

## 2021-01-01 RX ORDER — LANSOPRAZOLE
30 KIT
Qty: 300 ML | Status: ON HOLD
Start: 2021-01-01 | End: 2021-01-01

## 2021-01-01 RX ORDER — HALOPERIDOL 1 MG/1
1 TABLET ORAL EVERY 4 HOURS PRN
Status: CANCELLED | OUTPATIENT
Start: 2021-01-01

## 2021-01-01 RX ORDER — PROPOFOL 10 MG/ML
VIAL (ML) INTRAVENOUS CONTINUOUS PRN
Status: DISCONTINUED | OUTPATIENT
Start: 2021-01-01 | End: 2021-01-01 | Stop reason: SURG

## 2021-01-01 RX ORDER — DEXTROSE MONOHYDRATE 25 G/50ML
25 INJECTION, SOLUTION INTRAVENOUS
Status: DISCONTINUED | OUTPATIENT
Start: 2021-01-01 | End: 2021-01-01

## 2021-01-01 RX ORDER — FENTANYL CITRATE 50 UG/ML
100 INJECTION, SOLUTION INTRAMUSCULAR; INTRAVENOUS
Status: DISCONTINUED | OUTPATIENT
Start: 2021-01-01 | End: 2021-01-01 | Stop reason: HOSPADM

## 2021-01-01 RX ORDER — CHLORHEXIDINE GLUCONATE 0.12 MG/ML
15 RINSE ORAL EVERY 12 HOURS SCHEDULED
Status: DISCONTINUED | OUTPATIENT
Start: 2021-01-01 | End: 2021-01-01

## 2021-01-01 RX ORDER — SODIUM CHLORIDE, SODIUM LACTATE, POTASSIUM CHLORIDE, CALCIUM CHLORIDE 600; 310; 30; 20 MG/100ML; MG/100ML; MG/100ML; MG/100ML
INJECTION, SOLUTION INTRAVENOUS CONTINUOUS PRN
Status: DISCONTINUED | OUTPATIENT
Start: 2021-01-01 | End: 2021-01-01 | Stop reason: SURG

## 2021-01-01 RX ORDER — DIPHENHYDRAMINE HCL 25 MG
25 CAPSULE ORAL EVERY 6 HOURS PRN
Status: DISCONTINUED | OUTPATIENT
Start: 2021-01-01 | End: 2021-01-01 | Stop reason: HOSPADM

## 2021-01-01 RX ORDER — HYDROMORPHONE HCL 110MG/55ML
1.5 PATIENT CONTROLLED ANALGESIA SYRINGE INTRAVENOUS
Status: CANCELLED | OUTPATIENT
Start: 2021-01-01 | End: 2021-01-01

## 2021-01-01 RX ORDER — BISACODYL 10 MG
10 SUPPOSITORY, RECTAL RECTAL DAILY PRN
Status: DISCONTINUED | OUTPATIENT
Start: 2021-01-01 | End: 2021-01-01 | Stop reason: HOSPADM

## 2021-01-01 RX ORDER — SODIUM CHLORIDE, SODIUM LACTATE, POTASSIUM CHLORIDE, CALCIUM CHLORIDE 600; 310; 30; 20 MG/100ML; MG/100ML; MG/100ML; MG/100ML
100 INJECTION, SOLUTION INTRAVENOUS CONTINUOUS
Status: DISCONTINUED | OUTPATIENT
Start: 2021-01-01 | End: 2021-01-01

## 2021-01-01 RX ORDER — LORAZEPAM 2 MG/ML
2 CONCENTRATE ORAL
Status: CANCELLED | OUTPATIENT
Start: 2021-01-01 | End: 2021-01-01

## 2021-01-01 RX ORDER — TAMSULOSIN HYDROCHLORIDE 0.4 MG/1
0.4 CAPSULE ORAL NIGHTLY
Status: DISCONTINUED | OUTPATIENT
Start: 2021-01-01 | End: 2021-01-01 | Stop reason: HOSPADM

## 2021-01-01 RX ORDER — ONDANSETRON 4 MG/1
4 TABLET, FILM COATED ORAL EVERY 6 HOURS PRN
Status: DISCONTINUED | OUTPATIENT
Start: 2021-01-01 | End: 2021-01-01 | Stop reason: HOSPADM

## 2021-01-01 RX ORDER — GLYCOPYRROLATE 0.2 MG/ML
0.2 INJECTION INTRAMUSCULAR; INTRAVENOUS
Status: CANCELLED | OUTPATIENT
Start: 2021-01-01

## 2021-01-01 RX ORDER — DIPHENHYDRAMINE HYDROCHLORIDE 50 MG/ML
25 INJECTION INTRAMUSCULAR; INTRAVENOUS EVERY 6 HOURS PRN
Status: DISCONTINUED | OUTPATIENT
Start: 2021-01-01 | End: 2021-01-01 | Stop reason: HOSPADM

## 2021-01-01 RX ORDER — SODIUM CHLORIDE 0.9 % (FLUSH) 0.9 %
10 SYRINGE (ML) INJECTION AS NEEDED
Status: DISCONTINUED | OUTPATIENT
Start: 2021-01-01 | End: 2021-01-01 | Stop reason: HOSPADM

## 2021-01-01 RX ORDER — LABETALOL HYDROCHLORIDE 5 MG/ML
20 INJECTION, SOLUTION INTRAVENOUS
Status: DISCONTINUED | OUTPATIENT
Start: 2021-01-01 | End: 2021-01-01

## 2021-01-01 RX ORDER — DIPHENHYDRAMINE HCL 50 MG
50 CAPSULE ORAL ONCE
Status: COMPLETED | OUTPATIENT
Start: 2021-01-01 | End: 2021-01-01

## 2021-01-01 RX ORDER — PANTOPRAZOLE SODIUM 40 MG/1
40 TABLET, DELAYED RELEASE ORAL
Start: 2021-01-01 | End: 2021-01-01

## 2021-01-01 RX ORDER — LORAZEPAM 0.5 MG/1
0.25 TABLET ORAL 2 TIMES DAILY
Status: DISCONTINUED | OUTPATIENT
Start: 2021-01-01 | End: 2021-01-01 | Stop reason: HOSPADM

## 2021-01-01 RX ORDER — ATORVASTATIN CALCIUM 20 MG/1
40 TABLET, FILM COATED ORAL NIGHTLY
Status: DISCONTINUED | OUTPATIENT
Start: 2021-01-01 | End: 2021-01-01 | Stop reason: HOSPADM

## 2021-01-01 RX ORDER — POLYETHYLENE GLYCOL 3350 17 G/17G
17 POWDER, FOR SOLUTION ORAL DAILY PRN
Status: DISCONTINUED | OUTPATIENT
Start: 2021-01-01 | End: 2021-01-01 | Stop reason: HOSPADM

## 2021-01-01 RX ORDER — BISACODYL 10 MG
10 SUPPOSITORY, RECTAL RECTAL ONCE
Status: COMPLETED | OUTPATIENT
Start: 2021-01-01 | End: 2021-01-01

## 2021-01-01 RX ORDER — HYDROMORPHONE HCL 110MG/55ML
1.5 PATIENT CONTROLLED ANALGESIA SYRINGE INTRAVENOUS
Status: DISCONTINUED | OUTPATIENT
Start: 2021-01-01 | End: 2021-01-01

## 2021-01-01 RX ORDER — POTASSIUM CHLORIDE 1.5 G/1.77G
40 POWDER, FOR SOLUTION ORAL ONCE
Status: COMPLETED | OUTPATIENT
Start: 2021-01-01 | End: 2021-01-01

## 2021-01-01 RX ORDER — DIPHENHYDRAMINE HCL 25 MG
25 CAPSULE ORAL EVERY 6 HOURS PRN
Status: CANCELLED | OUTPATIENT
Start: 2021-01-01

## 2021-01-01 RX ORDER — PANTOPRAZOLE SODIUM 40 MG/1
40 TABLET, DELAYED RELEASE ORAL
Status: DISCONTINUED | OUTPATIENT
Start: 2021-01-01 | End: 2021-01-01

## 2021-01-01 RX ORDER — WARFARIN SODIUM 5 MG/1
5 TABLET ORAL NIGHTLY
Start: 2021-01-01 | End: 2021-01-01 | Stop reason: HOSPADM

## 2021-01-01 RX ORDER — FENTANYL CITRATE 50 UG/ML
50 INJECTION, SOLUTION INTRAMUSCULAR; INTRAVENOUS
Status: DISCONTINUED | OUTPATIENT
Start: 2021-01-01 | End: 2021-01-01

## 2021-01-01 RX ORDER — AMOXICILLIN 250 MG
2 CAPSULE ORAL 2 TIMES DAILY
Status: DISCONTINUED | OUTPATIENT
Start: 2021-01-01 | End: 2021-01-01 | Stop reason: HOSPADM

## 2021-01-01 RX ORDER — PANTOPRAZOLE SODIUM 40 MG/1
40 TABLET, DELAYED RELEASE ORAL
Status: DISCONTINUED | OUTPATIENT
Start: 2021-01-01 | End: 2021-01-01 | Stop reason: HOSPADM

## 2021-01-01 RX ORDER — SODIUM CHLORIDE 9 MG/ML
30 INJECTION, SOLUTION INTRAVENOUS CONTINUOUS
Status: DISCONTINUED | OUTPATIENT
Start: 2021-01-01 | End: 2021-01-01

## 2021-01-01 RX ORDER — MORPHINE SULFATE 2 MG/ML
4 INJECTION, SOLUTION INTRAMUSCULAR; INTRAVENOUS
Status: DISCONTINUED | OUTPATIENT
Start: 2021-01-01 | End: 2021-01-01 | Stop reason: HOSPADM

## 2021-01-01 RX ORDER — MORPHINE SULFATE 20 MG/ML
10 SOLUTION ORAL
Status: DISCONTINUED | OUTPATIENT
Start: 2021-01-01 | End: 2021-01-01 | Stop reason: HOSPADM

## 2021-01-01 RX ORDER — DONEPEZIL HYDROCHLORIDE 10 MG/1
10 TABLET, FILM COATED ORAL NIGHTLY
Status: DISCONTINUED | OUTPATIENT
Start: 2021-01-01 | End: 2021-01-01 | Stop reason: HOSPADM

## 2021-01-01 RX ORDER — SODIUM CHLORIDE 0.9 % (FLUSH) 0.9 %
10 SYRINGE (ML) INJECTION AS NEEDED
Status: CANCELLED | OUTPATIENT
Start: 2021-01-01

## 2021-01-01 RX ORDER — LIDOCAINE HYDROCHLORIDE 20 MG/ML
INJECTION, SOLUTION INFILTRATION; PERINEURAL AS NEEDED
Status: DISCONTINUED | OUTPATIENT
Start: 2021-01-01 | End: 2021-01-01 | Stop reason: SURG

## 2021-01-01 RX ORDER — HALOPERIDOL 2 MG/ML
1 SOLUTION ORAL EVERY 4 HOURS PRN
Status: CANCELLED | OUTPATIENT
Start: 2021-01-01

## 2021-01-01 RX ORDER — WARFARIN SODIUM 5 MG/1
5 TABLET ORAL
Status: COMPLETED | OUTPATIENT
Start: 2021-01-01 | End: 2021-01-01

## 2021-01-01 RX ORDER — LORAZEPAM 2 MG/ML
1 CONCENTRATE ORAL
Status: CANCELLED | OUTPATIENT
Start: 2021-01-01 | End: 2021-01-01

## 2021-01-01 RX ORDER — LORAZEPAM 2 MG/ML
0.5 CONCENTRATE ORAL
Status: ACTIVE | OUTPATIENT
Start: 2021-01-01 | End: 2021-01-01

## 2021-01-01 RX ORDER — SODIUM CHLORIDE 9 MG/ML
30 INJECTION, SOLUTION INTRAVENOUS CONTINUOUS PRN
Status: DISCONTINUED | OUTPATIENT
Start: 2021-01-01 | End: 2021-01-01 | Stop reason: HOSPADM

## 2021-01-01 RX ORDER — LORAZEPAM 0.5 MG/1
0.25 TABLET ORAL 2 TIMES DAILY
Qty: 6 TABLET | Refills: 0 | Status: ON HOLD | OUTPATIENT
Start: 2021-01-01 | End: 2021-01-01 | Stop reason: SDUPTHER

## 2021-01-01 RX ORDER — AMOXICILLIN AND CLAVULANATE POTASSIUM 875; 125 MG/1; MG/1
1 TABLET, FILM COATED ORAL EVERY 12 HOURS SCHEDULED
Status: DISCONTINUED | OUTPATIENT
Start: 2021-01-01 | End: 2021-01-01

## 2021-01-01 RX ORDER — SODIUM CHLORIDE 0.9 % (FLUSH) 0.9 %
10 SYRINGE (ML) INJECTION EVERY 12 HOURS SCHEDULED
Status: DISCONTINUED | OUTPATIENT
Start: 2021-01-01 | End: 2021-01-01 | Stop reason: HOSPADM

## 2021-01-01 RX ORDER — CALCIUM GLUCONATE 20 MG/ML
1 INJECTION, SOLUTION INTRAVENOUS AS NEEDED
Status: DISCONTINUED | OUTPATIENT
Start: 2021-01-01 | End: 2021-01-01

## 2021-01-01 RX ORDER — PHENYTOIN 50 MG/1
300 TABLET, CHEWABLE ORAL 2 TIMES DAILY
Status: DISCONTINUED | OUTPATIENT
Start: 2021-01-01 | End: 2021-01-01 | Stop reason: HOSPADM

## 2021-01-01 RX ORDER — SODIUM CHLORIDE 9 MG/ML
100 INJECTION, SOLUTION INTRAVENOUS CONTINUOUS
Status: DISCONTINUED | OUTPATIENT
Start: 2021-01-01 | End: 2021-01-01

## 2021-01-01 RX ORDER — HALOPERIDOL 5 MG/ML
1 INJECTION INTRAMUSCULAR EVERY 4 HOURS PRN
Status: CANCELLED | OUTPATIENT
Start: 2021-01-01

## 2021-01-01 RX ORDER — PHENYLEPHRINE HCL IN 0.9% NACL 0.5 MG/5ML
.5-3 SYRINGE (ML) INTRAVENOUS
Status: DISCONTINUED | OUTPATIENT
Start: 2021-01-01 | End: 2021-01-01

## 2021-01-01 RX ORDER — CALCIUM CARBONATE 200(500)MG
1 TABLET,CHEWABLE ORAL 2 TIMES DAILY
Status: DISCONTINUED | OUTPATIENT
Start: 2021-01-01 | End: 2021-01-01 | Stop reason: HOSPADM

## 2021-01-01 RX ORDER — FAMOTIDINE 10 MG/ML
20 INJECTION, SOLUTION INTRAVENOUS 2 TIMES DAILY
Status: DISCONTINUED | OUTPATIENT
Start: 2021-01-01 | End: 2021-01-01

## 2021-01-01 RX ORDER — MORPHINE SULFATE 10 MG/ML
6 INJECTION INTRAMUSCULAR; INTRAVENOUS; SUBCUTANEOUS
Status: DISCONTINUED | OUTPATIENT
Start: 2021-01-01 | End: 2021-01-01

## 2021-01-01 RX ORDER — MORPHINE SULFATE 20 MG/ML
20 SOLUTION ORAL
Status: DISCONTINUED | OUTPATIENT
Start: 2021-01-01 | End: 2021-01-01

## 2021-01-01 RX ORDER — LORAZEPAM 2 MG/ML
2 INJECTION INTRAMUSCULAR
Status: DISPENSED | OUTPATIENT
Start: 2021-01-01 | End: 2021-01-01

## 2021-01-01 RX ORDER — PANTOPRAZOLE SODIUM 40 MG/1
40 TABLET, DELAYED RELEASE ORAL DAILY
Start: 2021-01-01

## 2021-01-01 RX ORDER — GAUZE BANDAGE 2" X 2"
100 BANDAGE TOPICAL DAILY
Status: DISCONTINUED | OUTPATIENT
Start: 2021-01-01 | End: 2021-01-01 | Stop reason: HOSPADM

## 2021-01-01 RX ORDER — SUCRALFATE 1 G/1
1 TABLET ORAL
Status: DISCONTINUED | OUTPATIENT
Start: 2021-01-01 | End: 2021-01-01 | Stop reason: SDUPTHER

## 2021-01-01 RX ORDER — DIPHENHYDRAMINE HYDROCHLORIDE 50 MG/ML
25 INJECTION INTRAMUSCULAR; INTRAVENOUS EVERY 6 HOURS PRN
Status: CANCELLED | OUTPATIENT
Start: 2021-01-01

## 2021-01-01 RX ORDER — MORPHINE SULFATE 20 MG/ML
10 SOLUTION ORAL
Status: CANCELLED | OUTPATIENT
Start: 2021-01-01 | End: 2021-01-01

## 2021-01-01 RX ORDER — ZONISAMIDE 100 MG/1
100 CAPSULE ORAL DAILY
Qty: 3 CAPSULE | Refills: 0
Start: 2021-01-01 | End: 2021-01-01 | Stop reason: SDUPTHER

## 2021-01-01 RX ORDER — LORAZEPAM 2 MG/ML
1 CONCENTRATE ORAL
Status: ACTIVE | OUTPATIENT
Start: 2021-01-01 | End: 2021-01-01

## 2021-01-01 RX ORDER — SODIUM CHLORIDE 0.9 % (FLUSH) 0.9 %
10 SYRINGE (ML) INJECTION AS NEEDED
Status: DISCONTINUED | OUTPATIENT
Start: 2021-01-01 | End: 2021-01-01

## 2021-01-01 RX ORDER — MORPHINE SULFATE 20 MG/ML
20 SOLUTION ORAL
Status: CANCELLED | OUTPATIENT
Start: 2021-01-01 | End: 2021-01-01

## 2021-01-01 RX ORDER — SODIUM CHLORIDE 0.9 % (FLUSH) 0.9 %
10 SYRINGE (ML) INJECTION EVERY 12 HOURS SCHEDULED
Status: DISCONTINUED | OUTPATIENT
Start: 2021-01-01 | End: 2021-01-01

## 2021-01-01 RX ORDER — POTASSIUM CHLORIDE 750 MG/1
40 TABLET, FILM COATED, EXTENDED RELEASE ORAL AS NEEDED
Status: DISCONTINUED | OUTPATIENT
Start: 2021-01-01 | End: 2021-01-01 | Stop reason: HOSPADM

## 2021-01-01 RX ORDER — LORAZEPAM 0.5 MG/1
0.25 TABLET ORAL 2 TIMES DAILY
Qty: 6 TABLET | Refills: 0 | Status: SHIPPED | OUTPATIENT
Start: 2021-01-01

## 2021-01-01 RX ORDER — GLYCOPYRROLATE 0.2 MG/ML
0.4 INJECTION INTRAMUSCULAR; INTRAVENOUS
Status: CANCELLED | OUTPATIENT
Start: 2021-01-01

## 2021-01-01 RX ORDER — ACETAMINOPHEN 325 MG/1
650 TABLET ORAL ONCE
Status: COMPLETED | OUTPATIENT
Start: 2021-01-01 | End: 2021-01-01

## 2021-01-01 RX ORDER — INSULIN LISPRO 100 [IU]/ML
0-24 INJECTION, SOLUTION INTRAVENOUS; SUBCUTANEOUS EVERY 6 HOURS
Status: DISCONTINUED | OUTPATIENT
Start: 2021-01-01 | End: 2021-01-01

## 2021-01-01 RX ORDER — FAMOTIDINE 20 MG/1
20 TABLET, FILM COATED ORAL
Status: DISCONTINUED | OUTPATIENT
Start: 2021-01-01 | End: 2021-01-01

## 2021-01-01 RX ORDER — MAGNESIUM SULFATE HEPTAHYDRATE 40 MG/ML
4 INJECTION, SOLUTION INTRAVENOUS AS NEEDED
Status: DISCONTINUED | OUTPATIENT
Start: 2021-01-01 | End: 2021-01-01

## 2021-01-01 RX ORDER — POTASSIUM CHLORIDE 750 MG/1
40 TABLET, FILM COATED, EXTENDED RELEASE ORAL AS NEEDED
Status: DISCONTINUED | OUTPATIENT
Start: 2021-01-01 | End: 2021-01-01

## 2021-01-01 RX ORDER — POTASSIUM CHLORIDE 7.45 MG/ML
10 INJECTION INTRAVENOUS
Status: DISCONTINUED | OUTPATIENT
Start: 2021-01-01 | End: 2021-01-01

## 2021-01-01 RX ORDER — LORAZEPAM 2 MG/ML
0.5 CONCENTRATE ORAL
Status: CANCELLED | OUTPATIENT
Start: 2021-01-01 | End: 2021-01-01

## 2021-01-01 RX ORDER — UREA 10 %
1 LOTION (ML) TOPICAL NIGHTLY PRN
Status: DISCONTINUED | OUTPATIENT
Start: 2021-01-01 | End: 2021-01-01 | Stop reason: HOSPADM

## 2021-01-01 RX ORDER — ONDANSETRON 2 MG/ML
4 INJECTION INTRAMUSCULAR; INTRAVENOUS EVERY 6 HOURS PRN
Status: CANCELLED | OUTPATIENT
Start: 2021-01-01

## 2021-01-01 RX ORDER — NOREPINEPHRINE BIT/0.9 % NACL 8 MG/250ML
.02-.3 INFUSION BOTTLE (ML) INTRAVENOUS
Status: DISCONTINUED | OUTPATIENT
Start: 2021-01-01 | End: 2021-01-01

## 2021-01-01 RX ORDER — RISPERIDONE 0.5 MG/1
0.5 TABLET ORAL 2 TIMES DAILY
Status: DISCONTINUED | OUTPATIENT
Start: 2021-01-01 | End: 2021-01-01 | Stop reason: HOSPADM

## 2021-01-01 RX ORDER — POTASSIUM CHLORIDE 1.5 G/1.77G
40 POWDER, FOR SOLUTION ORAL AS NEEDED
Status: DISCONTINUED | OUTPATIENT
Start: 2021-01-01 | End: 2021-01-01

## 2021-01-01 RX ORDER — MORPHINE SULFATE 10 MG/ML
6 INJECTION INTRAMUSCULAR; INTRAVENOUS; SUBCUTANEOUS
Status: ACTIVE | OUTPATIENT
Start: 2021-01-01 | End: 2021-01-01

## 2021-01-01 RX ADMIN — LORAZEPAM 0.25 MG: 0.5 TABLET ORAL at 21:58

## 2021-01-01 RX ADMIN — PROPOFOL 35 MCG/KG/MIN: 10 INJECTION, EMULSION INTRAVENOUS at 08:10

## 2021-01-01 RX ADMIN — HYDROMORPHONE HYDROCHLORIDE 1.5 MG: 2 INJECTION, SOLUTION INTRAMUSCULAR; INTRAVENOUS; SUBCUTANEOUS at 16:45

## 2021-01-01 RX ADMIN — ATORVASTATIN CALCIUM 40 MG: 20 TABLET, FILM COATED ORAL at 20:03

## 2021-01-01 RX ADMIN — TAMSULOSIN HYDROCHLORIDE 0.4 MG: 0.4 CAPSULE ORAL at 20:38

## 2021-01-01 RX ADMIN — PROPOFOL 80 MG: 10 INJECTION, EMULSION INTRAVENOUS at 12:42

## 2021-01-01 RX ADMIN — PHENYTOIN 300 MG: 50 TABLET, CHEWABLE ORAL at 21:58

## 2021-01-01 RX ADMIN — LIDOCAINE HYDROCHLORIDE 40 MG: 20 INJECTION, SOLUTION INFILTRATION; PERINEURAL at 12:43

## 2021-01-01 RX ADMIN — MEMANTINE HYDROCHLORIDE 10 MG: 10 TABLET, FILM COATED ORAL at 23:09

## 2021-01-01 RX ADMIN — LORAZEPAM 2 MG: 2 INJECTION INTRAMUSCULAR; INTRAVENOUS at 13:38

## 2021-01-01 RX ADMIN — LORAZEPAM 0.25 MG: 0.5 TABLET ORAL at 23:19

## 2021-01-01 RX ADMIN — SUCRALFATE 1 G: 1 TABLET ORAL at 05:40

## 2021-01-01 RX ADMIN — HYDROMORPHONE HYDROCHLORIDE 1 MG: 1 INJECTION, SOLUTION INTRAMUSCULAR; INTRAVENOUS; SUBCUTANEOUS at 05:14

## 2021-01-01 RX ADMIN — SODIUM CHLORIDE 125 ML/HR: 9 INJECTION, SOLUTION INTRAVENOUS at 17:46

## 2021-01-01 RX ADMIN — HYDROMORPHONE HYDROCHLORIDE 1 MG: 1 INJECTION, SOLUTION INTRAMUSCULAR; INTRAVENOUS; SUBCUTANEOUS at 01:09

## 2021-01-01 RX ADMIN — MULTIPLE VITAMINS W/ MINERALS TAB 1 TABLET: TAB at 08:52

## 2021-01-01 RX ADMIN — MAGNESIUM SULFATE HEPTAHYDRATE 4 G: 4 INJECTION, SOLUTION INTRAVENOUS at 06:26

## 2021-01-01 RX ADMIN — HYDROMORPHONE HYDROCHLORIDE 1.5 MG: 2 INJECTION, SOLUTION INTRAMUSCULAR; INTRAVENOUS; SUBCUTANEOUS at 16:40

## 2021-01-01 RX ADMIN — SODIUM CHLORIDE 8 MG/HR: 900 INJECTION INTRAVENOUS at 15:23

## 2021-01-01 RX ADMIN — LORAZEPAM 2 MG: 2 INJECTION INTRAMUSCULAR; INTRAVENOUS at 21:10

## 2021-01-01 RX ADMIN — POTASSIUM CHLORIDE 40 MEQ: 1.5 POWDER, FOR SOLUTION ORAL at 16:03

## 2021-01-01 RX ADMIN — PHENYTOIN 300 MG: 50 TABLET, CHEWABLE ORAL at 20:50

## 2021-01-01 RX ADMIN — BENZTROPINE MESYLATE 0.5 MG: 0.5 TABLET ORAL at 09:04

## 2021-01-01 RX ADMIN — PROPOFOL 35 MCG/KG/MIN: 10 INJECTION, EMULSION INTRAVENOUS at 00:52

## 2021-01-01 RX ADMIN — GLYCOPYRROLATE 0.2 MG: 0.2 INJECTION INTRAMUSCULAR; INTRAVENOUS at 12:01

## 2021-01-01 RX ADMIN — SODIUM CHLORIDE, POTASSIUM CHLORIDE, SODIUM LACTATE AND CALCIUM CHLORIDE 1000 ML: 600; 310; 30; 20 INJECTION, SOLUTION INTRAVENOUS at 15:42

## 2021-01-01 RX ADMIN — BENZTROPINE MESYLATE 0.5 MG: 0.5 TABLET ORAL at 20:37

## 2021-01-01 RX ADMIN — ATORVASTATIN CALCIUM 40 MG: 20 TABLET, FILM COATED ORAL at 21:13

## 2021-01-01 RX ADMIN — HYDROMORPHONE HYDROCHLORIDE 1.5 MG: 2 INJECTION, SOLUTION INTRAMUSCULAR; INTRAVENOUS; SUBCUTANEOUS at 09:16

## 2021-01-01 RX ADMIN — LORAZEPAM 2 MG: 2 INJECTION INTRAMUSCULAR; INTRAVENOUS at 09:57

## 2021-01-01 RX ADMIN — MEMANTINE HYDROCHLORIDE 10 MG: 10 TABLET, FILM COATED ORAL at 08:25

## 2021-01-01 RX ADMIN — PROPOFOL 50 MCG/KG/MIN: 10 INJECTION, EMULSION INTRAVENOUS at 14:42

## 2021-01-01 RX ADMIN — DEXAMETHASONE SODIUM PHOSPHATE 6 MG: 10 INJECTION INTRAMUSCULAR; INTRAVENOUS at 04:55

## 2021-01-01 RX ADMIN — SODIUM CHLORIDE 8 MG/HR: 900 INJECTION INTRAVENOUS at 12:36

## 2021-01-01 RX ADMIN — RISPERIDONE 1 MG: 1 TABLET ORAL at 08:17

## 2021-01-01 RX ADMIN — SODIUM CHLORIDE 8 MG/HR: 900 INJECTION INTRAVENOUS at 23:05

## 2021-01-01 RX ADMIN — SODIUM CHLORIDE, PRESERVATIVE FREE 10 ML: 5 INJECTION INTRAVENOUS at 21:17

## 2021-01-01 RX ADMIN — DOCUSATE SODIUM 50MG AND SENNOSIDES 8.6MG 2 TABLET: 8.6; 5 TABLET, FILM COATED ORAL at 21:19

## 2021-01-01 RX ADMIN — LORAZEPAM 2 MG: 2 INJECTION INTRAMUSCULAR; INTRAVENOUS at 17:29

## 2021-01-01 RX ADMIN — AMOXICILLIN AND CLAVULANATE POTASSIUM 1 TABLET: 875; 125 TABLET, FILM COATED ORAL at 09:02

## 2021-01-01 RX ADMIN — BENZTROPINE MESYLATE 0.5 MG: 0.5 TABLET ORAL at 08:59

## 2021-01-01 RX ADMIN — LORAZEPAM 2 MG: 2 INJECTION INTRAMUSCULAR; INTRAVENOUS at 09:27

## 2021-01-01 RX ADMIN — MEMANTINE HYDROCHLORIDE 10 MG: 10 TABLET, FILM COATED ORAL at 09:19

## 2021-01-01 RX ADMIN — PANTOPRAZOLE SODIUM 80 MG: 40 INJECTION, POWDER, FOR SOLUTION INTRAVENOUS at 10:36

## 2021-01-01 RX ADMIN — BENZTROPINE MESYLATE 0.5 MG: 0.5 TABLET ORAL at 11:56

## 2021-01-01 RX ADMIN — LORAZEPAM 0.25 MG: 0.5 TABLET ORAL at 11:55

## 2021-01-01 RX ADMIN — LORAZEPAM 2 MG: 2 INJECTION INTRAMUSCULAR; INTRAVENOUS at 09:29

## 2021-01-01 RX ADMIN — LORAZEPAM 0.25 MG: 0.5 TABLET ORAL at 08:53

## 2021-01-01 RX ADMIN — HYDROMORPHONE HYDROCHLORIDE 1.5 MG: 2 INJECTION, SOLUTION INTRAMUSCULAR; INTRAVENOUS; SUBCUTANEOUS at 08:43

## 2021-01-01 RX ADMIN — ENOXAPARIN SODIUM 60 MG: 60 INJECTION SUBCUTANEOUS at 06:31

## 2021-01-01 RX ADMIN — TAMSULOSIN HYDROCHLORIDE 0.4 MG: 0.4 CAPSULE ORAL at 21:58

## 2021-01-01 RX ADMIN — SODIUM CHLORIDE, PRESERVATIVE FREE 10 ML: 5 INJECTION INTRAVENOUS at 08:25

## 2021-01-01 RX ADMIN — GLYCOPYRROLATE 0.2 MG: 0.2 INJECTION INTRAMUSCULAR; INTRAVENOUS at 05:37

## 2021-01-01 RX ADMIN — MORPHINE SULFATE 4 MG: 2 INJECTION, SOLUTION INTRAMUSCULAR; INTRAVENOUS at 12:32

## 2021-01-01 RX ADMIN — DEXMEDETOMIDINE HYDROCHLORIDE 0.2 MCG/KG/HR: 100 INJECTION, SOLUTION, CONCENTRATE INTRAVENOUS at 11:26

## 2021-01-01 RX ADMIN — Medication 100 MG: at 08:58

## 2021-01-01 RX ADMIN — HYDROMORPHONE HYDROCHLORIDE 1 MG: 1 INJECTION, SOLUTION INTRAMUSCULAR; INTRAVENOUS; SUBCUTANEOUS at 08:33

## 2021-01-01 RX ADMIN — LANSOPRAZOLE 30 MG: KIT at 18:29

## 2021-01-01 RX ADMIN — BENZTROPINE MESYLATE 0.5 MG: 0.5 TABLET ORAL at 08:37

## 2021-01-01 RX ADMIN — AMOXICILLIN AND CLAVULANATE POTASSIUM 1 TABLET: 875; 125 TABLET, FILM COATED ORAL at 08:35

## 2021-01-01 RX ADMIN — CHLORHEXIDINE GLUCONATE 15 ML: 1.2 RINSE ORAL at 09:03

## 2021-01-01 RX ADMIN — ENOXAPARIN SODIUM 60 MG: 100 INJECTION, SOLUTION INTRAVENOUS; SUBCUTANEOUS at 01:04

## 2021-01-01 RX ADMIN — MEMANTINE HYDROCHLORIDE 10 MG: 10 TABLET, FILM COATED ORAL at 21:16

## 2021-01-01 RX ADMIN — IRON SUCROSE 300 MG: 20 INJECTION, SOLUTION INTRAVENOUS at 21:13

## 2021-01-01 RX ADMIN — LORAZEPAM 0.25 MG: 0.5 TABLET ORAL at 21:15

## 2021-01-01 RX ADMIN — PHENYTOIN 300 MG: 50 TABLET, CHEWABLE ORAL at 21:29

## 2021-01-01 RX ADMIN — SUCRALFATE 1 G: 1 TABLET ORAL at 16:54

## 2021-01-01 RX ADMIN — RISPERIDONE 1 MG: 1 TABLET ORAL at 21:57

## 2021-01-01 RX ADMIN — ANTACID TABLETS 1 TABLET: 500 TABLET, CHEWABLE ORAL at 20:04

## 2021-01-01 RX ADMIN — MULTIPLE VITAMINS W/ MINERALS TAB 1 TABLET: TAB at 08:59

## 2021-01-01 RX ADMIN — SODIUM CHLORIDE, PRESERVATIVE FREE 10 ML: 5 INJECTION INTRAVENOUS at 20:02

## 2021-01-01 RX ADMIN — GLYCOPYRROLATE 0.2 MG: 0.2 INJECTION INTRAMUSCULAR; INTRAVENOUS at 20:56

## 2021-01-01 RX ADMIN — GLYCOPYRROLATE 0.2 MG: 0.2 INJECTION INTRAMUSCULAR; INTRAVENOUS at 21:10

## 2021-01-01 RX ADMIN — PROPOFOL 35 MCG/KG/MIN: 10 INJECTION, EMULSION INTRAVENOUS at 09:30

## 2021-01-01 RX ADMIN — HYDROMORPHONE HYDROCHLORIDE 1 MG: 1 INJECTION, SOLUTION INTRAMUSCULAR; INTRAVENOUS; SUBCUTANEOUS at 17:43

## 2021-01-01 RX ADMIN — PROPOFOL 20 MCG/KG/MIN: 10 INJECTION, EMULSION INTRAVENOUS at 01:56

## 2021-01-01 RX ADMIN — PHENYTOIN 300 MG: 50 TABLET, CHEWABLE ORAL at 09:04

## 2021-01-01 RX ADMIN — ANTACID TABLETS 1 TABLET: 500 TABLET, CHEWABLE ORAL at 11:56

## 2021-01-01 RX ADMIN — HYDROMORPHONE HYDROCHLORIDE 1 MG: 1 INJECTION, SOLUTION INTRAMUSCULAR; INTRAVENOUS; SUBCUTANEOUS at 21:29

## 2021-01-01 RX ADMIN — ATORVASTATIN CALCIUM 40 MG: 20 TABLET, FILM COATED ORAL at 21:57

## 2021-01-01 RX ADMIN — LORAZEPAM 2 MG: 2 INJECTION INTRAMUSCULAR; INTRAVENOUS at 01:48

## 2021-01-01 RX ADMIN — TAMSULOSIN HYDROCHLORIDE 0.4 MG: 0.4 CAPSULE ORAL at 20:59

## 2021-01-01 RX ADMIN — LORAZEPAM 2 MG: 2 INJECTION INTRAMUSCULAR; INTRAVENOUS at 17:05

## 2021-01-01 RX ADMIN — DONEPEZIL HYDROCHLORIDE 10 MG: 10 TABLET, FILM COATED ORAL at 21:58

## 2021-01-01 RX ADMIN — FENTANYL CITRATE 50 MCG: 50 INJECTION, SOLUTION INTRAMUSCULAR; INTRAVENOUS at 11:21

## 2021-01-01 RX ADMIN — SUCRALFATE 1 G: 1 TABLET ORAL at 21:16

## 2021-01-01 RX ADMIN — LORAZEPAM 2 MG: 2 INJECTION INTRAMUSCULAR; INTRAVENOUS at 01:09

## 2021-01-01 RX ADMIN — GLYCOPYRROLATE 0.2 MG: 0.2 INJECTION INTRAMUSCULAR; INTRAVENOUS at 17:28

## 2021-01-01 RX ADMIN — LORAZEPAM 2 MG: 2 INJECTION INTRAMUSCULAR; INTRAVENOUS at 12:01

## 2021-01-01 RX ADMIN — DOCUSATE SODIUM 50MG AND SENNOSIDES 8.6MG 2 TABLET: 8.6; 5 TABLET, FILM COATED ORAL at 20:03

## 2021-01-01 RX ADMIN — SODIUM CHLORIDE, POTASSIUM CHLORIDE, SODIUM LACTATE AND CALCIUM CHLORIDE 100 ML/HR: 600; 310; 30; 20 INJECTION, SOLUTION INTRAVENOUS at 02:17

## 2021-01-01 RX ADMIN — SODIUM CHLORIDE 125 ML/HR: 9 INJECTION, SOLUTION INTRAVENOUS at 04:48

## 2021-01-01 RX ADMIN — HYDROMORPHONE HYDROCHLORIDE 1.5 MG: 2 INJECTION, SOLUTION INTRAMUSCULAR; INTRAVENOUS; SUBCUTANEOUS at 17:05

## 2021-01-01 RX ADMIN — OXYCODONE HYDROCHLORIDE 5 MG: 5 SOLUTION ORAL at 09:46

## 2021-01-01 RX ADMIN — BENZTROPINE MESYLATE 0.5 MG: 0.5 TABLET ORAL at 21:15

## 2021-01-01 RX ADMIN — FAMOTIDINE 20 MG: 20 TABLET, FILM COATED ORAL at 06:58

## 2021-01-01 RX ADMIN — GLYCOPYRROLATE 0.2 MG: 0.2 INJECTION INTRAMUSCULAR; INTRAVENOUS at 08:53

## 2021-01-01 RX ADMIN — HYDROMORPHONE HYDROCHLORIDE 1 MG: 1 INJECTION, SOLUTION INTRAMUSCULAR; INTRAVENOUS; SUBCUTANEOUS at 09:36

## 2021-01-01 RX ADMIN — MORPHINE SULFATE 4 MG: 2 INJECTION, SOLUTION INTRAMUSCULAR; INTRAVENOUS at 08:04

## 2021-01-01 RX ADMIN — LORAZEPAM 2 MG: 2 INJECTION INTRAMUSCULAR; INTRAVENOUS at 17:43

## 2021-01-01 RX ADMIN — MEMANTINE HYDROCHLORIDE 10 MG: 10 TABLET, FILM COATED ORAL at 20:21

## 2021-01-01 RX ADMIN — BARIUM SULFATE 55 ML: 0.81 POWDER, FOR SUSPENSION ORAL at 12:00

## 2021-01-01 RX ADMIN — POTASSIUM CHLORIDE 40 MEQ: 750 TABLET, EXTENDED RELEASE ORAL at 17:53

## 2021-01-01 RX ADMIN — PHENYTOIN 300 MG: 50 TABLET, CHEWABLE ORAL at 23:10

## 2021-01-01 RX ADMIN — DONEPEZIL HYDROCHLORIDE 10 MG: 10 TABLET, FILM COATED ORAL at 20:38

## 2021-01-01 RX ADMIN — PROPOFOL 200 MCG/KG/MIN: 10 INJECTION, EMULSION INTRAVENOUS at 10:33

## 2021-01-01 RX ADMIN — LORAZEPAM 2 MG: 2 INJECTION INTRAMUSCULAR; INTRAVENOUS at 06:14

## 2021-01-01 RX ADMIN — AMOXICILLIN AND CLAVULANATE POTASSIUM 1 TABLET: 875; 125 TABLET, FILM COATED ORAL at 12:00

## 2021-01-01 RX ADMIN — PROPOFOL 50 MCG/KG/MIN: 10 INJECTION, EMULSION INTRAVENOUS at 01:25

## 2021-01-01 RX ADMIN — HYDROMORPHONE HYDROCHLORIDE 1 MG: 1 INJECTION, SOLUTION INTRAMUSCULAR; INTRAVENOUS; SUBCUTANEOUS at 00:26

## 2021-01-01 RX ADMIN — SODIUM CHLORIDE, PRESERVATIVE FREE 10 ML: 5 INJECTION INTRAVENOUS at 08:02

## 2021-01-01 RX ADMIN — MEMANTINE HYDROCHLORIDE 10 MG: 10 TABLET, FILM COATED ORAL at 20:38

## 2021-01-01 RX ADMIN — ZONISAMIDE 100 MG: 100 CAPSULE ORAL at 08:53

## 2021-01-01 RX ADMIN — PHENYTOIN 200 MG: 50 TABLET, CHEWABLE ORAL at 21:58

## 2021-01-01 RX ADMIN — BENZTROPINE MESYLATE 0.5 MG: 0.5 TABLET ORAL at 20:50

## 2021-01-01 RX ADMIN — ZONISAMIDE 100 MG: 100 CAPSULE ORAL at 11:56

## 2021-01-01 RX ADMIN — SUCRALFATE 1 G: 1 TABLET ORAL at 17:53

## 2021-01-01 RX ADMIN — SUCRALFATE 1 G: 1 TABLET ORAL at 21:29

## 2021-01-01 RX ADMIN — RISPERIDONE 1 MG: 1 TABLET ORAL at 09:03

## 2021-01-01 RX ADMIN — MEMANTINE HYDROCHLORIDE 10 MG: 10 TABLET, FILM COATED ORAL at 09:02

## 2021-01-01 RX ADMIN — MORPHINE SULFATE 4 MG: 2 INJECTION, SOLUTION INTRAMUSCULAR; INTRAVENOUS at 17:19

## 2021-01-01 RX ADMIN — HYDROMORPHONE HYDROCHLORIDE 1 MG: 1 INJECTION, SOLUTION INTRAMUSCULAR; INTRAVENOUS; SUBCUTANEOUS at 10:07

## 2021-01-01 RX ADMIN — ENOXAPARIN SODIUM 60 MG: 60 INJECTION SUBCUTANEOUS at 20:38

## 2021-01-01 RX ADMIN — LORAZEPAM 2 MG: 2 INJECTION INTRAMUSCULAR; INTRAVENOUS at 05:28

## 2021-01-01 RX ADMIN — PHENYLEPHRINE HYDROCHLORIDE 0.5 MCG/KG/MIN: 10 INJECTION INTRAVENOUS at 18:29

## 2021-01-01 RX ADMIN — PHENYTOIN 300 MG: 50 TABLET, CHEWABLE ORAL at 21:15

## 2021-01-01 RX ADMIN — BENZTROPINE MESYLATE 0.5 MG: 0.5 TABLET ORAL at 09:16

## 2021-01-01 RX ADMIN — TAMSULOSIN HYDROCHLORIDE 0.4 MG: 0.4 CAPSULE ORAL at 20:15

## 2021-01-01 RX ADMIN — DEXAMETHASONE SODIUM PHOSPHATE 6 MG: 10 INJECTION INTRAMUSCULAR; INTRAVENOUS at 05:54

## 2021-01-01 RX ADMIN — IRON SUCROSE 300 MG: 20 INJECTION, SOLUTION INTRAVENOUS at 21:58

## 2021-01-01 RX ADMIN — ENOXAPARIN SODIUM 30 MG: 30 INJECTION SUBCUTANEOUS at 05:40

## 2021-01-01 RX ADMIN — SODIUM CHLORIDE, PRESERVATIVE FREE 10 ML: 5 INJECTION INTRAVENOUS at 08:54

## 2021-01-01 RX ADMIN — MEMANTINE HYDROCHLORIDE 10 MG: 10 TABLET, FILM COATED ORAL at 21:00

## 2021-01-01 RX ADMIN — LORAZEPAM 0.25 MG: 0.5 TABLET ORAL at 20:38

## 2021-01-01 RX ADMIN — LORAZEPAM 1 MG: 2 INJECTION INTRAMUSCULAR; INTRAVENOUS at 13:46

## 2021-01-01 RX ADMIN — LORAZEPAM 1 MG: 2 INJECTION INTRAMUSCULAR; INTRAVENOUS at 04:46

## 2021-01-01 RX ADMIN — ANORECTAL OINTMENT: 15.7; .44; 24; 20.6 OINTMENT TOPICAL at 21:19

## 2021-01-01 RX ADMIN — SODIUM CHLORIDE 125 ML/HR: 9 INJECTION, SOLUTION INTRAVENOUS at 10:24

## 2021-01-01 RX ADMIN — SODIUM CHLORIDE 500 ML: 9 INJECTION, SOLUTION INTRAVENOUS at 10:32

## 2021-01-01 RX ADMIN — RISPERIDONE 1 MG: 1 TABLET ORAL at 08:51

## 2021-01-01 RX ADMIN — CHLORHEXIDINE GLUCONATE 15 ML: 1.2 RINSE ORAL at 08:52

## 2021-01-01 RX ADMIN — HYDROMORPHONE HYDROCHLORIDE 1 MG: 1 INJECTION, SOLUTION INTRAMUSCULAR; INTRAVENOUS; SUBCUTANEOUS at 15:05

## 2021-01-01 RX ADMIN — MORPHINE SULFATE 4 MG: 2 INJECTION, SOLUTION INTRAMUSCULAR; INTRAVENOUS at 20:37

## 2021-01-01 RX ADMIN — SUCRALFATE 1 G: 1 TABLET ORAL at 13:12

## 2021-01-01 RX ADMIN — SUCRALFATE 1 G: 1 TABLET ORAL at 06:11

## 2021-01-01 RX ADMIN — SUCRALFATE 1 G: 1 TABLET ORAL at 08:59

## 2021-01-01 RX ADMIN — CHLORHEXIDINE GLUCONATE 15 ML: 1.2 RINSE ORAL at 20:29

## 2021-01-01 RX ADMIN — HYDROMORPHONE HYDROCHLORIDE 1 MG: 1 INJECTION, SOLUTION INTRAMUSCULAR; INTRAVENOUS; SUBCUTANEOUS at 14:41

## 2021-01-01 RX ADMIN — LORAZEPAM 2 MG: 2 INJECTION INTRAMUSCULAR; INTRAVENOUS at 04:34

## 2021-01-01 RX ADMIN — HYDROMORPHONE HYDROCHLORIDE 1 MG: 1 INJECTION, SOLUTION INTRAMUSCULAR; INTRAVENOUS; SUBCUTANEOUS at 00:45

## 2021-01-01 RX ADMIN — LORAZEPAM 2 MG: 2 INJECTION INTRAMUSCULAR; INTRAVENOUS at 05:13

## 2021-01-01 RX ADMIN — LORAZEPAM 2 MG: 2 INJECTION INTRAMUSCULAR; INTRAVENOUS at 05:05

## 2021-01-01 RX ADMIN — SODIUM CHLORIDE 8 MG/HR: 900 INJECTION INTRAVENOUS at 03:14

## 2021-01-01 RX ADMIN — SUCRALFATE 1 G: 1 TABLET ORAL at 12:11

## 2021-01-01 RX ADMIN — FENTANYL CITRATE 50 MCG: 50 INJECTION, SOLUTION INTRAMUSCULAR; INTRAVENOUS at 11:33

## 2021-01-01 RX ADMIN — LORAZEPAM 2 MG: 2 INJECTION INTRAMUSCULAR; INTRAVENOUS at 21:16

## 2021-01-01 RX ADMIN — FENTANYL CITRATE 100 MCG: 0.05 INJECTION, SOLUTION INTRAMUSCULAR; INTRAVENOUS at 17:27

## 2021-01-01 RX ADMIN — TAMSULOSIN HYDROCHLORIDE 0.4 MG: 0.4 CAPSULE ORAL at 20:50

## 2021-01-01 RX ADMIN — SODIUM CHLORIDE 8 MG/HR: 900 INJECTION INTRAVENOUS at 23:10

## 2021-01-01 RX ADMIN — SUCRALFATE 1 G: 1 TABLET ORAL at 11:30

## 2021-01-01 RX ADMIN — POTASSIUM CHLORIDE 40 MEQ: 1.5 POWDER, FOR SOLUTION ORAL at 06:26

## 2021-01-01 RX ADMIN — RISPERIDONE 0.5 MG: 0.5 TABLET ORAL at 21:58

## 2021-01-01 RX ADMIN — DIPHENHYDRAMINE HYDROCHLORIDE 50 MG: 50 CAPSULE ORAL at 18:01

## 2021-01-01 RX ADMIN — MEMANTINE HYDROCHLORIDE 10 MG: 10 TABLET, FILM COATED ORAL at 20:50

## 2021-01-01 RX ADMIN — DEXAMETHASONE SODIUM PHOSPHATE 6 MG: 10 INJECTION INTRAMUSCULAR; INTRAVENOUS at 17:05

## 2021-01-01 RX ADMIN — AMOXICILLIN AND CLAVULANATE POTASSIUM 1 TABLET: 875; 125 TABLET, FILM COATED ORAL at 20:02

## 2021-01-01 RX ADMIN — LORAZEPAM 2 MG: 2 INJECTION INTRAMUSCULAR; INTRAVENOUS at 13:41

## 2021-01-01 RX ADMIN — HYDROMORPHONE HYDROCHLORIDE 1 MG: 1 INJECTION, SOLUTION INTRAMUSCULAR; INTRAVENOUS; SUBCUTANEOUS at 17:38

## 2021-01-01 RX ADMIN — BENZTROPINE MESYLATE 0.5 MG: 0.5 TABLET ORAL at 23:09

## 2021-01-01 RX ADMIN — DONEPEZIL HYDROCHLORIDE 10 MG: 10 TABLET, FILM COATED ORAL at 21:29

## 2021-01-01 RX ADMIN — LORAZEPAM 0.25 MG: 0.5 TABLET ORAL at 20:04

## 2021-01-01 RX ADMIN — SUCRALFATE 1 G: 1 TABLET ORAL at 17:57

## 2021-01-01 RX ADMIN — SUCRALFATE 1 G: 1 TABLET ORAL at 17:13

## 2021-01-01 RX ADMIN — MULTIPLE VITAMINS W/ MINERALS TAB 1 TABLET: TAB at 08:38

## 2021-01-01 RX ADMIN — ACETAMINOPHEN 650 MG: 325 TABLET, FILM COATED ORAL at 18:02

## 2021-01-01 RX ADMIN — SODIUM CHLORIDE 100 ML/HR: 9 INJECTION, SOLUTION INTRAVENOUS at 01:42

## 2021-01-01 RX ADMIN — ANORECTAL OINTMENT: 15.7; .44; 24; 20.6 OINTMENT TOPICAL at 21:58

## 2021-01-01 RX ADMIN — ZONISAMIDE 100 MG: 100 CAPSULE ORAL at 09:42

## 2021-01-01 RX ADMIN — SODIUM CHLORIDE 8 MG/HR: 900 INJECTION INTRAVENOUS at 00:11

## 2021-01-01 RX ADMIN — MEMANTINE HYDROCHLORIDE 10 MG: 10 TABLET, FILM COATED ORAL at 20:15

## 2021-01-01 RX ADMIN — LORAZEPAM 2 MG: 2 INJECTION INTRAMUSCULAR; INTRAVENOUS at 10:06

## 2021-01-01 RX ADMIN — SUCRALFATE 1 G: 1 TABLET ORAL at 12:44

## 2021-01-01 RX ADMIN — PANTOPRAZOLE SODIUM 40 MG: 40 TABLET, DELAYED RELEASE ORAL at 05:40

## 2021-01-01 RX ADMIN — PHENYTOIN 300 MG: 50 TABLET, CHEWABLE ORAL at 08:52

## 2021-01-01 RX ADMIN — MEMANTINE HYDROCHLORIDE 10 MG: 10 TABLET, FILM COATED ORAL at 08:51

## 2021-01-01 RX ADMIN — SODIUM CHLORIDE 8 MG/HR: 900 INJECTION INTRAVENOUS at 13:46

## 2021-01-01 RX ADMIN — SODIUM CHLORIDE 125 ML/HR: 9 INJECTION, SOLUTION INTRAVENOUS at 18:23

## 2021-01-01 RX ADMIN — REMDESIVIR 200 MG: 100 INJECTION, POWDER, LYOPHILIZED, FOR SOLUTION INTRAVENOUS at 08:12

## 2021-01-01 RX ADMIN — LORAZEPAM 2 MG: 2 INJECTION INTRAMUSCULAR; INTRAVENOUS at 20:23

## 2021-01-01 RX ADMIN — BENZTROPINE MESYLATE 0.5 MG: 0.5 TABLET ORAL at 21:00

## 2021-01-01 RX ADMIN — SODIUM CHLORIDE 30 ML/HR: 9 INJECTION, SOLUTION INTRAVENOUS at 12:18

## 2021-01-01 RX ADMIN — Medication: at 09:55

## 2021-01-01 RX ADMIN — ENOXAPARIN SODIUM 30 MG: 30 INJECTION SUBCUTANEOUS at 18:19

## 2021-01-01 RX ADMIN — PROPOFOL 45 MCG/KG/MIN: 10 INJECTION, EMULSION INTRAVENOUS at 04:55

## 2021-01-01 RX ADMIN — Medication 1 MG: at 21:18

## 2021-01-01 RX ADMIN — LORAZEPAM 0.5 MG: 2 INJECTION INTRAMUSCULAR; INTRAVENOUS at 21:42

## 2021-01-01 RX ADMIN — BENZTROPINE MESYLATE 0.5 MG: 0.5 TABLET ORAL at 09:42

## 2021-01-01 RX ADMIN — MORPHINE SULFATE 4 MG: 2 INJECTION, SOLUTION INTRAMUSCULAR; INTRAVENOUS at 21:42

## 2021-01-01 RX ADMIN — Medication 100 MG: at 08:25

## 2021-01-01 RX ADMIN — HYDROMORPHONE HYDROCHLORIDE 1.5 MG: 2 INJECTION, SOLUTION INTRAMUSCULAR; INTRAVENOUS; SUBCUTANEOUS at 00:51

## 2021-01-01 RX ADMIN — SODIUM CHLORIDE 125 ML/HR: 9 INJECTION, SOLUTION INTRAVENOUS at 18:19

## 2021-01-01 RX ADMIN — SUCRALFATE 1 G: 1 TABLET ORAL at 11:56

## 2021-01-01 RX ADMIN — BARIUM SULFATE 4 ML: 980 POWDER, FOR SUSPENSION ORAL at 12:00

## 2021-01-01 RX ADMIN — SUCRALFATE 1 G: 1 TABLET ORAL at 18:29

## 2021-01-01 RX ADMIN — PANTOPRAZOLE SODIUM 80 MG: 40 INJECTION, POWDER, FOR SOLUTION INTRAVENOUS at 16:40

## 2021-01-01 RX ADMIN — Medication 100 MG: at 09:16

## 2021-01-01 RX ADMIN — ATORVASTATIN CALCIUM 40 MG: 20 TABLET, FILM COATED ORAL at 20:38

## 2021-01-01 RX ADMIN — HYDROMORPHONE HYDROCHLORIDE 1.5 MG: 2 INJECTION, SOLUTION INTRAMUSCULAR; INTRAVENOUS; SUBCUTANEOUS at 06:14

## 2021-01-01 RX ADMIN — HYDROMORPHONE HYDROCHLORIDE 1 MG: 1 INJECTION, SOLUTION INTRAMUSCULAR; INTRAVENOUS; SUBCUTANEOUS at 00:28

## 2021-01-01 RX ADMIN — SODIUM CHLORIDE, PRESERVATIVE FREE 10 ML: 5 INJECTION INTRAVENOUS at 20:03

## 2021-01-01 RX ADMIN — SODIUM CHLORIDE, PRESERVATIVE FREE 10 ML: 5 INJECTION INTRAVENOUS at 01:42

## 2021-01-01 RX ADMIN — ANTACID TABLETS 1 TABLET: 500 TABLET, CHEWABLE ORAL at 21:58

## 2021-01-01 RX ADMIN — SODIUM CHLORIDE 1000 ML: 9 INJECTION, SOLUTION INTRAVENOUS at 15:00

## 2021-01-01 RX ADMIN — MEMANTINE HYDROCHLORIDE 10 MG: 10 TABLET, FILM COATED ORAL at 09:16

## 2021-01-01 RX ADMIN — PROPOFOL 100 MG: 10 INJECTION, EMULSION INTRAVENOUS at 10:33

## 2021-01-01 RX ADMIN — IRON SUCROSE 200 MG: 20 INJECTION, SOLUTION INTRAVENOUS at 18:33

## 2021-01-01 RX ADMIN — HYDROMORPHONE HYDROCHLORIDE 1.5 MG: 2 INJECTION, SOLUTION INTRAMUSCULAR; INTRAVENOUS; SUBCUTANEOUS at 17:04

## 2021-01-01 RX ADMIN — SUCRALFATE 1 G: 1 TABLET ORAL at 09:02

## 2021-01-01 RX ADMIN — LORAZEPAM 2 MG: 2 INJECTION INTRAMUSCULAR; INTRAVENOUS at 00:51

## 2021-01-01 RX ADMIN — PROPOFOL 45 MCG/KG/MIN: 10 INJECTION, EMULSION INTRAVENOUS at 09:46

## 2021-01-01 RX ADMIN — FAMOTIDINE 20 MG: 20 TABLET, FILM COATED ORAL at 08:35

## 2021-01-01 RX ADMIN — SUCRALFATE 1 G: 1 TABLET ORAL at 20:38

## 2021-01-01 RX ADMIN — Medication 1 MG: at 22:15

## 2021-01-01 RX ADMIN — PROPOFOL 35 MCG/KG/MIN: 10 INJECTION, EMULSION INTRAVENOUS at 11:43

## 2021-01-01 RX ADMIN — ACETAMINOPHEN 650 MG: 325 TABLET, FILM COATED ORAL at 12:08

## 2021-01-01 RX ADMIN — AMOXICILLIN AND CLAVULANATE POTASSIUM 1 TABLET: 875; 125 TABLET, FILM COATED ORAL at 20:29

## 2021-01-01 RX ADMIN — LORAZEPAM 2 MG: 2 INJECTION INTRAMUSCULAR; INTRAVENOUS at 20:55

## 2021-01-01 RX ADMIN — TAMSULOSIN HYDROCHLORIDE 0.4 MG: 0.4 CAPSULE ORAL at 20:21

## 2021-01-01 RX ADMIN — DONEPEZIL HYDROCHLORIDE 10 MG: 10 TABLET, FILM COATED ORAL at 20:15

## 2021-01-01 RX ADMIN — ZONISAMIDE 100 MG: 100 CAPSULE ORAL at 08:17

## 2021-01-01 RX ADMIN — SUCRALFATE 1 G: 1 TABLET ORAL at 08:52

## 2021-01-01 RX ADMIN — ENOXAPARIN SODIUM 60 MG: 60 INJECTION SUBCUTANEOUS at 09:12

## 2021-01-01 RX ADMIN — LORAZEPAM 2 MG: 2 INJECTION INTRAMUSCULAR; INTRAVENOUS at 13:00

## 2021-01-01 RX ADMIN — PROPOFOL 45 MCG/KG/MIN: 10 INJECTION, EMULSION INTRAVENOUS at 01:45

## 2021-01-01 RX ADMIN — PROPOFOL 50 MCG/KG/MIN: 10 INJECTION, EMULSION INTRAVENOUS at 21:42

## 2021-01-01 RX ADMIN — MULTIPLE VITAMINS W/ MINERALS TAB 1 TABLET: TAB at 12:07

## 2021-01-01 RX ADMIN — HYDROMORPHONE HYDROCHLORIDE 1 MG: 1 INJECTION, SOLUTION INTRAMUSCULAR; INTRAVENOUS; SUBCUTANEOUS at 20:23

## 2021-01-01 RX ADMIN — ZONISAMIDE 100 MG: 100 CAPSULE ORAL at 08:24

## 2021-01-01 RX ADMIN — RISPERIDONE 1 MG: 1 TABLET ORAL at 20:38

## 2021-01-01 RX ADMIN — SUCRALFATE 1 G: 1 TABLET ORAL at 21:12

## 2021-01-01 RX ADMIN — DONEPEZIL HYDROCHLORIDE 10 MG: 10 TABLET, FILM COATED ORAL at 20:50

## 2021-01-01 RX ADMIN — LORAZEPAM 0.25 MG: 0.5 TABLET ORAL at 21:00

## 2021-01-01 RX ADMIN — RISPERIDONE 1 MG: 1 TABLET ORAL at 21:15

## 2021-01-01 RX ADMIN — SODIUM CHLORIDE, PRESERVATIVE FREE 10 ML: 5 INJECTION INTRAVENOUS at 08:19

## 2021-01-01 RX ADMIN — LORAZEPAM 1 MG: 2 INJECTION INTRAMUSCULAR; INTRAVENOUS at 08:33

## 2021-01-01 RX ADMIN — LORAZEPAM 2 MG: 2 INJECTION INTRAMUSCULAR; INTRAVENOUS at 17:38

## 2021-01-01 RX ADMIN — RISPERIDONE 0.5 MG: 0.5 TABLET ORAL at 20:04

## 2021-01-01 RX ADMIN — TAMSULOSIN HYDROCHLORIDE 0.4 MG: 0.4 CAPSULE ORAL at 23:10

## 2021-01-01 RX ADMIN — LORAZEPAM 0.25 MG: 0.5 TABLET ORAL at 21:30

## 2021-01-01 RX ADMIN — OXYCODONE HYDROCHLORIDE 5 MG: 5 SOLUTION ORAL at 14:19

## 2021-01-01 RX ADMIN — DOCUSATE SODIUM 50MG AND SENNOSIDES 8.6MG 2 TABLET: 8.6; 5 TABLET, FILM COATED ORAL at 08:54

## 2021-01-01 RX ADMIN — LORAZEPAM 2 MG: 2 INJECTION INTRAMUSCULAR; INTRAVENOUS at 21:38

## 2021-01-01 RX ADMIN — PHENYTOIN 200 MG: 50 TABLET, CHEWABLE ORAL at 21:18

## 2021-01-01 RX ADMIN — LORAZEPAM 2 MG: 2 INJECTION INTRAMUSCULAR; INTRAVENOUS at 12:32

## 2021-01-01 RX ADMIN — ENOXAPARIN SODIUM 60 MG: 100 INJECTION, SOLUTION INTRAVENOUS; SUBCUTANEOUS at 00:13

## 2021-01-01 RX ADMIN — LIDOCAINE HYDROCHLORIDE 100 MG: 20 INJECTION, SOLUTION INFILTRATION; PERINEURAL at 16:28

## 2021-01-01 RX ADMIN — LORAZEPAM 2 MG: 2 INJECTION INTRAMUSCULAR; INTRAVENOUS at 00:45

## 2021-01-01 RX ADMIN — SUCRALFATE 1 G: 1 TABLET ORAL at 18:31

## 2021-01-01 RX ADMIN — SUCRALFATE 1 G: 1 TABLET ORAL at 09:18

## 2021-01-01 RX ADMIN — HYDROMORPHONE HYDROCHLORIDE 1.5 MG: 2 INJECTION, SOLUTION INTRAMUSCULAR; INTRAVENOUS; SUBCUTANEOUS at 13:41

## 2021-01-01 RX ADMIN — ATORVASTATIN CALCIUM 40 MG: 20 TABLET, FILM COATED ORAL at 20:21

## 2021-01-01 RX ADMIN — RISPERIDONE 1 MG: 1 TABLET ORAL at 20:15

## 2021-01-01 RX ADMIN — ANORECTAL OINTMENT: 15.7; .44; 24; 20.6 OINTMENT TOPICAL at 20:10

## 2021-01-01 RX ADMIN — ATORVASTATIN CALCIUM 40 MG: 20 TABLET, FILM COATED ORAL at 21:30

## 2021-01-01 RX ADMIN — DEXMEDETOMIDINE HYDROCHLORIDE 0.1 MCG/KG/HR: 100 INJECTION, SOLUTION, CONCENTRATE INTRAVENOUS at 01:21

## 2021-01-01 RX ADMIN — SUCRALFATE 1 G: 1 TABLET ORAL at 06:49

## 2021-01-01 RX ADMIN — HYDROMORPHONE HYDROCHLORIDE 1 MG: 1 INJECTION, SOLUTION INTRAMUSCULAR; INTRAVENOUS; SUBCUTANEOUS at 09:27

## 2021-01-01 RX ADMIN — SUCRALFATE 1 G: 1 TABLET ORAL at 12:29

## 2021-01-01 RX ADMIN — HYDROMORPHONE HYDROCHLORIDE 1 MG: 1 INJECTION, SOLUTION INTRAMUSCULAR; INTRAVENOUS; SUBCUTANEOUS at 05:03

## 2021-01-01 RX ADMIN — LORAZEPAM 0.25 MG: 0.5 TABLET ORAL at 08:17

## 2021-01-01 RX ADMIN — SUCRALFATE 1 G: 1 TABLET ORAL at 17:06

## 2021-01-01 RX ADMIN — LORAZEPAM 2 MG: 2 INJECTION INTRAMUSCULAR; INTRAVENOUS at 12:43

## 2021-01-01 RX ADMIN — FAMOTIDINE 20 MG: 10 INJECTION INTRAVENOUS at 08:36

## 2021-01-01 RX ADMIN — ZONISAMIDE 100 MG: 100 CAPSULE ORAL at 09:19

## 2021-01-01 RX ADMIN — PHENYLEPHRINE HYDROCHLORIDE 100 MCG: 10 INJECTION INTRAVENOUS at 16:30

## 2021-01-01 RX ADMIN — LORAZEPAM 2 MG: 2 INJECTION INTRAMUSCULAR; INTRAVENOUS at 00:49

## 2021-01-01 RX ADMIN — HYDROMORPHONE HYDROCHLORIDE 1 MG: 1 INJECTION, SOLUTION INTRAMUSCULAR; INTRAVENOUS; SUBCUTANEOUS at 17:30

## 2021-01-01 RX ADMIN — ZONISAMIDE 100 MG: 100 CAPSULE ORAL at 16:54

## 2021-01-01 RX ADMIN — HEPARIN SODIUM 12 UNITS/KG/HR: 10000 INJECTION, SOLUTION INTRAVENOUS at 00:50

## 2021-01-01 RX ADMIN — CEFEPIME HYDROCHLORIDE 2 G: 2 INJECTION, POWDER, FOR SOLUTION INTRAVENOUS at 05:09

## 2021-01-01 RX ADMIN — GLYCOPYRROLATE 0.2 MG: 0.2 INJECTION INTRAMUSCULAR; INTRAVENOUS at 16:38

## 2021-01-01 RX ADMIN — RISPERIDONE 0.5 MG: 0.5 TABLET ORAL at 08:54

## 2021-01-01 RX ADMIN — BENZTROPINE MESYLATE 0.5 MG: 0.5 TABLET ORAL at 21:12

## 2021-01-01 RX ADMIN — RISPERIDONE 1 MG: 1 TABLET ORAL at 09:16

## 2021-01-01 RX ADMIN — PANTOPRAZOLE SODIUM 40 MG: 40 TABLET, DELAYED RELEASE ORAL at 06:49

## 2021-01-01 RX ADMIN — LORAZEPAM 0.25 MG: 0.5 TABLET ORAL at 08:59

## 2021-01-01 RX ADMIN — HEPARIN SODIUM 12 UNITS/KG/HR: 10000 INJECTION, SOLUTION INTRAVENOUS at 08:23

## 2021-01-01 RX ADMIN — HYDROMORPHONE HYDROCHLORIDE 1.5 MG: 2 INJECTION, SOLUTION INTRAMUSCULAR; INTRAVENOUS; SUBCUTANEOUS at 21:11

## 2021-01-01 RX ADMIN — FENTANYL CITRATE 50 MCG: 50 INJECTION, SOLUTION INTRAMUSCULAR; INTRAVENOUS at 06:41

## 2021-01-01 RX ADMIN — LORAZEPAM 2 MG: 2 INJECTION INTRAMUSCULAR; INTRAVENOUS at 18:01

## 2021-01-01 RX ADMIN — POTASSIUM CHLORIDE 40 MEQ: 1.5 POWDER, FOR SOLUTION ORAL at 10:50

## 2021-01-01 RX ADMIN — SUCRALFATE 1 G: 1 TABLET ORAL at 12:35

## 2021-01-01 RX ADMIN — LORAZEPAM 0.25 MG: 0.5 TABLET ORAL at 20:51

## 2021-01-01 RX ADMIN — RISPERIDONE 1 MG: 1 TABLET ORAL at 20:21

## 2021-01-01 RX ADMIN — Medication 100 MG: at 09:19

## 2021-01-01 RX ADMIN — PHENYTOIN 300 MG: 50 TABLET, CHEWABLE ORAL at 08:14

## 2021-01-01 RX ADMIN — SODIUM CHLORIDE, POTASSIUM CHLORIDE, SODIUM LACTATE AND CALCIUM CHLORIDE 100 ML/HR: 600; 310; 30; 20 INJECTION, SOLUTION INTRAVENOUS at 04:21

## 2021-01-01 RX ADMIN — HYDROMORPHONE HYDROCHLORIDE 1 MG: 1 INJECTION, SOLUTION INTRAMUSCULAR; INTRAVENOUS; SUBCUTANEOUS at 00:50

## 2021-01-01 RX ADMIN — DOCUSATE SODIUM 50MG AND SENNOSIDES 8.6MG 2 TABLET: 8.6; 5 TABLET, FILM COATED ORAL at 21:58

## 2021-01-01 RX ADMIN — HYDROMORPHONE HYDROCHLORIDE 1 MG: 1 INJECTION, SOLUTION INTRAMUSCULAR; INTRAVENOUS; SUBCUTANEOUS at 11:07

## 2021-01-01 RX ADMIN — BENZTROPINE MESYLATE 0.5 MG: 0.5 TABLET ORAL at 20:21

## 2021-01-01 RX ADMIN — ONDANSETRON 4 MG: 2 INJECTION INTRAMUSCULAR; INTRAVENOUS at 16:40

## 2021-01-01 RX ADMIN — LORAZEPAM 2 MG: 2 INJECTION INTRAMUSCULAR; INTRAVENOUS at 12:36

## 2021-01-01 RX ADMIN — SODIUM CHLORIDE 8 MG/HR: 900 INJECTION INTRAVENOUS at 10:36

## 2021-01-01 RX ADMIN — LORAZEPAM 2 MG: 2 INJECTION INTRAMUSCULAR; INTRAVENOUS at 17:19

## 2021-01-01 RX ADMIN — DONEPEZIL HYDROCHLORIDE 10 MG: 10 TABLET, FILM COATED ORAL at 08:51

## 2021-01-01 RX ADMIN — BISACODYL 10 MG: 10 SUPPOSITORY RECTAL at 12:13

## 2021-01-01 RX ADMIN — PANTOPRAZOLE SODIUM 40 MG: 40 TABLET, DELAYED RELEASE ORAL at 18:01

## 2021-01-01 RX ADMIN — Medication 100 MG: at 09:17

## 2021-01-01 RX ADMIN — SODIUM CHLORIDE 500 ML: 9 INJECTION, SOLUTION INTRAVENOUS at 16:41

## 2021-01-01 RX ADMIN — LORAZEPAM 2 MG: 2 INJECTION INTRAMUSCULAR; INTRAVENOUS at 01:43

## 2021-01-01 RX ADMIN — HYDROMORPHONE HYDROCHLORIDE 1 MG: 1 INJECTION, SOLUTION INTRAMUSCULAR; INTRAVENOUS; SUBCUTANEOUS at 16:38

## 2021-01-01 RX ADMIN — SODIUM CHLORIDE, PRESERVATIVE FREE 10 ML: 5 INJECTION INTRAVENOUS at 08:36

## 2021-01-01 RX ADMIN — MORPHINE SULFATE 4 MG: 2 INJECTION, SOLUTION INTRAMUSCULAR; INTRAVENOUS at 00:25

## 2021-01-01 RX ADMIN — SODIUM CHLORIDE, PRESERVATIVE FREE 10 ML: 5 INJECTION INTRAVENOUS at 21:53

## 2021-01-01 RX ADMIN — HEPARIN SODIUM 18 UNITS/KG/HR: 10000 INJECTION, SOLUTION INTRAVENOUS at 18:16

## 2021-01-01 RX ADMIN — FENTANYL CITRATE 50 MCG: 50 INJECTION, SOLUTION INTRAMUSCULAR; INTRAVENOUS at 08:33

## 2021-01-01 RX ADMIN — LORAZEPAM 2 MG: 2 INJECTION INTRAMUSCULAR; INTRAVENOUS at 05:03

## 2021-01-01 RX ADMIN — PHENYTOIN 200 MG: 50 TABLET, CHEWABLE ORAL at 20:03

## 2021-01-01 RX ADMIN — POTASSIUM CHLORIDE 40 MEQ: 750 TABLET, EXTENDED RELEASE ORAL at 11:53

## 2021-01-01 RX ADMIN — TAMSULOSIN HYDROCHLORIDE 0.4 MG: 0.4 CAPSULE ORAL at 21:18

## 2021-01-01 RX ADMIN — PHENYLEPHRINE HYDROCHLORIDE 50 MCG: 10 INJECTION INTRAVENOUS at 12:41

## 2021-01-01 RX ADMIN — LORAZEPAM 2 MG: 2 INJECTION INTRAMUSCULAR; INTRAVENOUS at 09:50

## 2021-01-01 RX ADMIN — PANTOPRAZOLE SODIUM 8 MG/HR: 40 INJECTION, POWDER, FOR SOLUTION INTRAVENOUS at 12:06

## 2021-01-01 RX ADMIN — FAMOTIDINE 20 MG: 10 INJECTION INTRAVENOUS at 11:27

## 2021-01-01 RX ADMIN — BENZTROPINE MESYLATE 0.5 MG: 0.5 TABLET ORAL at 21:58

## 2021-01-01 RX ADMIN — SODIUM CHLORIDE, PRESERVATIVE FREE 10 ML: 5 INJECTION INTRAVENOUS at 09:02

## 2021-01-01 RX ADMIN — HYDROMORPHONE HYDROCHLORIDE 1 MG: 1 INJECTION, SOLUTION INTRAMUSCULAR; INTRAVENOUS; SUBCUTANEOUS at 21:16

## 2021-01-01 RX ADMIN — ENOXAPARIN SODIUM 60 MG: 100 INJECTION, SOLUTION INTRAVENOUS; SUBCUTANEOUS at 11:33

## 2021-01-01 RX ADMIN — HYDROMORPHONE HYDROCHLORIDE 1 MG: 1 INJECTION, SOLUTION INTRAMUSCULAR; INTRAVENOUS; SUBCUTANEOUS at 17:39

## 2021-01-01 RX ADMIN — HYDROMORPHONE HYDROCHLORIDE 1 MG: 1 INJECTION, SOLUTION INTRAMUSCULAR; INTRAVENOUS; SUBCUTANEOUS at 00:53

## 2021-01-01 RX ADMIN — DONEPEZIL HYDROCHLORIDE 10 MG: 10 TABLET, FILM COATED ORAL at 21:15

## 2021-01-01 RX ADMIN — LORAZEPAM 0.25 MG: 0.5 TABLET ORAL at 21:19

## 2021-01-01 RX ADMIN — PHENYTOIN 300 MG: 50 TABLET, CHEWABLE ORAL at 08:24

## 2021-01-01 RX ADMIN — RISPERIDONE 0.5 MG: 0.5 TABLET ORAL at 21:18

## 2021-01-01 RX ADMIN — LORAZEPAM 2 MG: 2 INJECTION INTRAMUSCULAR; INTRAVENOUS at 17:04

## 2021-01-01 RX ADMIN — HYDROMORPHONE HYDROCHLORIDE 1 MG: 1 INJECTION, SOLUTION INTRAMUSCULAR; INTRAVENOUS; SUBCUTANEOUS at 07:12

## 2021-01-01 RX ADMIN — RISPERIDONE 0.5 MG: 0.5 TABLET ORAL at 21:12

## 2021-01-01 RX ADMIN — LORAZEPAM 0.25 MG: 0.5 TABLET ORAL at 21:18

## 2021-01-01 RX ADMIN — LANSOPRAZOLE 30 MG: KIT at 06:31

## 2021-01-01 RX ADMIN — SODIUM CHLORIDE 150 ML/HR: 900 INJECTION INTRAVENOUS at 23:03

## 2021-01-01 RX ADMIN — SUCRALFATE 1 G: 1 TABLET ORAL at 20:03

## 2021-01-01 RX ADMIN — PHENYTOIN 300 MG: 50 TABLET, CHEWABLE ORAL at 09:16

## 2021-01-01 RX ADMIN — PHENYLEPHRINE HYDROCHLORIDE 0.2 MCG/KG/MIN: 10 INJECTION INTRAVENOUS at 00:56

## 2021-01-01 RX ADMIN — FAMOTIDINE 20 MG: 20 TABLET, FILM COATED ORAL at 17:05

## 2021-01-01 RX ADMIN — ATORVASTATIN CALCIUM 40 MG: 20 TABLET, FILM COATED ORAL at 21:00

## 2021-01-01 RX ADMIN — SODIUM CHLORIDE 8 MG/HR: 900 INJECTION INTRAVENOUS at 22:34

## 2021-01-01 RX ADMIN — SODIUM CHLORIDE, PRESERVATIVE FREE 10 ML: 5 INJECTION INTRAVENOUS at 08:35

## 2021-01-01 RX ADMIN — PHENYTOIN 200 MG: 50 TABLET, CHEWABLE ORAL at 09:41

## 2021-01-01 RX ADMIN — RISPERIDONE 1 MG: 1 TABLET ORAL at 08:59

## 2021-01-01 RX ADMIN — HYDROMORPHONE HYDROCHLORIDE 1 MG: 1 INJECTION, SOLUTION INTRAMUSCULAR; INTRAVENOUS; SUBCUTANEOUS at 01:43

## 2021-01-01 RX ADMIN — ATORVASTATIN CALCIUM 40 MG: 20 TABLET, FILM COATED ORAL at 21:18

## 2021-01-01 RX ADMIN — LORAZEPAM 2 MG: 2 INJECTION INTRAMUSCULAR; INTRAVENOUS at 07:11

## 2021-01-01 RX ADMIN — PHENYTOIN 300 MG: 50 TABLET, CHEWABLE ORAL at 20:59

## 2021-01-01 RX ADMIN — SUCRALFATE 1 G: 1 TABLET ORAL at 23:21

## 2021-01-01 RX ADMIN — CHLORHEXIDINE GLUCONATE 15 ML: 1.2 RINSE ORAL at 20:02

## 2021-01-01 RX ADMIN — RISPERIDONE 1 MG: 1 TABLET ORAL at 08:24

## 2021-01-01 RX ADMIN — SODIUM CHLORIDE, PRESERVATIVE FREE 10 ML: 5 INJECTION INTRAVENOUS at 20:30

## 2021-01-01 RX ADMIN — SUCRALFATE 1 G: 1 TABLET ORAL at 18:12

## 2021-01-01 RX ADMIN — SODIUM CHLORIDE 8 MG/HR: 900 INJECTION INTRAVENOUS at 20:59

## 2021-01-01 RX ADMIN — SODIUM CHLORIDE 8 MG/HR: 900 INJECTION INTRAVENOUS at 09:39

## 2021-01-01 RX ADMIN — PROPOFOL 50 MCG/KG/MIN: 10 INJECTION, EMULSION INTRAVENOUS at 18:31

## 2021-01-01 RX ADMIN — TAMSULOSIN HYDROCHLORIDE 0.4 MG: 0.4 CAPSULE ORAL at 21:15

## 2021-01-01 RX ADMIN — SODIUM CHLORIDE 150 ML/HR: 900 INJECTION INTRAVENOUS at 09:01

## 2021-01-01 RX ADMIN — GLYCOPYRROLATE 0.2 MG: 0.2 INJECTION INTRAMUSCULAR; INTRAVENOUS at 00:25

## 2021-01-01 RX ADMIN — GLYCOPYRROLATE 0.2 MG: 0.2 INJECTION INTRAMUSCULAR; INTRAVENOUS at 17:04

## 2021-01-01 RX ADMIN — BENZTROPINE MESYLATE 0.5 MG: 0.5 TABLET ORAL at 20:15

## 2021-01-01 RX ADMIN — ACETAMINOPHEN 650 MG: 325 TABLET, FILM COATED ORAL at 18:31

## 2021-01-01 RX ADMIN — Medication 100 MG: at 08:38

## 2021-01-01 RX ADMIN — LORAZEPAM 2 MG: 2 INJECTION INTRAMUSCULAR; INTRAVENOUS at 21:29

## 2021-01-01 RX ADMIN — SODIUM CHLORIDE 1000 ML: 9 INJECTION, SOLUTION INTRAVENOUS at 01:57

## 2021-01-01 RX ADMIN — ENOXAPARIN SODIUM 60 MG: 100 INJECTION, SOLUTION INTRAVENOUS; SUBCUTANEOUS at 10:50

## 2021-01-01 RX ADMIN — LORAZEPAM 2 MG: 2 INJECTION INTRAMUSCULAR; INTRAVENOUS at 15:05

## 2021-01-01 RX ADMIN — DEXAMETHASONE SODIUM PHOSPHATE 6 MG: 10 INJECTION INTRAMUSCULAR; INTRAVENOUS at 05:08

## 2021-01-01 RX ADMIN — PHENYTOIN 200 MG: 50 TABLET, CHEWABLE ORAL at 11:55

## 2021-01-01 RX ADMIN — SUCRALFATE 1 G: 1 TABLET ORAL at 20:50

## 2021-01-01 RX ADMIN — MULTIPLE VITAMINS W/ MINERALS TAB 1 TABLET: TAB at 18:23

## 2021-01-01 RX ADMIN — INSULIN LISPRO 4 UNITS: 100 INJECTION, SOLUTION INTRAVENOUS; SUBCUTANEOUS at 12:00

## 2021-01-01 RX ADMIN — MEMANTINE HYDROCHLORIDE 10 MG: 10 TABLET, FILM COATED ORAL at 21:28

## 2021-01-01 RX ADMIN — PANTOPRAZOLE SODIUM 40 MG: 40 TABLET, DELAYED RELEASE ORAL at 17:53

## 2021-01-01 RX ADMIN — ATORVASTATIN CALCIUM 40 MG: 20 TABLET, FILM COATED ORAL at 23:09

## 2021-01-01 RX ADMIN — LORAZEPAM 2 MG: 2 INJECTION INTRAMUSCULAR; INTRAVENOUS at 22:10

## 2021-01-01 RX ADMIN — PHENYTOIN 300 MG: 50 TABLET, CHEWABLE ORAL at 20:15

## 2021-01-01 RX ADMIN — SODIUM CHLORIDE 8 MG/HR: 900 INJECTION INTRAVENOUS at 08:02

## 2021-01-01 RX ADMIN — ENOXAPARIN SODIUM 60 MG: 60 INJECTION SUBCUTANEOUS at 08:30

## 2021-01-01 RX ADMIN — ENOXAPARIN SODIUM 60 MG: 100 INJECTION, SOLUTION INTRAVENOUS; SUBCUTANEOUS at 11:21

## 2021-01-01 RX ADMIN — ZONISAMIDE 100 MG: 100 CAPSULE ORAL at 08:58

## 2021-01-01 RX ADMIN — Medication 100 MG: at 08:54

## 2021-01-01 RX ADMIN — PHENYLEPHRINE HYDROCHLORIDE 0.25 MCG/KG/MIN: 10 INJECTION INTRAVENOUS at 10:58

## 2021-01-01 RX ADMIN — PANTOPRAZOLE SODIUM 40 MG: 40 INJECTION, POWDER, FOR SOLUTION INTRAVENOUS at 21:29

## 2021-01-01 RX ADMIN — SUCRALFATE 1 G: 1 TABLET ORAL at 06:40

## 2021-01-01 RX ADMIN — HYDROMORPHONE HYDROCHLORIDE 1 MG: 1 INJECTION, SOLUTION INTRAMUSCULAR; INTRAVENOUS; SUBCUTANEOUS at 05:37

## 2021-01-01 RX ADMIN — BENZTROPINE MESYLATE 0.5 MG: 0.5 TABLET ORAL at 09:07

## 2021-01-01 RX ADMIN — IOPAMIDOL 95 ML: 612 INJECTION, SOLUTION INTRAVENOUS at 21:50

## 2021-01-01 RX ADMIN — MEMANTINE HYDROCHLORIDE 10 MG: 10 TABLET, FILM COATED ORAL at 08:17

## 2021-01-01 RX ADMIN — BENZTROPINE MESYLATE 0.5 MG: 0.5 TABLET ORAL at 21:29

## 2021-01-01 RX ADMIN — RISPERIDONE 0.5 MG: 0.5 TABLET ORAL at 09:42

## 2021-01-01 RX ADMIN — LORAZEPAM 2 MG: 2 INJECTION INTRAMUSCULAR; INTRAVENOUS at 21:11

## 2021-01-01 RX ADMIN — SODIUM CHLORIDE 8 MG/HR: 900 INJECTION INTRAVENOUS at 04:30

## 2021-01-01 RX ADMIN — Medication: at 21:19

## 2021-01-01 RX ADMIN — APIXABAN 10 MG: 5 TABLET, FILM COATED ORAL at 21:15

## 2021-01-01 RX ADMIN — PHENYTOIN 300 MG: 50 TABLET, CHEWABLE ORAL at 09:19

## 2021-01-01 RX ADMIN — SODIUM CHLORIDE, POTASSIUM CHLORIDE, SODIUM LACTATE AND CALCIUM CHLORIDE 100 ML/HR: 600; 310; 30; 20 INJECTION, SOLUTION INTRAVENOUS at 11:26

## 2021-01-01 RX ADMIN — IRON SUCROSE 200 MG: 20 INJECTION, SOLUTION INTRAVENOUS at 17:53

## 2021-01-01 RX ADMIN — LORAZEPAM 2 MG: 2 INJECTION INTRAMUSCULAR; INTRAVENOUS at 11:06

## 2021-01-01 RX ADMIN — BENZTROPINE MESYLATE 0.5 MG: 0.5 TABLET ORAL at 20:04

## 2021-01-01 RX ADMIN — LORAZEPAM 2 MG: 2 INJECTION INTRAMUSCULAR; INTRAVENOUS at 04:51

## 2021-01-01 RX ADMIN — LORAZEPAM 0.25 MG: 0.5 TABLET ORAL at 09:20

## 2021-01-01 RX ADMIN — REMDESIVIR 100 MG: 100 INJECTION, POWDER, LYOPHILIZED, FOR SOLUTION INTRAVENOUS at 05:33

## 2021-01-01 RX ADMIN — LORAZEPAM 2 MG: 2 INJECTION INTRAMUSCULAR; INTRAVENOUS at 08:44

## 2021-01-01 RX ADMIN — PHENYTOIN 300 MG: 50 TABLET, CHEWABLE ORAL at 08:37

## 2021-01-01 RX ADMIN — DEXAMETHASONE SODIUM PHOSPHATE 6 MG: 10 INJECTION INTRAMUSCULAR; INTRAVENOUS at 05:09

## 2021-01-01 RX ADMIN — HYDROMORPHONE HYDROCHLORIDE 1 MG: 1 INJECTION, SOLUTION INTRAMUSCULAR; INTRAVENOUS; SUBCUTANEOUS at 05:05

## 2021-01-01 RX ADMIN — BENZTROPINE MESYLATE 0.5 MG: 0.5 TABLET ORAL at 08:24

## 2021-01-01 RX ADMIN — DONEPEZIL HYDROCHLORIDE 10 MG: 10 TABLET, FILM COATED ORAL at 20:57

## 2021-01-01 RX ADMIN — ANTACID TABLETS 1 TABLET: 500 TABLET, CHEWABLE ORAL at 21:13

## 2021-01-01 RX ADMIN — SODIUM CHLORIDE 125 ML/HR: 9 INJECTION, SOLUTION INTRAVENOUS at 18:42

## 2021-01-01 RX ADMIN — Medication 100 MG: at 08:52

## 2021-01-01 RX ADMIN — Medication 100 MG: at 16:54

## 2021-01-01 RX ADMIN — LORAZEPAM 2 MG: 2 INJECTION INTRAMUSCULAR; INTRAVENOUS at 18:36

## 2021-01-01 RX ADMIN — GLYCOPYRROLATE 0.2 MG: 0.2 INJECTION INTRAMUSCULAR; INTRAVENOUS at 00:29

## 2021-01-01 RX ADMIN — HEPARIN SODIUM 15 UNITS/KG/HR: 10000 INJECTION, SOLUTION INTRAVENOUS at 02:52

## 2021-01-01 RX ADMIN — LANSOPRAZOLE 30 MG: KIT at 06:40

## 2021-01-01 RX ADMIN — MEMANTINE HYDROCHLORIDE 10 MG: 10 TABLET, FILM COATED ORAL at 08:58

## 2021-01-01 RX ADMIN — PROPOFOL 50 MCG/KG/MIN: 10 INJECTION, EMULSION INTRAVENOUS at 05:08

## 2021-01-01 RX ADMIN — MEMANTINE HYDROCHLORIDE 10 MG: 10 TABLET, FILM COATED ORAL at 08:38

## 2021-01-01 RX ADMIN — SUCRALFATE 1 G: 1 TABLET ORAL at 21:18

## 2021-01-01 RX ADMIN — SODIUM CHLORIDE, POTASSIUM CHLORIDE, SODIUM LACTATE AND CALCIUM CHLORIDE 100 ML/HR: 600; 310; 30; 20 INJECTION, SOLUTION INTRAVENOUS at 12:42

## 2021-01-01 RX ADMIN — LORAZEPAM 2 MG: 2 INJECTION INTRAMUSCULAR; INTRAVENOUS at 01:29

## 2021-01-01 RX ADMIN — SODIUM CHLORIDE, PRESERVATIVE FREE 10 ML: 5 INJECTION INTRAVENOUS at 21:58

## 2021-01-01 RX ADMIN — ATORVASTATIN CALCIUM 40 MG: 20 TABLET, FILM COATED ORAL at 20:50

## 2021-01-01 RX ADMIN — CHLORHEXIDINE GLUCONATE 15 ML: 1.2 RINSE ORAL at 20:30

## 2021-01-01 RX ADMIN — MULTIPLE VITAMINS W/ MINERALS TAB 1 TABLET: TAB at 08:54

## 2021-01-01 RX ADMIN — ANORECTAL OINTMENT: 15.7; .44; 24; 20.6 OINTMENT TOPICAL at 08:02

## 2021-01-01 RX ADMIN — PANTOPRAZOLE SODIUM 40 MG: 40 TABLET, DELAYED RELEASE ORAL at 08:51

## 2021-01-01 RX ADMIN — MORPHINE SULFATE 4 MG: 2 INJECTION, SOLUTION INTRAMUSCULAR; INTRAVENOUS at 00:50

## 2021-01-01 RX ADMIN — SUCRALFATE 1 G: 1 TABLET ORAL at 08:38

## 2021-01-01 RX ADMIN — ATORVASTATIN CALCIUM 40 MG: 20 TABLET, FILM COATED ORAL at 21:58

## 2021-01-01 RX ADMIN — POTASSIUM CHLORIDE 40 MEQ: 1.5 POWDER, FOR SOLUTION ORAL at 18:29

## 2021-01-01 RX ADMIN — BENZTROPINE MESYLATE 0.5 MG: 0.5 TABLET ORAL at 21:18

## 2021-01-01 RX ADMIN — LORAZEPAM 1 MG: 2 INJECTION INTRAMUSCULAR; INTRAVENOUS at 08:51

## 2021-01-01 RX ADMIN — SUCRALFATE 1 G: 1 TABLET ORAL at 17:46

## 2021-01-01 RX ADMIN — CHLORHEXIDINE GLUCONATE 15 ML: 1.2 RINSE ORAL at 21:53

## 2021-01-01 RX ADMIN — SODIUM CHLORIDE 100 ML/HR: 9 INJECTION, SOLUTION INTRAVENOUS at 00:29

## 2021-01-01 RX ADMIN — LORAZEPAM 2 MG: 2 INJECTION INTRAMUSCULAR; INTRAVENOUS at 05:37

## 2021-01-01 RX ADMIN — POTASSIUM CHLORIDE 40 MEQ: 1.5 POWDER, FOR SOLUTION ORAL at 17:08

## 2021-01-01 RX ADMIN — ENOXAPARIN SODIUM 60 MG: 100 INJECTION, SOLUTION INTRAVENOUS; SUBCUTANEOUS at 13:14

## 2021-01-01 RX ADMIN — APIXABAN 10 MG: 5 TABLET, FILM COATED ORAL at 12:35

## 2021-01-01 RX ADMIN — PROPOFOL 100 MG: 10 INJECTION, EMULSION INTRAVENOUS at 16:28

## 2021-01-01 RX ADMIN — BARIUM SULFATE 50 ML: 400 SUSPENSION ORAL at 12:00

## 2021-01-01 RX ADMIN — HYDROMORPHONE HYDROCHLORIDE 1 MG: 1 INJECTION, SOLUTION INTRAMUSCULAR; INTRAVENOUS; SUBCUTANEOUS at 14:14

## 2021-01-01 RX ADMIN — POTASSIUM CHLORIDE 40 MEQ: 1.5 POWDER, FOR SOLUTION ORAL at 14:26

## 2021-01-01 RX ADMIN — LANSOPRAZOLE 30 MG: KIT at 17:57

## 2021-01-01 RX ADMIN — HYDROMORPHONE HYDROCHLORIDE 1 MG: 1 INJECTION, SOLUTION INTRAMUSCULAR; INTRAVENOUS; SUBCUTANEOUS at 09:30

## 2021-01-01 RX ADMIN — ENOXAPARIN SODIUM 60 MG: 100 INJECTION, SOLUTION INTRAVENOUS; SUBCUTANEOUS at 22:30

## 2021-01-01 RX ADMIN — HYDROMORPHONE HYDROCHLORIDE 1 MG: 1 INJECTION, SOLUTION INTRAMUSCULAR; INTRAVENOUS; SUBCUTANEOUS at 12:01

## 2021-01-01 RX ADMIN — FAMOTIDINE 20 MG: 10 INJECTION INTRAVENOUS at 21:52

## 2021-01-01 RX ADMIN — TAMSULOSIN HYDROCHLORIDE 0.4 MG: 0.4 CAPSULE ORAL at 20:04

## 2021-01-01 RX ADMIN — HYDROMORPHONE HYDROCHLORIDE 1.5 MG: 2 INJECTION, SOLUTION INTRAMUSCULAR; INTRAVENOUS; SUBCUTANEOUS at 05:28

## 2021-01-01 RX ADMIN — ANTACID TABLETS 1 TABLET: 500 TABLET, CHEWABLE ORAL at 08:53

## 2021-01-01 RX ADMIN — PROPOFOL 100 MCG/KG/MIN: 10 INJECTION, EMULSION INTRAVENOUS at 12:48

## 2021-01-01 RX ADMIN — HYDROMORPHONE HYDROCHLORIDE 1 MG: 1 INJECTION, SOLUTION INTRAMUSCULAR; INTRAVENOUS; SUBCUTANEOUS at 08:53

## 2021-01-01 RX ADMIN — LORAZEPAM 0.25 MG: 0.5 TABLET ORAL at 20:15

## 2021-01-01 RX ADMIN — IOPAMIDOL 95 ML: 755 INJECTION, SOLUTION INTRAVENOUS at 14:46

## 2021-01-01 RX ADMIN — HYDROMORPHONE HYDROCHLORIDE 1.5 MG: 2 INJECTION, SOLUTION INTRAMUSCULAR; INTRAVENOUS; SUBCUTANEOUS at 12:59

## 2021-01-01 RX ADMIN — HYDROMORPHONE HYDROCHLORIDE 1 MG: 1 INJECTION, SOLUTION INTRAMUSCULAR; INTRAVENOUS; SUBCUTANEOUS at 21:09

## 2021-01-01 RX ADMIN — LORAZEPAM 2 MG: 2 INJECTION INTRAMUSCULAR; INTRAVENOUS at 14:41

## 2021-01-01 RX ADMIN — SODIUM CHLORIDE, PRESERVATIVE FREE 10 ML: 5 INJECTION INTRAVENOUS at 11:44

## 2021-01-01 RX ADMIN — SUCRALFATE 1 G: 1 TABLET ORAL at 17:36

## 2021-01-01 RX ADMIN — PROPOFOL 40 MCG/KG/MIN: 10 INJECTION, EMULSION INTRAVENOUS at 14:19

## 2021-01-01 RX ADMIN — LANSOPRAZOLE 30 MG: KIT at 09:16

## 2021-01-01 RX ADMIN — PROPOFOL 5 MCG/KG/MIN: 10 INJECTION, EMULSION INTRAVENOUS at 09:45

## 2021-01-01 RX ADMIN — ATORVASTATIN CALCIUM 40 MG: 20 TABLET, FILM COATED ORAL at 21:15

## 2021-01-01 RX ADMIN — SUCRALFATE 1 G: 1 TABLET ORAL at 21:58

## 2021-01-01 RX ADMIN — SODIUM CHLORIDE 1830 ML: 9 INJECTION, SOLUTION INTRAVENOUS at 03:50

## 2021-01-01 RX ADMIN — FAMOTIDINE 20 MG: 20 TABLET, FILM COATED ORAL at 17:10

## 2021-01-01 RX ADMIN — FENTANYL CITRATE 100 MCG: 0.05 INJECTION, SOLUTION INTRAMUSCULAR; INTRAVENOUS at 15:11

## 2021-01-01 RX ADMIN — GLYCOPYRROLATE 0.2 MG: 0.2 INJECTION INTRAMUSCULAR; INTRAVENOUS at 12:36

## 2021-01-01 RX ADMIN — PANTOPRAZOLE SODIUM 40 MG: 40 TABLET, DELAYED RELEASE ORAL at 16:54

## 2021-01-01 RX ADMIN — HYDROMORPHONE HYDROCHLORIDE 1 MG: 1 INJECTION, SOLUTION INTRAMUSCULAR; INTRAVENOUS; SUBCUTANEOUS at 12:43

## 2021-01-01 RX ADMIN — PANTOPRAZOLE SODIUM 40 MG: 40 TABLET, DELAYED RELEASE ORAL at 08:38

## 2021-01-01 RX ADMIN — HYDROMORPHONE HYDROCHLORIDE 1.5 MG: 2 INJECTION, SOLUTION INTRAMUSCULAR; INTRAVENOUS; SUBCUTANEOUS at 22:10

## 2021-01-01 RX ADMIN — LORAZEPAM 0.25 MG: 0.5 TABLET ORAL at 09:17

## 2021-01-01 RX ADMIN — REMDESIVIR 100 MG: 100 INJECTION, POWDER, LYOPHILIZED, FOR SOLUTION INTRAVENOUS at 05:08

## 2021-01-01 RX ADMIN — DEXAMETHASONE SODIUM PHOSPHATE 6 MG: 10 INJECTION INTRAMUSCULAR; INTRAVENOUS at 18:34

## 2021-01-01 RX ADMIN — DEXAMETHASONE SODIUM PHOSPHATE 6 MG: 10 INJECTION INTRAMUSCULAR; INTRAVENOUS at 05:33

## 2021-01-01 RX ADMIN — LORAZEPAM 0.25 MG: 0.5 TABLET ORAL at 09:07

## 2021-01-01 RX ADMIN — MORPHINE SULFATE 4 MG: 2 INJECTION, SOLUTION INTRAMUSCULAR; INTRAVENOUS at 04:46

## 2021-01-01 RX ADMIN — PROPOFOL 45 MCG/KG/MIN: 10 INJECTION, EMULSION INTRAVENOUS at 17:10

## 2021-01-01 RX ADMIN — LORAZEPAM 0.25 MG: 0.5 TABLET ORAL at 08:25

## 2021-01-01 RX ADMIN — DONEPEZIL HYDROCHLORIDE 10 MG: 10 TABLET, FILM COATED ORAL at 23:09

## 2021-01-01 RX ADMIN — BENZTROPINE MESYLATE 0.5 MG: 0.5 TABLET ORAL at 08:54

## 2021-01-01 RX ADMIN — LORAZEPAM 2 MG: 2 INJECTION INTRAMUSCULAR; INTRAVENOUS at 16:38

## 2021-01-01 RX ADMIN — FENTANYL CITRATE 100 MCG: 0.05 INJECTION, SOLUTION INTRAMUSCULAR; INTRAVENOUS at 11:28

## 2021-01-01 RX ADMIN — LORAZEPAM 2 MG: 2 INJECTION INTRAMUSCULAR; INTRAVENOUS at 04:35

## 2021-01-01 RX ADMIN — LORAZEPAM 2 MG: 2 INJECTION INTRAMUSCULAR; INTRAVENOUS at 08:53

## 2021-01-01 RX ADMIN — ENOXAPARIN SODIUM 30 MG: 30 INJECTION SUBCUTANEOUS at 12:08

## 2021-01-01 RX ADMIN — HYDROMORPHONE HYDROCHLORIDE 1 MG: 1 INJECTION, SOLUTION INTRAMUSCULAR; INTRAVENOUS; SUBCUTANEOUS at 04:51

## 2021-01-01 RX ADMIN — SUCRALFATE 1 G: 1 TABLET ORAL at 20:21

## 2021-01-01 RX ADMIN — RISPERIDONE 1 MG: 1 TABLET ORAL at 09:19

## 2021-01-01 RX ADMIN — LORAZEPAM 0.25 MG: 0.5 TABLET ORAL at 09:02

## 2021-01-01 RX ADMIN — ZONISAMIDE 100 MG: 100 CAPSULE ORAL at 08:37

## 2021-01-01 RX ADMIN — HYDROMORPHONE HYDROCHLORIDE 1.5 MG: 2 INJECTION, SOLUTION INTRAMUSCULAR; INTRAVENOUS; SUBCUTANEOUS at 01:47

## 2021-01-01 RX ADMIN — LORAZEPAM 1 MG: 2 INJECTION INTRAMUSCULAR; INTRAVENOUS at 17:39

## 2021-01-01 RX ADMIN — LORAZEPAM 2 MG: 2 INJECTION INTRAMUSCULAR; INTRAVENOUS at 00:52

## 2021-01-01 RX ADMIN — RISPERIDONE 1 MG: 1 TABLET ORAL at 21:29

## 2021-01-01 RX ADMIN — SUCRALFATE 1 G: 1 TABLET ORAL at 06:31

## 2021-01-01 RX ADMIN — PROPOFOL 45 MCG/KG/MIN: 10 INJECTION, EMULSION INTRAVENOUS at 12:42

## 2021-01-01 RX ADMIN — MULTIPLE VITAMINS W/ MINERALS TAB 1 TABLET: TAB at 08:17

## 2021-01-01 RX ADMIN — LORAZEPAM 0.25 MG: 0.5 TABLET ORAL at 08:38

## 2021-01-01 RX ADMIN — Medication 100 MG: at 11:55

## 2021-01-01 RX ADMIN — GLYCOPYRROLATE 0.2 MG: 0.2 INJECTION INTRAMUSCULAR; INTRAVENOUS at 07:11

## 2021-01-01 RX ADMIN — PANTOPRAZOLE SODIUM 40 MG: 40 TABLET, DELAYED RELEASE ORAL at 17:06

## 2021-01-01 RX ADMIN — VANCOMYCIN HYDROCHLORIDE 1250 MG: 10 INJECTION, POWDER, LYOPHILIZED, FOR SOLUTION INTRAVENOUS at 05:57

## 2021-01-01 RX ADMIN — ZONISAMIDE 100 MG: 100 CAPSULE ORAL at 09:07

## 2021-01-01 RX ADMIN — APIXABAN 10 MG: 5 TABLET, FILM COATED ORAL at 09:16

## 2021-01-01 RX ADMIN — FENTANYL CITRATE 100 MCG: 0.05 INJECTION, SOLUTION INTRAMUSCULAR; INTRAVENOUS at 07:42

## 2021-01-01 RX ADMIN — LORAZEPAM 2 MG: 2 INJECTION INTRAMUSCULAR; INTRAVENOUS at 16:45

## 2021-01-01 RX ADMIN — ENOXAPARIN SODIUM 30 MG: 30 INJECTION SUBCUTANEOUS at 17:36

## 2021-01-01 RX ADMIN — LORAZEPAM 2 MG: 2 INJECTION INTRAMUSCULAR; INTRAVENOUS at 14:14

## 2021-01-01 RX ADMIN — LORAZEPAM 2 MG: 2 INJECTION INTRAMUSCULAR; INTRAVENOUS at 05:06

## 2021-01-01 RX ADMIN — LORAZEPAM 2 MG: 2 INJECTION INTRAMUSCULAR; INTRAVENOUS at 09:36

## 2021-01-01 RX ADMIN — GLYCOPYRROLATE 0.4 MG: 0.2 INJECTION INTRAMUSCULAR; INTRAVENOUS at 16:54

## 2021-01-01 RX ADMIN — SUCRALFATE 1 G: 1 TABLET ORAL at 20:15

## 2021-01-01 RX ADMIN — GLYCOPYRROLATE 0.4 MG: 0.2 INJECTION INTRAMUSCULAR; INTRAVENOUS at 09:57

## 2021-01-01 RX ADMIN — SODIUM CHLORIDE, POTASSIUM CHLORIDE, SODIUM LACTATE AND CALCIUM CHLORIDE: 600; 310; 30; 20 INJECTION, SOLUTION INTRAVENOUS at 16:23

## 2021-01-01 RX ADMIN — SODIUM PHOSPHATE, MONOBASIC, MONOHYDRATE AND SODIUM PHOSPHATE, DIBASIC, ANHYDROUS 20 MMOL: 276; 142 INJECTION, SOLUTION INTRAVENOUS at 11:36

## 2021-01-01 RX ADMIN — LORAZEPAM 2 MG: 2 INJECTION INTRAMUSCULAR; INTRAVENOUS at 13:10

## 2021-01-01 RX ADMIN — PHENYTOIN 200 MG: 50 TABLET, CHEWABLE ORAL at 21:12

## 2021-01-01 RX ADMIN — CHLORHEXIDINE GLUCONATE 15 ML: 1.2 RINSE ORAL at 08:37

## 2021-01-01 RX ADMIN — HYDROMORPHONE HYDROCHLORIDE 1 MG: 1 INJECTION, SOLUTION INTRAMUSCULAR; INTRAVENOUS; SUBCUTANEOUS at 09:57

## 2021-01-01 RX ADMIN — PHENYTOIN 300 MG: 50 TABLET, CHEWABLE ORAL at 08:58

## 2021-01-01 RX ADMIN — LORAZEPAM 0.5 MG: 2 INJECTION INTRAMUSCULAR; INTRAVENOUS at 00:25

## 2021-01-01 RX ADMIN — ZONISAMIDE 100 MG: 100 CAPSULE ORAL at 08:57

## 2021-01-01 RX ADMIN — BENZTROPINE MESYLATE 0.5 MG: 0.5 TABLET ORAL at 08:17

## 2021-01-01 RX ADMIN — PHENYTOIN 300 MG: 50 TABLET, CHEWABLE ORAL at 20:37

## 2021-01-01 RX ADMIN — SODIUM CHLORIDE, POTASSIUM CHLORIDE, SODIUM LACTATE AND CALCIUM CHLORIDE: 600; 310; 30; 20 INJECTION, SOLUTION INTRAVENOUS at 12:41

## 2021-01-01 RX ADMIN — HYDROMORPHONE HYDROCHLORIDE 1 MG: 1 INJECTION, SOLUTION INTRAMUSCULAR; INTRAVENOUS; SUBCUTANEOUS at 05:06

## 2021-01-01 RX ADMIN — ENOXAPARIN SODIUM 60 MG: 60 INJECTION SUBCUTANEOUS at 21:10

## 2021-01-01 RX ADMIN — HYDROMORPHONE HYDROCHLORIDE 1 MG: 1 INJECTION, SOLUTION INTRAMUSCULAR; INTRAVENOUS; SUBCUTANEOUS at 09:50

## 2021-01-01 RX ADMIN — SUCRALFATE 1 G: 1 TABLET ORAL at 12:06

## 2021-01-01 RX ADMIN — LIDOCAINE HYDROCHLORIDE 60 MG: 20 INJECTION, SOLUTION INFILTRATION; PERINEURAL at 10:33

## 2021-01-01 RX ADMIN — PROPOFOL 40 MCG/KG/MIN: 10 INJECTION, EMULSION INTRAVENOUS at 20:02

## 2021-01-01 RX ADMIN — SODIUM CHLORIDE, PRESERVATIVE FREE 10 ML: 5 INJECTION INTRAVENOUS at 09:42

## 2021-01-01 RX ADMIN — HYDROMORPHONE HYDROCHLORIDE 1 MG: 1 INJECTION, SOLUTION INTRAMUSCULAR; INTRAVENOUS; SUBCUTANEOUS at 13:44

## 2021-01-01 RX ADMIN — Medication 1 MG: at 21:13

## 2021-01-01 RX ADMIN — SODIUM CHLORIDE 8 MG/HR: 900 INJECTION INTRAVENOUS at 04:45

## 2021-01-01 RX ADMIN — PHENYTOIN 200 MG: 50 TABLET, CHEWABLE ORAL at 09:07

## 2021-01-01 RX ADMIN — HYDROMORPHONE HYDROCHLORIDE 1.5 MG: 2 INJECTION, SOLUTION INTRAMUSCULAR; INTRAVENOUS; SUBCUTANEOUS at 16:54

## 2021-01-01 RX ADMIN — ZONISAMIDE 100 MG: 100 CAPSULE ORAL at 09:02

## 2021-01-01 RX ADMIN — SODIUM CHLORIDE, PRESERVATIVE FREE 10 ML: 5 INJECTION INTRAVENOUS at 21:13

## 2021-01-01 RX ADMIN — Medication 100 MG: at 09:03

## 2021-01-01 RX ADMIN — SUCRALFATE 1 G: 1 TABLET ORAL at 13:02

## 2021-01-01 RX ADMIN — LORAZEPAM 2 MG: 2 INJECTION INTRAMUSCULAR; INTRAVENOUS at 09:16

## 2021-01-01 RX ADMIN — HYDROMORPHONE HYDROCHLORIDE 1 MG: 1 INJECTION, SOLUTION INTRAMUSCULAR; INTRAVENOUS; SUBCUTANEOUS at 21:38

## 2021-01-01 RX ADMIN — TAMSULOSIN HYDROCHLORIDE 0.4 MG: 0.4 CAPSULE ORAL at 21:29

## 2021-01-01 RX ADMIN — PHENYLEPHRINE HYDROCHLORIDE 100 MCG: 10 INJECTION INTRAVENOUS at 12:46

## 2021-01-01 RX ADMIN — ANORECTAL OINTMENT: 15.7; .44; 24; 20.6 OINTMENT TOPICAL at 08:54

## 2021-01-01 RX ADMIN — HYDROMORPHONE HYDROCHLORIDE 1 MG: 1 INJECTION, SOLUTION INTRAMUSCULAR; INTRAVENOUS; SUBCUTANEOUS at 21:10

## 2021-01-01 RX ADMIN — HYDROMORPHONE HYDROCHLORIDE 1 MG: 1 INJECTION, SOLUTION INTRAMUSCULAR; INTRAVENOUS; SUBCUTANEOUS at 04:34

## 2021-01-01 RX ADMIN — RISPERIDONE 1 MG: 1 TABLET ORAL at 23:10

## 2021-01-01 RX ADMIN — PROPOFOL 35 MCG/KG/MIN: 10 INJECTION, EMULSION INTRAVENOUS at 17:05

## 2021-01-01 RX ADMIN — HYDROMORPHONE HYDROCHLORIDE 1 MG: 1 INJECTION, SOLUTION INTRAMUSCULAR; INTRAVENOUS; SUBCUTANEOUS at 01:29

## 2021-01-01 RX ADMIN — RISPERIDONE 1 MG: 1 TABLET ORAL at 21:00

## 2021-01-01 RX ADMIN — MEMANTINE HYDROCHLORIDE 10 MG: 10 TABLET, FILM COATED ORAL at 21:58

## 2021-01-01 RX ADMIN — WARFARIN 5 MG: 5 TABLET ORAL at 18:29

## 2021-01-01 RX ADMIN — HYDROMORPHONE HYDROCHLORIDE 1 MG: 1 INJECTION, SOLUTION INTRAMUSCULAR; INTRAVENOUS; SUBCUTANEOUS at 12:36

## 2021-01-01 RX ADMIN — ENOXAPARIN SODIUM 30 MG: 30 INJECTION SUBCUTANEOUS at 11:30

## 2021-01-01 RX ADMIN — HYDROMORPHONE HYDROCHLORIDE 1 MG: 1 INJECTION, SOLUTION INTRAMUSCULAR; INTRAVENOUS; SUBCUTANEOUS at 18:36

## 2021-01-01 RX ADMIN — SODIUM CHLORIDE 8 MG/HR: 900 INJECTION INTRAVENOUS at 09:41

## 2021-01-01 RX ADMIN — SODIUM CHLORIDE, POTASSIUM CHLORIDE, SODIUM LACTATE AND CALCIUM CHLORIDE 100 ML/HR: 600; 310; 30; 20 INJECTION, SOLUTION INTRAVENOUS at 19:19

## 2021-01-01 RX ADMIN — PROPOFOL 45 MCG/KG/MIN: 10 INJECTION, EMULSION INTRAVENOUS at 06:58

## 2021-01-01 RX ADMIN — BENZTROPINE MESYLATE 0.5 MG: 0.5 TABLET ORAL at 09:19

## 2021-01-01 RX ADMIN — SODIUM CHLORIDE 8 MG/HR: 900 INJECTION INTRAVENOUS at 18:42

## 2021-01-01 RX ADMIN — LORAZEPAM 2 MG: 2 INJECTION INTRAMUSCULAR; INTRAVENOUS at 16:55

## 2021-01-01 RX ADMIN — SODIUM CHLORIDE 125 ML/HR: 9 INJECTION, SOLUTION INTRAVENOUS at 01:51

## 2021-01-01 RX ADMIN — IRON SUCROSE 200 MG: 20 INJECTION, SOLUTION INTRAVENOUS at 18:31

## 2021-01-01 RX ADMIN — RISPERIDONE 1 MG: 1 TABLET ORAL at 08:37

## 2021-01-01 RX ADMIN — HYDROMORPHONE HYDROCHLORIDE 1 MG: 1 INJECTION, SOLUTION INTRAMUSCULAR; INTRAVENOUS; SUBCUTANEOUS at 21:15

## 2021-01-01 RX ADMIN — LORAZEPAM 0.25 MG: 0.5 TABLET ORAL at 09:42

## 2021-01-01 RX ADMIN — DEXAMETHASONE SODIUM PHOSPHATE 6 MG: 10 INJECTION INTRAMUSCULAR; INTRAVENOUS at 17:25

## 2021-01-01 RX ADMIN — SODIUM CHLORIDE 125 ML/HR: 9 INJECTION, SOLUTION INTRAVENOUS at 02:18

## 2021-01-01 RX ADMIN — MORPHINE SULFATE 4 MG: 2 INJECTION, SOLUTION INTRAMUSCULAR; INTRAVENOUS at 08:51

## 2021-01-01 RX ADMIN — REMDESIVIR 100 MG: 100 INJECTION, POWDER, LYOPHILIZED, FOR SOLUTION INTRAVENOUS at 05:30

## 2021-01-01 RX ADMIN — GLYCOPYRROLATE 0.2 MG: 0.2 INJECTION INTRAMUSCULAR; INTRAVENOUS at 13:41

## 2021-01-01 RX ADMIN — ENOXAPARIN SODIUM 30 MG: 30 INJECTION SUBCUTANEOUS at 00:45

## 2021-01-01 RX ADMIN — LORAZEPAM 2 MG: 2 INJECTION INTRAMUSCULAR; INTRAVENOUS at 20:37

## 2021-01-01 RX ADMIN — DEXAMETHASONE SODIUM PHOSPHATE 6 MG: 10 INJECTION INTRAMUSCULAR; INTRAVENOUS at 17:10

## 2021-01-01 RX ADMIN — DOCUSATE SODIUM 50MG AND SENNOSIDES 8.6MG 2 TABLET: 8.6; 5 TABLET, FILM COATED ORAL at 21:18

## 2021-01-01 RX ADMIN — SUCRALFATE 1 G: 1 TABLET ORAL at 21:00

## 2021-01-01 RX ADMIN — LORAZEPAM 2 MG: 2 INJECTION INTRAMUSCULAR; INTRAVENOUS at 12:59

## 2021-01-01 RX ADMIN — SODIUM CHLORIDE, PRESERVATIVE FREE 10 ML: 5 INJECTION INTRAVENOUS at 20:29

## 2021-01-01 RX ADMIN — LORAZEPAM 2 MG: 2 INJECTION INTRAMUSCULAR; INTRAVENOUS at 00:50

## 2021-01-01 RX ADMIN — Medication 100 MG: at 08:17

## 2021-01-01 RX ADMIN — DOCUSATE SODIUM 50MG AND SENNOSIDES 8.6MG 2 TABLET: 8.6; 5 TABLET, FILM COATED ORAL at 12:06

## 2021-01-01 RX ADMIN — LORAZEPAM 0.25 MG: 0.5 TABLET ORAL at 20:21

## 2021-01-01 RX ADMIN — ANORECTAL OINTMENT: 15.7; .44; 24; 20.6 OINTMENT TOPICAL at 08:19

## 2021-01-01 RX ADMIN — HYDROMORPHONE HYDROCHLORIDE 1 MG: 1 INJECTION, SOLUTION INTRAMUSCULAR; INTRAVENOUS; SUBCUTANEOUS at 18:01

## 2021-01-01 RX ADMIN — LORAZEPAM 2 MG: 2 INJECTION INTRAMUSCULAR; INTRAVENOUS at 16:40

## 2021-01-01 RX ADMIN — PHENYTOIN 300 MG: 50 TABLET, CHEWABLE ORAL at 20:21

## 2021-01-01 RX ADMIN — PHENYTOIN 200 MG: 50 TABLET, CHEWABLE ORAL at 08:54

## 2021-01-01 RX ADMIN — DONEPEZIL HYDROCHLORIDE 10 MG: 10 TABLET, FILM COATED ORAL at 21:00

## 2021-01-01 RX ADMIN — ENOXAPARIN SODIUM 60 MG: 100 INJECTION, SOLUTION INTRAVENOUS; SUBCUTANEOUS at 23:00

## 2021-01-01 RX ADMIN — HYDROMORPHONE HYDROCHLORIDE 1 MG: 1 INJECTION, SOLUTION INTRAMUSCULAR; INTRAVENOUS; SUBCUTANEOUS at 13:38

## 2021-01-01 RX ADMIN — SODIUM CHLORIDE 500 ML: 9 INJECTION, SOLUTION INTRAVENOUS at 20:12

## 2021-01-01 RX ADMIN — ACETAMINOPHEN 650 MG: 650 SUPPOSITORY RECTAL at 01:51

## 2021-01-01 RX ADMIN — TAMSULOSIN HYDROCHLORIDE 0.4 MG: 0.4 CAPSULE ORAL at 21:13

## 2021-01-01 RX ADMIN — ENOXAPARIN SODIUM 60 MG: 100 INJECTION, SOLUTION INTRAVENOUS; SUBCUTANEOUS at 11:36

## 2021-01-01 RX ADMIN — SUCRALFATE 1 G: 1 TABLET ORAL at 14:32

## 2021-01-01 RX ADMIN — HYDROMORPHONE HYDROCHLORIDE 1.5 MG: 2 INJECTION, SOLUTION INTRAMUSCULAR; INTRAVENOUS; SUBCUTANEOUS at 13:00

## 2021-01-01 RX ADMIN — MORPHINE SULFATE 4 MG: 2 INJECTION, SOLUTION INTRAMUSCULAR; INTRAVENOUS at 04:35

## 2021-01-01 RX ADMIN — ATORVASTATIN CALCIUM 40 MG: 20 TABLET, FILM COATED ORAL at 20:15

## 2021-01-01 RX ADMIN — SODIUM CHLORIDE 1000 ML: 9 INJECTION, SOLUTION INTRAVENOUS at 21:28

## 2021-01-01 RX ADMIN — LORAZEPAM 2 MG: 2 INJECTION INTRAMUSCULAR; INTRAVENOUS at 00:28

## 2021-01-01 RX ADMIN — SODIUM CHLORIDE 125 ML/HR: 9 INJECTION, SOLUTION INTRAVENOUS at 08:18

## 2021-01-01 RX ADMIN — ACETAMINOPHEN 650 MG: 325 TABLET, FILM COATED ORAL at 22:15

## 2021-01-01 RX ADMIN — SUCRALFATE 1 G: 1 TABLET ORAL at 17:08

## 2021-01-01 RX ADMIN — ZONISAMIDE 100 MG: 100 CAPSULE ORAL at 09:16

## 2021-01-01 RX ADMIN — SODIUM CHLORIDE 125 ML/HR: 9 INJECTION, SOLUTION INTRAVENOUS at 10:05

## 2021-01-01 RX ADMIN — FAMOTIDINE 20 MG: 20 TABLET, FILM COATED ORAL at 17:25

## 2021-01-01 RX ADMIN — FAMOTIDINE 20 MG: 20 TABLET, FILM COATED ORAL at 06:30

## 2021-01-01 RX ADMIN — RISPERIDONE 0.5 MG: 0.5 TABLET ORAL at 11:56

## 2021-01-01 RX ADMIN — PANTOPRAZOLE SODIUM 40 MG: 40 INJECTION, POWDER, FOR SOLUTION INTRAVENOUS at 06:25

## 2021-01-01 RX ADMIN — RISPERIDONE 1 MG: 1 TABLET ORAL at 20:51

## 2021-01-01 RX ADMIN — CEFTRIAXONE SODIUM 1 G: 1 INJECTION, SOLUTION INTRAVENOUS at 06:58

## 2021-01-01 RX ADMIN — Medication 100 MG: at 01:01

## 2021-01-01 RX ADMIN — LORAZEPAM 2 MG: 2 INJECTION INTRAMUSCULAR; INTRAVENOUS at 08:04

## 2021-01-01 RX ADMIN — CHLORHEXIDINE GLUCONATE 15 ML: 1.2 RINSE ORAL at 17:06

## 2021-01-01 RX ADMIN — PROPOFOL 50 MCG/KG/MIN: 10 INJECTION, EMULSION INTRAVENOUS at 08:50

## 2021-01-01 RX ADMIN — PANTOPRAZOLE SODIUM 40 MG: 40 TABLET, DELAYED RELEASE ORAL at 18:31

## 2021-01-01 RX ADMIN — ANTACID TABLETS 1 TABLET: 500 TABLET, CHEWABLE ORAL at 21:18

## 2021-01-01 RX ADMIN — HYDROMORPHONE HYDROCHLORIDE 1.5 MG: 2 INJECTION, SOLUTION INTRAMUSCULAR; INTRAVENOUS; SUBCUTANEOUS at 13:10

## 2021-01-01 RX ADMIN — LORAZEPAM 2 MG: 2 INJECTION INTRAMUSCULAR; INTRAVENOUS at 00:25

## 2021-01-01 RX ADMIN — GLYCOPYRROLATE 0.2 MG: 0.2 INJECTION INTRAMUSCULAR; INTRAVENOUS at 10:31

## 2021-01-01 RX ADMIN — SUCRALFATE 1 G: 1 TABLET ORAL at 18:01

## 2021-01-01 RX ADMIN — POTASSIUM CHLORIDE 40 MEQ: 1.5 POWDER, FOR SOLUTION ORAL at 11:33

## 2021-01-01 RX ADMIN — SODIUM CHLORIDE, POTASSIUM CHLORIDE, SODIUM LACTATE AND CALCIUM CHLORIDE 100 ML/HR: 600; 310; 30; 20 INJECTION, SOLUTION INTRAVENOUS at 17:11

## 2021-01-01 RX ADMIN — RISPERIDONE 0.5 MG: 0.5 TABLET ORAL at 09:07

## 2021-01-01 RX ADMIN — HYDROMORPHONE HYDROCHLORIDE 1 MG: 1 INJECTION, SOLUTION INTRAMUSCULAR; INTRAVENOUS; SUBCUTANEOUS at 20:55

## 2021-01-01 RX ADMIN — CHLORHEXIDINE GLUCONATE 15 ML: 1.2 RINSE ORAL at 11:27

## 2021-04-20 PROBLEM — K92.0 HEMATEMESIS: Status: ACTIVE | Noted: 2021-01-01

## 2021-04-20 PROBLEM — J44.9 COPD (CHRONIC OBSTRUCTIVE PULMONARY DISEASE) (HCC): Status: ACTIVE | Noted: 2021-01-01

## 2021-04-20 PROBLEM — I73.9 PAD (PERIPHERAL ARTERY DISEASE) (HCC): Status: ACTIVE | Noted: 2021-01-01

## 2021-04-20 PROBLEM — I10 ESSENTIAL HYPERTENSION: Status: ACTIVE | Noted: 2021-01-01

## 2021-04-20 PROBLEM — E78.5 HLD (HYPERLIPIDEMIA): Status: ACTIVE | Noted: 2021-01-01

## 2021-04-20 NOTE — ED PROVIDER NOTES
EMERGENCY DEPARTMENT ENCOUNTER    Room Number:  N533/1  Date seen:  4/21/2021  Time seen: 19:35 EDT  PCP: Provider, No Known  Historian: Patient and nursing home staff    HPI:  Chief complaint: Vomiting blood  A complete HPI/ROS/PMH/PSH/SH/FH are unobtainable due to: Nonverbal and dementia  Context:Drew Day is a 65 y.o. male, who presents to the ED with c/o coffee-ground emesis prior to arrival.  Patient resides at [facility].    Per Kellen (RN at [facility]), she reports that she came to work earlier today when the CNA reported patient had coffee-ground vomit around 12 PM. She noticed 2 more episodes prior to arrival. At baseline, patient is nonverbal and history of dementia.     Patient was placed in face mask in first look. Patient was wearing facemask when I entered the room and throughout our encounter. I wore full protective equipment throughout this patient encounter including a face mask, goggles, and gloves. Hand hygiene was performed before donning protective equipment and after removal when leaving the room.    MEDICAL RECORD REVIEW  Patient was admitted at [facility] November 2019 for acute upper GI bleed.    ALLERGIES  Eggs or egg-derived products and Fish-derived products    PAST MEDICAL HISTORY  Active Ambulatory Problems     Diagnosis Date Noted   • Dementia without behavioral disturbance (CMS/HCC) 09/17/2016   • Gastroesophageal reflux disease without esophagitis 09/17/2016   • Coronary artery disease involving native coronary artery of native heart without angina pectoris 09/17/2016   • Seizure disorder (CMS/HCC) 09/17/2016   • Cognitive impairment 09/17/2016   • Closed head injury 09/17/2016   • Acute upper GI bleed 11/22/2019     Resolved Ambulatory Problems     Diagnosis Date Noted   • No Resolved Ambulatory Problems     Past Medical History:   Diagnosis Date   • Back pain    • Convulsion disorder (CMS/HCC)    • Coronary artery disease    • Dementia (CMS/HCC)    •  Hyperlipidemia    • Hypertension    • Seizures (CMS/HCC)        PAST SURGICAL HISTORY  Past Surgical History:   Procedure Laterality Date   • CARDIAC CATHETERIZATION     • CORONARY ARTERY BYPASS GRAFT Left    • VASCULAR SURGERY Left        FAMILY HISTORY  Family History   Problem Relation Age of Onset   • Arthritis Mother    • Depression Mother    • Alcohol abuse Mother    • Emphysema Mother    • Alzheimer's disease Mother    • Alzheimer's disease Father    • Alcohol abuse Father    • Bipolar disorder Sister    • Heart disease Sister    • Cancer Brother    • Alcohol abuse Brother    • Drug abuse Brother    • Bipolar disorder Son    • Anxiety disorder Son    • Stroke Brother    • Cancer Brother    • Hypertension Brother    • Bipolar disorder Brother    • Anxiety disorder Brother    • Neuropathy Brother    • Heart disease Brother    • Drug abuse Brother    • Alcohol abuse Brother        SOCIAL HISTORY  Social History     Socioeconomic History   • Marital status:      Spouse name: Not on file   • Number of children: Not on file   • Years of education: Not on file   • Highest education level: Not on file   Tobacco Use   • Smoking status: Former Smoker   Substance and Sexual Activity   • Alcohol use: No       REVIEW OF SYSTEMS  Review of Systems   Unable to perform ROS: Dementia         PHYSICAL EXAM    ED Triage Vitals   Temp Heart Rate Resp BP SpO2   04/20/21 1836 04/20/21 1835 04/20/21 1835 04/20/21 1835 04/20/21 1835   99.4 °F (37.4 °C) (!) 127 14 121/68 95 %      Temp src Heart Rate Source Patient Position BP Location FiO2 (%)   04/20/21 1836 04/20/21 1835 -- -- --   Tympanic Monitor        Physical Exam    I have reviewed the triage vital signs and nursing notes.      GENERAL: not distressed; chronically ill-appearing, nonverbal  HENT: nares patent  EYES: no scleral icterus  NECK: no ROM limitations  CV: regular rhythm, regular rate  RESPIRATORY: normal effort; diminished breath sounds to bilateral lower  lobes  ABDOMEN: soft; generalized tenderness to palpation, no rebound or guarding  : Heme positive.  Rectal exam chaperoned by LAURO Ventura  MUSCULOSKELETAL: no deformity  NEURO: alert, moves all extremities, follows commands  SKIN: warm, dry    LAB RESULTS  Recent Results (from the past 24 hour(s))   Comprehensive Metabolic Panel    Collection Time: 04/20/21  7:12 PM    Specimen: Blood   Result Value Ref Range    Glucose 131 (H) 65 - 99 mg/dL    BUN 20 8 - 23 mg/dL    Creatinine 0.83 0.76 - 1.27 mg/dL    Sodium 143 136 - 145 mmol/L    Potassium 4.0 3.5 - 5.2 mmol/L    Chloride 105 98 - 107 mmol/L    CO2 25.4 22.0 - 29.0 mmol/L    Calcium 9.2 8.6 - 10.5 mg/dL    Total Protein 6.7 6.0 - 8.5 g/dL    Albumin 4.10 3.50 - 5.20 g/dL    ALT (SGPT) 14 1 - 41 U/L    AST (SGOT) 20 1 - 40 U/L    Alkaline Phosphatase 139 (H) 39 - 117 U/L    Total Bilirubin <0.2 0.0 - 1.2 mg/dL    eGFR Non African Amer 92 >60 mL/min/1.73    Globulin 2.6 gm/dL    A/G Ratio 1.6 g/dL    BUN/Creatinine Ratio 24.1 7.0 - 25.0    Anion Gap 12.6 5.0 - 15.0 mmol/L   Type & Screen    Collection Time: 04/20/21  7:12 PM    Specimen: Blood   Result Value Ref Range    ABO Type AB     RH type Positive     Antibody Screen Negative     T&S Expiration Date 4/23/2021 11:59:59 PM    CBC Auto Differential    Collection Time: 04/20/21  7:12 PM    Specimen: Blood   Result Value Ref Range    WBC 14.36 (H) 3.40 - 10.80 10*3/mm3    RBC 4.28 4.14 - 5.80 10*6/mm3    Hemoglobin 11.7 (L) 13.0 - 17.7 g/dL    Hematocrit 35.8 (L) 37.5 - 51.0 %    MCV 83.6 79.0 - 97.0 fL    MCH 27.3 26.6 - 33.0 pg    MCHC 32.7 31.5 - 35.7 g/dL    RDW 13.8 12.3 - 15.4 %    RDW-SD 41.0 37.0 - 54.0 fl    MPV 10.2 6.0 - 12.0 fL    Platelets 186 140 - 450 10*3/mm3    Neutrophil % 86.5 (H) 42.7 - 76.0 %    Lymphocyte % 6.7 (L) 19.6 - 45.3 %    Monocyte % 5.8 5.0 - 12.0 %    Eosinophil % 0.1 (L) 0.3 - 6.2 %    Basophil % 0.3 0.0 - 1.5 %    Immature Grans % 0.6 (H) 0.0 - 0.5 %    Neutrophils,  Absolute 12.43 (H) 1.70 - 7.00 10*3/mm3    Lymphocytes, Absolute 0.96 0.70 - 3.10 10*3/mm3    Monocytes, Absolute 0.83 0.10 - 0.90 10*3/mm3    Eosinophils, Absolute 0.01 0.00 - 0.40 10*3/mm3    Basophils, Absolute 0.05 0.00 - 0.20 10*3/mm3    Immature Grans, Absolute 0.08 (H) 0.00 - 0.05 10*3/mm3    nRBC 0.0 0.0 - 0.2 /100 WBC   Light Blue Top    Collection Time: 04/20/21  7:12 PM   Result Value Ref Range    Extra Tube hold for add-on    Green Top (Gel)    Collection Time: 04/20/21  7:12 PM   Result Value Ref Range    Extra Tube Hold for add-ons.    Lavender Top    Collection Time: 04/20/21  7:12 PM   Result Value Ref Range    Extra Tube hold for add-on    Gold Top - SST    Collection Time: 04/20/21  7:12 PM   Result Value Ref Range    Extra Tube Hold for add-ons.    Lipase    Collection Time: 04/20/21  7:12 PM    Specimen: Blood   Result Value Ref Range    Lipase 25 13 - 60 U/L   Lactic Acid, Plasma    Collection Time: 04/20/21  7:47 PM    Specimen: Blood   Result Value Ref Range    Lactate 2.2 (C) 0.5 - 2.0 mmol/L   Urinalysis With Microscopic If Indicated (No Culture) - Urine, Clean Catch    Collection Time: 04/20/21  9:09 PM    Specimen: Urine, Clean Catch   Result Value Ref Range    Color, UA Yellow Yellow, Straw    Appearance, UA Clear Clear    pH, UA 7.0 5.0 - 8.0    Specific Gravity, UA 1.028 1.005 - 1.030    Glucose, UA Negative Negative    Ketones, UA Trace (A) Negative    Bilirubin, UA Negative Negative    Blood, UA Moderate (2+) (A) Negative    Protein, UA Negative Negative    Leuk Esterase, UA Small (1+) (A) Negative    Nitrite, UA Negative Negative    Urobilinogen, UA 0.2 E.U./dL 0.2 - 1.0 E.U./dL   Urinalysis, Microscopic Only - Urine, Clean Catch    Collection Time: 04/20/21  9:09 PM    Specimen: Urine, Clean Catch   Result Value Ref Range    RBC, UA 13-20 (A) None Seen, 0-2 /HPF    WBC, UA 3-5 (A) None Seen, 0-2 /HPF    Bacteria, UA 2+ (A) None Seen /HPF    Squamous Epithelial Cells, UA 0-2 None  Seen, 0-2 /HPF    Hyaline Casts, UA None Seen None Seen /LPF    Methodology Automated Microscopy    Timed Lactic Acid, Reflex    Collection Time: 04/20/21 10:33 PM    Specimen: Blood   Result Value Ref Range    Lactate 1.2 0.5 - 2.0 mmol/L         RADIOLOGY RESULTS  CT Abdomen Pelvis With Contrast   Final Result       1. The patient does have hiatal hernia. Full assessment is degraded by   motion artifact. No other abnormality of the stomach or duodenum can be   seen.   2. Large volume of thrombus, extending from the left common iliac vein   into the inferior vena cava to the level of the left renal vein.   3. Large volume of stool seen throughout the colon, suggesting   constipation, with additional increased stool within the rectal vault,   which may reflect fecal impaction.       FINDINGS were called to Physician Assistant Trista Gupta at 10:42 PM.       Radiation dose reduction techniques were utilized, including automated   exposure control and exposure modulation based on body size.       This report was finalized on 4/20/2021 10:45 PM by Dr. Allie Lakhani M.D.          XR Chest 1 View   Final Result   No focal pulmonary consolidation. Indeterminate nodular   density at the right lung base.       This report was finalized on 4/20/2021 8:20 PM by Dr. Jaime Guzmán M.D.                PROGRESS, DATA ANALYSIS, CONSULTS AND MEDICAL DECISION MAKING  All labs have been independently reviewed by me.  All radiology studies have been reviewed by me and discussed with radiologist dictating the report. Discussion below represents my analysis of pertinent findings related to patient's condition, differential diagnosis, treatment plan and final disposition.     ED Course as of Apr 21 0027   Tue Apr 20, 2021 1950 WBC(!): 14.36 [SS]   1950 Hemoglobin(!): 11.7 [SS]   2100 I did a rectal exam with LAURO Ventura as male chaperone.  Heme positive.    [SS]   3368 I discussed with Dr. Lakhani regarding CT  abdomen.  She notes a large blood clot from the left common iliac to the left renal vein.  I have another call out to [facility] regarding the results.    [SS]   6704 I called and informed Nathalia regarding the CT abdomen results.    [SS]      ED Course User Index  [SS] Trista Gupta PA-C       The differential diagnosis include but are not limited to GI bleed, anemia, perforated diverticulitis.         Reviewed pt's history and workup with Dr. Swanson.  After a bedside evaluation, Dr. Swanson agrees with the plan of care.      (FOR ADMIT) Based on the patient's lab findings and presenting symptoms, the doctor and I feel it is appropriate to admit the patient for further management, evaluation, and treatment.  I have discussed this with the admitting team.  I have also discussed this with the patient/family.  They are in agreement with admission.          Disposition vitals:  /61 (BP Location: Right leg, Patient Position: Lying)   Pulse 105   Temp 99.4 °F (37.4 °C) (Tympanic)   Resp 16   SpO2 98%       DIAGNOSIS  Final diagnoses:   Hematemesis, presence of nausea not specified   Gastrointestinal hemorrhage, unspecified gastrointestinal hemorrhage type       FOLLOW UP   No follow-up provider specified.       Trista Gupta PA-C  04/21/21 0028

## 2021-04-20 NOTE — ED TRIAGE NOTES
Cough and bloody emesis x 3 today.  Non verbal at baseline    Patient was placed in face mask during first look triage.  Patient was wearing a face mask throughout encounter.  I wore personal protective equipment throughout the encounter.  Hand hygiene was performed before and after patient encounter.

## 2021-04-21 PROBLEM — I82.422 THROMBOSIS OF LEFT ILIAC VEIN (HCC): Status: ACTIVE | Noted: 2021-01-01

## 2021-04-21 PROBLEM — N39.0 ACUTE UTI (URINARY TRACT INFECTION): Status: ACTIVE | Noted: 2021-01-01

## 2021-04-21 PROBLEM — I82.421: Status: ACTIVE | Noted: 2021-01-01

## 2021-04-21 PROBLEM — K92.2 GIB (GASTROINTESTINAL BLEEDING): Status: ACTIVE | Noted: 2021-01-01

## 2021-04-21 NOTE — SIGNIFICANT NOTE
04/21/21 0957   OTHER   Discipline speech language pathologist   Rehab Time/Intention   Session Not Performed other (see comments)  (SLP to follow for eval after GI work-up. Per SNF documents patient on puree/thins with no fish/eggs at baseline. SLP to follow for eval as appropriate.)

## 2021-04-21 NOTE — CASE MANAGEMENT/SOCIAL WORK
Discharge Planning Assessment  [facility]     Patient Name: Drew Day  MRN: [id number]  Today's Date: 4/21/2021    Admit Date: 4/20/2021    Discharge Needs Assessment     Row Name 04/21/21 1555       Living Environment    Lives With  facility resident    Current Living Arrangements  extended care facility    Provides Primary Care For  no one, unable/limited ability to care for self    Family Caregiver if Needed  other (see comments)    Quality of Family Relationships  involved    Able to Return to Prior Arrangements  yes       Resource/Environmental Concerns    Resource/Environmental Concerns  none       Transition Planning    Patient/Family Anticipates Transition to  long-term care facility    Patient/Family Anticipated Services at Transition      Transportation Anticipated  health plan transportation       Discharge Needs Assessment    Readmission Within the Last 30 Days  no previous admission in last 30 days    Equipment Currently Used at Home  none    Concerns to be Addressed  discharge planning    Anticipated Changes Related to Illness  none    Discharge Facility/Level of Care Needs  nursing facility, skilled;nursing facility, intermediate    Current Discharge Risk  chronically ill;cognitively impaired;dependent with mobility/activities of daily living        Discharge Plan     Row Name 04/21/21 1551       Plan    Plan  Return back to [facility] when medically ready via EMS    Provided Post Acute Provider List?  N/A    Patient/Family in Agreement with Plan  yes    Plan Comments  Reviewed clinicals. CCP called [facility] Guardian Deanna Elliott [phone] via outbound call, introduced self and explained CCP role. Verified facesheet and confirmed pt is a resident at [facility]. She will email CCP copy of guardianship documents for hospital medical record. She states pts family is allowed information for pt but not decisional. IMM notice given. She states pt will return back to [facility] [place]. Spoke with Michelle Sanches and pt is from [facility] with medicaid bed hold and can return. Packet in tim. Roman CELESTIN/CCP        Continued Care and Services - Admitted Since 4/20/2021     Destination     Service Provider Request Status Selected Services Address Phone Fax Patient Preferred    [facility]  Accepted N/A [address] [facility] [id number] [phone] --                Demographic Summary     Row Name 04/21/21 1554       General Information    Admission Type  inpatient    Required Notices Provided  Important Message from Medicare    Referral Source  admission list    Reason for Consult  decision-making;discharge planning    Preferred Language  English     Used During This Interaction  no       Contact Information    Permission Granted to Share Info With  guardian;family/designee;facility     Contact Information Obtained for  guardian       Guardian Information    Name, Edison Elliott    Phone, Edison  [phone]    Email, [email or web address]        Functional Status    No documentation.       Psychosocial    No documentation.       Abuse/Neglect    No documentation.       Legal    No documentation.       Substance Abuse    No documentation.       Patient Forms    No documentation.           Emelyn Rich, SABI

## 2021-04-21 NOTE — DISCHARGE PLACEMENT REQUEST
"Drew Day (65 y.o. Male)     Date of Birth Social Security Number Address Home Phone MRN    1955  [address] [facility] [facility] [address] [phone] [id number]    [employer] Marital Status          None        Admission Date Admission Type Admitting Provider Attending Provider Department, Room/Bed    4/20/21 Emergency Raji Ravi MD Broaddus, Emmett J., MD [address], [address]    Discharge Date Discharge Disposition Discharge Destination                       Attending Provider: Abdiel Little MD    Allergies: Eggs Or Egg-derived Products, Fish-derived Products    Isolation: None   Infection: None   Code Status: CPR    Ht: 177.8 cm (70\")   Wt: 62 kg (136 lb 11.2 oz)    Admission Cmt: None   Principal Problem: GIB (gastrointestinal bleeding) [K92.2]                 Active Insurance as of 4/20/2021     Primary Coverage     Payor Plan Insurance Group Employer/Plan Group    MEDICARE MEDICARE A & B      Payor Plan Address Payor Plan Phone Number Payor Plan Fax Number Effective Dates    [address] [phone]  4/1/2000 - None Entered    [facility] [id number]       Subscriber Name Subscriber Birth Date Member ID       DREW DAY 1955 [id number]           Secondary Coverage     Payor Plan Insurance Group Employer/Plan Group    [place]      Payor Plan Address Payor Plan Phone Number Payor Plan Fax Number Effective Dates    [address] [phone]  10/2/2017 - None Entered    [facility] [id number]       Subscriber Name Subscriber Birth Date Member ID       DREW DAY 1955 [id number]                 Emergency Contacts      (Rel.) Home Phone Work Phone Mobile Phone    ShayChristine (Relative) [phone] -- --    Ankush Day (Brother) [phone] -- --    Liliana Day (Relative) [phone] -- --    QUINN FRANCOIS (Guardian) [phone] --              "

## 2021-04-21 NOTE — ED PROVIDER NOTES
MD ATTESTATION NOTE    The MAIKEL and I have discussed this patient's history, physical exam, and treatment plan.  I have reviewed the documentation and personally had a face to face interaction with the patient. I affirm the documentation and agree with the treatment and plan.  The attached note describes my personal findings.      History  65-year-old male with history of GI bleed sent in for bloody vomiting.  Patient is nonverbal does have history of GI bleed in 2019 and was hospitalized at [facility] for same.    Physical Exam  Vital Signs reviewed  Chronically ill-appearing male in no obvious distress, he is nonverbal  Abdomen-nontender to palpation    Disposition  I discussed treatment and evaluation of this patient with ANNE Gupta.  Will admit to the hospital for work-up of upper GI bleed.  We will give IV fluid bolus and IV Protonix.     Juan Swanson MD  04/20/21 2034

## 2021-04-21 NOTE — PLAN OF CARE
Goal Outcome Evaluation:  Plan of Care Reviewed With: patient  Progress: no change  Outcome Summary: New admit, nonverbal, hx dementia, contractures, consults pending, IVF infusing, IV ABX ordered, low SBP, will continue to monitor.

## 2021-04-21 NOTE — CONSULTS
Patient Name: Drew Day Account #: [id number]    MRN: [id number] Admission Date: 4/20/2021      Consulting Service: Vascular Surgery Date of Evaluation: April 21, 2021    Requesting Provider: KING CHOPRA    CHIEF COMPLAINT: IVC thrombosis  HPI: Drew Day is a 65 y.o. 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  The right iliac venous system seems fairly clear.  The clot appears to possibly be chronic in nature based on his total lack of swelling in both legs.  It has mild amount of flow around the edges of the clot but the clot seems very well-circumscribed and is clearly adherent.  It extends from the renal veins down to the left iliac system.  The patient is nonverbal noncommunicative and is impossible to tell if he has had this diagnosis before based on his interview.  I do note that he has a chart that is being merged and will have to see if there is anything in the old chart available.    PAST MEDICAL HISTORY:   Past Medical History:   Diagnosis Date   • Back pain    • Convulsion disorder (CMS/HCC)    • Coronary artery disease    • Dementia (CMS/HCC)    • Hyperlipidemia    • Hypertension    • Seizures (CMS/HCC)       PAST SURGICAL HISTORY:   Past Surgical History:   Procedure Laterality Date   • CARDIAC CATHETERIZATION     • CORONARY ARTERY BYPASS GRAFT Left    • VASCULAR SURGERY Left       FAMILY HISTORY:   Family History   Problem Relation Age of Onset   • Arthritis Mother    • Depression Mother    • Alcohol abuse Mother    • Emphysema Mother    • Alzheimer's disease Mother    • Alzheimer's disease Father    • Alcohol abuse Father    • Bipolar disorder Sister    • Heart disease Sister    • Cancer Brother    • Alcohol abuse Brother    • Drug abuse Brother    • Bipolar disorder Son    • Anxiety disorder Son    • Stroke Brother    • Cancer Brother    • Hypertension Brother    • Bipolar disorder  Brother    • Anxiety disorder Brother    • Neuropathy Brother    • Heart disease Brother    • Drug abuse Brother    • Alcohol abuse Brother       SOCIAL HISTORY:   Social History     Tobacco Use   • Smoking status: Former Smoker   Substance Use Topics   • Alcohol use: No   • Drug use: Not on file      MEDICATIONS:   No current facility-administered medications on file prior to encounter.     Current Outpatient Medications on File Prior to Encounter   Medication Sig Dispense Refill   • acetaminophen (TYLENOL) 325 MG tablet Take 650 mg by mouth Every 6 (Six) Hours As Needed for Mild Pain .     • atorvastatin (LIPITOR) 40 MG tablet Take 1 tablet by mouth Every Night. 30 tablet 0   • benztropine (COGENTIN) 0.5 MG tablet Take 0.5 mg by mouth 2 (Two) Times a Day.     • donepezil (ARICEPT) 10 MG tablet Take 1 tablet by mouth Every Night. 30 tablet 1   • LORazepam (ATIVAN) 0.5 MG tablet Take 0.25 mg by mouth 2 (Two) Times a Day.     • memantine (NAMENDA) 10 MG tablet Take 10 mg by mouth 2 (Two) Times a Day.     • Multiple Vitamins-Minerals (MULTIVITAMIN ADULT) tablet Take 1 tablet by mouth daily. 30 tablet 5   • risperiDONE (risperDAL) 1 MG tablet Take 1 mg by mouth 2 (Two) Times a Day.     • tamsulosin (FLOMAX) 0.4 MG capsule 24 hr capsule Take 1 capsule by mouth every night. 30 capsule 5   • calcium carbonate (TUMS) 500 MG chewable tablet Chew 1 tablet 2 (Two) Times a Day.     • Menthol-Zinc Oxide (REMEDY CALAZIME EX) Apply  topically 2 (Two) Times a Day.     • PHENYTOIN INFATABS 50 MG chewable tablet Take 4 tablets bid (Patient taking differently: Chew 300 mg 2 (Two) Times a Day.) 240 tablet 5   • raNITIdine (ZANTAC) 150 MG tablet Take 150 mg by mouth Daily.     • thiamine (VITAMINE B-1) 100 MG tablet Take 1 tablet by mouth daily. 30 tablet 5   • zonisamide (ZONEGRAN) 100 MG capsule Take 1 capsule by mouth daily. 30 capsule 5             ALLERGIES: Eggs or egg-derived products and Fish-derived products   COMPLETE REVIEW  "OF SYSTEMS:     Review of systems is extremely limited as the patient remains noncommunicative      PHYSICAL EXAM:   Patient Vitals for the past 24 hrs:   BP Temp Temp src Pulse Resp SpO2 Height Weight   04/21/21 1011 -- -- -- -- -- -- 177.8 cm (70\") 62 kg (136 lb 11.2 oz)   04/21/21 0731 113/72 97.4 °F (36.3 °C) Axillary 88 16 100 % -- --   04/21/21 0657 104/91 -- -- -- -- -- -- --   04/21/21 0628 (!) 88/43 96.7 °F (35.9 °C) Axillary 100 14 91 % -- --   04/21/21 0356 100/70 97.6 °F (36.4 °C) Axillary 107 16 98 % -- 62 kg (136 lb 11.2 oz)   04/20/21 2348 116/61 98.3 °F (36.8 °C) Axillary 105 16 98 % -- --   04/20/21 2200 110/58 -- -- 101 -- 97 % -- --   04/20/21 2022 -- -- -- 113 -- 95 % -- --   04/20/21 2000 123/73 -- -- 112 -- 96 % -- --   04/20/21 1836 -- 99.4 °F (37.4 °C) Tympanic -- -- -- -- --   04/20/21 1835 121/68 -- -- (!) 127 14 95 % -- --        General appearance: in mild to moderate distress, chronically ill appearing and uncooperative.  Neurological exam reveals contracted noncommunicative with nonlateralizing findings.  ENT exam reveals - ENT exam normal, no neck nodes or sinus tenderness.  CVS exam: normal rate, regular rhythm, normal S1, S2, no murmurs, rubs, clicks or gallops.  Chest: clear to auscultation, no wheezes, rales or rhonchi, symmetric air entry.  Abdominal exam: soft, nontender, nondistended, no masses or organomegaly.  Examination of the feet reveals warm, good capillary refill.  No edema on either leg        LABS:      Results Review:       I reviewed the patient's new clinical results.  Results from last 7 days   Lab Units 04/21/21  0645 04/21/21  0020 04/20/21 1912   WBC 10*3/mm3 15.51*  --  14.36*   HEMOGLOBIN g/dL 9.1* 9.6* 11.7*   PLATELETS 10*3/mm3 134*  --  186     Results from last 7 days   Lab Units 04/21/21  0645 04/20/21  1912   SODIUM mmol/L 139 143   POTASSIUM mmol/L 3.6 4.0   CHLORIDE mmol/L 108* 105   CO2 mmol/L 22.4 25.4   BUN mg/dL 19 20   CREATININE mg/dL 0.89 0.83 "   GLUCOSE mg/dL 116* 131*   Estimated Creatinine Clearance: 70.6 mL/min (by C-G formula based on SCr of 0.89 mg/dL).  Results from last 7 days   Lab Units 04/21/21  0645 04/20/21  1912   CALCIUM mg/dL 8.5* 9.2   ALBUMIN g/dL 3.50 4.10           The following radiologic or non-invasive studies have been reviewed by me: CT scan reviewed with images viewed    Active Hospital Problems    Diagnosis  POA   • **GIB (gastrointestinal bleeding) [K92.2]  Yes   • Acute UTI (urinary tract infection) [N39.0]  Yes   • Thrombosis of left iliac vein (CMS/[facility]) [I82.422]  Yes   • Hematemesis [K92.0]  Yes   • HLD (hyperlipidemia) [E78.5]  Yes   • Essential hypertension [I10]  Yes   • PAD (peripheral artery disease) (CMS/[facility]) [I73.9]  Yes   • COPD (chronic obstructive pulmonary disease) (CMS/[facility]) [J44.9]  Yes   • Seizure disorder (CMS/[facility]) [G40.909]  Yes   • Gastroesophageal reflux disease without esophagitis [K21.9]  Yes   • Coronary artery disease involving native coronary artery of native heart without angina pectoris [I25.10]  Yes   • Dementia without behavioral disturbance (CMS/[facility]) [F03.90]  Yes      Resolved Hospital Problems   No resolved problems to display.         ASSESSMENT/PLAN: 65 y.o. 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  This chronicity would make it less likely for this clot to embolize.  We will try to assess chronicity with duplex scan.  I agree with anticoagulation.  Options for filter is extremely limited as it would have to be a suprarenal filter placed within the infrahepatic IVC.  This placement is concerning for penetration/migration/thrombosis all being much more likely and much more significant.  At this point time I am concerned with the patient's quality of life and overall status.  I believe that clarifying his CODE STATUS is the first and foremost priority.  He clearly is someone who has no quality of life.  There is a question of whether [place] is [place].  Last CCP to clarify this.  Once again there is no option for IVC filter in my opinion and the decision for long-term anticoagulation will be made between findings of his GI bleed and the chronicity of his clots.  We will follow with you    I discussed the plan with the nursing staff. Thank you for this consult.     Kingston Torres MD   04/21/21

## 2021-04-21 NOTE — PROGRESS NOTES
Pharmacy Consult - Lovenox    Pt Name: Drew Day   Indication:  Iliac Vein Thrombus.  Consulted by:  KEYSHA Pelayo    MRN: [id number]  : 1955  / Age: 65 y.o.  62 kg (136 lb 11.2 oz)  Body mass index is 19.61 kg/m².      Relevant clinical data and objective history reviewed:  65 y.o. male   62 kg (136 lb 11.2 oz)    Home Anticoagulation:      Past Medical History:   Diagnosis Date   • Back pain    • Convulsion disorder (CMS/HCC)    • Coronary artery disease    • Dementia (CMS/HCC)    • Hyperlipidemia    • Hypertension    • Seizures (CMS/HCC)      is allergic to eggs or egg-derived products and fish-derived products.    Lab Results   Component Value Date    WBC 14.36 (H) 2021    HGB 9.6 (L) 2021    HCT 29.9 (L) 2021    MCV 83.6 2021     2021     Lab Results   Component Value Date    GLUCOSE 131 (H) 2021    CALCIUM 9.2 2021     2021    K 4.0 2021    CO2 25.4 2021     2021    BUN 20 2021    CREATININE 0.83 2021    EGFRIFNONA 92 2021    BCR 24.1 2021    ANIONGAP 12.6 2021       CrCl cannot be calculated (Unknown ideal weight.).    Active Inpatient Anticoagulation Orders:      Assessment/Plan    Will start patient on 60 mg (1 mg/kg) subcutaneous every 12 hours, adjusted for renal function.        Renal function will continue to be monitored and dosing adjustments will be made by pharmacy based on renal function if necessary    Mo Devine [facility]

## 2021-04-21 NOTE — ED NOTES
Attempt to call report, receiving RN unable to take report at this time.      Maye Sheriff, RN  04/20/21 4026

## 2021-04-21 NOTE — H&P
Patient Name:  Drew Day  YOB: 1955  MRN:  [id number]  Admit Date:  4/20/2021  Patient Care Team:  Provider, No Known as PCP - General      Subjective   History Present Illness     Chief Complaint   Patient presents with   • Vomiting Blood     History of Present Illness   Mr. Day is a 65 y.o. former smoker with a history of seizure disorder, PAD, HLD, GERD, essential hypertension, dementia, CAD, COPD that presents to [facility] complaining of vomiting blood.  Patient is nonverbal so HPI is been taken from ER documentation.  Patient is a resident of [facility] and today nursing staff noticed some coffee-ground vomitus around 12 PM.  Patient had 2 more episodes prior to arrival.  Patient does have a history of GI bleed in 2019 which he was hospitalized at [facility].  Patient also displays some mild abdominal tenderness.  Work-up in the emergency department revealed a hemoglobin 11.7, hematocrit 35.8, and lactate 2.2.  Heme positive stool noted on rectal exam.  CT A/P shows a large volume of thrombus extending from the left common iliac vein into the inferior vena cava to the level of the left renal vein.  Constipation and fecal impaction also noted.  Patient care for further evaluation.    Review of Systems   Unable to perform ROS: Patient nonverbal        Personal History     Past Medical History:   Diagnosis Date   • Back pain    • Convulsion disorder (CMS/HCC)    • Coronary artery disease    • Dementia (CMS/HCC)    • Hyperlipidemia    • Hypertension    • Seizures (CMS/HCC)      Past Surgical History:   Procedure Laterality Date   • CARDIAC CATHETERIZATION     • CORONARY ARTERY BYPASS GRAFT Left    • VASCULAR SURGERY Left      Family History   Problem Relation Age of Onset   • Arthritis Mother    • Depression Mother    • Alcohol abuse Mother    • Emphysema Mother    • Alzheimer's disease Mother    • Alzheimer's disease Father    • Alcohol abuse Father    •  Bipolar disorder Sister    • Heart disease Sister    • Cancer Brother    • Alcohol abuse Brother    • Drug abuse Brother    • Bipolar disorder Son    • Anxiety disorder Son    • Stroke Brother    • Cancer Brother    • Hypertension Brother    • Bipolar disorder Brother    • Anxiety disorder Brother    • Neuropathy Brother    • Heart disease Brother    • Drug abuse Brother    • Alcohol abuse Brother      Social History     Tobacco Use   • Smoking status: Former Smoker   Substance Use Topics   • Alcohol use: No   • Drug use: Not on file     No current facility-administered medications on file prior to encounter.     Current Outpatient Medications on File Prior to Encounter   Medication Sig Dispense Refill   • acetaminophen (TYLENOL) 325 MG tablet Take 650 mg by mouth Every 6 (Six) Hours As Needed for Mild Pain .     • atorvastatin (LIPITOR) 40 MG tablet Take 1 tablet by mouth Every Night. 30 tablet 0   • benztropine (COGENTIN) 0.5 MG tablet Take 0.5 mg by mouth 2 (Two) Times a Day.     • donepezil (ARICEPT) 10 MG tablet Take 1 tablet by mouth Every Night. 30 tablet 1   • LORazepam (ATIVAN) 0.5 MG tablet Take 0.25 mg by mouth 2 (Two) Times a Day.     • memantine (NAMENDA) 10 MG tablet Take 10 mg by mouth 2 (Two) Times a Day.     • Multiple Vitamins-Minerals (MULTIVITAMIN ADULT) tablet Take 1 tablet by mouth daily. 30 tablet 5   • risperiDONE (risperDAL) 1 MG tablet Take 1 mg by mouth 2 (Two) Times a Day.     • tamsulosin (FLOMAX) 0.4 MG capsule 24 hr capsule Take 1 capsule by mouth every night. 30 capsule 5   • calcium carbonate (TUMS) 500 MG chewable tablet Chew 1 tablet 2 (Two) Times a Day.     • Menthol-Zinc Oxide (REMEDY CALAZIME EX) Apply  topically 2 (Two) Times a Day.     • PHENYTOIN INFATABS 50 MG chewable tablet Take 4 tablets bid (Patient taking differently: Chew 300 mg 2 (Two) Times a Day.) 240 tablet 5   • raNITIdine (ZANTAC) 150 MG tablet Take 150 mg by mouth Daily.     • thiamine (VITAMINE B-1) 100 MG  tablet Take 1 tablet by mouth daily. 30 tablet 5   • zonisamide (ZONEGRAN) 100 MG capsule Take 1 capsule by mouth daily. 30 capsule 5     Allergies   Allergen Reactions   • Eggs Or Egg-Derived Products Unknown - High Severity     Per nursing home list   • Fish-Derived Products Unknown - High Severity     Per nursing home list       Objective    Objective     Vital Signs  Temp:  [97.6 °F (36.4 °C)-99.4 °F (37.4 °C)] 97.6 °F (36.4 °C)  Heart Rate:  [101-127] 107  Resp:  [14-16] 16  BP: (100-123)/(58-73) 100/70  SpO2:  [95 %-98 %] 98 %  on  Flow (L/min):  [2] 2;   Device (Oxygen Therapy): nasal cannula  Body mass index is 19.61 kg/m².    Physical Exam  Vitals and nursing note reviewed.   Constitutional:       General: He is sleeping. He is not in acute distress.     Appearance: He is underweight. He is not toxic-appearing.      Comments: Chronically ill-appearing, opens eyes to stimuli, non-verbal     HENT:      Head: Normocephalic and atraumatic.   Eyes:      General: No scleral icterus.        Right eye: No discharge.         Left eye: No discharge.      Conjunctiva/sclera: Conjunctivae normal.   Neck:      Vascular: No JVD.   Cardiovascular:      Rate and Rhythm: Regular rhythm. Tachycardia present.      Heart sounds: Normal heart sounds. No murmur heard.   No friction rub. No gallop.    Pulmonary:      Effort: Tachypnea present. No respiratory distress.      Breath sounds: Examination of the right-lower field reveals decreased breath sounds. Examination of the left-lower field reveals decreased breath sounds. Decreased breath sounds present. No wheezing or rales.   Abdominal:      General: Bowel sounds are normal. There is no distension.      Palpations: Abdomen is soft.      Tenderness: There is no abdominal tenderness. There is no guarding.   Musculoskeletal:         General: Deformity ( Bilateral upper extremity contractures ) present. No tenderness.      Cervical back: Normal range of motion and neck supple.    Skin:     General: Skin is warm and dry.      Capillary Refill: Capillary refill takes less than 2 seconds.   Neurological:      Motor: Weakness present.   Psychiatric:         Speech: He is noncommunicative.         Behavior: Behavior normal.         Cognition and Memory: Cognition is impaired.      Comments: Patient is nonverbal       Results Review:  I reviewed the patient's new clinical results.    Lab Results (last 24 hours)     Procedure Component Value Units Date/Time    CBC & Differential [[id number]]  (Abnormal) Collected: 04/20/21 1912    Specimen: Blood Updated: 04/20/21 1924    Narrative:      The following orders were created for panel order CBC & Differential.  Procedure                               Abnormality         Status                     ---------                               -----------         ------                     CBC Auto Differential[[id number]]        Abnormal            Final result                 Please view results for these tests on the individual orders.    Comprehensive Metabolic Panel [[id number]]  (Abnormal) Collected: 04/20/21 1912    Specimen: Blood Updated: 04/20/21 1939     Glucose 131 mg/dL      BUN 20 mg/dL      Creatinine 0.83 mg/dL      Sodium 143 mmol/L      Potassium 4.0 mmol/L      Chloride 105 mmol/L      CO2 25.4 mmol/L      Calcium 9.2 mg/dL      Total Protein 6.7 g/dL      Albumin 4.10 g/dL      ALT (SGPT) 14 U/L      AST (SGOT) 20 U/L      Alkaline Phosphatase 139 U/L      Total Bilirubin <0.2 mg/dL      eGFR Non African Amer 92 mL/min/1.73      Globulin 2.6 gm/dL      A/G Ratio 1.6 g/dL      BUN/Creatinine Ratio 24.1     Anion Gap 12.6 mmol/L     Narrative:      GFR Normal >60  Chronic Kidney Disease <60  Kidney Failure <15      CBC Auto Differential [[id number]]  (Abnormal) Collected: 04/20/21 1912    Specimen: Blood Updated: 04/20/21 1924     WBC 14.36 10*3/mm3      RBC 4.28 10*6/mm3      Hemoglobin 11.7 g/dL      Hematocrit 35.8 %      MCV 83.6 fL       MCH 27.3 pg      MCHC 32.7 g/dL      RDW 13.8 %      RDW-SD 41.0 fl      MPV 10.2 fL      Platelets 186 10*3/mm3      Neutrophil % 86.5 %      Lymphocyte % 6.7 %      Monocyte % 5.8 %      Eosinophil % 0.1 %      Basophil % 0.3 %      Immature Grans % 0.6 %      Neutrophils, Absolute 12.43 10*3/mm3      Lymphocytes, Absolute 0.96 10*3/mm3      Monocytes, Absolute 0.83 10*3/mm3      Eosinophils, Absolute 0.01 10*3/mm3      Basophils, Absolute 0.05 10*3/mm3      Immature Grans, Absolute 0.08 10*3/mm3      nRBC 0.0 /100 WBC     Lipase [[id number]]  (Normal) Collected: 04/20/21 1912    Specimen: Blood Updated: 04/20/21 1949     Lipase 25 U/L     Lactic Acid, Plasma [[id number]]  (Abnormal) Collected: 04/20/21 1947    Specimen: Blood Updated: 04/20/21 2014     Lactate 2.2 mmol/L     Urinalysis With Microscopic If Indicated (No Culture) - Urine, Clean Catch [[id number]]  (Abnormal) Collected: 04/20/21 2109    Specimen: Urine, Clean Catch Updated: 04/20/21 2150     Color, UA Yellow     Appearance, UA Clear     pH, UA 7.0     Specific Gravity, UA 1.028     Glucose, UA Negative     Ketones, UA Trace     Bilirubin, UA Negative     Blood, UA Moderate (2+)     Protein, UA Negative     Leuk Esterase, UA Small (1+)     Nitrite, UA Negative     Urobilinogen, UA 0.2 E.U./dL    Urinalysis, Microscopic Only - Urine, Clean Catch [[id number]]  (Abnormal) Collected: 04/20/21 2109    Specimen: Urine, Clean Catch Updated: 04/20/21 2235     RBC, UA 13-20 /HPF      WBC, UA 3-5 /HPF      Bacteria, UA 2+ /HPF      Squamous Epithelial Cells, UA 0-2 /HPF      Hyaline Casts, UA None Seen /LPF      Methodology Automated Microscopy    COVID PRE-OP / PRE-PROCEDURE SCREENING ORDER (NO ISOLATION) - Swab, Nasopharynx [[id number]]  (Normal) Collected: 04/20/21 2128    Specimen: Swab from Nasopharynx Updated: 04/21/21 0139    Narrative:      The following orders were created for panel order COVID PRE-OP / PRE-PROCEDURE SCREENING ORDER (NO ISOLATION) - Swab,  Nasopharynx.  Procedure                               Abnormality         Status                     ---------                               -----------         ------                     COVID-19,APTIMA PANTHER,...[[id number]]  Normal              Final result                 Please view results for these tests on the individual orders.    COVID-19,APTIMA PANTHER,[facility], NP/OP SWAB IN UTM/VTM/SALINE TRANSPORT MEDIA,24 HR TAT - Swab, Nasopharynx [[id number]]  (Normal) Collected: 04/20/21 2128    Specimen: Swab from Nasopharynx Updated: 04/21/21 0139     COVID19 Not Detected    Narrative:      Fact sheet for providers: https://www.fda.gov/media/657603/download     Fact sheet for patients: https://www.fda.gov/media/694230/download    Test performed by RT PCR.    Timed Lactic Acid, Reflex [[id number]]  (Normal) Collected: 04/20/21 2233    Specimen: Blood Updated: 04/20/21 2309     Lactate 1.2 mmol/L     Blood Culture - Blood, Hand, Right [[id number]] Collected: 04/20/21 2233    Specimen: Blood from Hand, Right Updated: 04/20/21 2241    Hemoglobin & Hematocrit, Blood [[id number]]  (Abnormal) Collected: 04/21/21 0020    Specimen: Blood Updated: 04/21/21 0039     Hemoglobin 9.6 g/dL      Hematocrit 29.9 %           Imaging Results (Last 24 Hours)     Procedure Component Value Units Date/Time    CT Abdomen Pelvis With Contrast [[id number]] Collected: 04/20/21 2234     Updated: 04/20/21 2248    Narrative:      CT OF THE ABDOMEN AND PELVIS WITH CONTRAST     HISTORY: GI bleed.     COMPARISON: None available.     TECHNIQUE: Axial CT imaging was obtained through the abdomen and pelvis.  IV contrast material was administered.     FINDINGS:  Examination is severely degraded by motion artifact. Images through the  lung bases do not demonstrate any acute abnormalities. There are  extensive coronary artery calcifications. There is a small hiatal  hernia. No obvious abnormality of the stomach or duodenum is seen.  Again, images are  compromised by motion artifact. No focal hepatic  lesions are seen. Gallbladder, adrenal glands, spleen, and pancreas all  appear normal. The patient does appear to have thrombus within the  inferior vena cava. This extends from the left common iliac vein into  the inferior vena cava to the level of the left renal vein. This is seen  on both sets of images, and is not felt to be artifactual. There is no  hydronephrosis. The kidneys enhance symmetrically. There is  calcification of the aorta. There is no small bowel obstruction. Large  volume of stool seen throughout the colon, suggesting constipation.  Large volume of stool within the rectal vault may reflect fecal  impaction. There is no definite evidence of proctitis. Prostate gland is  within normal limits. The left-sided the bladder is asymmetrically thick  walled, with potentially some enhancement noted. Repeat multiphasic CT  versus consideration for cystoscopy is suggested. There is no evidence  of appendicitis. No acute osseous abnormalities are seen.       Impression:         1. The patient does have hiatal hernia. Full assessment is degraded by  motion artifact. No other abnormality of the stomach or duodenum can be  seen.  2. Large volume of thrombus, extending from the left common iliac vein  into the inferior vena cava to the level of the left renal vein.  3. Large volume of stool seen throughout the colon, suggesting  constipation, with additional increased stool within the rectal vault,  which may reflect fecal impaction.     FINDINGS were called to Physician Assistant Trista Gupta at 10:42 PM.     Radiation dose reduction techniques were utilized, including automated  exposure control and exposure modulation based on body size.     This report was finalized on 4/20/2021 10:45 PM by Dr. Allie Lakhani M.D.       XR Chest 1 View [[id number]] Collected: 04/20/21 2017     Updated: 04/20/21 2023    Narrative:      XR CHEST 1 VW-     HISTORY: Male who  is 65 years-old, leukocytosis     TECHNIQUE: Frontal views of the chest     COMPARISON: None available     FINDINGS: Heart, mediastinum and pulmonary vasculature are unremarkable.  Sternotomy wires are noted. No focal pulmonary consolidation, pleural  effusion, or pneumothorax. An 8 mm nodular density projecting  peripherally at the right lower lung is indeterminate, could for example  represent nipple shadow or pulmonary nodule, frontal view with nipple  markers or CT can be obtained for further evaluation. No acute osseous  process.       Impression:      No focal pulmonary consolidation. Indeterminate nodular  density at the right lung base.     This report was finalized on 4/20/2021 8:20 PM by Dr. Jaime Guzmán M.D.                 No orders to display        Assessment/Plan     Active Hospital Problems    Diagnosis  POA   • **GIB (gastrointestinal bleeding) [K92.2]  Yes   • Acute UTI (urinary tract infection) [N39.0]  Yes   • Thrombosis of left iliac vein (CMS/HCC) [I82.422]  Yes   • Hematemesis [K92.0]  Yes   • HLD (hyperlipidemia) [E78.5]  Yes   • Essential hypertension [I10]  Yes   • PAD (peripheral artery disease) (CMS/HCC) [I73.9]  Yes   • COPD (chronic obstructive pulmonary disease) (CMS/HCC) [J44.9]  Yes   • Seizure disorder (CMS/HCC) [G40.909]  Yes   • Gastroesophageal reflux disease without esophagitis [K21.9]  Yes   • Coronary artery disease involving native coronary artery of native heart without angina pectoris [I25.10]  Yes   • Dementia without behavioral disturbance (CMS/HCC) [F03.90]  Yes      Resolved Hospital Problems   No resolved problems to display.       Mr. Day is a 65 y.o. former smoker with a history of dementia, HLD, essential hypertension, PAD, COPD, seizure disorder, GERD, CAD who presents with hematic emesis and has been admitted for GI bleed.    GI bleed  -Hemoglobin on admission 11.7, recheck 9.6  -H&H every 8 hours, transfuse if hemoglobin less than 8  -N.p.o. diet  -IV  Protonix   -GI consultation  -Supportive care with IV hydration and antiemetic    Left common iliac vein thrombus  -Large volume of thrombus extending from the left common iliac vein into the inferior vena cava to the level of the left renal vein. Discussed with Dr. Ravi.  -Lovenox, pharmacy to dose  -Vascular consult     Acute urinary tract infection  -UA consistent with UTI  -Start IV Rocephin x5 days  -Urine cultures ordered, will adjust antibiotics accordingly    Essential hypertension/HLD/seizure disorder/GERD/dementia  -Resume home regimen once patient is evaluated by speech therapy      I discussed the patient's findings and my recommendations with nursing staff and Dr. Ravi.    VTE Prophylaxis - Pharmacy to dose Lovenox.  Code Status - Full code.-Per chart review, patient is a keating of the [facility].       KEYSHA Jaffe  [facility]  04/21/21  00:00 EDT

## 2021-04-21 NOTE — CONSULTS
"Adult Nutrition  Assessment/PES    Patient Name:  Drew Day  YOB: 1955  MRN: [id number]  Admit Date:  4/20/2021    Assessment Date:  4/21/2021    Comments:  Nutrition screen completed per RN admission screen. Drew Day is a 65 y.o. male, who presents to the ED with c/o coffee-ground emesis. NPO. Will follow for some form of nutrition as medical conditions allow.    Reason for Assessment     Row Name 04/21/21 1009          Reason for Assessment    Reason For Assessment  identified at risk by screening criteria     Diagnosis  gastrointestinal disease GI bleed/emesis, COPD, Dementia, HTN, PAD, CAD, GERD, seizure     Identified At Risk by Screening Criteria  difficulty chewing/swallowing         Nutrition/Diet History     Row Name 04/21/21 1010          Nutrition/Diet History    Typical Food/Fluid Intake  Per NH notes pt was on pureen Thin liquids     Food Allergies  egg;fish/shellfish         Anthropometrics     Row Name 04/21/21 1011 04/21/21 0356       Anthropometrics    Height  177.8 cm (70\")  --    Weight  62 kg (136 lb 11.2 oz) not weighed by RD  62 kg (136 lb 11.2 oz)       Ideal Body Weight (IBW)    Ideal Body Weight (IBW) (kg)  76.48  --    % Ideal Body Weight  81.08  --       Body Mass Index (BMI)    BMI (kg/m2)  19.66  --    BMI Assessment  BMI 18.5-24.9: normal  --        Labs/Tests/Procedures/Meds     Row Name 04/21/21 1012          Labs/Procedures/Meds    Lab Results Reviewed  reviewed     Lab Results Comments  glu        Diagnostic Tests/Procedures    Diagnostic Test/Procedure Reviewed  reviewed     Diagnostic Test/Procedures Comments  speech unable to evalualte, pt nonverbal        Medications    Pertinent Medications Reviewed  reviewed     Pertinent Medications Comments  protonix, IV at 100         Physical Findings     Row Name 04/21/21 1012          Physical Findings    Overall Physical Appearance  -- contractures     Skin  -- amanda Reese         Estimated/Assessed Needs     Row " "Name 04/21/21 1013 04/21/21 1011       Calculation Measurements    Weight Used For Calculations  62 kg (136 lb 11 oz)  --    Height  --  177.8 cm (70\")       Estimated/Assessed Needs    Additional Documentation  KCAL/KG (Group);Fluid Requirements (Group);Protein Requirements (Group)  --       KCAL/KG    KCAL/KG  25 Kcal/Kg (kcal);30 Kcal/Kg (kcal)  --    25 Kcal/Kg (kcal)  1550  --    30 Kcal/Kg (kcal)  1860  --       Protein Requirements    Weight Used For Protein Calculations  62 kg (136 lb 11 oz)  --    Est Protein Requirement Amount (gms/kg)  1.0 gm protein  --    Estimated Protein Requirements (gms/day)  62  --       Fluid Requirements    Fluid Requirements (mL/day)  1550  --    RDA Method (mL)  1550  --        Nutrition Prescription Ordered     Row Name 04/21/21 1013          Nutrition Prescription PO    Current PO Diet  NPO                 Problem/Interventions:  Problem 1     Row Name 04/21/21 1014          Nutrition Diagnoses Problem 1    Problem 1  Altered GI Function     Etiology (related to)  Medical Diagnosis     Gastrointestinal  Gastrointestinal bleed;Vomiting         Problem 2     Row Name 04/21/21 1014          Nutrition Diagnoses Problem 2    Problem 2  Biting/Chewing Difficulty     Etiology (related to)  Medical Diagnosis     Signs/Symptoms (evidenced by)  SLP/Swallow eval     Swallow eval status  Pending             Intervention Goal     Row Name 04/21/21 1015          Intervention Goal    General  Maintain nutrition;Disease management/therapy;Reduce/improve symptoms;Meet nutritional needs for age/condition     PO  Initiate feeding;Tolerate PO     Weight  Maintain weight         Nutrition Intervention     Row Name 04/21/21 1015          Nutrition Intervention    RD/Tech Action  Follow Tx progress;Care plan reviewd;Await begin PO           Education/Evaluation     Row Name 04/21/21 1015          Education    Education  Education not appropriate at this time     Please explain  Patient confusion  "       Monitor/Evaluation    Monitor  Per protocol           Electronically signed by:  Marlin Alex RD  04/21/21 10:15 EDT

## 2021-04-21 NOTE — ANESTHESIA POSTPROCEDURE EVALUATION
Patient: Drew Day    Procedure Summary     Date: 04/21/21 Room / Location:  [facility]    Anesthesia Start: 1623 Anesthesia Stop: 1641    Procedure: ESOPHAGOGASTRODUODENOSCOPY (N/A Esophagus) Diagnosis:       Gastrointestinal hemorrhage, unspecified gastrointestinal hemorrhage type      (Gastrointestinal hemorrhage, unspecified gastrointestinal hemorrhage type [K92.2])    Surgeons: Paco Abad MD Provider: Ye Singer MD    Anesthesia Type: MAC ASA Status: 3          Anesthesia Type: MAC    Vitals  Vitals Value Taken Time   /63 04/21/21 1700   Temp     Pulse 86 04/21/21 1700   Resp 14 04/21/21 1700   SpO2 99 % 04/21/21 1700           Post Anesthesia Care and Evaluation      Comments: Pt. Discharged prior to being evaluated by anesthesia.  Chart is reviewed and no complications are noted.  THIS CASE IS NOT MEDICALLY DIRECTED

## 2021-04-21 NOTE — SIGNIFICANT NOTE
04/21/21 1307   OTHER   Discipline occupational therapist   Rehab Time/Intention   Session Not Performed other (see comments)  (Pt dependent at baseline.)   Recommendation   OT - Next Appointment 04/22/21

## 2021-04-21 NOTE — ANESTHESIA PREPROCEDURE EVALUATION
Anesthesia Evaluation                  Airway   No difficulty expected  Dental      Pulmonary    (+) COPD,   Cardiovascular     Rhythm: regular    (+) hypertension, CAD, CABG,       Neuro/Psych  (+) seizures, dementia,     GI/Hepatic/Renal/Endo    (+)  GI bleeding ,     Musculoskeletal     Abdominal    Substance History      OB/GYN          Other          Other Comment: Hb 9.1                  Anesthesia Plan    ASA 3     MAC       Anesthetic plan, all risks, benefits, and alternatives have been provided, discussed and informed consent has been obtained with: legal guardian.

## 2021-04-21 NOTE — PROGRESS NOTES
Continued Stay Note  [facility]     Patient Name: Drew Day  MRN: [id number]  Today's Date: 4/21/2021    Admit Date: 4/20/2021    Discharge Plan     Row Name 04/21/21 1646       Plan    Plan  Return back to [facility] when medically ready via EMS    Plan Comments  CCP consulted to clarify code status. CCP called Deanna Lim [facility] Guardian [phone] and left vm requesting call back. roman celestin/ccp    Row Name 04/21/21 1559       Plan    Plan  Return back to [facility] when medically ready via EMS    Provided Post Acute Provider List?  N/A    Patient/Family in Agreement with Plan  yes    Plan Comments  Reviewed clinicals. CCP called State Guardian Deanna Elliott [phone] via outbound call, introduced self and explained CCP role. Verified facesheet and confirmed pt is a resident at [facility]. She will email CCP copy of guardianship documents for hospital medical record. She states pts family is allowed information for pt but not decisional. IMM notice given. She states pt will return back to [facility] [place]. [facility] and can return. Packet in [facility]. Roman CELESTIN/CCP        Discharge Codes    No documentation.             mEelyn Rich, RN

## 2021-04-22 PROBLEM — K20.91 ESOPHAGITIS, UNSPECIFIED WITH BLEEDING: Status: ACTIVE | Noted: 2021-01-01

## 2021-04-22 NOTE — PLAN OF CARE
Goal Outcome Evaluation:     Progress: improving   Pt nonverbal.  Pt started on pureed diet today per speech therapy.  Tolerating diet well.  Heparin gtt is to be started today.  Will CTM.

## 2021-04-22 NOTE — PROGRESS NOTES
" LOS: 1 day     Name: Drew Day  Age: 65 y.o.  Sex: male  :  1955  MRN: [id number]         Primary Care Physician: Provider, No Known    Subjective   Subjective  Resting soundly.  No acute overnight events.  EGD yesterday showed esophagitis which is suspected to be the source of his bleeding.    Objective   Vital Signs  Temp:  [97.6 °F (36.4 °C)-99 °F (37.2 °C)] 98 °F (36.7 °C)  Heart Rate:  [73-99] 78  Resp:  [14-18] 14  BP: ()/(41-90) 100/56  Body mass index is 19.61 kg/m².    Objective:  General Appearance:  Comfortable and in no acute distress.    Vital signs: (most recent): Blood pressure 100/56, pulse 78, temperature 98 °F (36.7 °C), temperature source Oral, resp. rate 14, height 177.8 cm (70\"), weight 62 kg (136 lb 11.2 oz), SpO2 98 %.    Lungs:  Normal effort and normal respiratory rate.  There are decreased breath sounds.    Heart: Normal rate.  Regular rhythm.    Abdomen: Abdomen is soft.  Bowel sounds are normal.   There is no abdominal tenderness.     Extremities: There is no dependent edema or local swelling.    Neurological: Patient is alert and oriented to person, place and time.    Skin:  Warm and dry.              Results Review:       I reviewed the patient's new clinical results.    Results from last 7 days   Lab Units 21  1219 21  0659 21  0010 21  1621 21  0645 21  0020 21  1912   WBC 10*3/mm3 4.61  --   --   --  15.51*  --  14.36*   HEMOGLOBIN g/dL 9.1* 9.4* 8.6* 11.0* 9.1* 9.6* 11.7*   PLATELETS 10*3/mm3 106*  --   --   --  134*  --  186     Results from last 7 days   Lab Units 21  0659 21  0645 21  191   SODIUM mmol/L 144 139 143   POTASSIUM mmol/L 3.7 3.6 4.0   CHLORIDE mmol/L 113* 108* 105   CO2 mmol/L 21.0* 22.4 25.4   BUN mg/dL 14 19 20   CREATININE mg/dL 0.73* 0.89 0.83   CALCIUM mg/dL 7.8* 8.5* 9.2   GLUCOSE mg/dL 91 116* 131*     Results from last 7 days   Lab Units 21  1219   INR  1.36* "             Scheduled Meds:   pantoprazole, 40 mg, Oral, BID AC  sucralfate, 1 g, Oral, 4x Daily AC & at Bedtime      PRN Meds:   •  acetaminophen **OR** acetaminophen **OR** acetaminophen  •  aluminum-magnesium hydroxide-simethicone  •  heparin (porcine)  •  nitroglycerin  •  ondansetron **OR** ondansetron  •  sodium chloride  Continuous Infusions:  heparin (porcine), 18 Units/kg/hr        Assessment/Plan   Active Hospital Problems    Diagnosis  POA   • **Esophagitis, unspecified with bleeding [K20.91]  Unknown   • Acute UTI (urinary tract infection) [N39.0]  Yes   • Thrombosis of left iliac vein (CMS/[facility]) [I82.422]  Yes   • GIB (gastrointestinal bleeding) [K92.2]  Yes   • Hematemesis [K92.0]  Yes   • HLD (hyperlipidemia) [E78.5]  Yes   • Essential hypertension [I10]  Yes   • PAD (peripheral artery disease) (CMS/[facility]) [I73.9]  Yes   • COPD (chronic obstructive pulmonary disease) (CMS/[facility]) [J44.9]  Yes   • Seizure disorder (CMS/[facility]) [G40.909]  Yes   • Gastroesophageal reflux disease without esophagitis [K21.9]  Yes   • Coronary artery disease involving native coronary artery of native heart without angina pectoris [I25.10]  Yes   • Dementia without behavioral disturbance (CMS/[facility]) [F03.90]  Yes      Resolved Hospital Problems   No resolved problems to display.       Assessment & Plan    -GI has cleared him to start a heparin drip.  We will see how he responds and monitor hemoglobin.  [facility]  -Vascular surgery recommends against filter placement for thrombolysis.  Anticoagulation has been initiated  -Continue PPI and Carafate.  -Does not appear that his home medications have been reconciled and will do so.  -He will return to SNF when ready        I wore full protective equipment throughout the patient encounter including eye protection and facemask.  Hand hygiene was performed before donning protective equipment and after removal when leaving the room.    Larry Guallpa  MD Mar  [facility]  04/22/21  14:33 EDT

## 2021-04-22 NOTE — CASE MANAGEMENT/SOCIAL WORK
Continued Stay Note  [facility]     Patient Name: Drew Day  MRN: [id number]  Today's Date: 4/22/2021    Admit Date: 4/20/2021    Discharge Plan     Row Name 04/22/21 1040       Plan    Plan  Return back to [place] when medically ready via EMS    Patient/Family in Agreement with Plan  yes    Plan Comments  CCP consulted to clarify pts code status. CCP called Deanna Saaban [phone] pt is a FULL code. She states she did recieve CCP email and will sent copy of guardianship papers to CCP. If MD'S are requesting assistance to recommend code status change, CCP can assist in process. Pt to return back to [place] when mediclaly ready. Roman CELESTIN/CCP        Discharge Codes    No documentation.             Emelyn Rich, RN

## 2021-04-22 NOTE — PLAN OF CARE
Goal Outcome Evaluation:  Plan of Care Reviewed With: patient  Progress: no change  Outcome Summary: Lovenox given. HGB decreased from 11.0 to 8.6. Will CTM

## 2021-04-22 NOTE — PLAN OF CARE
Goal Outcome Evaluation:  Plan of Care Reviewed With: patient     Outcome Summary: Clinical swallow eval completed. Recommend resume baseline diet of puree and thin liquids. Meds crushed with puree. Recommend liquids via straw, slow rate, 1:1 assist, alternate liquids/solids. SLP to s/o at this time, please re-consult as warranted.    Patient was not wearing a face mask during this therapy encounter. Therapist used appropriate personal protective equipment including mask, eye protection and gloves.  Mask used was N95/duckbill. Appropriate PPE was worn during the entire therapy session. Hand hygiene was completed before and after therapy session. Patient is not in enhanced droplet precautions.

## 2021-04-22 NOTE — THERAPY EVALUATION
Acute Care - Speech Language Pathology   Swallow Initial Evaluation [facility]     Patient Name: Drew Day  : 1955  MRN: [id number]  Today's Date: 2021               Admit Date: 2021    Visit Dx:     ICD-10-CM ICD-9-CM   1. Hematemesis, presence of nausea not specified  K92.0 578.0   2. Gastrointestinal hemorrhage, unspecified gastrointestinal hemorrhage type  K92.2 578.9     Patient Active Problem List   Diagnosis   • Dementia without behavioral disturbance (CMS/HCC)   • Gastroesophageal reflux disease without esophagitis   • Coronary artery disease involving native coronary artery of native heart without angina pectoris   • Seizure disorder (CMS/HCC)   • Cognitive impairment   • Closed head injury   • Acute upper GI bleed   • Hematemesis   • HLD (hyperlipidemia)   • Essential hypertension   • PAD (peripheral artery disease) (CMS/HCC)   • COPD (chronic obstructive pulmonary disease) (CMS/HCC)   • Acute UTI (urinary tract infection)   • Thrombosis of left iliac vein (CMS/HCC)   • GIB (gastrointestinal bleeding)     Past Medical History:   Diagnosis Date   • Back pain    • Convulsion disorder (CMS/HCC)    • Coronary artery disease    • Dementia (CMS/HCC)    • Hyperlipidemia    • Hypertension    • Seizures (CMS/HCC)      Past Surgical History:   Procedure Laterality Date   • CARDIAC CATHETERIZATION     • CORONARY ARTERY BYPASS GRAFT Left    • ENDOSCOPY N/A 2021    Procedure: ESOPHAGOGASTRODUODENOSCOPY;  Surgeon: Paco Abad MD;  Location: [facility] ENDOSCOPY;  Service: Gastroenterology;  Laterality: N/A;  Pre: coffee ground emesis  Post: hiatal hernia, esophagitis   • VASCULAR SURGERY Left         SWALLOW EVALUATION (last 72 hours)      SLP Adult Swallow Evaluation     Row Name 21 0924                   Rehab Evaluation    Document Type  evaluation  -OC        Subjective Information  no complaints  -OC        Patient Observations  alert;cooperative;agree to therapy  -OC         Care Plan Review  evaluation/treatment results reviewed  -OC        Patient Effort  good  -OC        Symptoms Noted During/After Treatment  none  -OC           General Information    Patient Profile Reviewed  yes  -OC        Pertinent History Of Current Problem  Patient admitted with esophagitis, GERD, GI bleed. Hx dementia, nonverbal, contracted.   -OC        Current Method of Nutrition  NPO  -OC        Precautions/Limitations, Vision  WFL;for purposes of eval  -OC        Precautions/Limitations, Hearing  WFL;for purposes of eval  -OC        Prior Level of Function-Communication  cognitive-linguistic impairment  -OC        Prior Level of Function-Swallowing  puree;thin liquids;other (see comments) per SNF, no egg no fish  -OC        Plans/Goals Discussed with  patient;agreed upon  -OC        Barriers to Rehab  previous functional deficit;medically complex;cognitive status  -OC        Patient's Goals for Discharge  patient could not state  -OC           Pain    Additional Documentation  Pain Scale: Numbers Pre/Post-Treatment (Group)  -OC           Pain Scale: Numbers Pre/Post-Treatment    Pre/Posttreatment Pain Comment  unable to state, does not appear in discomfort  -OC           Oral Motor Structure and Function    Dentition Assessment  edentulous  -OC        Secretion Management  WNL/WFL  -OC        Mucosal Quality  moist, healthy  -OC        Volitional Swallow  unable to elicit  -OC        Volitional Cough  unable to elicit  -OC           Oral Musculature and Cranial Nerve Assessment    Oral Motor General Assessment  unable to assess;other (see comments) unable to follow oral motor commands  -OC           Clinical Swallow Eval    Clinical Swallow Evaluation Summary  Patient demonstrated no overt s/s aspiration with thin via spoon/cup/straw and puree. Patient with perseverative mastication puree. Timely swallow with all presentations. Patient has improved control of liquids via straw.   -OC           Clinical  Impression    SLP Swallowing Diagnosis  swallow WFL;oral dysphagia  -OC        Functional Impact  no impact on function  -OC        Rehab Potential/Prognosis, Swallowing  good, to achieve stated therapy goals  -OC        Swallow Criteria for Skilled Therapeutic Interventions Met  current level of function same as previous level of function;baseline status  -OC           Recommendations    Therapy Frequency (Swallow)  evaluation only  -OC        Predicted Duration Therapy Intervention (Days)  until discharge  -OC        SLP Diet Recommendation  puree;thin liquids  -OC        Recommended Precautions and Strategies  assist with feeding;small bites of food and sips of liquid;upright posture during/after eating;alternate between small bites of food and sips of liquid;general aspiration precautions  -OC        Oral Care Recommendations  Oral Care BID/PRN  -OC        SLP Rec. for Method of Medication Administration  meds crushed;with pudding or applesauce;as tolerated  -OC        Monitor for Signs of Aspiration  yes;notify SLP if any concerns  -OC        Anticipated Discharge Disposition (SLP)  skilled nursing facility  -OC          User Key  (r) = Recorded By, (t) = Taken By, (c) = Cosigned By    Initials Name Effective Dates    OC Pooja Rosales MA,[facility]-SLP 06/08/18 -           EDUCATION  The patient has been educated in the following areas:   Dysphagia (Swallowing Impairment).    SLP Recommendation and Plan  SLP Swallowing Diagnosis: swallow WFL, oral dysphagia  SLP Diet Recommendation: puree, thin liquids  Recommended Precautions and Strategies: assist with feeding, small bites of food and sips of liquid, upright posture during/after eating, alternate between small bites of food and sips of liquid, general aspiration precautions  SLP Rec. for Method of Medication Administration: meds crushed, with pudding or applesauce, as tolerated     Monitor for Signs of Aspiration: yes, notify SLP if any concerns     Swallow Criteria  for Skilled Therapeutic Interventions Met: current level of function same as previous level of function, baseline status  Anticipated Discharge Disposition (SLP): skilled nursing facility  Rehab Potential/Prognosis, Swallowing: good, to achieve stated therapy goals  Therapy Frequency (Swallow): evaluation only  Predicted Duration Therapy Intervention (Days): until discharge           Plan of Care Reviewed With: patient  Outcome Summary: Clinical swallow eval completed. Recommend resume baseline diet of puree and thin liquids. Meds crushed with puree. Recommend liquids via straw, slow rate, 1:1 assist, alternate liquids/solids. SLP to s/o at this time, please re-consult as warranted.  Patient was not wearing a face mask during this therapy encounter. Therapist used appropriate personal protective equipment including mask, eye protection and gloves.  Mask used was N95/duckbill. Appropriate PPE was worn during the entire therapy session. Hand hygiene was completed before and after therapy session. Patient is not in enhanced droplet precautions.                    SLP Outcome Measures (last 72 hours)      SLP Outcome Measures     Row Name 04/22/21 0929             SLP Outcome Measures    Outcome Measure Used?  Adult NOMS  -OC         Adult FCM Scores    FCM Chosen  Swallowing  -OC      Swallowing FCM Score  4  -OC        User Key  (r) = Recorded By, (t) = Taken By, (c) = Cosigned By    Initials Name Effective Dates    OC Pooja Rosales MA,[facility]-SLP 06/08/18 -            Time Calculation:   Time Calculation- SLP     Row Name 04/22/21 0929             Time Calculation- SLP    SLP Start Time  0845  -OC      SLP Received On  04/22/21  -OC         Untimed Charges    SLP Eval/Re-eval   ST Eval Oral Pharyng Swallow - 98283  -OC      [id number]-[id number] Eval Oral Pharyng Swallow Minutes  45  -OC         Total Minutes    Untimed Charges Total Minutes  45  -OC       Total Minutes  45  -OC        User Key  (r) = Recorded By, (t) = Taken By, (c)  = Cosigned By    Initials Name Provider Type    OC Pooja Rosales MA,CCC-SLP Speech and Language Pathologist          Therapy Charges for Today     Code Description Service Date Service Provider Modifiers Qty    [id number]  ST EVAL ORAL PHARYNG SWALLOW 3 4/22/2021 Pooja Rosales MA,CCC-SLP GN 1               Pooja Rosales MA,PARI-SLP  4/22/2021

## 2021-04-22 NOTE — PROGRESS NOTES
Continued Stay Note  [facility]     Patient Name: Drew Day  MRN: [id number]  Today's Date: 4/22/2021    Admit Date: 4/20/2021    Discharge Plan     Row Name 04/22/21 1040       Plan    Plan  Return back to [facility] when medically ready via EMS    Patient/Family in Agreement with Plan  yes    Plan Comments  CCP called Deanna Saaban [phone] pt is a FULL code. She states she did recieve CCP email and will sent copy of guardianship papers to CCP. Pt to return back to [facility] when mediclaly ready. Roman CELESTIN/CCP        Discharge Codes    No documentation.             Emelyn Rich, RN

## 2021-04-22 NOTE — PROGRESS NOTES
[facility]  Inpatient Progress Note    Reason for Follow Up: Hematemesis due to esophagitis    Subjective     Interval History:   No further vomiting.  He has been n.p.o.  His sister is at the bedside.  She reports that he was on a puréed diet at the nursing facility but required assistance to feed.    Current Facility-Administered Medications:   •  acetaminophen (TYLENOL) tablet 650 mg, 650 mg, Oral, Q4H PRN **OR** acetaminophen (TYLENOL) 160 MG/5ML solution 650 mg, 650 mg, Oral, Q4H PRN **OR** acetaminophen (TYLENOL) suppository 650 mg, 650 mg, Rectal, Q4H PRN, Paco Abad MD  •  aluminum-magnesium hydroxide-simethicone (MAALOX MAX) 400-400-40 MG/5ML suspension 15 mL, 15 mL, Oral, Q6H PRN, Paco Abad MD  •  enoxaparin (LOVENOX) syringe 60 mg, 1 mg/kg, Subcutaneous, Q12H, Abdiel Little MD, 60 mg at 04/22/21 0830  •  nitroglycerin (NITROSTAT) SL tablet 0.4 mg, 0.4 mg, Sublingual, Q5 Min PRN, Paco Abad MD  •  ondansetron (ZOFRAN) tablet 4 mg, 4 mg, Oral, Q6H PRN **OR** ondansetron (ZOFRAN) injection 4 mg, 4 mg, Intravenous, Q6H PRN, Paco Abad MD  •  pantoprazole (PROTONIX) EC tablet 40 mg, 40 mg, Oral, BID AC, Paco Abad MD  •  sodium chloride 0.9 % flush 10 mL, 10 mL, Intravenous, PRN, Paco Abad MD, 10 mL at 04/22/21 0142  •  sodium chloride 0.9 % infusion, 100 mL/hr, Intravenous, Continuous, Paco Abad MD, Last Rate: 100 mL/hr at 04/22/21 0142, 100 mL/hr at 04/22/21 0142  Review of Systems:    The following systems were reviewed and negative;  constitution and gastrointestinal    Objective     Vital Signs  Temp:  [97.6 °F (36.4 °C)-99 °F (37.2 °C)] 99 °F (37.2 °C)  Heart Rate:  [73-99] 77  Resp:  [14-18] 14  BP: ()/(41-90) 100/63  Body mass index is 19.61 kg/m².  No intake or output data in the 24 hours ending 04/22/21 0839  No intake/output data recorded.     Physical Exam:   General: patient awake, nonverbal   Eyes: Normal lids and  lashes, no scleral icterus   Neck: supple, normal ROM   Skin: warm and dry, not jaundiced   Pulm:   regular and unlabored   Abdomen: soft, nontender, nondistended    Extremities: no rash or edema   Psychiatric: Normal mood and behavior; memory intact     Results Review:     I reviewed the patient's new clinical results.    Results from last 7 days   Lab Units 04/22/21  0659 04/22/21  0010 04/21/21  1621 04/21/21  0645 04/20/21  1912   WBC 10*3/mm3  --   --   --  15.51* 14.36*   HEMOGLOBIN g/dL 9.4* 8.6* 11.0* 9.1* 11.7*   HEMATOCRIT % 30.1* 26.8* 34.8* 28.2* 35.8*   PLATELETS 10*3/mm3  --   --   --  134* 186     Results from last 7 days   Lab Units 04/22/21  0659 04/21/21  0645 04/20/21 1912   SODIUM mmol/L 144 139 143   POTASSIUM mmol/L 3.7 3.6 4.0   CHLORIDE mmol/L 113* 108* 105   CO2 mmol/L 21.0* 22.4 25.4   BUN mg/dL 14 19 20   CREATININE mg/dL 0.73* 0.89 0.83   CALCIUM mg/dL 7.8* 8.5* 9.2   BILIRUBIN mg/dL  --  0.2 <0.2   ALK PHOS U/L  --  102 139*   ALT (SGPT) U/L  --  10 14   AST (SGOT) U/L  --  19 20   GLUCOSE mg/dL 91 116* 131*         Lab Results   Lab Value Date/Time    LIPASE 25 04/20/2021 1912    LIPASE 27 11/22/2019 0650       Radiology:  CT Abdomen Pelvis With Contrast   Final Result       1. The patient does have hiatal hernia. Full assessment is degraded by   motion artifact. No other abnormality of the stomach or duodenum can be   seen.   2. Large volume of thrombus, extending from the left common iliac vein   into the inferior vena cava to the level of the left renal vein.   3. Large volume of stool seen throughout the colon, suggesting   constipation, with additional increased stool within the rectal vault,   which may reflect fecal impaction.       FINDINGS were called to Physician Assistant Trista Gupta at 10:42 PM.       Radiation dose reduction techniques were utilized, including automated   exposure control and exposure modulation based on body size.       This report was finalized on  4/20/2021 10:45 PM by Dr. Allie Lakhani M.D.          XR Chest 1 View   Final Result   No focal pulmonary consolidation. Indeterminate nodular   density at the right lung base.       This report was finalized on 4/20/2021 8:20 PM by Dr. Jaime Guzmán M.D.              Assessment/Plan     Patient Active Problem List   Diagnosis   • Dementia without behavioral disturbance (CMS/HCC)   • Gastroesophageal reflux disease without esophagitis   • Coronary artery disease involving native coronary artery of native heart without angina pectoris   • Seizure disorder (CMS/HCC)   • Cognitive impairment   • Closed head injury   • Acute upper GI bleed   • Hematemesis   • HLD (hyperlipidemia)   • Essential hypertension   • PAD (peripheral artery disease) (CMS/HCC)   • COPD (chronic obstructive pulmonary disease) (CMS/HCC)   • Acute UTI (urinary tract infection)   • Thrombosis of left iliac vein (CMS/HCC)   • GIB (gastrointestinal bleeding)     All problems are new to me today  Assessment:  1. Hematemesis due to esophagitis  2. GERD with esophagitis  3. Blood loss anemia  4. IVC thrombosis/left iliac-vascular consult reviewed-Eliquis recommended when safe from a bleeding standpoint  5. Thrombocytopenia      Plan:  · Continue twice daily Protonix, add Carafate evaluated by speech  · I think the risk of significant bleeding is low-I would not be opposed to starting a heparin drip and watching carefully-could be transitioned to Eliquis if stable  · Nutrition to see to determine safety of oral feeding-he was on a puréed diet previously    I discussed the patients findings and my recommendations with patient and family.    Elaina Aguilera MD

## 2021-04-22 NOTE — PROGRESS NOTES
Name: Drew Day ADMIT: 2021   : 1955  PCP: Provider, No Known    MRN: [id number] LOS: 1 days   AGE/SEX: 65 y.o. male  ROOM: [facility]    [place], Subsequent Hospital Care    Chief Complaint   Patient presents with   • Vomiting Blood     CC: Inferior vena caval thrombosis  Subjective     65 y.o. male vena caval thrombosis and left iliac venous thrombosis.  The iliac venous clot appears to be acute.  It is unclear if the cable clot is chronic or acute but given the homogenicityof the clots it does appear that the whole thing could be acute.  Propagation of clot up to the renals where there is flow could be expected.  This is a large clot and the only way to approach it would be a angio VAC from the neck which is a major procedure.  I would recommend against the use of a filter intrahepatically.  Anticoagulation is our current recommendation.  DOAC would probably be the best answer given likely difficulty in following INRs.    Review of Systems still unresponsive but more awake    Objective     Scheduled Medications:   enoxaparin, 1 mg/kg, Subcutaneous, Q12H  pantoprazole, 40 mg, Oral, BID AC        Active Infusions:  sodium chloride, 100 mL/hr, Last Rate: 100 mL/hr (21 0142)        As Needed Medications:  •  acetaminophen **OR** acetaminophen **OR** acetaminophen  •  aluminum-magnesium hydroxide-simethicone  •  nitroglycerin  •  ondansetron **OR** ondansetron  •  sodium chloride    Vital Signs  Vital Signs Patient Vitals for the past 24 hrs:   BP Temp Temp src Pulse Resp SpO2 Height Weight   21 0714 100/63 99 °F (37.2 °C) Oral 77 14 100 % -- --   21 2325 116/44 98.7 °F (37.1 °C) Oral 96 14 94 % -- --   21 1947 103/57 98.5 °F (36.9 °C) Oral 99 14 91 % -- --   21 1700 100/63 -- -- 86 14 99 % -- --   21 1651 102/41 -- -- 80 14 100 % -- --   21 1639 107/43 -- -- 73 14 100 % -- --   04/21/21 1553 128/59 98.7 °F (37.1 °C) Oral 97 18 95 % -- --  "  04/21/21 1447 94/90 97.6 °F (36.4 °C) Oral 87 18 94 % -- --   04/21/21 1011 -- -- -- -- -- -- 177.8 cm (70\") 62 kg (136 lb 11.2 oz)     Vital Signs (range)  Temp:  [97.6 °F (36.4 °C)-99 °F (37.2 °C)] 99 °F (37.2 °C)  Heart Rate:  [73-99] 77  Resp:  [14-18] 14  BP: ()/(41-90) 100/63  I/O:  I/O last 3 completed shifts:  In: 500 [IV Piggyback:500]  Out: -   I/O: No intake or output data in the 24 hours ending 04/22/21 0815  BMI:  Body mass index is 19.61 kg/m².    Physical Exam:  Physical Exam   Abdomen benign  Extremities warm with no swelling    Results Review:     CBC    Results from last 7 days   Lab Units 04/22/21  0659 04/22/21  0010 04/21/21  1621 04/21/21  0645 04/21/21  0020 04/20/21  1912   WBC 10*3/mm3  --   --   --  15.51*  --  14.36*   HEMOGLOBIN g/dL 9.4* 8.6* 11.0* 9.1* 9.6* 11.7*   PLATELETS 10*3/mm3  --   --   --  134*  --  186     BMP   Results from last 7 days   Lab Units 04/22/21  0659 04/21/21  0645 04/20/21  1912   SODIUM mmol/L 144 139 143   POTASSIUM mmol/L 3.7 3.6 4.0   CHLORIDE mmol/L 113* 108* 105   CO2 mmol/L 21.0* 22.4 25.4   BUN mg/dL 14 19 20   CREATININE mg/dL 0.73* 0.89 0.83   GLUCOSE mg/dL 91 116* 131*     Cr Clearance Estimated Creatinine Clearance: 78.6 mL/min (A) (by C-G formula based on SCr of 0.73 mg/dL (L)).  Coag     Radiology(recent) CT Abdomen Pelvis With Contrast    Result Date: 4/20/2021   1. The patient does have hiatal hernia. Full assessment is degraded by motion artifact. No other abnormality of the stomach or duodenum can be seen. 2. Large volume of thrombus, extending from the left common iliac vein into the inferior vena cava to the level of the left renal vein. 3. Large volume of stool seen throughout the colon, suggesting constipation, with additional increased stool within the rectal vault, which may reflect fecal impaction.  FINDINGS were called to Physician Assistant Trista Gupta at 10:42 PM.  Radiation dose reduction techniques were utilized, including " automated exposure control and exposure modulation based on body size.  This report was finalized on 4/20/2021 10:45 PM by Dr. Allie Lakhani M.D.      XR Chest 1 View    Result Date: 4/20/2021  No focal pulmonary consolidation. Indeterminate nodular density at the right lung base.  This report was finalized on 4/20/2021 8:20 PM by Dr. Jaime Guzmán M.D.        Assessment/Plan     Assessment & Plan      GIB (gastrointestinal bleeding)    Dementia without behavioral disturbance (CMS/HCC)    Gastroesophageal reflux disease without esophagitis    Coronary artery disease involving native coronary artery of native heart without angina pectoris    Seizure disorder (CMS/HCC)    Hematemesis    HLD (hyperlipidemia)    Essential hypertension    PAD (peripheral artery disease) (CMS/HCC)    COPD (chronic obstructive pulmonary disease) (CMS/HCC)    Acute UTI (urinary tract infection)    Thrombosis of left iliac vein (CMS/HCC)      65 y.o. male keating of the [facility] with thrombosed vena cava and left iliac system.  Esophagitis is the likely source for GI bleeding.  Options for treatment of the clot are really limited to anticoagulation.  I do not believe the filter is a good answer as it would have to be placed infra hepatically.  I believe I believe attempts to Angiomax this clot out would also be a major surgery with significant risk in a gentleman who is unlikely to show significant long-term benefit.  I am strongly recommending review by the [facility] as to his CODE STATUS as well.  Would recommend initiation of Eliquis when GI feels it is appropriate.      Personal protective equipment used for this patient encounter:  Patient wearing surgical mask []    Provider wearing a surgical mask [x]    Gloves [x]    Eye protection [x]    Face Shield []    Gown []    N 95 respirator or CAPR/PAPR []   Duration of interaction 15    Kingston Torres MD  04/22/21  08:08 EDT    Please call my office with any question: [phone]    Active Hospital Problems    Diagnosis  POA   • **GIB (gastrointestinal bleeding) [K92.2]  Yes   • Acute UTI (urinary tract infection) [N39.0]  Yes   • Thrombosis of left iliac vein (CMS/[facility]) [I82.422]  Yes   • Hematemesis [K92.0]  Yes   • HLD (hyperlipidemia) [E78.5]  Yes   • Essential hypertension [I10]  Yes   • PAD (peripheral artery disease) (CMS/[facility]) [I73.9]  Yes   • COPD (chronic obstructive pulmonary disease) (CMS/[facility]) [J44.9]  Yes   • Seizure disorder (CMS/[facility]) [G40.909]  Yes   • Gastroesophageal reflux disease without esophagitis [K21.9]  Yes   • Coronary artery disease involving native coronary artery of native heart without angina pectoris [I25.10]  Yes   • Dementia without behavioral disturbance (CMS/[facility]) [F03.90]  Yes      Resolved Hospital Problems   No resolved problems to display.

## 2021-04-23 NOTE — CASE MANAGEMENT/SOCIAL WORK
Continued Stay Note  [facility]     Patient Name: Drew Day  MRN: [id number]  Today's Date: 4/23/2021    Admit Date: 4/20/2021    Discharge Plan     Row Name 04/23/21 1329       Plan    Plan  Return back to [place] when medically ready via EMS    Patient/Family in Agreement with Plan  yes    Plan Comments  Recieved Guardianship documents from State Guardian Deannadiamante Elliott. Notified her of MD requesting DNR Process for pt. Plan is pt to return back to [place] when medically ready. Packet in tim. bernadette CELESTIN/CCP    Row Name 04/23/21 1328       Plan    Plan  Return back to [place] when medically ready via EMS    Row Name 04/23/21 1257       Plan    Plan  Return back to [place] when medically ready via EMS    Plan Comments  [facility]  Form started and faxed to Kayla SANCHEZ for signature.  Will submit, along with supporting documentation, once completed by Dr. Manuel.  SI        Discharge Codes    No documentation.             Emelyn Rich RN

## 2021-04-23 NOTE — PROGRESS NOTES
[facility], [place], operated by [facility]  Inpatient Progress Note    Reason for Follow Up:  Hematemesis due to esophagitis    Subjective     Interval History:   No overnight issues.  He is eating with assistance.  No visualized bleeding.  On heparin drip on heparin drip    Current Facility-Administered Medications:   •  acetaminophen (TYLENOL) tablet 650 mg, 650 mg, Oral, Q4H PRN **OR** acetaminophen (TYLENOL) 160 MG/5ML solution 650 mg, 650 mg, Oral, Q4H PRN **OR** acetaminophen (TYLENOL) suppository 650 mg, 650 mg, Rectal, Q4H PRN, Paco Abad MD  •  aluminum-magnesium hydroxide-simethicone (MAALOX MAX) 400-400-40 MG/5ML suspension 15 mL, 15 mL, Oral, Q6H PRN, Paco Abad MD  •  atorvastatin (LIPITOR) tablet 40 mg, 40 mg, Oral, Nightly, Larry Manuel MD, 40 mg at 04/22/21 2130  •  benztropine (COGENTIN) tablet 0.5 mg, 0.5 mg, Oral, BID, Larry Manuel MD, 0.5 mg at 04/23/21 0904  •  donepezil (ARICEPT) tablet 10 mg, 10 mg, Oral, Nightly, Larry Manuel MD, 10 mg at 04/22/21 2129  •  heparin (porcine) 5000 UNIT/ML injection 2,500-5,000 Units, 40-80 Units/kg, Intravenous, Q6H PRN, Larry Manuel MD  •  heparin 13861 units/250 mL (100 units/mL) in 0.45 % NaCl infusion, 18 Units/kg/hr, Intravenous, Titrated, Larry Manuel MD, Last Rate: 7.44 mL/hr at 04/23/21 0935, 12 Units/kg/hr at 04/23/21 0935  •  lansoprazole (FIRST) oral suspension 30 mg, 30 mg, Oral, BID OLGA, Elaina Aguilera MD  •  LORazepam (ATIVAN) tablet 0.25 mg, 0.25 mg, Oral, BID, Larry Manuel MD, 0.25 mg at 04/23/21 0902  •  memantine (NAMENDA) tablet 10 mg, 10 mg, Oral, BID, Larry Manuel MD, 10 mg at 04/23/21 0902  •  nitroglycerin (NITROSTAT) SL tablet 0.4 mg, 0.4 mg, Sublingual, Q5 Min PRN, Paco Abad MD  •  ondansetron (ZOFRAN) tablet 4 mg, 4 mg, Oral, Q6H PRN **OR** ondansetron (ZOFRAN) injection 4 mg, 4 mg, Intravenous, Q6H PRN, Paco Abad,  MD  •  phenytoin (DILANTIN) chewable tablet 300 mg, 300 mg, Oral, BID, Larry Manuel MD, 300 mg at 04/23/21 0904  •  risperiDONE (risperDAL) tablet 1 mg, 1 mg, Oral, Q12H, Larry Manuel MD, 1 mg at 04/23/21 0903  •  sodium chloride 0.9 % flush 10 mL, 10 mL, Intravenous, PRN, Paco Abad MD, 10 mL at 04/22/21 0142  •  sucralfate (CARAFATE) tablet 1 g, 1 g, Oral, 4x Daily AC & at Bedtime, Elaina Aguilera MD, 1 g at 04/23/21 1244  •  tamsulosin (FLOMAX) 24 hr capsule 0.4 mg, 0.4 mg, Oral, Nightly, Larry Manuel MD, 0.4 mg at 04/22/21 2129  •  thiamine (VITAMIN B-1) tablet 100 mg, 100 mg, Oral, Daily, Larry Manuel MD, 100 mg at 04/23/21 0903  •  zonisamide (ZONEGRAN) capsule 100 mg, 100 mg, Oral, Daily, Larry Manuel MD, 100 mg at 04/23/21 0902  Review of Systems:    The following systems were reviewed and negative;  constitution and gastrointestinal    Objective     Vital Signs  Temp:  [97.8 °F (36.6 °C)-98.4 °F (36.9 °C)] 97.8 °F (36.6 °C)  Heart Rate:  [56-78] 62  Resp:  [14-20] 14  BP: ()/(48-66) 96/54  Body mass index is 19.61 kg/m².    Intake/Output Summary (Last 24 hours) at 4/23/2021 1259  Last data filed at 4/23/2021 0900  Gross per 24 hour   Intake 220 ml   Output --   Net 220 ml     I/O this shift:  In: 120 [P.O.:120]  Out: -      Physical Exam:   General: Nonverbal   Eyes: Normal lids and lashes, no scleral icterus   Neck: supple, normal ROM   Skin: warm and dry, not jaundiced   Pulm:  , regular and unlabored   Abdomen: soft, nontender, nondistended    Extremities: no rash or edema       Results Review:     I reviewed the patient's new clinical results.    Results from last 7 days   Lab Units 04/23/21  1031 04/23/21  0636 04/22/21  1219 04/21/21  0645   WBC 10*3/mm3  --  3.61 4.61 15.51*   HEMOGLOBIN g/dL 8.0* 7.8* 9.1* 9.1*   HEMATOCRIT % 24.4* 25.0* 28.5* 28.2*   PLATELETS 10*3/mm3  --  101* 106* 134*     Results from last 7  days   Lab Units 04/22/21  0659 04/21/21  0645 04/20/21  1912   SODIUM mmol/L 144 139 143   POTASSIUM mmol/L 3.7 3.6 4.0   CHLORIDE mmol/L 113* 108* 105   CO2 mmol/L 21.0* 22.4 25.4   BUN mg/dL 14 19 20   CREATININE mg/dL 0.73* 0.89 0.83   CALCIUM mg/dL 7.8* 8.5* 9.2   BILIRUBIN mg/dL  --  0.2 <0.2   ALK PHOS U/L  --  102 139*   ALT (SGPT) U/L  --  10 14   AST (SGOT) U/L  --  19 20   GLUCOSE mg/dL 91 116* 131*     Results from last 7 days   Lab Units 04/22/21  1219   INR  1.36*     Lab Results   Lab Value Date/Time    LIPASE 25 04/20/2021 1912    LIPASE 27 11/22/2019 0650       Radiology:  CT Abdomen Pelvis With Contrast   Final Result       1. The patient does have hiatal hernia. Full assessment is degraded by   motion artifact. No other abnormality of the stomach or duodenum can be   seen.   2. Large volume of thrombus, extending from the left common iliac vein   into the inferior vena cava to the level of the left renal vein.   3. Large volume of stool seen throughout the colon, suggesting   constipation, with additional increased stool within the rectal vault,   which may reflect fecal impaction.       FINDINGS were called to Physician Assistant Trista Gupta at 10:42 PM.       Radiation dose reduction techniques were utilized, including automated   exposure control and exposure modulation based on body size.       This report was finalized on 4/20/2021 10:45 PM by Dr. Allie Lakhani M.D.          XR Chest 1 View   Final Result   No focal pulmonary consolidation. Indeterminate nodular   density at the right lung base.       This report was finalized on 4/20/2021 8:20 PM by Dr. Jaime Guzmán M.D.              Assessment/Plan     Patient Active Problem List   Diagnosis   • Dementia without behavioral disturbance (CMS/HCC)   • Gastroesophageal reflux disease without esophagitis   • Coronary artery disease involving native coronary artery of native heart without angina pectoris   • Seizure disorder  (CMS/[facility])   • Cognitive impairment   • Closed head injury   • Acute upper GI bleed   • Hematemesis   • HLD (hyperlipidemia)   • Essential hypertension   • PAD (peripheral artery disease) (CMS/[facility])   • COPD (chronic obstructive pulmonary disease) (CMS/[facility])   • Acute UTI (urinary tract infection)   • Thrombosis of left iliac vein (CMS/[facility])   • GIB (gastrointestinal bleeding)   • Esophagitis, unspecified with bleeding       Assessment:  1. Hematemesis due to esophagitis  2. GERD with esophagitis  3. Blood loss anemia  4. IVC thrombosis/left iliac-vascular consult reviewed-Eliquis recommended when safe from a bleeding standpoint  5. Thrombocytopenia      Plan:  · Heparin drip initiated-hemoglobin down this morning.  Will follow closely -watch for evidence of bleeding   · continue PPI twice daily indefinitely and Carafate x4 to 6 weeks  · Diet as tolerated    I discussed the patients findings and my recommendations with patient and nursing staff.    Elaina Aguilera MD

## 2021-04-23 NOTE — PROGRESS NOTES
Adult Nutrition  Assessment/PES    Patient Name:  Drew Day  YOB: 1955  MRN: [id number]  Admit Date:  4/20/2021    Assessment Date:  4/23/2021    Comments:   Diet advanced from NPO to Pureed/thins per speech recommendations. Tolerating and eating well, %. Added Mighty Shakes BID to supplement. Will continue to monitor.    Reason for Assessment     Row Name 04/23/21 1339          Reason for Assessment    Reason For Assessment  follow-up protocol             Labs/Tests/Procedures/Meds     Row Name 04/23/21 1333          Labs/Procedures/Meds    Lab Results Reviewed  reviewed        Diagnostic Tests/Procedures    Diagnostic Test/Procedure Reviewed  reviewed     Diagnostic Test/Procedures Comments  ST eval, diet started 4/22        Medications    Pertinent Medications Reviewed  reviewed     Pertinent Medications Comments  carafate, protonix, heparin         Physical Findings     Row Name 04/23/21 133          Physical Findings    Overall Physical Appearance  underweight     Skin  other (see comments) B 14, skin intact           Nutrition Prescription Ordered     Row Name 04/23/21 1336          Nutrition Prescription PO    Current PO Diet  Dysphagia     Dysphagia Level  2  Pureed     Fluid Consistency  Thin         Evaluation of Received Nutrient/Fluid Intake     Row Name 04/23/21 1332          PO Evaluation    Number of Days PO Intake Evaluated  2 days     % PO Intake  %               Problem/Interventions:    Problem 2     Row Name 04/23/21 133          Nutrition Diagnoses Problem 2    Problem 2  Biting/Chewing Difficulty     Signs/Symptoms (evidenced by)  SLP/Swallow eval     Swallow eval status  Done             Intervention Goal     Row Name 04/23/21 0544          Intervention Goal    General  Maintain nutrition;Disease management/therapy;Reduce/improve symptoms     PO  Tolerate PO;Continue positive trend;PO intake (%)     PO Intake %  75 %     Weight  Maintain weight          Nutrition Intervention     Row Name 04/23/21 1336          Nutrition Intervention    RD/Tech Action  Follow Tx progress;Care plan reviewd;Encourage intake;Recommend/ordered     Recommended/Ordered  Supplement         Nutrition Prescription     Row Name 04/23/21 1336          Nutrition Prescription PO    PO Prescription  Begin/change supplement     Supplement  Mighty Shake     Supplement Frequency  2 times a day         Education/Evaluation     Row Name 04/23/21 1334          Monitor/Evaluation    Monitor  Per protocol;I&O;PO intake;Supplement intake;Pertinent labs;Skin status;Weight           Electronically signed by:  Nafisa Mclean RD  04/23/21 13:37 EDT

## 2021-04-23 NOTE — CASE MANAGEMENT/SOCIAL WORK
Continued Stay Note  [facility]     Patient Name: Drew Day  MRN: [id number]  Today's Date: 4/23/2021    Admit Date: 4/20/2021    Discharge Plan     Row Name 04/23/21 1328       Plan    Plan  Return back to [facility] when medically ready via EMS    Plan Comments  Form completed.  Form and supporting documentation submitted to [facility] nurse consultants at [phone].   left to notify of submission.  Await determination.  5N desk phone number supplied in case of after hours determination.  SI                              Discharge Codes    No documentation.             Nya Quigley RN

## 2021-04-23 NOTE — PROGRESS NOTES
" LOS: 2 days     Name: Drew Day  Age: 65 y.o.  Sex: male  :  1955  MRN: [id number]         Primary Care Physician: Provider, No Known    Subjective   Subjective  No acute overnight events.  No obvious bleeding noted per staff.  Mentation remains poor, which is not new.    Objective   Vital Signs  Temp:  [97.8 °F (36.6 °C)-98.4 °F (36.9 °C)] 97.8 °F (36.6 °C)  Heart Rate:  [56-78] 56  Resp:  [14-20] 18  BP: ()/(48-66) 101/48  Body mass index is 19.61 kg/m².    Objective:  General Appearance:  Comfortable, in no acute distress and ill-appearing.    Vital signs: (most recent): Blood pressure 101/48, pulse 56, temperature 97.8 °F (36.6 °C), temperature source Oral, resp. rate 18, height 177.8 cm (70\"), weight 62 kg (136 lb 11.2 oz), SpO2 100 %.    Lungs:  Normal effort and normal respiratory rate.  There are decreased breath sounds.    Heart: Normal rate.  Regular rhythm.    Abdomen: Abdomen is soft.  Bowel sounds are normal.   There is no abdominal tenderness.     Extremities: There is no dependent edema or local swelling.    Neurological: Patient is alert and oriented to person, place and time.    Skin:  Warm and dry.              Results Review:       I reviewed the patient's new clinical results.    Results from last 7 days   Lab Units 21  1031 21  0636 21  1219 21  0659 21  0010 21  1621 21  0645 21  191   WBC 10*3/mm3  --  3.61 4.61  --   --   --  15.51* 14.36*   HEMOGLOBIN g/dL 8.0* 7.8* 9.1* 9.4* 8.6* 11.0* 9.1* 11.7*   PLATELETS 10*3/mm3  --  101* 106*  --   --   --  134* 186     Results from last 7 days   Lab Units 21  0659 21  0645 21  1912   SODIUM mmol/L 144 139 143   POTASSIUM mmol/L 3.7 3.6 4.0   CHLORIDE mmol/L 113* 108* 105   CO2 mmol/L 21.0* 22.4 25.4   BUN mg/dL 14 19 20   CREATININE mg/dL 0.73* 0.89 0.83   CALCIUM mg/dL 7.8* 8.5* 9.2   GLUCOSE mg/dL 91 116* 131*     Results from last 7 days   Lab Units " 04/22/21  1219   INR  1.36*             Scheduled Meds:   atorvastatin, 40 mg, Oral, Nightly  benztropine, 0.5 mg, Oral, BID  donepezil, 10 mg, Oral, Nightly  lansoprazole, 30 mg, Oral, BID AC  LORazepam, 0.25 mg, Oral, BID  memantine, 10 mg, Oral, BID  phenytoin, 300 mg, Oral, BID  risperiDONE, 1 mg, Oral, Q12H  sucralfate, 1 g, Oral, 4x Daily AC & at Bedtime  tamsulosin, 0.4 mg, Oral, Nightly  thiamine, 100 mg, Oral, Daily  zonisamide, 100 mg, Oral, Daily      PRN Meds:   •  acetaminophen **OR** acetaminophen **OR** acetaminophen  •  aluminum-magnesium hydroxide-simethicone  •  heparin (porcine)  •  nitroglycerin  •  ondansetron **OR** ondansetron  •  sodium chloride  Continuous Infusions:  heparin (porcine), 18 Units/kg/hr, Last Rate: 12 Units/kg/hr (04/23/21 0935)        Assessment/Plan   Active Hospital Problems    Diagnosis  POA   • **Esophagitis, unspecified with bleeding [K20.91]  Unknown   • Acute UTI (urinary tract infection) [N39.0]  Yes   • Thrombosis of left iliac vein (CMS/[facility]) [I82.422]  Yes   • GIB (gastrointestinal bleeding) [K92.2]  Yes   • Hematemesis [K92.0]  Yes   • HLD (hyperlipidemia) [E78.5]  Yes   • Essential hypertension [I10]  Yes   • PAD (peripheral artery disease) (CMS/[facility]) [I73.9]  Yes   • COPD (chronic obstructive pulmonary disease) (CMS/[facility]) [J44.9]  Yes   • Seizure disorder (CMS/[facility]) [G40.909]  Yes   • Gastroesophageal reflux disease without esophagitis [K21.9]  Yes   • Coronary artery disease involving native coronary artery of native heart without angina pectoris [I25.10]  Yes   • Dementia without behavioral disturbance (CMS/[facility]) [F03.90]  Yes      Resolved Hospital Problems   No resolved problems to display.       Assessment & Plan    -Hemoglobin noted to drop this morning down to 7.8.  Recheck 4 hours later was 8.0.  We will continue to monitor very closely while continuing heparin drip.  If remains stable overall we will eventually transition to Eliquis.  -Vascular surgery  recommends against filter placement for thrombolysis.  Anticoagulation has been initiated  -Continue PPI and Carafate.  -He will return to SNF when ready  -Agree with Dr. Torres that his CODE STATUS should be changed to DNR given his advanced dementia, nonverbal state, comorbidities, and difficult situation balancing need for anticoagulation with upper GI bleed.  CCP is arranging the appropriate paperwork.      I wore full protective equipment throughout the patient encounter including eye protection and facemask.  Hand hygiene was performed before donning protective equipment and after removal when leaving the room.    Larry Manuel MD  [facility]  04/23/21  12:44 EDT

## 2021-04-23 NOTE — PROGRESS NOTES
Name: Drew Day ADMIT: 2021   : 1955  PCP: Provider, No Known    MRN: [id number] LOS: 2 days   AGE/SEX: 65 y.o. male  ROOM: [address]    [place], Subsequent Hospital Care    Chief Complaint   Patient presents with   • Vomiting Blood     CC: Inferior vena caval thrombosis  Subjective     65 y.o. male vena caval thrombosis and left iliac venous thrombosis.  The iliac venous clot appears to be acute.  It is unclear if the caval clot is chronic or acute but given the homogenicityof the clots it does appear that the whole thing could be acute.  Propagation of clot up to the renals where there is flow could be expected.  This is a large clot and the only way to approach it would be a angio VAC from the neck which is a major procedure.  I would recommend against the use of a filter intrahepatically.  Anticoagulation is our current recommendation.  DOAC would probably be the best answer given likely difficulty in following INRs.    Review of Systems still unresponsive but more awake    Objective     Scheduled Medications:   atorvastatin, 40 mg, Oral, Nightly  benztropine, 0.5 mg, Oral, BID  donepezil, 10 mg, Oral, Nightly  lansoprazole, 30 mg, Oral, BID AC  LORazepam, 0.25 mg, Oral, BID  memantine, 10 mg, Oral, BID  phenytoin, 300 mg, Oral, BID  risperiDONE, 1 mg, Oral, Q12H  sucralfate, 1 g, Oral, 4x Daily AC & at Bedtime  tamsulosin, 0.4 mg, Oral, Nightly  thiamine, 100 mg, Oral, Daily  zonisamide, 100 mg, Oral, Daily        Active Infusions:  heparin (porcine), 18 Units/kg/hr, Last Rate: Stopped (21 0830)        As Needed Medications:  •  acetaminophen **OR** acetaminophen **OR** acetaminophen  •  aluminum-magnesium hydroxide-simethicone  •  heparin (porcine)  •  nitroglycerin  •  ondansetron **OR** ondansetron  •  sodium chloride    Vital Signs  Vital Signs   Patient Vitals for the past 24 hrs:   BP Temp Temp src Pulse Resp SpO2   21 0659 101/48 97.8 °F (36.6 °C) Oral 56  18 100 %   04/22/21 2248 154/66 98.4 °F (36.9 °C) Temporal 70 20 100 %   04/22/21 1900 96/50 98.2 °F (36.8 °C) Temporal 70 18 96 %   04/22/21 1358 100/56 98 °F (36.7 °C) Oral 78 14 98 %     Vital Signs (range)  Temp:  [97.8 °F (36.6 °C)-98.4 °F (36.9 °C)] 97.8 °F (36.6 °C)  Heart Rate:  [56-78] 56  Resp:  [14-20] 18  BP: ()/(48-66) 101/48  I/O:  I/O last 3 completed shifts:  In: 200 [P.O.:200]  Out: -   I/O:     Intake/Output Summary (Last 24 hours) at 4/23/2021 0903  Last data filed at 4/22/2021 1700  Gross per 24 hour   Intake 200 ml   Output --   Net 200 ml     BMI:  Body mass index is 19.61 kg/m².    Physical Exam:  Physical Exam   Abdomen benign  Extremities warm with no swelling    Results Review:     CBC    Results from last 7 days   Lab Units 04/23/21  0636 04/22/21  1219 04/22/21  0659 04/22/21  0010 04/21/21  1621 04/21/21  0645 04/21/21  0020 04/20/21  1912   WBC 10*3/mm3 3.61 4.61  --   --   --  15.51*  --  14.36*   HEMOGLOBIN g/dL 7.8* 9.1* 9.4* 8.6* 11.0* 9.1* 9.6* 11.7*   PLATELETS 10*3/mm3 101* 106*  --   --   --  134*  --  186     BMP   Results from last 7 days   Lab Units 04/22/21  0659 04/21/21  0645 04/20/21  1912   SODIUM mmol/L 144 139 143   POTASSIUM mmol/L 3.7 3.6 4.0   CHLORIDE mmol/L 113* 108* 105   CO2 mmol/L 21.0* 22.4 25.4   BUN mg/dL 14 19 20   CREATININE mg/dL 0.73* 0.89 0.83   GLUCOSE mg/dL 91 116* 131*     Cr Clearance Estimated Creatinine Clearance: 78.6 mL/min (A) (by C-G formula based on SCr of 0.73 mg/dL (L)).  Coag   Results from last 7 days   Lab Units 04/23/21  0636 04/22/21  2348 04/22/21  1219   INR   --   --  1.36*   APTT seconds 135.9* 143.4* 47.0*     Radiology(recent) No radiology results for the last day    Assessment/Plan     Assessment & Plan      Esophagitis, unspecified with bleeding    Dementia without behavioral disturbance (CMS/[facility])    Gastroesophageal reflux disease without esophagitis    Coronary artery disease involving native coronary artery of native  heart without angina pectoris    Seizure disorder (CMS/HCC)    Hematemesis    HLD (hyperlipidemia)    Essential hypertension    PAD (peripheral artery disease) (CMS/HCC)    COPD (chronic obstructive pulmonary disease) (CMS/HCC)    Acute UTI (urinary tract infection)    Thrombosis of left iliac vein (CMS/HCC)    GIB (gastrointestinal bleeding)      65 y.o. male keating of the [facility] with thrombosed vena cava and left iliac system.  Esophagitis is the likely source for GI bleeding.  Options for treatment of the clot are really limited to anticoagulation.  I do not believe the filter is a good answer as it would have to be placed infra hepatically.  I believe I believe attempts to Angiovac this clot out would also be a major surgery with significant risk in a gentleman who is unlikely to show significant long-term benefit.  I am strongly recommending review by the state as to his CODE STATUS as well.  Agree with Dr Aguilera about  trialing heparin and then transitioning to a DOAC.  We will sign off.  Please call for further issues.    Personal protective equipment used for this patient encounter:  Patient wearing surgical mask []    Provider wearing a surgical mask [x]    Gloves [x]    Eye protection [x]    Face Shield []    Gown []    N 95 respirator or CAPR/PAPR []   Duration of interaction 15    Kingston Torres MD  04/23/21  09:03 EDT    Please call my office with any question: [phone]    Active Hospital Problems    Diagnosis  POA   • **Esophagitis, unspecified with bleeding [K20.91]  Unknown   • Acute UTI (urinary tract infection) [N39.0]  Yes   • Thrombosis of left iliac vein (CMS/HCC) [I82.422]  Yes   • GIB (gastrointestinal bleeding) [K92.2]  Yes   • Hematemesis [K92.0]  Yes   • HLD (hyperlipidemia) [E78.5]  Yes   • Essential hypertension [I10]  Yes   • PAD (peripheral artery disease) (CMS/HCC) [I73.9]  Yes   • COPD (chronic obstructive pulmonary disease) (CMS/HCC) [J44.9]  Yes   • Seizure disorder (CMS/HCC)  [G40.909]  Yes   • Gastroesophageal reflux disease without esophagitis [K21.9]  Yes   • Coronary artery disease involving native coronary artery of native heart without angina pectoris [I25.10]  Yes   • Dementia without behavioral disturbance (CMS/HCC) [F03.90]  Yes      Resolved Hospital Problems   No resolved problems to display.

## 2021-04-23 NOTE — PLAN OF CARE
Patient laughing with RN during shift. Q2 turns. Patient remains on heparin gtt. No complaint of pain or discomfort unless attempting to turn patient. SCD's, labs and meds as ordered. Will continue to monitor.     Goal Outcome Evaluation:           Problem: Adult Inpatient Plan of Care  Goal: Plan of Care Review  Outcome: Ongoing, Progressing  Flowsheets (Taken 4/22/2021 0919 by Pooja Rosales MA,CCC-SLP)  Plan of Care Reviewed With: patient  Goal: Patient-Specific Goal (Individualized)  Outcome: Ongoing, Progressing  Goal: Absence of Hospital-Acquired Illness or Injury  Outcome: Ongoing, Progressing  Intervention: Identify and Manage Fall Risk  Recent Flowsheet Documentation  Taken 4/23/2021 0259 by aNn Bender RN  Safety Promotion/Fall Prevention:   safety round/check completed   room organization consistent   activity supervised   assistive device/personal items within reach   clutter free environment maintained  Taken 4/23/2021 0023 by Nan Bender RN  Safety Promotion/Fall Prevention:   safety round/check completed   room organization consistent   activity supervised   assistive device/personal items within reach   clutter free environment maintained  Taken 4/22/2021 2140 by Nan Bender RN  Safety Promotion/Fall Prevention:   safety round/check completed   room organization consistent   activity supervised   assistive device/personal items within reach   clutter free environment maintained  Intervention: Prevent Skin Injury  Recent Flowsheet Documentation  Taken 4/23/2021 0259 by Nan Bender RN  Body Position:   turned   side-lying, right  Taken 4/23/2021 0023 by Nan Bender RN  Body Position:   turned   supine   tilted, left  Taken 4/22/2021 2140 by Nan Bender RN  Body Position:   turned   side-lying, right  Intervention: Prevent and Manage VTE (venous thromboembolism) Risk  Recent Flowsheet Documentation  Taken 4/22/2021 2140 by Nan Bender RN  VTE Prevention/Management:   bilateral   sequential  compression devices on  Goal: Optimal Comfort and Wellbeing  Outcome: Ongoing, Progressing  Goal: Readiness for Transition of Care  Outcome: Ongoing, Progressing     Problem: Skin Injury Risk Increased  Goal: Skin Health and Integrity  Outcome: Ongoing, Progressing  Intervention: Optimize Skin Protection  Recent Flowsheet Documentation  Taken 4/22/2021 2140 by Nan Bender RN  Head of Bed ([facility]): HOB at 20-30 degrees  Goal: Skin Health and Integrity  Outcome: Ongoing, Progressing  Intervention: Optimize Skin Protection  Recent Flowsheet Documentation  Taken 4/22/2021 2140 by Nan Bender RN  Head of Bed ([facility]): [facility] at 20-30 degrees     Problem: Fall Injury Risk  Goal: Absence of Fall and Fall-Related Injury  Outcome: Ongoing, Progressing  Intervention: Promote Injury-Free Environment  Recent Flowsheet Documentation  Taken 4/23/2021 0259 by Nan Bender RN  Safety Promotion/Fall Prevention:   safety round/check completed   room organization consistent   activity supervised   assistive device/personal items within reach   clutter free environment maintained  Taken 4/23/2021 0023 by Nan Bender RN  Safety Promotion/Fall Prevention:   safety round/check completed   room organization consistent   activity supervised   assistive device/personal items within reach   clutter free environment maintained  Taken 4/22/2021 2140 by Nan Bender RN  Safety Promotion/Fall Prevention:   safety round/check completed   room organization consistent   activity supervised   assistive device/personal items within reach   clutter free environment maintained     Problem: Confusion Chronic  Goal: Optimal Cognitive Function  Outcome: Ongoing, Progressing  Intervention: Minimize Injury Risk and Provide Safety  Recent Flowsheet Documentation  Taken 4/23/2021 0259 by Nan Bender RN  Enhanced Safety Measures: bed alarm set  Taken 4/23/2021 0023 by Nan Bender RN  Enhanced Safety Measures: bed alarm set  Taken 4/22/2021 2140 by Napoleon  SABI [place]  Enhanced Safety Measures: bed alarm set

## 2021-04-23 NOTE — CASE MANAGEMENT/SOCIAL WORK
Continued Stay Note  [facility]     Patient Name: Drew Day  MRN: [id number]  Today's Date: 4/23/2021    Admit Date: 4/20/2021    Discharge Plan     Row Name 04/23/21 1257       Plan    Plan  Return back to [facility] when medically ready via EMS    Plan Comments  [facility]  Form started and faxed to Kayla SANCHEZ for signature.  Will submit, along with supporting documentation, once completed by Dr. Manuel.  SI        Discharge Codes    No documentation.             Nya Quigley RN

## 2021-04-24 NOTE — PLAN OF CARE
Goal Outcome Evaluation:  Plan of Care Reviewed With: patient  Progress: improving  Outcome Summary: Pt. heparin gtt d/c'd. Started on eliquis. monitoring hgb. Turned Q2 hours. One BM this shift. Will CTM the rest of my shift.

## 2021-04-24 NOTE — PROGRESS NOTES
" LOS: 3 days     Name: Drew Day  Age: 65 y.o.  Sex: male  :  1955  MRN: [id number]         Primary Care Physician: Provider, No Known    Subjective   Subjective  No overnight events.  Patient remains at his mental baseline.  Nonverbal.  Eating 75 to 100% of meals.  No signs of bleeding.    Objective   Vital Signs  Temp:  [97.3 °F (36.3 °C)-98.1 °F (36.7 °C)] 97.3 °F (36.3 °C)  Heart Rate:  [57-67] 57  Resp:  [14-18] 16  BP: ()/(54-61) 113/61  Body mass index is 19.61 kg/m².    Objective:  General Appearance:  Comfortable and in no acute distress (Chronically ill-appearing).    Vital signs: (most recent): Blood pressure 113/61, pulse 57, temperature 97.3 °F (36.3 °C), temperature source Oral, resp. rate 16, height 177.8 cm (70\"), weight 62 kg (136 lb 11.2 oz), SpO2 99 %.    Lungs:  Normal effort and normal respiratory rate.    Heart: Normal rate.  Regular rhythm.    Abdomen: Abdomen is soft.  Bowel sounds are normal.   There is no abdominal tenderness.     Extremities: There is no dependent edema or local swelling.    Neurological: (He is nonverbal.  Interaction is limited).    Skin:  Warm and dry.              Results Review:       I reviewed the patient's new clinical results.    Results from last 7 days   Lab Units 21  0444 21  1535 21  1031 21  0636 21  1219 21  0659 21  0010 21  0645 21  1912   WBC 10*3/mm3 3.63  --   --  3.61 4.61  --   --  15.51* 14.36*   HEMOGLOBIN g/dL 9.0* 9.1* 8.0* 7.8* 9.1* 9.4* 8.6* 9.1* 11.7*   PLATELETS 10*3/mm3 127*  --   --  101* 106*  --   --  134* 186     Results from last 7 days   Lab Units 21  0444 21  0659 21  0645 21  1912   SODIUM mmol/L 143 144 139 143   POTASSIUM mmol/L 3.4* 3.7 3.6 4.0   CHLORIDE mmol/L 113* 113* 108* 105   CO2 mmol/L 22.8 21.0* 22.4 25.4   BUN mg/dL 9 14 19 20   CREATININE mg/dL 0.59* 0.73* 0.89 0.83   CALCIUM mg/dL 7.9* 7.8* 8.5* 9.2   GLUCOSE mg/dL 95 91 116* " 131*     Results from last 7 days   Lab Units 04/22/21  1219   INR  1.36*             Scheduled Meds:   apixaban, 10 mg, Oral, Q12H   Followed by  [START ON 5/1/2021] apixaban, 5 mg, Oral, Q12H  atorvastatin, 40 mg, Oral, Nightly  benztropine, 0.5 mg, Oral, BID  donepezil, 10 mg, Oral, Nightly  lansoprazole, 30 mg, Oral, BID AC  LORazepam, 0.25 mg, Oral, BID  memantine, 10 mg, Oral, BID  phenytoin, 300 mg, Oral, BID  risperiDONE, 1 mg, Oral, Q12H  sucralfate, 1 g, Oral, 4x Daily AC & at Bedtime  tamsulosin, 0.4 mg, Oral, Nightly  thiamine, 100 mg, Oral, Daily  zonisamide, 100 mg, Oral, Daily      PRN Meds:   •  acetaminophen **OR** acetaminophen **OR** acetaminophen  •  aluminum-magnesium hydroxide-simethicone  •  nitroglycerin  •  ondansetron **OR** ondansetron  •  potassium chloride **OR** potassium chloride **OR** potassium chloride  •  sodium chloride  Continuous Infusions:       Assessment/Plan   Active Hospital Problems    Diagnosis  POA   • **Esophagitis, unspecified with bleeding [K20.91]  Unknown   • Acute UTI (urinary tract infection) [N39.0]  Yes   • Thrombosis of left iliac vein (CMS/[facility]) [I82.422]  Yes   • GIB (gastrointestinal bleeding) [K92.2]  Yes   • Hematemesis [K92.0]  Yes   • HLD (hyperlipidemia) [E78.5]  Yes   • Essential hypertension [I10]  Yes   • PAD (peripheral artery disease) (CMS/[facility]) [I73.9]  Yes   • COPD (chronic obstructive pulmonary disease) (CMS/[facility]) [J44.9]  Yes   • Seizure disorder (CMS/[facility]) [G40.909]  Yes   • Gastroesophageal reflux disease without esophagitis [K21.9]  Yes   • Coronary artery disease involving native coronary artery of native heart without angina pectoris [I25.10]  Yes   • Dementia without behavioral disturbance (CMS/[facility]) [F03.90]  Yes      Resolved Hospital Problems   No resolved problems to display.       Assessment & Plan    -Hemoglobin overall looks stable with no signs of bleeding.  Will transition to Eliquis today and stop heparin drip.  -Vascular surgery  recommends against filter placement for thrombolysis.  Anticoagulation has been initiated  -Continue PPI and Carafate.  -He has a state guardian.  Paperwork signed yesterday to make him a DNR given his advanced dementia, nonverbal state, comorbidities and acute issues requiring this hospitalization.  However, currently listed as a full code until we receive confirmation.  -1 of 2 blood cultures from 4/20/2021 showing gram-positive cocci.  BC ID PCR negative.  Suspect this is contamination as the patient is afebrile and has no leukocytosis.  Await additional culture information and monitor.  -If remains stable the next 24 hours on the Eliquis then he can discharge back to his nursing home.      I wore full protective equipment throughout the patient encounter including eye protection and facemask.  Hand hygiene was performed before donning protective equipment and after removal when leaving the room.    Larry Manuel MD  [facility]  04/24/21  10:02 EDT

## 2021-04-24 NOTE — PROGRESS NOTES
Gastroenterology   Inpatient Progress Note    Reason for Follow Up: Hematemesis secondary to LA grade C esophagitis    Subjective     Interval History:   S/p EGD 4/21 with Grade C Esophagitis and 2 cm hiatal hernia.  Patient confused secondary to brain injury.  Eliquis restarted.    Current Facility-Administered Medications:   •  acetaminophen (TYLENOL) tablet 650 mg, 650 mg, Oral, Q4H PRN **OR** acetaminophen (TYLENOL) 160 MG/5ML solution 650 mg, 650 mg, Oral, Q4H PRN **OR** acetaminophen (TYLENOL) suppository 650 mg, 650 mg, Rectal, Q4H PRN, Paco Abad MD  •  aluminum-magnesium hydroxide-simethicone (MAALOX MAX) 400-400-40 MG/5ML suspension 15 mL, 15 mL, Oral, Q6H PRN, Paco Abad MD  •  apixaban (ELIQUIS) tablet 10 mg, 10 mg, Oral, Q12H, 10 mg at 04/24/21 1235 **FOLLOWED BY** [START ON 5/1/2021] apixaban (ELIQUIS) tablet 5 mg, 5 mg, Oral, Q12H, Larry Manuel MD  •  atorvastatin (LIPITOR) tablet 40 mg, 40 mg, Oral, Nightly, Larry Manuel MD, 40 mg at 04/23/21 2157  •  benztropine (COGENTIN) tablet 0.5 mg, 0.5 mg, Oral, BID, Larry Manuel MD, 0.5 mg at 04/24/21 0824  •  donepezil (ARICEPT) tablet 10 mg, 10 mg, Oral, Nightly, Larry Manuel MD, 10 mg at 04/23/21 2158  •  lansoprazole (FIRST) oral suspension 30 mg, 30 mg, Oral, BID OLGA, Elaina Aguilera MD, 30 mg at 04/24/21 0640  •  LORazepam (ATIVAN) tablet 0.25 mg, 0.25 mg, Oral, BID, Larry Manuel MD, 0.25 mg at 04/24/21 0825  •  memantine (NAMENDA) tablet 10 mg, 10 mg, Oral, BID, Larry Manuel MD, 10 mg at 04/24/21 0825  •  nitroglycerin (NITROSTAT) SL tablet 0.4 mg, 0.4 mg, Sublingual, Q5 Min PRN, Paco Abad MD  •  ondansetron (ZOFRAN) tablet 4 mg, 4 mg, Oral, Q6H PRN **OR** ondansetron (ZOFRAN) injection 4 mg, 4 mg, Intravenous, Q6H PRN, Paco Abad MD  •  phenytoin (DILANTIN) chewable tablet 300 mg, 300 mg, Oral, BID, Larry Manuel MD, 300 mg at  04/24/21 0824  •  potassium chloride (K-DUR,KLOR-CON) ER tablet 40 mEq, 40 mEq, Oral, PRN **OR** potassium chloride (KLOR-CON) packet 40 mEq, 40 mEq, Oral, PRN **OR** potassium chloride 10 mEq in 100 mL IVPB, 10 mEq, Intravenous, Q1H PRN, Larry Manuel MD  •  risperiDONE (risperDAL) tablet 1 mg, 1 mg, Oral, Q12H, Larry Manuel MD, 1 mg at 04/24/21 0824  •  sodium chloride 0.9 % flush 10 mL, 10 mL, Intravenous, PRN, Paco Abad MD, 10 mL at 04/24/21 0825  •  sucralfate (CARAFATE) tablet 1 g, 1 g, Oral, 4x Daily AC & at Bedtime, Elaina Aguilera MD, 1 g at 04/24/21 1235  •  tamsulosin (FLOMAX) 24 hr capsule 0.4 mg, 0.4 mg, Oral, Nightly, Larry Manuel MD, 0.4 mg at 04/23/21 2158  •  thiamine (VITAMIN B-1) tablet 100 mg, 100 mg, Oral, Daily, Larry Manuel MD, 100 mg at 04/24/21 0825  •  zonisamide (ZONEGRAN) capsule 100 mg, 100 mg, Oral, Daily, Larry Manuel MD, 100 mg at 04/24/21 0824  Review of Systems:    Review of systems could not be obtained due to  patient confusion.    Objective     Vital Signs  Temp:  [97.3 °F (36.3 °C)-98.1 °F (36.7 °C)] 97.3 °F (36.3 °C)  Heart Rate:  [57-67] 57  Resp:  [16-18] 16  BP: (111-113)/(59-61) 113/61  Body mass index is 19.61 kg/m².  No intake or output data in the 24 hours ending 04/24/21 1325  No intake/output data recorded.     Physical Exam:   General: patient moving around, eyes closed, confused and does not answer questions   Eyes: no scleral icterus   Skin: warm and dry, not jaundiced   Abdomen: soft, nontender, nondistended; normal bowel sounds, no masses palpated, no periumbical lymphadenopathy   Psychiatric: confused     Results Review:     I reviewed the patient's new clinical results.  I reviewed the patient's new imaging results and agree with the interpretation.  I reviewed the patient's other test results and agree with the interpretation    Results from last 7 days   Lab Units 04/24/21  3691  04/23/21  1535 04/23/21  1031 04/23/21  0636 04/22/21  1219   WBC 10*3/mm3 3.63  --   --  3.61 4.61   HEMOGLOBIN g/dL 9.0* 9.1* 8.0* 7.8* 9.1*   HEMATOCRIT % 28.0* 28.9* 24.4* 25.0* 28.5*   PLATELETS 10*3/mm3 127*  --   --  101* 106*     Results from last 7 days   Lab Units 04/24/21  0444 04/22/21  0659 04/21/21  0645 04/20/21  1912   SODIUM mmol/L 143 144 139 143   POTASSIUM mmol/L 3.4* 3.7 3.6 4.0   CHLORIDE mmol/L 113* 113* 108* 105   CO2 mmol/L 22.8 21.0* 22.4 25.4   BUN mg/dL 9 14 19 20   CREATININE mg/dL 0.59* 0.73* 0.89 0.83   CALCIUM mg/dL 7.9* 7.8* 8.5* 9.2   BILIRUBIN mg/dL  --   --  0.2 <0.2   ALK PHOS U/L  --   --  102 139*   ALT (SGPT) U/L  --   --  10 14   AST (SGOT) U/L  --   --  19 20   GLUCOSE mg/dL 95 91 116* 131*     Results from last 7 days   Lab Units 04/22/21  1219   INR  1.36*     Lab Results   Lab Value Date/Time    LIPASE 25 04/20/2021 1912    LIPASE 27 11/22/2019 0650       Radiology:  CT Abdomen Pelvis With Contrast   Final Result       1. The patient does have hiatal hernia. Full assessment is degraded by   motion artifact. No other abnormality of the stomach or duodenum can be   seen.   2. Large volume of thrombus, extending from the left common iliac vein   into the inferior vena cava to the level of the left renal vein.   3. Large volume of stool seen throughout the colon, suggesting   constipation, with additional increased stool within the rectal vault,   which may reflect fecal impaction.       FINDINGS were called to Physician Assistant Trista Gupta at 10:42 PM.       Radiation dose reduction techniques were utilized, including automated   exposure control and exposure modulation based on body size.       This report was finalized on 4/20/2021 10:45 PM by Dr. Allie Lakhani M.D.          XR Chest 1 View   Final Result   No focal pulmonary consolidation. Indeterminate nodular   density at the right lung base.       This report was finalized on 4/20/2021 8:20 PM by Dr. Sandoval  GLADYS Guzmán M.D.              Assessment/Plan     Patient Active Problem List   Diagnosis   • Dementia without behavioral disturbance (CMS/[facility])   • Gastroesophageal reflux disease without esophagitis   • Coronary artery disease involving native coronary artery of native heart without angina pectoris   • Seizure disorder (CMS/[facility])   • Cognitive impairment   • Closed head injury   • Acute upper GI bleed   • Hematemesis   • HLD (hyperlipidemia)   • Essential hypertension   • PAD (peripheral artery disease) (CMS/[facility])   • COPD (chronic obstructive pulmonary disease) (CMS/[facility])   • Acute UTI (urinary tract infection)   • Thrombosis of left iliac vein (CMS/[facility])   • GIB (gastrointestinal bleeding)   • Esophagitis, unspecified with bleeding       Assessment:  1. Hematemesis secondary to LA grade C esophagitis  2. GERD  3. Anemia-secondary to blood loss.  4. Thrombocytopenia  5. IVC thrombosis/left iliac vascular consult.  Eliquis recommended when safe from a bleeding standpoint    These problems are new to me.  Plan:  · Hemoglobin stable this morning.  We will continue twice daily PPI moving forward as well as Carafate for 4 to 6 weeks.  · Diet as tolerated  · We will continue to monitor H/H for any evidence of bleeding.  Labs already ordered for the a.m.    I discussed the patients findings and my recommendations with patient and nursing staff.    KEYSHA Fernández  [facility] [place] [id number]  Office: [phone]

## 2021-04-25 NOTE — PLAN OF CARE
Problem: Adult Inpatient Plan of Care  Goal: Plan of Care Review  Outcome: Ongoing, Progressing  Flowsheets  Taken 4/25/2021 0011 by Kamila Lu, RN  Outcome Summary: VITALS AS DOCUMENTED. PLEASANT AND COOPERATIVE. FOLLOWS SIMPLE COMMANDS. NO VERBAL RESPONSES NOTED. OCC SMILE AND LAUGH. HGB STABLE @ 9.0  100% OF HS SNACK TAKEN. MEDICATIONS IN PUDDING TAKEN W/O DIFFICULTY. RESTING QUIETLY W/ EYES CLOSED. NO S/SX DISTRESS. MONITORING.  Taken 4/24/2021 1555 by Tanisha Nowak, RN  Plan of Care Reviewed With: patient  Goal: Patient-Specific Goal (Individualized)  Outcome: Ongoing, Progressing  Goal: Absence of Hospital-Acquired Illness or Injury  Outcome: Ongoing, Progressing  Intervention: Identify and Manage Fall Risk  Description: Perform standard risk assessment with a validated tool or comprehensive approach appropriate to the patient on admission; reassess fall risk frequently, with change in status or transfer to another level of care.  Communicate fall injury risk to interprofessional healthcare team.  Determine need for increased observation, equipment and environmental modification, such as low bed and signage, as well as supportive, nonskid footwear.  Adjust safety measures to individual developmental age, stage and identified risk factors.  Reinforce the importance of safety and physical activity with patient and family.  Perform regular intentional rounding to assess need for position change, pain assessment, personal needs, including assistance with toileting.  Recent Flowsheet Documentation  Taken 4/24/2021 2345 by Kamila Lu, RN  Safety Promotion/Fall Prevention:   assistive device/personal items within reach   clutter free environment maintained   fall prevention program maintained   room organization consistent   safety round/check completed  Taken 4/24/2021 2119 by Kamila Lu, RN  Safety Promotion/Fall Prevention:   clutter free environment maintained   assistive device/personal items  within reach   lighting adjusted   nonskid shoes/slippers when out of bed   room organization consistent   safety round/check completed  Taken 4/24/2021 1940 by Kamila Lu RN  Safety Promotion/Fall Prevention:   assistive device/personal items within reach   clutter free environment maintained   room organization consistent   safety round/check completed  Intervention: Prevent Skin Injury  Description: Assess skin risk on admission and at regular intervals throughout hospital stay.  Keep all areas of skin (especially folds) clean and dry.  Maintain adequate skin hydration.  Relieve and redistribute pressure and protect bony prominences; implement measures based on patient-specific risk factors.  Match turning and repositioning schedule to clinical condition.  Encourage weight shift frequently; assist with reposition if unable to complete independently.  Float heels off bed. Avoid pressure on the Achilles tendon.  Keep skin free from extended contact with medical devices.  Use aids (e.g., slide boards, mechanical lift) during transfer.  Recent Flowsheet Documentation  Taken 4/24/2021 2345 by Kamila Lu, RN  Body Position:   weight shift assistance provided   neutral head position   neutral body alignment  Taken 4/24/2021 2119 by Kamila Lu RN  Body Position:   weight shift assistance provided   tilted, left   neutral body alignment   neutral head position  Skin Protection:   adhesive use limited   electrode sites changed   pulse oximeter probe site changed  Taken 4/24/2021 1940 by Kamila Lu, RN  Body Position:   position maintained   neutral body alignment   neutral head position  Intervention: Prevent and Manage VTE (venous thromboembolism) Risk  Description: Assess for VTE risk.  Encourage/assist with early ambulation.  Initiate and maintain compression or other therapy, as indicated based on identified risk in accordance with organizational protocol/provider order.  Encourage both active and  passive leg exercises while in bed, if unable to ambulate.  Recent Flowsheet Documentation  Taken 4/24/2021 2119 by Kamila Lu RN  VTE Prevention/Management:   bilateral   dorsiflexion/plantar flexion performed   sequential compression devices on  Intervention: Prevent Infection  Description: Maintain skin and mucous membrane integrity; promote hand, oral and pulmonary hygiene.  Optimize fluid balance, nutrition, sleep and glycemic control to maximize infection resistance.  Identify potential sources of infection early to prevent or mitigate progression of infection (e.g., wound, lines, devices).  Evaluate ongoing need for invasive devices; remove promptly when no longer indicated.  Recent Flowsheet Documentation  Taken 4/24/2021 2345 by Kamila Lu RN  Infection Prevention:   personal protective equipment utilized   rest/sleep promoted   single patient room provided   visitors restricted/screened   equipment surfaces disinfected   hand hygiene promoted  Taken 4/24/2021 2119 by Kamila Lu RN  Infection Prevention:   equipment surfaces disinfected   hand hygiene promoted   personal protective equipment utilized   rest/sleep promoted   single patient room provided   visitors restricted/screened  Taken 4/24/2021 1940 by Kamila Lu RN  Infection Prevention:   hand hygiene promoted   equipment surfaces disinfected   personal protective equipment utilized   rest/sleep promoted   single patient room provided   visitors restricted/screened  Goal: Optimal Comfort and Wellbeing  Outcome: Ongoing, Progressing  Intervention: Provide Person-Centered Care  Description: Use a family-focused approach to care.  Develop trust and rapport by proactively providing information, encouraging questions, addressing concerns and offering reassurance.  Acknowledge emotional response to hospitalization.  Recognize and utilize personal coping strategies.  Honor spiritual and cultural preferences.  Recent Flowsheet  Documentation  Taken 4/24/2021 2345 by Kamila Lu RN  Trust Relationship/Rapport:   care explained   reassurance provided  Taken 4/24/2021 2119 by Kamila Lu RN  Trust Relationship/Rapport:   care explained   reassurance provided  Goal: Readiness for Transition of Care  Outcome: Ongoing, Progressing     Problem: Skin Injury Risk Increased  Goal: Skin Health and Integrity  Outcome: Ongoing, Progressing  Intervention: Optimize Skin Protection  Description: Reassess skin injury risk and inspect skin frequently (e.g., scheduled interval, with turning, with change in condition) to provide optimal prevention.  Maintain adequate tissue perfusion (e.g., encourage fluid balance, avoid crossing legs, constrictive clothing or devices) to promote tissue oxygenation.  Maintain head of bed at lowest degree of elevation tolerated, considering medical condition and other restrictions. Limit amount of time head of bed is elevated greater than 30 degrees to prevent friction/shear injury.  Avoid positioning onto an area that remains reddened.  Minimize incontinence and moisture (e.g., toileting schedule; moisture-wicking pad, diaper or incontinence collection device, skin moisture barrier).  Cleanse skin promptly and gently when soiled utilizing a pH-balanced cleanser.  Relieve and redistribute pressure (e.g., schedule position changes; utilize higher specification foam mattress, chair cushion, constant low-pressure or alternating-pressure support surface; medical device repositioning; protective dressing applicatio  Support increased progressive functional activity (e.g., therapeutic exercise) to decrease risk associated with immobility. Balance rest with activity.  Recent Flowsheet Documentation  Taken 4/24/2021 2345 by Kamila Lu, RN  Head of Bed (HOB): HOB at 20-30 degrees  Taken 4/24/2021 2119 by Kamila Lu, RN  Pressure Reduction Techniques:   weight shift assistance provided   pressure points protected   sit  time limited to 2 hours  Head of Bed (HOB): HOB at 20 degrees  Pressure Reduction Devices: pressure-redistributing mattress utilized  Skin Protection:   adhesive use limited   electrode sites changed   pulse oximeter probe site changed  Taken 4/24/2021 1940 by Kamila Lu RN  Head of Bed (HOB): HOB elevated  Goal: Skin Health and Integrity  Outcome: Ongoing, Progressing  Intervention: Optimize Skin Protection  Description: Reassess skin injury risk and inspect skin frequently (e.g., scheduled interval, with turning, with change in condition) to provide optimal prevention.  Maintain adequate tissue perfusion (e.g., encourage fluid balance, avoid crossing legs, constrictive clothing or devices) to promote tissue oxygenation.  Maintain head of bed at lowest degree of elevation tolerated, considering medical condition and other restrictions. Limit amount of time head of bed is elevated greater than 30 degrees to prevent friction/shear injury.  Avoid positioning onto an area that remains reddened.  Minimize incontinence and moisture (e.g., toileting schedule; moisture-wicking pad, diaper or incontinence collection device, skin moisture barrier).  Cleanse skin promptly and gently when soiled utilizing a pH-balanced cleanser.  Relieve and redistribute pressure (e.g., schedule position changes; utilize higher specification foam mattress, chair cushion, constant low-pressure or alternating-pressure support surface; medical device repositioning; protective dressing applicatio  Support increased progressive functional activity (e.g., therapeutic exercise) to decrease risk associated with immobility. Balance rest with activity.  Recent Flowsheet Documentation  Taken 4/24/2021 2345 by Kamila Lu, RN  Head of Bed (HOB): HOB at 20-30 degrees  Taken 4/24/2021 2119 by Kamila Lu, RN  Pressure Reduction Techniques:   weight shift assistance provided   pressure points protected   sit time limited to 2 hours  Head of Bed  (HOB): HOB at 20 degrees  Pressure Reduction Devices: pressure-redistributing mattress utilized  Skin Protection:   adhesive use limited   electrode sites changed   pulse oximeter probe site changed  Taken 4/24/2021 1940 by Kamila Lu, RN  Head of Bed (HOB): HOB elevated     Problem: Fall Injury Risk  Goal: Absence of Fall and Fall-Related Injury  Outcome: Ongoing, Progressing  Intervention: Identify and Manage Contributors to Fall Injury Risk  Description: Reassess fall risk frequently and with change in status or transfer to another level of care.  Communicate fall injury risk to all healthcare team members (e.g., rounds, change of shift/provider, patient transport).  Anticipate needs; perform regular intentional rounding to assess need for position change, pain assessment, personal needs (e.g., toileting) and placement of necessary items.  Provide reorientation, appropriate sensory stimulation and routines with changes in mental status to decrease risk of fall.  Promote use of personal vision and auditory aids (e.g., glasses, hearing aids).  Assess assistance level required for safe and effective care; provide support as needed (e.g., toileting, bathing, mobilization).  Define behavior and activity limits to patient and family.  If fall occurs, assess for and treat injury; determine cause; revise fall injury prevention plan.  Regularly review medication contribution to fall risk; adjust medication administration times to minimize risk of falling.  Consider risk related to polypharmacy and age.  Balance adequate pain management with potential for oversedation.  Recent Flowsheet Documentation  Taken 4/24/2021 2119 by Kamila Lu, RN  Medication Review/Management: medications reviewed  Taken 4/24/2021 1940 by Kamila Lu, RN  Medication Review/Management: medications reviewed  Intervention: Promote Injury-Free Environment  Description: Provide a safe, barrier-free environment that encourages independent  activity.  Keep care area uncluttered and well-lighted.  Determine need for increased observation or auditory alerts (e.g., bed or chair alarm).  Assess equipment and environmental modification needs (e.g., low bed, signage, nonskid footwear, grab bars).  Avoid use of restraints.  Recent Flowsheet Documentation  Taken 4/24/2021 2345 by Kamila Lu, RN  Safety Promotion/Fall Prevention:   assistive device/personal items within reach   clutter free environment maintained   fall prevention program maintained   room organization consistent   safety round/check completed  Taken 4/24/2021 2119 by Kamila Lu, RN  Safety Promotion/Fall Prevention:   clutter free environment maintained   assistive device/personal items within reach   lighting adjusted   nonskid shoes/slippers when out of bed   room organization consistent   safety round/check completed  Taken 4/24/2021 1940 by Kamila Lu, RN  Safety Promotion/Fall Prevention:   assistive device/personal items within reach   clutter free environment maintained   room organization consistent   safety round/check completed     Problem: Confusion Chronic  Goal: Optimal Cognitive Function  Outcome: Ongoing, Progressing  Intervention: Minimize Injury Risk and Provide Safety  Description: Assess the degree of impairment and confusion using standardized tools; establish baseline status.  Assess assistance level required for safe and effective self-care; provide interventions specific to patient need, such as fall precautions, intentional rounding, bed or chair alarms, bed type or position adjustment.  Facilitate safe, barrier-free surroundings; keep needed items within reach (e.g., call light, personal belongings).  Recent Flowsheet Documentation  Taken 4/24/2021 2345 by Kamila Lu, RN  Enhanced Safety Measures:   bed alarm set   room near unit station  Taken 4/24/2021 2119 by Kamila Lu, RN  Enhanced Safety Measures:   bed alarm set   room near unit  station  Taken 4/24/2021 1940 by Kamila Lu, RN  Enhanced Safety Measures:   bed alarm set   room near unit station  Intervention: Minimize and Manage Confusion  Description: Identify and address contributing factors (e.g., pain, anxiety, hypoxia, sleep disturbances, infection, immobility).  Promote use of personal vision and auditory aids.  Provide orientation cues (e.g., calendar, clock, pictures); encourage family member presence for calming benefit.  Establish familiarity using a structured routine congruent with home schedule, when possible (e.g., consistent caregiver, sleep and meal schedule, toileting schedule).  Use communication techniques, such as address patient by name, speak slowly, use simple directions, provide time for response, listen carefully, maintain eye contact, refrain from correcting or contradicting the patient’s perception for optimal inter  Consider pharmacologic therapy (e.g., cholinesterase inhibitor, memantine) or herbal agent (e.g., ginkgo biloba) to slow progression of cognitive impairment.  Optimize hydration and nutrition intake; consider dietary supplement to improve cognitive function (e.g., vitamin supplements, omega-3 fatty acids).  Enhance cognitive function with activities, such as playing word games, discussing current events and reminiscing to promote enjoyment.  Encourage physical activity to decrease restlessness and confusion; provide assistance based upon ability level; consider repetitive hand activities.  Assess quality of life and experience of loss related to chronic confusion.  Recent Flowsheet Documentation  Taken 4/24/2021 2119 by Kamila Lu, RN  Environmental Support: calm environment promoted   Goal Outcome Evaluation:        Outcome Summary: VITALS AS DOCUMENTED. PLEASANT AND COOPERATIVE. FOLLOWS SIMPLE COMMANDS. NO VERBAL RESPONSES NOTED. OCC SMILE AND LAUGH. HGB STABLE @ 9.0  100% OF HS SNACK TAKEN. MEDICATIONS IN PUDDING TAKEN W/O DIFFICULTY. RESTING  QUIETLY W/ EYES CLOSED. NO S/SX DISTRESS. MONITORING.

## 2021-04-25 NOTE — PROGRESS NOTES
Gastroenterology   Inpatient Progress Note    Reason for Follow Up: Hematemesis secondary to LA grade C esophagitis    Subjective     Interval History:   S/p EGD 4/21 with Grade C Esophagitis and 2 cm hiatal hernia.  No new issues    Current Facility-Administered Medications:   •  acetaminophen (TYLENOL) tablet 650 mg, 650 mg, Oral, Q4H PRN **OR** acetaminophen (TYLENOL) 160 MG/5ML solution 650 mg, 650 mg, Oral, Q4H PRN **OR** acetaminophen (TYLENOL) suppository 650 mg, 650 mg, Rectal, Q4H PRN, Paco Abad MD  •  aluminum-magnesium hydroxide-simethicone (MAALOX MAX) 400-400-40 MG/5ML suspension 15 mL, 15 mL, Oral, Q6H PRN, Paco Abad MD  •  apixaban (ELIQUIS) tablet 10 mg, 10 mg, Oral, Q12H, 10 mg at 04/25/21 0916 **FOLLOWED BY** [START ON 5/1/2021] apixaban (ELIQUIS) tablet 5 mg, 5 mg, Oral, Q12H, Larry Manuel MD  •  atorvastatin (LIPITOR) tablet 40 mg, 40 mg, Oral, Nightly, Larry Manuel MD, 40 mg at 04/24/21 2115  •  benztropine (COGENTIN) tablet 0.5 mg, 0.5 mg, Oral, BID, Larry Manuel MD, 0.5 mg at 04/25/21 0916  •  donepezil (ARICEPT) tablet 10 mg, 10 mg, Oral, Nightly, Larry Manuel MD, 10 mg at 04/24/21 2115  •  lansoprazole (FIRST) oral suspension 30 mg, 30 mg, Oral, BID AC, Elaina Aguilera MD, 30 mg at 04/25/21 0916  •  LORazepam (ATIVAN) tablet 0.25 mg, 0.25 mg, Oral, BID, Larry Manuel MD, 0.25 mg at 04/25/21 0917  •  memantine (NAMENDA) tablet 10 mg, 10 mg, Oral, BID, Larry Manuel MD, 10 mg at 04/25/21 0916  •  nitroglycerin (NITROSTAT) SL tablet 0.4 mg, 0.4 mg, Sublingual, Q5 Min PRN, Paco Abad MD  •  ondansetron (ZOFRAN) tablet 4 mg, 4 mg, Oral, Q6H PRN **OR** ondansetron (ZOFRAN) injection 4 mg, 4 mg, Intravenous, Q6H PRN, Paco Abad MD  •  phenytoin (DILANTIN) chewable tablet 300 mg, 300 mg, Oral, BID, Larry Manuel MD, 300 mg at 04/25/21 0916  •  potassium chloride  (K-DUR,KLOR-CON) ER tablet 40 mEq, 40 mEq, Oral, PRN **OR** potassium chloride (KLOR-CON) packet 40 mEq, 40 mEq, Oral, PRN, 40 mEq at 04/24/21 1829 **OR** potassium chloride 10 mEq in 100 mL IVPB, 10 mEq, Intravenous, Q1H PRN, Larry Manuel MD  •  risperiDONE (risperDAL) tablet 1 mg, 1 mg, Oral, Q12H, Larry Manuel MD, 1 mg at 04/25/21 0916  •  sodium chloride 0.9 % flush 10 mL, 10 mL, Intravenous, PRN, Paco Abad MD, 10 mL at 04/24/21 0825  •  sucralfate (CARAFATE) tablet 1 g, 1 g, Oral, 4x Daily AC & at Bedtime, Elaina Aguilera MD, 1 g at 04/25/21 0918  •  tamsulosin (FLOMAX) 24 hr capsule 0.4 mg, 0.4 mg, Oral, Nightly, Larry Manuel MD, 0.4 mg at 04/24/21 2115  •  thiamine (VITAMIN B-1) tablet 100 mg, 100 mg, Oral, Daily, Larry Manuel MD, 100 mg at 04/25/21 0917  •  zonisamide (ZONEGRAN) capsule 100 mg, 100 mg, Oral, Daily, Larry Manuel MD, 100 mg at 04/25/21 0916  Review of Systems:    Review of systems could not be obtained due to  patient confusion.    Objective     Vital Signs  Temp:  [97.6 °F (36.4 °C)-98.2 °F (36.8 °C)] 97.6 °F (36.4 °C)  Heart Rate:  [59-75] 59  Resp:  [16-20] 16  BP: ()/(57-77) 123/57  Body mass index is 19.61 kg/m².  No intake or output data in the 24 hours ending 04/25/21 0932  No intake/output data recorded.     Physical Exam:   General: sleeping, no distress   Eyes: no scleral icterus   Skin: warm and dry, not jaundiced   Abdomen: soft, NT/ND, +BS   Psychiatric: confused     Results Review:     Results from last 7 days   Lab Units 04/25/21  0537 04/24/21  0444 04/23/21  1535 04/23/21  0636   WBC 10*3/mm3 3.64 3.63  --  3.61   HEMOGLOBIN g/dL 9.4* 9.0* 9.1* 7.8*   HEMATOCRIT % 30.3* 28.0* 28.9* 25.0*   PLATELETS 10*3/mm3 128* 127*  --  101*     Results from last 7 days   Lab Units 04/25/21  0537 04/24/21  2237 04/24/21  0444 04/22/21  0659 04/21/21  0645 04/20/21  1912   SODIUM mmol/L 140  --  143 144 139  143   POTASSIUM mmol/L 3.6 4.0 3.4* 3.7 3.6 4.0   CHLORIDE mmol/L 110*  --  113* 113* 108* 105   CO2 mmol/L 22.7  --  22.8 21.0* 22.4 25.4   BUN mg/dL 6*  --  9 14 19 20   CREATININE mg/dL 0.60*  --  0.59* 0.73* 0.89 0.83   CALCIUM mg/dL 8.1*  --  7.9* 7.8* 8.5* 9.2   BILIRUBIN mg/dL  --   --   --   --  0.2 <0.2   ALK PHOS U/L  --   --   --   --  102 139*   ALT (SGPT) U/L  --   --   --   --  10 14   AST (SGOT) U/L  --   --   --   --  19 20   GLUCOSE mg/dL 85  --  95 91 116* 131*     Results from last 7 days   Lab Units 04/22/21  1219   INR  1.36*     Lab Results   Lab Value Date/Time    LIPASE 25 04/20/2021 1912    LIPASE 27 11/22/2019 0650       Radiology:  CT Abdomen Pelvis With Contrast   Final Result       1. The patient does have hiatal hernia. Full assessment is degraded by   motion artifact. No other abnormality of the stomach or duodenum can be   seen.   2. Large volume of thrombus, extending from the left common iliac vein   into the inferior vena cava to the level of the left renal vein.   3. Large volume of stool seen throughout the colon, suggesting   constipation, with additional increased stool within the rectal vault,   which may reflect fecal impaction.       FINDINGS were called to Physician Assistant Trista Gupta at 10:42 PM.       Radiation dose reduction techniques were utilized, including automated   exposure control and exposure modulation based on body size.       This report was finalized on 4/20/2021 10:45 PM by Dr. Allie Lakhani M.D.          XR Chest 1 View   Final Result   No focal pulmonary consolidation. Indeterminate nodular   density at the right lung base.       This report was finalized on 4/20/2021 8:20 PM by Dr. Jaime Guzmán M.D.              Assessment/Plan     Patient Active Problem List   Diagnosis   • Dementia without behavioral disturbance (CMS/HCC)   • Gastroesophageal reflux disease without esophagitis   • Coronary artery disease involving native coronary  artery of native heart without angina pectoris   • Seizure disorder (CMS/HCC)   • Cognitive impairment   • Closed head injury   • Acute upper GI bleed   • Hematemesis   • HLD (hyperlipidemia)   • Essential hypertension   • PAD (peripheral artery disease) (CMS/HCC)   • COPD (chronic obstructive pulmonary disease) (CMS/HCC)   • Acute UTI (urinary tract infection)   • Thrombosis of left iliac vein (CMS/HCC)   • GIB (gastrointestinal bleeding)   • Esophagitis, unspecified with bleeding       Assessment:  1. Hematemesis secondary to LA grade C esophagitis  2. GERD  3. ABLA  4. Thrombocytopenia  5. IVC thrombosis/left iliac     Plan:  · Hb improving  We will continue twice daily PPI moving forward as well as Carafate for 4 to 6 weeks.  · Diet as tolerated    GI will sign off    I discussed the patients findings and my recommendations with patient and nursing staff.          Paco Vuong M.D.  [facility]  [address] [place] [address]  Office: [phone]

## 2021-04-25 NOTE — PROGRESS NOTES
Pharmacy Consult: Warfarin Dosing/ Monitoring    Drew Day is a 65 y.o. male, estimated creatinine clearance is 78.6 mL/min (A) (by C-G formula based on SCr of 0.6 mg/dL (L)). weighing 62 kg (136 lb 11.2 oz).     has a past medical history of Back pain, Convulsion disorder (CMS/HCC), Coronary artery disease, Dementia (CMS/HCC), Hyperlipidemia, Hypertension, and Seizures (CMS/[facility]).    Social History     Tobacco Use    Smoking status: Former Smoker   Substance Use Topics    Alcohol use: No    Drug use: Not on file       Results from last 7 days   Lab Units 04/25/21  0537 04/24/21  0444 04/23/21  1535 04/23/21  1031 04/23/21  0636 04/22/21  2348 04/22/21  1219 04/22/21  0659 04/21/21  0645 04/20/21 1912   INR   --   --   --   --   --   --  1.36*  --   --   --    APTT seconds  --  89.2* 84.0*  --  135.9* 143.4* 47.0*  --   --   --    HEMOGLOBIN g/dL 9.4* 9.0* 9.1* 8.0* 7.8*  --  9.1* 9.4* 9.1* 11.7*   HEMATOCRIT % 30.3* 28.0* 28.9* 24.4* 25.0*  --  28.5* 30.1* 28.2* 35.8*   PLATELETS 10*3/mm3 128* 127*  --   --  101*  --  106*  --  134* 186     Results from last 7 days   Lab Units 04/25/21  0537 04/24/21  2237 04/24/21  0444 04/22/21  0659 04/21/21  0645 04/20/21 1912   SODIUM mmol/L 140  --  143 144 139 143   POTASSIUM mmol/L 3.6 4.0 3.4* 3.7 3.6 4.0   CHLORIDE mmol/L 110*  --  113* 113* 108* 105   CO2 mmol/L 22.7  --  22.8 21.0* 22.4 25.4   BUN mg/dL 6*  --  9 14 19 20   CREATININE mg/dL 0.60*  --  0.59* 0.73* 0.89 0.83   CALCIUM mg/dL 8.1*  --  7.9* 7.8* 8.5* 9.2   BILIRUBIN mg/dL  --   --   --   --  0.2 <0.2   ALK PHOS U/L  --   --   --   --  102 139*   ALT (SGPT) U/L  --   --   --   --  10 14   AST (SGOT) U/L  --   --   --   --  19 20   GLUCOSE mg/dL 85  --  95 91 116* 131*     Anticoagulation history: new for patient    Hospital Anticoagulation:  Consulting provider: Dr KING Pandey  Start date: 4/25/21  Indication: Extensive LLE DVT  Target INR: 2-3  Expected duration: TBD   Bridge Therapy: Yes with  enoxaparin    4/22 INR= 1.36                Date 4/25            INR             Warfarin dose 5 mg              Potential drug interactions:   Phenytoin with long term use of warfarin can decrease effect of warfarin  Sucralfate may decrease warfarin effectiveness  Lansoprazole may increase risk of bleeding      Education complete?/ Date: TBD    Assessment/Plan:  Will need to follow INR  closely with drug-drug interaction with phenytoin    Dose warfarin 5mg x1 today  Monitor INR  Follow up 4/26/21    Pharmacy will continue to follow until discharge or discontinuation of warfarin.   Maria Teresa Boswell, [facility]  4/25/2021

## 2021-04-25 NOTE — PLAN OF CARE
Goal Outcome Evaluation:  Plan of Care Reviewed With: patient  Progress: improving  Outcome Summary: No changes with patient status other than medication change from eliquis to lovenox and warfarin. Will CTM the rest of my shift.

## 2021-04-25 NOTE — PROGRESS NOTES
Name: Drew Day ADMIT: 2021   : 1955  PCP: Provider, No Known    MRN: [id number] LOS: 4 days   AGE/SEX: 65 y.o. male  ROOM: [facility]     Subjective   Subjective   Nonverbal. Doing well per RN. Tolerating diet. Voiding well. Moving bowels without issue.    Review of Systems   Unable to perform ROS: Patient nonverbal        Objective   Objective   Vital Signs  Temp:  [97.6 °F (36.4 °C)-98.2 °F (36.8 °C)] 97.6 °F (36.4 °C)  Heart Rate:  [59-75] 59  Resp:  [16-20] 16  BP: ()/(57-77) 123/57  SpO2:  [100 %] 100 %  on   ;   Device (Oxygen Therapy): room air  Body mass index is 19.61 kg/m².  Physical Exam  Vitals and nursing note reviewed. Exam conducted with a chaperone present.   Constitutional:       General: He is not in acute distress.     Appearance: He is ill-appearing (chronically). He is not toxic-appearing or diaphoretic.   HENT:      Head: Normocephalic and atraumatic.      Right Ear: External ear normal.      Left Ear: External ear normal.      Nose: Nose normal.      Mouth/Throat:      Mouth: Mucous membranes are moist.      Pharynx: Oropharynx is clear.   Eyes:      General: No scleral icterus.        Right eye: No discharge.         Left eye: No discharge.      Conjunctiva/sclera: Conjunctivae normal.   Cardiovascular:      Rate and Rhythm: Normal rate and regular rhythm.      Pulses: Normal pulses.   Pulmonary:      Effort: Pulmonary effort is normal. No respiratory distress.      Breath sounds: Normal breath sounds.   Abdominal:      General: Bowel sounds are normal. There is no distension.      Palpations: Abdomen is soft.      Tenderness: There is no abdominal tenderness.   Musculoskeletal:         General: No swelling or signs of injury. Normal range of motion.      Cervical back: Neck supple.   Lymphadenopathy:      Cervical: No cervical adenopathy.   Skin:     General: Skin is warm and dry.      Capillary Refill: Capillary refill takes less than 2 seconds.      Coloration: Skin  is not jaundiced.      Findings: No rash.   Neurological:      Mental Status: He is alert. Mental status is at baseline.   Psychiatric:      Comments: Unable to assess         Results Review     I reviewed the patient's new clinical results.  Results from last 7 days   Lab Units 04/25/21 0537 04/24/21 0444 04/23/21  1535 04/23/21  1031 04/23/21  0636 04/22/21  1219   WBC 10*3/mm3 3.64 3.63  --   --  3.61 4.61   HEMOGLOBIN g/dL 9.4* 9.0* 9.1* 8.0* 7.8* 9.1*   PLATELETS 10*3/mm3 128* 127*  --   --  101* 106*     Results from last 7 days   Lab Units 04/25/21 0537 04/24/21 2237 04/24/21 0444 04/22/21 0659 04/21/21  0645   SODIUM mmol/L 140  --  143 144 139   POTASSIUM mmol/L 3.6 4.0 3.4* 3.7 3.6   CHLORIDE mmol/L 110*  --  113* 113* 108*   CO2 mmol/L 22.7  --  22.8 21.0* 22.4   BUN mg/dL 6*  --  9 14 19   CREATININE mg/dL 0.60*  --  0.59* 0.73* 0.89   GLUCOSE mg/dL 85  --  95 91 116*   Estimated Creatinine Clearance: 78.6 mL/min (A) (by C-G formula based on SCr of 0.6 mg/dL (L)).  Results from last 7 days   Lab Units 04/21/21 0645 04/20/21  1912   ALBUMIN g/dL 3.50 4.10   BILIRUBIN mg/dL 0.2 <0.2   ALK PHOS U/L 102 139*   AST (SGOT) U/L 19 20   ALT (SGPT) U/L 10 14     Results from last 7 days   Lab Units 04/25/21 0537 04/24/21 0444 04/22/21 0659 04/21/21 0645 04/20/21  1912   CALCIUM mg/dL 8.1* 7.9* 7.8* 8.5* 9.2   ALBUMIN g/dL  --   --   --  3.50 4.10     Results from last 7 days   Lab Units 04/20/21 2233 04/20/21  1947   LACTATE mmol/L 1.2 2.2*     COVID19   Date Value Ref Range Status   04/20/2021 Not Detected Not Detected - Ref. Range Final     No results found for: HGBA1C, POCGLU    Duplex Aorta IVC Iliac Graft Complete CAR  · Very difficult study due to patient positioning and bowel gas. Acute   thrombus seen in the left external iliac, common femoral vein, and   proximal femoral vein. The distal IVC was not well visualized.       Scheduled Medications  apixaban, 10 mg, Oral, Q12H   Followed  by  [START ON 5/1/2021] apixaban, 5 mg, Oral, Q12H  atorvastatin, 40 mg, Oral, Nightly  benztropine, 0.5 mg, Oral, BID  donepezil, 10 mg, Oral, Nightly  lansoprazole, 30 mg, Oral, BID AC  LORazepam, 0.25 mg, Oral, BID  memantine, 10 mg, Oral, BID  phenytoin, 300 mg, Oral, BID  risperiDONE, 1 mg, Oral, Q12H  sucralfate, 1 g, Oral, 4x Daily AC & at Bedtime  tamsulosin, 0.4 mg, Oral, Nightly  thiamine, 100 mg, Oral, Daily  zonisamide, 100 mg, Oral, Daily    Infusions  Pharmacy to Dose enoxaparin (LOVENOX),   Pharmacy to dose warfarin,   Pharmacy to dose warfarin,     Diet  Diet Dysphagia; II - Pureed; Thin; Extra Sauce / Gravy       Assessment/Plan     Active Hospital Problems    Diagnosis  POA   • **Gastroesophageal reflux disease with esophagitis and hemorrhage [K21.01]  Yes   • Traumatic brain injury (CMS/[facility]) [S06.9X9A]  Yes   • Hypokalemia [E87.6]  No   • 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.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]  Yes   • Hematemesis [K92.0]  Yes   • HLD (hyperlipidemia) [E78.5]  Yes   • Essential hypertension [I10]  Yes   • PAD (peripheral artery disease) (CMS/[facility]) [I73.9]  Yes   • COPD (chronic obstructive pulmonary disease) (CMS/[facility]) [J44.9]  Yes   • Seizure disorder (CMS/[facility]) [G40.909]  Yes   • Coronary artery disease involving native coronary artery of native heart without angina pectoris [I25.10]  Yes   • Dementia without behavioral disturbance (CMS/[facility]) [F03.90]  Yes      Resolved Hospital Problems   No resolved problems to display.       Mr. Day is a 64yo gentleman with TBI residing [facility] L[facility], and with history of dementia, HLD, essential hypertension, PAD, COPD, seizure disorder, GERD, and  CAD, who presented with hematemesis and was admitted for GI bleed. CT A&P in ER also revealed extensive LLE DVT. Taken to endoscopy later on day of admission and found to have esophagitis without bleeding.    Upper GI bleeding due to GERD with esophagitis  S/p EGD on 4/21  GI recommends Carafate and BID PPI  F/u  in 4 weeks  GI okay with AC  No further evidence of bleeding despite being started on anticoagulation    Acute blood loss anemia  Has not required transfusion  Hgb remains stable     Extensive LLE DVT  Vasc recommends AC only, no indication for IVC filter  Was on heparin gtt and tolerating, changed to po Eliquis however there is an interaction with his phenytoin  Long d/w Pharmacist today  Will have to change to Coumadin with Lovenox bridge    Positive blood culture  In only 1 of 2 bottle, no other evidence of infection, WBC and temp are normal  Suspect contaminant    Hypokalemia  Replaced orally with protocol  Check Mg++    Abnormal UA  Present on admission, but no evidence of UTI  Rocephin dc'd after one dose    Constipation  Identified on CT on admission in ER  Moving bowels now without issue    Thrombocytopenia  Mild, stable  Continue to monitor closely while on Lovenox  If platelets fall significantly would consult Heme/Onc and check HIT Ab    TBI, dementia, keating of state  Back to his long term care facility at [place]  Paperwork completed for change to DNR, await response from guardian    Seizure disorder  Stable here on Zonegran, Phenytoin, and Ativan    · Full dose Lovenox bridge to Coumadin for DVT prophylaxis.  · Full code.  · Discussed with patient, nursing staff and pharmacist.  · Anticipate discharge to SNU facility , timing yet to be determined.      Lorenzo Pandey MD  [facility]  04/25/21  12:33 EDT

## 2021-04-25 NOTE — PROGRESS NOTES
Pharmacy consult for enoxaparin dosing    Drew Day is a 65 y.o. male 62 kg (136 lb 11.2 oz).    Pharmacy consulted to dose for DVT/PE (active thrombosis) per Dr KING Pandey's request.    Estimated Creatinine Clearance: 78.6 mL/min (A) (by C-G formula based on SCr of 0.6 mg/dL (L)).  Creatinine   Date Value Ref Range Status   04/25/2021 0.60 (L) 0.76 - 1.27 mg/dL Final   04/24/2021 0.59 (L) 0.76 - 1.27 mg/dL Final     Platelets   Date Value Ref Range Status   04/25/2021 128 (L) 140 - 450 10*3/mm3 Final   04/24/2021 127 (L) 140 - 450 10*3/mm3 Final   04/23/2021 101 (L) 140 - 450 10*3/mm3 Final     Lab Results   Component Value Date    HGB 9.4 (L) 04/25/2021    HGB 9.0 (L) 04/24/2021    HGB 9.1 (L) 04/23/2021     Lab Results   Component Value Date    INR 1.36 (H) 04/22/2021    INR 1.11 (H) 11/22/2019       Assessment/ Plan:  Patient's wt= 62 kg, CrCl ~ 79 ml/mo  Enoxaparin 1mg/kg (60mg) SC q 12 hrs    Note patient is on this as bridge therapy with warfarin.   Pharmacy will continue to monitor and adjust as needed until discontinued.  Maria Teresa Boswell, PharmD, BCACP  04/25/21 13:13 EDT

## 2021-04-26 NOTE — PROGRESS NOTES
Addendum:  Noted new discharge orders for warfarin 5 mg po hs- tonight's dose to be given at skilled nursing facility. Agree with dose. Last dose given 4/25/21 1829.  Ivory Balbuena, PharmD, [facility]    Pharmacy Consult: Warfarin Dosing/ Monitoring    Drew Day is a 65 y.o. male, estimated creatinine clearance is 78.6 mL/min (A) (by C-G formula based on SCr of 0.61 mg/dL (L)). weighing 62 kg (136 lb 11.2 oz).     has a past medical history of Back pain, Convulsion disorder (CMS/HCC), Coronary artery disease, Dementia (CMS/HCC), Hyperlipidemia, Hypertension, and Seizures (CMS/[facility]).    Social History     Tobacco Use   • Smoking status: Former Smoker   Substance Use Topics   • Alcohol use: No   • Drug use: Not on file       Results from last 7 days   Lab Units 04/26/21  0942 04/26/21  0637 04/25/21  1724 04/25/21  0537 04/24/21  0444 04/23/21  1535 04/23/21  1031 04/23/21  0636 04/22/21  2348 04/22/21  1219 04/21/21  0645 04/20/21  1912   INR  1.14*  --  1.07  --   --   --   --   --   --  1.36*  --   --    APTT seconds  --   --   --   --  89.2* 84.0*  --  135.9* 143.4* 47.0*  --   --    HEMOGLOBIN g/dL  --  9.7*  --  9.4* 9.0* 9.1* 8.0* 7.8*  --  9.1* 9.1* 11.7*   HEMATOCRIT %  --  30.1*  --  30.3* 28.0* 28.9* 24.4* 25.0*  --  28.5* 28.2* 35.8*   PLATELETS 10*3/mm3  --  139*  --  128* 127*  --   --  101*  --  106* 134* 186     Results from last 7 days   Lab Units 04/26/21  0637 04/25/21  0537 04/24/21  2237 04/24/21  0444 04/21/21  0645 04/20/21 1912   SODIUM mmol/L 141 140  --  143 139 143   POTASSIUM mmol/L 3.7 3.6 4.0 3.4* 3.6 4.0   CHLORIDE mmol/L 110* 110*  --  113* 108* 105   CO2 mmol/L 21.6* 22.7  --  22.8 22.4 25.4   BUN mg/dL 11 6*  --  9 19 20   CREATININE mg/dL 0.61* 0.60*  --  0.59* 0.89 0.83   CALCIUM mg/dL 8.5* 8.1*  --  7.9* 8.5* 9.2   BILIRUBIN mg/dL <0.2  --   --   --  0.2 <0.2   ALK PHOS U/L 97  --   --   --  102 139*   ALT (SGPT) U/L 28  --   --   --  10 14   AST (SGOT) U/L 51*  --   --   --  19  20   GLUCOSE mg/dL 81 85  --  95 116* 131*     Anticoagulation history: new for patient    Hospital Anticoagulation:  Consulting provider: Dr KING Pandey  Start date: 4/25/21  Indication: Extensive LLE DVT  Target INR: 2-3  Expected duration: TBD   Bridge Therapy: Yes with enoxaparin    4/22 INR= 1.36                Date 4/25 4/26           INR 1.07 1.14           Warfarin dose 5 mg 5 mg             Potential drug interactions:   • Phenytoin with long term use of warfarin can decrease effect of warfarin  • Sucralfate may decrease warfarin effectiveness  • Lansoprazole may increase risk of bleeding      Education complete/ Date: TBD    Assessment/Plan:  Will need to follow INR closely with drug-drug interaction with phenytoin    Pt being discharged to [facility] later today. Per discharge coordinator, [facility] should dose warfarin today and SNF to resume warfarin dosing tomorrow (4/27/21).   Dose warfarin 5mg x1 today  Monitor INR  Follow up in AM    Pharmacy will continue to follow until discharge or discontinuation of warfarin.   Ivory Balbuena RPH  4/26/2021

## 2021-04-26 NOTE — DISCHARGE SUMMARY
Patient Name: Drew Day  : 1955  MRN: [id number]    Date of Admission: 2021  Date of Discharge:  2021  Primary Care Physician: Provider, No Known      Chief Complaint:   Vomiting Blood      Discharge Diagnoses     Active Hospital Problems    Diagnosis  POA   • **Gastroesophageal reflux disease with esophagitis and hemorrhage [K21.01]  Yes   • Traumatic brain injury (CMS/Formerly [facility]) [S06.9X9A]  Yes   • Hypokalemia [E87.6]  No   • Thrombosis of left iliac vein (CMS/Formerly [facility]) [I82.422]  Yes   • Hematemesis [K92.0]  Yes   • HLD (hyperlipidemia) [E78.5]  Yes   • Essential hypertension [I10]  Yes   • PAD (peripheral artery disease) (CMS/Formerly [facility]) [I73.9]  Yes   • COPD (chronic obstructive pulmonary disease) (CMS/[facility]) [J44.9]  Yes   • Seizure disorder (CMS/[facility]ly [facility]) [G40.909]  Yes   • Coronary artery disease involving native coronary artery of native heart without angina pectoris [I25.10]  Yes   • Dementia without behavioral disturbance (CMS/[facility]) [F03.90]  Yes      Resolved Hospital Problems   No resolved problems to display.        Hospital Course     Mr. Day is a 66yo gentleman with TBI residing in LTC as keating of [facility], and with history of dementia, HLD, essential hypertension, PAD, COPD, seizure disorder, GERD, and  CAD, who presented with hematemesis and was admitted for GI bleed. CT A&P in ER also revealed extensive LLE DVT. Taken to endoscopy later on day of admission and found to have esophagitis without bleeding. Please see below for details of admission:     Upper GI bleeding due to GERD with esophagitis  S/p EGD on   GI recommends Carafate and BID PPI  F/u with them in 4 weeks  GI okay with full AC  No further evidence of bleeding despite being started on anticoagulation     Acute blood loss anemia  Has not required transfusion  Hgb remains stable in face of full anticoagulation     Extensive LLE DVT  Vasc recommends AC only, no indication for IVC filter  Was on heparin gtt and tolerating, changed to po  Eliquis however there is an interaction with his phenytoin  Long d/w Pharmacist and decision made to use Coumadin instead of Eliquis  Currently on Coumadin with Lovenox bridge, will require further management at his facility through medical staff there     Positive blood culture  In only 1 of 2 bottle, no other evidence of infection, WBC and temp are normal  Suspect contaminant     Hypokalemia  Replaced orally with protocol  Mg++ 2.1     Abnormal UA  Present on admission, but no evidence of UTI  Rocephin dc'd after one dose     Constipation  Identified on CT on admission in ER  Moving bowels now without issue     Thrombocytopenia  Resolved--Up to 139 today  Continue to monitor closely while on Lovenox    TBI, dementia, keating of state  Back to [place] long term care facility at [place] today  Paperwork completed for change to DNR, await response from guardian     Seizure disorder  Stable here on Zonegran, Phenytoin, and Ativan       Day of Discharge     Subjective:  Nonverbal. Doing well per RN. Tolerating diet. Voiding well. Moving bowels without issue.    Physical Exam:  Temp:  [97.5 °F (36.4 °C)-98.1 °F (36.7 °C)] 97.6 °F (36.4 °C)  Heart Rate:  [62-77] 62  Resp:  [16] 16  BP: (110-125)/(60-81) 110/60  Body mass index is 19.61 kg/m².  Physical Exam  Vitals and nursing note reviewed. Exam conducted with a chaperone present.   Constitutional:       General: He is not in acute distress.     Appearance: He is ill-appearing (chronically). He is not toxic-appearing or diaphoretic.   HENT:      Head: Normocephalic and atraumatic.      Right Ear: External ear normal.      Left Ear: External ear normal.      Nose: Nose normal.      Mouth/Throat:      Mouth: Mucous membranes are moist.      Pharynx: Oropharynx is clear.   Eyes:      General: No scleral icterus.        Right eye: No discharge.         Left eye: No discharge.      Conjunctiva/sclera: Conjunctivae normal.   Cardiovascular:      Rate and Rhythm: Normal rate and regular  rhythm.      Pulses: Normal pulses.   Pulmonary:      Effort: Pulmonary effort is normal. No respiratory distress.      Breath sounds: Normal breath sounds.   Abdominal:      General: Bowel sounds are normal. There is no distension.      Palpations: Abdomen is soft.      Tenderness: There is no abdominal tenderness.   Musculoskeletal:         General: No swelling or signs of injury. Normal range of motion.      Cervical back: Neck supple.   Lymphadenopathy:      Cervical: No cervical adenopathy.   Skin:     General: Skin is warm and dry.      Capillary Refill: Capillary refill takes less than 2 seconds.      Coloration: Skin is not jaundiced.      Findings: No rash.   Neurological:      Mental Status: He is alert. Mental status is at baseline.   Psychiatric:      Comments: Unable to assess      Consultants     Consult Orders (all) (From admission, onward)     Start     Ordered    04/21/21 1051  Inpatient Case Management  Consult  Once     Provider:  (Not yet assigned)    04/21/21 1051    04/21/21 0453  Inpatient Gastroenterology Consult  Once     Specialty:  Gastroenterology  Provider:  Elaina Aguilera MD    04/21/21 0452    04/21/21 0452  Inpatient Vascular Surgery Consult  Once     Specialty:  Vascular Surgery  Provider:  Lalo Ruano MD    04/21/21 0452    04/21/21 0143  Inpatient Case Management  Consult  Once     Provider:  (Not yet assigned)    04/21/21 0142    04/21/21 0143  Inpatient Nutrition Consult  Once     Provider:  (Not yet assigned)    04/21/21 0142    04/20/21 2128  [facility] (on-call MD unless specified) Details  Once     Specialty:  Hospitalist  Provider:  (Not yet assigned)    04/20/21 2127              Procedures     ESOPHAGOGASTRODUODENOSCOPY      Imaging Results (All)     Procedure Component Value Units Date/Time    CT Abdomen Pelvis With Contrast [[id number]] Collected: 04/20/21 2234     Updated: 04/20/21 2248    Narrative:      CT OF THE ABDOMEN AND PELVIS  WITH CONTRAST     HISTORY: GI bleed.     COMPARISON: None available.     TECHNIQUE: Axial CT imaging was obtained through the abdomen and pelvis.  IV contrast material was administered.     FINDINGS:  Examination is severely degraded by motion artifact. Images through the  lung bases do not demonstrate any acute abnormalities. There are  extensive coronary artery calcifications. There is a small hiatal  hernia. No obvious abnormality of the stomach or duodenum is seen.  Again, images are compromised by motion artifact. No focal hepatic  lesions are seen. Gallbladder, adrenal glands, spleen, and pancreas all  appear normal. The patient does appear to have thrombus within the  inferior vena cava. This extends from the left common iliac vein into  the inferior vena cava to the level of the left renal vein. This is seen  on both sets of images, and is not felt to be artifactual. There is no  hydronephrosis. The kidneys enhance symmetrically. There is  calcification of the aorta. There is no small bowel obstruction. Large  volume of stool seen throughout the colon, suggesting constipation.  Large volume of stool within the rectal vault may reflect fecal  impaction. There is no definite evidence of proctitis. Prostate gland is  within normal limits. The left-sided the bladder is asymmetrically thick  walled, with potentially some enhancement noted. Repeat multiphasic CT  versus consideration for cystoscopy is suggested. There is no evidence  of appendicitis. No acute osseous abnormalities are seen.       Impression:         1. The patient does have hiatal hernia. Full assessment is degraded by  motion artifact. No other abnormality of the stomach or duodenum can be  seen.  2. Large volume of thrombus, extending from the left common iliac vein  into the inferior vena cava to the level of the left renal vein.  3. Large volume of stool seen throughout the colon, suggesting  constipation, with additional increased stool  within the rectal vault,  which may reflect fecal impaction.     FINDINGS were called to Physician Assistant Trista Gupta at 10:42 PM.     Radiation dose reduction techniques were utilized, including automated  exposure control and exposure modulation based on body size.     This report was finalized on 4/20/2021 10:45 PM by Dr. Allie Lakhani M.D.       XR Chest 1 View [[id number]] Collected: 04/20/21 2017     Updated: 04/20/21 2023    Narrative:      XR CHEST 1 VW-     HISTORY: Male who is 65 years-old, leukocytosis     TECHNIQUE: Frontal views of the chest     COMPARISON: None available     FINDINGS: Heart, mediastinum and pulmonary vasculature are unremarkable.  Sternotomy wires are noted. No focal pulmonary consolidation, pleural  effusion, or pneumothorax. An 8 mm nodular density projecting  peripherally at the right lower lung is indeterminate, could for example  represent nipple shadow or pulmonary nodule, frontal view with nipple  markers or CT can be obtained for further evaluation. No acute osseous  process.       Impression:      No focal pulmonary consolidation. Indeterminate nodular  density at the right lung base.     This report was finalized on 4/20/2021 8:20 PM by Dr. Jaime Guzmán M.D.           Results for orders placed during the hospital encounter of 04/20/21    Duplex Aorta IVC Iliac Graft Complete CAR    Interpretation Summary  · Very difficult study due to patient positioning and bowel gas. Acute thrombus seen in the left external iliac, common femoral vein, and proximal femoral vein. The distal IVC was not well visualized.      Pertinent Labs     Results from last 7 days   Lab Units 04/26/21  0637 04/25/21  0537 04/24/21  0444 04/23/21  1535 04/23/21  0636   WBC 10*3/mm3 4.08 3.64 3.63  --  3.61   HEMOGLOBIN g/dL 9.7* 9.4* 9.0* 9.1* 7.8*   PLATELETS 10*3/mm3 139* 128* 127*  --  101*     Results from last 7 days   Lab Units 04/26/21  0637 04/25/21  0537 04/24/21  0244  04/24/21  0444 04/22/21  0659   SODIUM mmol/L 141 140  --  143 144   POTASSIUM mmol/L 3.7 3.6 4.0 3.4* 3.7   CHLORIDE mmol/L 110* 110*  --  113* 113*   CO2 mmol/L 21.6* 22.7  --  22.8 21.0*   BUN mg/dL 11 6*  --  9 14   CREATININE mg/dL 0.61* 0.60*  --  0.59* 0.73*   GLUCOSE mg/dL 81 85  --  95 91   Estimated Creatinine Clearance: 78.6 mL/min (A) (by C-G formula based on SCr of 0.61 mg/dL (L)).  Results from last 7 days   Lab Units 04/26/21  0637 04/21/21  0645 04/20/21 1912   ALBUMIN g/dL 3.60 3.50 4.10   BILIRUBIN mg/dL <0.2 0.2 <0.2   ALK PHOS U/L 97 102 139*   AST (SGOT) U/L 51* 19 20   ALT (SGPT) U/L 28 10 14     Results from last 7 days   Lab Units 04/26/21  0637 04/25/21  0537 04/24/21  0444 04/22/21  0659 04/21/21  0645 04/20/21 1912   CALCIUM mg/dL 8.5* 8.1* 7.9* 7.8* 8.5* 9.2   ALBUMIN g/dL 3.60  --   --   --  3.50 4.10   MAGNESIUM mg/dL 2.1  --   --   --   --   --      Results from last 7 days   Lab Units 04/20/21  1912   LIPASE U/L 25             Invalid input(s): LDLCALC  Results from last 7 days   Lab Units 04/21/21  0645 04/20/21 2233   BLOODCX  No growth at 5 days Kocuria rosea*   BCIDPCR   --  Negative by BCID PCR. Culture to Follow.     Results from last 7 days   Lab Units 04/20/21 2128   COVID19  Not Detected       Test Results Pending at Discharge     Pending Labs     Order Current Status    Protime-INR In process          Discharge Details        Discharge Medications      New Medications      Instructions Start Date   enoxaparin 60 MG/0.6ML solution syringe  Commonly known as: LOVENOX   60 mg, Subcutaneous, Every 12 Hours      lansoprazole 3 MG/ML suspension oral suspension   30 mg, Oral, 2 Times Daily Before Meals      sucralfate 1 g tablet  Commonly known as: CARAFATE   1 g, Oral, 4 Times Daily Before Meals & Nightly      warfarin 5 MG tablet  Commonly known as: Coumadin   5 mg, Oral, Nightly         Changes to Medications      Instructions Start Date   Phenytoin Infatabs 50 MG chewable  tablet  Generic drug: phenytoin  What changed:   · how much to take  · how to take this  · when to take this  · additional instructions   Take 4 tablets bid         Continue These Medications      Instructions Start Date   acetaminophen 325 MG tablet  Commonly known as: TYLENOL   650 mg, Oral, Every 6 Hours PRN      atorvastatin 40 MG tablet  Commonly known as: LIPITOR   40 mg, Oral, Nightly      benztropine 0.5 MG tablet  Commonly known as: COGENTIN   0.5 mg, Oral, 2 Times Daily      calcium carbonate 500 MG chewable tablet  Commonly known as: TUMS   1 tablet, Oral, 2 Times Daily      donepezil 10 MG tablet  Commonly known as: ARICEPT   10 mg, Oral, Nightly      LORazepam 0.5 MG tablet  Commonly known as: ATIVAN   0.25 mg, Oral, 2 Times Daily      memantine 10 MG tablet  Commonly known as: NAMENDA   10 mg, Oral, 2 Times Daily      Multivitamin Adult tablet tablet  Generic drug: multivitamin with minerals   1 tablet, Oral, Daily      REMEDY CALAZIME EX   Apply externally, 2 Times Daily      risperiDONE 1 MG tablet  Commonly known as: risperDAL   1 mg, Oral, 2 Times Daily      tamsulosin 0.4 MG capsule 24 hr capsule  Commonly known as: FLOMAX   0.4 mg, Oral, Nightly      thiamine 100 MG tablet  Commonly known as: VITAMIN B-1   100 mg, Oral, Daily      zonisamide 100 MG capsule  Commonly known as: ZONEGRAN   100 mg, Oral, Daily         Stop These Medications    raNITIdine 150 MG tablet  Commonly known as: ZANTAC            Allergies   Allergen Reactions   • Eggs Or Egg-Derived Products Unknown - High Severity     Per nursing home list   • Fish-Derived Products Unknown - High Severity     Per nursing home list         Discharge Disposition:  Long Term Care (DC - External)    Discharge Diet:  Diet Order   Procedures   • Diet Dysphagia; II - Pureed; Thin; Extra Sauce / Gravy       Discharge Activity:   as tolerated    CODE STATUS:    Code Status and Medical Interventions:   Ordered at: 04/20/21 5056     Code Status:     CPR     Medical Interventions (Level of Support Prior to Arrest):    Full       No future appointments.  Additional Instructions for the Follow-ups that You Need to Schedule     Discharge Follow-up with PCP   As directed       Currently Documented PCP:    Provider, No Known    PCP Phone Number:    [phone]     Follow Up Details: Medical staff at facility in 1-2 days         Discharge Follow-up with PCP   As directed       Currently Documented PCP:    Provider, No Known    PCP Phone Number:    [phone]     Follow Up Details: Medical Staff at facility in 1-2 days, will need close monitoring of INR         Discharge Follow-up with Specified Provider: Dr. Abad (GI); 1 Month   As directed      To: Dr. Abad (GI)    Follow Up: 1 Month         Discharge Follow-up with Specified Provider: Dr. Abad (GI); 1 Month   As directed      To: Dr. Abad (GI)    Follow Up: 1 Month         Discharge Follow-up with Specified Provider: Dr. Torres (Vasc Surg); 1 Month   As directed      To: Dr. Torres (Vasc Surg)    Follow Up: 1 Month         Discharge Follow-up with Specified Provider: Dr. Torres (Vasc Surgery); 1 Month   As directed      To: Dr. Torres (Vasc Surgery)    Follow Up: 1 Month           Follow-up Information     Provider, No Known .    Why: Medical staff at facility in 1-2 days  Contact information:  [place] [address]  [phone]             Provider, No Known .    Why: Medical Staff at facility in 1-2 days, will need close monitoring of INR  Contact information:  [place] [address]  [phone]                   Additional Instructions for the Follow-ups that You Need to Schedule     Discharge Follow-up with PCP   As directed       Currently Documented PCP:    Provider, No Known    PCP Phone Number:    [phone]     Follow Up Details: Medical staff at facility in 1-2 days         Discharge Follow-up with PCP   As directed       Currently Documented PCP:     Provider, No Known    PCP Phone Number:    [phone]     Follow Up Details: Medical Staff at facility in 1-2 days, will need close monitoring of INR         Discharge Follow-up with Specified Provider: Dr. Abad (GI); 1 Month   As directed      To: Dr. Abad (GI)    Follow Up: 1 Month         Discharge Follow-up with Specified Provider: Dr. Abad (GI); 1 Month   As directed      To: Dr. Abad (GI)    Follow Up: 1 Month         Discharge Follow-up with Specified Provider: Dr. Torres (Vasc Surg); 1 Month   As directed      To: Dr. Torres (Vasc Surg)    Follow Up: 1 Month         Discharge Follow-up with Specified Provider: Dr. Torres (Vasc Surgery); 1 Month   As directed      To: Dr. Torres (Vasc Surgery)    Follow Up: 1 Month           Time Spent on Discharge:  Greater than 30 minutes      Lorenzo Pandey MD  [facility]  04/26/21  10:18 EDT

## 2021-04-26 NOTE — PROGRESS NOTES
"Physicians Statement of Medical Necessity for  Ambulance Transportation    GENERAL INFORMATION     Name: Drew Day  YOB: 1955  Medicare #: [id number] and [place] Medicaid [id number]  Transport Date: 4/26/2021  (Valid for round trips this date, or for scheduled repetitive trips for 60 days from the date signed below.)  Origin: [facility] Destination: [facility]  Is the Patient's stay covered under Medicare Part A (PPS/DRG?)Yes  Closest appropriate facility? Yes  If this a hosp-hosp transfer? No  Is this a hospice patient? No    MEDICAL NECESSITY QUESTIONAIRE    Ambulance Transportation is medically necessary only if other means of transportation are contraindicated or would be potentially harmful to the patient.  To meet this requirement, the patient must be either \"bed confined\" or suffer from a condition such that transport by means other than an ambulance is contraindicated by the patient's condition.  The following questions must be answered by the healthcare professional signing below for this form to be valid:     1) Describe the MEDICAL CONDITION (physical and/or mental) of this patient AT THE TIME OF AMBULANCE TRANSPORT that requires the patient to be transported in an ambulance, and why transport by other means is contraindicated by the patient's condition:   Problems Addressed this Visit        Other    Traumatic brain injury (CMS/HCC) (Chronic)    Relevant Medications    LORazepam (ATIVAN) 0.5 MG tablet    Dementia without behavioral disturbance (CMS/HCC)    Relevant Medications    LORazepam (ATIVAN) 0.5 MG tablet    Seizure disorder (CMS/HCC)    Relevant Medications    LORazepam (ATIVAN) 0.5 MG tablet    Cognitive impairment    Relevant Medications    LORazepam (ATIVAN) 0.5 MG tablet    Hematemesis - Primary      Other Visit Diagnoses     Gastrointestinal hemorrhage, unspecified gastrointestinal hemorrhage type        Relevant Orders    Case Request " "(Completed)      Diagnoses       Codes Comments    Hematemesis, presence of nausea not specified    -  Primary ICD-10-CM: K92.0  ICD-9-CM: 578.0     Gastrointestinal hemorrhage, unspecified gastrointestinal hemorrhage type     ICD-10-CM: K92.2  ICD-9-CM: 578.9     Traumatic brain injury, without loss of consciousness, sequela (CMS/HCC)     ICD-10-CM: S06.9X0S  ICD-9-CM: 907.0     Cognitive impairment     ICD-10-CM: R41.89  ICD-9-CM: 294.9     Seizure disorder (CMS/HCC)     ICD-10-CM: G40.909  ICD-9-CM: 345.90     Dementia associated with other underlying disease without behavioral disturbance (CMS/HCC)     ICD-10-CM: F02.80  ICD-9-CM: 294.10           Past Medical History:   Diagnosis Date   • Back pain    • Convulsion disorder (CMS/HCC)    • Coronary artery disease    • Dementia (CMS/HCC)    • Hyperlipidemia    • Hypertension    • Seizures (CMS/HCC)       Past Surgical History:   Procedure Laterality Date   • CARDIAC CATHETERIZATION     • CORONARY ARTERY BYPASS GRAFT Left    • ENDOSCOPY N/A 4/21/2021    Procedure: ESOPHAGOGASTRODUODENOSCOPY;  Surgeon: Paco Abad MD;  [facility] ENDOSCOPY;  Service: Gastroenterology;  Laterality: N/A;  Pre: coffee ground emesis  Post: hiatal hernia, esophagitis   • VASCULAR SURGERY Left       2) Is this patient \"bed confined\" as defined below?Yes   To be \"bed confined\" the patient must satisfy all three of the following criteria:  (1) unable to get up from bed without assistance; AND (2) unable to ambulate;  AND (3) unable to sit in a chair or wheelchair.  3) Can this patient safely be transported by car or wheelchair van (I.e., may safely sit during transport, without an attendant or monitoring?)No   4. In addition to completing questions 1-3 above, please check any of the following conditions that apply*:          *Note: supporting documentation for any boxes checked must be maintained in the patient's medical records Contractures, Patient is confused, DVT requires " elevation of a lower extremity and Medical attendant required, Traumatic Brain injury, seizure disorder, state guardian patient, Dementia      SIGNATURE OF PHYSICIAN OR OTHER AUTHORIZED HEALTHCARE PROFESSIONAL    I certify that the above information is true and correct based on my evaluation of this patient, and represent that the patient requires transport by ambulance and that other forms of transport are contraindicated.  I understand that this information will be used by the Centers for Medicare and Medicaid Services (CMS) to support the determiniation of medical necessity for ambulance services, and I represent that I have personal knowledge of the patient's condition at the time of transport.    x   If this box is checked, I also certify that the patient is physically or mentally incapable of signing the ambulance service's claim form and that the institution with which I am affiliated has furnished care, services or assistance to the patient.  My signature below is made on behalf of the patient pursuant to 42 .36(b)(4). In accordance with 42 .37, the specific reason(s) that the patient is physically or mentally incapable of signing the claim for is as follows: dementia    Signature of Physician or Healthcare Professional  Date/Time:   4/26/2021 16:34 EDT      (For Scheduled repetitive transport, this form is not valid for transports performed more than 60 days after this date).                                                                                                                                            --------------------------------------------------------------------------------------------  Printed Name and Credentials of Physician or Authorized Healthcare Professional     *Form must be signed by patient's attending physician for scheduled, repetitive transports,.  For non-repetitive ambulance transports, if unable to obtain the signature of the attending physician, any of the  following may sign (please select below):     Physician  Clinical Nurse Specialist  Registered Nurse     Physician Assistant  Discharge Planner  Licensed Practical Nurse     Nurse Practitioner

## 2021-04-26 NOTE — CASE MANAGEMENT/SOCIAL WORK
Continued Stay Note  [facility]     Patient Name: Drew Day  MRN: [id number]  Today's Date: 4/26/2021    Admit Date: 4/20/2021    Discharge Plan     Row Name 04/26/21 0952       Plan    Plan  Return back to [facility] when medically ready via EMS    Plan Comments  Call placed to Tricia Partida RN, KAR[facility] nurse consultant, to check the status of the DNR request faxed Friday 4/23/21.  Await determination and call back.  SI        Discharge Codes    No documentation.             Nya Quigley RN

## 2021-04-26 NOTE — PROGRESS NOTES
Continued Stay Note  [facility]     Patient Name: Drew Day  MRN: [id number]  Today's Date: 4/26/2021    Admit Date: 4/20/2021    Discharge Plan     Row Name 04/26/21 1146       Plan    Plan  Return back to [facility] via [facility] AT 5PM    Patient/Family in Agreement with Plan  yes    Plan Comments  Recieved copy of DNR Approval letter. Scanned into Epic, and copy for packet to Nursing facility. Spoke with [facility] pickup at 5pm. Notified Deanna Elliott/[facility] Guardian [phone] left vm of dc and DNR approval letter recieved. CCP called Michelle/[facility] and left  notifiying of DC and DNR and pickup at 5PM. Packet in [facility]. bernadette french/ccp    Row Name 04/26/21 0952       Plan    Plan  Return back to [facility] when medically ready via EMS    Plan Comments  Call placed to Tricia Partida RN, KAREN nurse consultant, to check the status of the DNR request faxed Friday 4/23/21.  Await determination and call back.  SI        Discharge Codes    No documentation.       Expected Discharge Date and Time     Expected Discharge Date Expected Discharge Time    Apr 25, 2021             Emelyn Rich, SABI

## 2021-04-26 NOTE — PLAN OF CARE
Goal Outcome Evaluation:  Plan of Care Reviewed With: patient     Outcome Summary: Pt. has been awake and laughing with assessments, he is nonverbal d/t history of TBI VSS and he has been on RA.  D/C today back to SNF EMS transport scheduled for 1700, family was made aware of plans today will CTM

## 2021-04-29 NOTE — CASE MANAGEMENT/SOCIAL WORK
Case Management Discharge Note      Final Note: [facility] via ems. bernadette french/ccp    Provided Post Acute Provider List?: N/A    Selected Continued Care - Discharged on 4/26/2021 Admission date: 4/20/2021 - Discharge disposition: Long Term Care (DC - External)    Destination Coordination complete    Service Provider Selected Services Address Phone Fax Patient Preferred    [facility]  Skilled Nursing [address] [address] [phone] --          Durable Medical Equipment    No services have been selected for the patient.              Dialysis/Infusion    No services have been selected for the patient.              Home Medical Care    No services have been selected for the patient.              Therapy    No services have been selected for the patient.              Community Resources    No services have been selected for the patient.                  Transportation Services  Ambulance: [facility]    Final Discharge Disposition Code: 04 - intermediate care facility

## 2021-05-27 PROBLEM — K92.0 COFFEE GROUND EMESIS: Status: ACTIVE | Noted: 2021-01-01

## 2021-05-28 PROBLEM — D64.9 ACUTE ANEMIA: Status: ACTIVE | Noted: 2021-01-01

## 2021-05-28 NOTE — ANESTHESIA POSTPROCEDURE EVALUATION
Patient: Drew Day    Procedure Summary     Date: 05/28/21 Room / [facility] / [facility]    Anesthesia Start: 1239 Anesthesia Stop: 1258    Procedure: ESOPHAGOGASTRODUODENOSCOPY (N/A Esophagus) Diagnosis:       Coffee ground emesis      Acute anemia      (Coffee ground emesis [K92.0])      (Acute anemia [D64.9])    Surgeons: Paco Abad MD Provider: Eleazar Vidal MD    Anesthesia Type: MAC ASA Status: 3          Anesthesia Type: MAC    Vitals  Vitals Value Taken Time   /62 05/28/21 1307   Temp 37.1 °C (98.7 °F) 05/28/21 1307   Pulse 90 05/28/21 1307   Resp 16 05/28/21 1307   SpO2 98 % 05/28/21 1307           Post Anesthesia Care and Evaluation    Patient location during evaluation: bedside  Pain management: adequate  Airway patency: patent  Anesthetic complications: No anesthetic complications    Cardiovascular status: acceptable  Respiratory status: acceptable  Hydration status: acceptable

## 2021-05-28 NOTE — ANESTHESIA PREPROCEDURE EVALUATION
Anesthesia Evaluation     NPO Solid Status: > 8 hours             Airway   Mallampati: II  TM distance: >3 FB  Neck ROM: full  Dental    (+) poor dentition    Pulmonary - normal exam   (+) COPD,   Cardiovascular - normal exam    (+) CAD, CABG >6 Months, PVD, DVT,       Neuro/Psych  (+) dementia,       ROS Comment: nonverbal  GI/Hepatic/Renal/Endo    (+)  GI bleeding upper active bleeding,     ROS Comment: Receiving blood transfusion for UGIB    Musculoskeletal     Abdominal    Substance History      OB/GYN          Other                        Anesthesia Plan    ASA 3     MAC       Anesthetic plan, all risks, benefits, and alternatives have been provided, discussed and informed consent has been obtained with: patient.

## 2021-05-29 PROBLEM — R13.12 OROPHARYNGEAL DYSPHAGIA: Chronic | Status: ACTIVE | Noted: 2021-01-01

## 2021-05-29 PROBLEM — D50.0 IRON DEFICIENCY ANEMIA DUE TO CHRONIC BLOOD LOSS: Chronic | Status: ACTIVE | Noted: 2021-01-01

## 2021-05-29 PROBLEM — Z66 DNR (DO NOT RESUSCITATE): Chronic | Status: ACTIVE | Noted: 2021-01-01

## 2021-06-01 NOTE — CASE MANAGEMENT/SOCIAL WORK
Discharge Planning Assessment  [facility]     Patient Name: Drew Day  MRN: [id number]  Today's Date: 6/1/2021    Admit Date: 5/27/2021    Discharge Needs Assessment     Row Name 06/01/21 1339       Living Environment    Lives With  facility resident    Name(s) of Who Lives With Patient  Lives at [facility]    Current Living Arrangements  residential facility    Potentially Unsafe Housing Conditions  -- none identified    Primary Care Provided by  other (see comments) facility employees    Provides Primary Care For  no one    Family Caregiver if Needed  other (see comments) facility employees    Quality of Family Relationships  unable to assess    Able to Return to Prior Arrangements  yes    Living Arrangement Comments  Bedhold at [facility]       Resource/Environmental Concerns    Resource/Environmental Concerns  none    Transportation Concerns  car, none       Transition Planning    Patient/Family Anticipates Transition to  long-term care facility    Patient/Family Anticipated Services at Transition  none    Transportation Anticipated  health plan transportation       Discharge Needs Assessment    Readmission Within the Last 30 Days  no previous admission in last 30 days    Current Outpatient/Agency/Support Group  -- none    Equipment Currently Used at Home  other (see comments) per facility    Concerns to be Addressed  other (see comments) per facility    Anticipated Changes Related to Illness  none    Equipment Needed After Discharge  other (see comments) per facility    Outpatient/Agency/Support Group Needs  skilled nursing facility    Discharge Facility/Level of Care Needs  nursing facility, skilled        Discharge Plan     Row Name 06/01/21 1344       Plan    Plan  Discharge to [facility]    Plan Comments  I spoke with [facility] and the patient has a bed hold.  The patient is a keating of the [facility].  I attempted to get in touch with his guardian and left a message.  Pt will  return to [facility] at discharge.  CM will continue to follow.    Row Name 06/01/21 1131       Plan    Plan  [facility] at discharge    Plan Comments  attempted to contact facility, left message. Awaiting return call for bedhold status.        Continued Care and Services - Admitted Since 5/27/2021    Coordination has not been started for this encounter.     Selected Continued Care - Prior Encounters Includes selections from prior encounters from 2/26/2021 to 6/1/2021    Discharged on 4/26/2021 Admission date: 4/20/2021 - Discharge disposition: Intermediate Care     Destination     Service Provider Selected Services Address Phone Fax Patient Preferred    [facility]  Skilled Nursing [address] [address] [phone] --                      Demographic Summary     Row Name 06/01/21 1338       General Information    Admission Type  inpatient    Arrived From  hospital    Referral Source  admission list    Reason for Consult  discharge planning    Preferred Language  English     Used During This Interaction  no        Functional Status    No documentation.       Psychosocial    No documentation.       Abuse/Neglect    No documentation.       Legal    No documentation.       Substance Abuse    No documentation.       Patient Forms    No documentation.           Gem Levin, SABI

## 2021-06-01 NOTE — DISCHARGE SUMMARY
[facility]  DISCHARGE SUMMARY      Name:  Drew Day   Age:  65 y.o.  Sex:  male  :  1955  MRN:  [id number]   Visit Number:  [id number]  Primary Care Physician:  Provider, No Known  Date of Discharge:  2021  Admission Date:  2021      Discharge Diagnosis:     1.  Upper GI bleed due to esophagitis status post clipping.  2.  Iron deficiency anemia due to acute on chronic blood loss, status post 2 units PRBC and IV iron.  3.  Extensive left lower extremity DVT.  Coumadin stopped, recommend Lovenox 30 mg subcu twice daily.  4.  Dysphagia  5.  Seizure disorder  6.  Traumatic brain injury with dementia.  7.  COPD without exacerbation  Active Hospital Problems    Diagnosis  POA   • **Gastroesophageal reflux disease with esophagitis and hemorrhage [K21.01]  Yes   • Iron deficiency anemia due to chronic blood loss [D50.0]  Yes   • DNR (do not resuscitate) [Z66]  Yes   • Oropharyngeal dysphagia [R13.12]  Yes   • Coffee ground emesis [K92.0]  Yes   • Acute anemia [D64.9]  Yes   • Traumatic brain injury (CMS/HCC) [S06.9X9A]  Yes   • Hypokalemia [E87.6]  Yes   • Thrombosis of left iliac vein (CMS/[facility]) [I82.422]  Yes   • COPD (chronic obstructive pulmonary disease) (CMS/[facility]) [J44.9]  Yes   • PAD (peripheral artery disease) (CMS/[facility]) [I73.9]  Yes   • Essential hypertension [I10]  Yes   • HLD (hyperlipidemia) [E78.5]  Yes   • Dementia without behavioral disturbance (CMS/[facility]) [F03.90]  Yes   • Coronary artery disease involving native coronary artery of native heart without angina pectoris [I25.10]  Yes   • Seizure disorder (CMS/[facility]) [G40.909]  Yes   • Closed head injury [S09.90XA]  Yes   • Cognitive impairment [R41.89]  Yes      Resolved Hospital Problems   No resolved problems to display.         Presenting Problem/History of Present Illness:    Tachycardia [R00.0]  Chronic anticoagulation [Z79.01]  Coffee ground emesis [K92.0]  History of hypertension [Z86.79]  History of  coronary artery disease [Z86.79]  History of seizures [Z87.898]  Acute on chronic anemia [D64.9]         Hospital Course:    65-year-old male with a history of traumatic brain injury, DVT, dementia who was sent in from nursing home for him gagging foods.  He had coffee-ground emesis.  Gastroenterology was consulted.  EGD showed esophagitis with bleeding which was clipped.  Vascular surgery was consulted as the patient has extensive DVT in left lower extremity along with thrombosis of left iliac vein.  They recommended prophylactic dose Lovenox.  They recommended no further intervention.  Patient is now off Coumadin due to his further risk of the bleeding.    Patient is unable to answer any questions.  Did discuss with his sister who was present.  Patient has poor prognosis due to his multiple medical issues, especially his advanced dementia.  His GI bleeding combined with extensive DVT puts him at risk for PE and for bleeding.  Would consider hospice if the patient has further bleeding or further extension of his DVT.  In the meantime he is to continue on twice daily Protonix as well as 4 times daily Carafate.  Discontinue Coumadin.  Continue Lovenox 30 mg every 12 hours.    Gastroenterology has recommended colonoscopy if he continues to have problems as an outpatient.  Recommend follow-up with them in 1 month.  Follow-up with PCP at the nursing home.  Patient needs to increase his oral and fluid intake.  Anticipate poor prognosis due to his TBI and his worsening dementia.  His hemoglobin however is stable at this point and he does not appear to be bleeding.  We will discharge him back to nursing home.    Procedures Performed:    Procedure(s):  ESOPHAGOGASTRODUODENOSCOPY       Consults:     Consults     Date and Time Order Name Status Description    5/29/2021  4:28 PM Inpatient Vascular Surgery Consult Completed     5/27/2021  9:32 PM Inpatient Gastroenterology Consult Completed     5/27/2021  5:24 PM [facility] (on-call  MD unless specified) Details Completed           Pertinent Test Results:         Results from last 7 days   Lab Units 06/01/21  0555 05/31/21  2351 05/31/21  1555 05/31/21  0806 05/30/21  2356 05/30/21  1539 05/30/21  0800 05/30/21  0336 05/28/21  0548 05/28/21  0427 05/27/21  1638   WBC 10*3/mm3 4.05  --   --  5.07  --   --   --  4.54 5.05  --  8.55   HEMOGLOBIN g/dL 9.9* 9.6* 9.7* 9.3*  9.3* 8.9* 9.1* 8.5* 8.2* 6.6*  --  8.6*   HEMATOCRIT % 30.7* 30.3* 31.0* 28.4*  28.4* 27.3* 28.7* 26.8* 25.6* 21.1*  --  27.4*   PLATELETS 10*3/mm3 198  --   --  178  --   --   --  119* 181  --  255   MCV fL 83.9  --   --  82.1  --   --   --  84.2 79.3  --  79.9   SODIUM mmol/L 142  --   --  139  --   --   --  142  --  144 139   POTASSIUM mmol/L 3.7  --   --  3.5  --   --   --  3.3*  --  3.6 5.3*   CHLORIDE mmol/L 111*  --   --  107  --   --   --  113*  --  115* 109*   CO2 mmol/L 25.7  --   --  25.6  --   --   --  21.4*  --  22.1 24.3   BUN mg/dL 5*  --   --  5*  --   --   --  7*  --  25* 35*   CREATININE mg/dL 0.66*  --   --  0.62*  --   --   --  0.55*  --  0.67* 0.76   GLUCOSE mg/dL 90  --   --  88  --   --   --  85  --  110* 114*   CALCIUM mg/dL 8.4*  --   --  8.3*  --   --   --  7.7*  --  8.0* 8.6        Results from last 7 days   Lab Units 05/28/21  0427 05/27/21  1638   TROPONIN T ng/mL <0.010 <0.010     Results from last 7 days   Lab Units 06/01/21  0555 05/31/21  0806 05/30/21  0336 05/28/21  0548 05/27/21  1638   LACTATE mmol/L  --   --   --   --  1.9   WBC 10*3/mm3 4.05 5.07 4.54 5.05 8.55     Results from last 7 days   Lab Units 06/01/21  0555 05/31/21  0806 05/30/21  0336 05/27/21  1638   BILIRUBIN mg/dL 0.2 0.2 0.2 <0.2   ALK PHOS U/L 128* 119* 93 112   ALT (SGPT) U/L 13 14 12 16   AST (SGOT) U/L 19 21 22 40   PROTIME Seconds  --   --   --  19.5*   INR   --   --   --  1.67*   APTT seconds  --   --   --  29.6           Invalid input(s): TG, LDLCALC, LDLREALC      Brief Urine Lab Results  (Last result in the past 365  "days)      Color   Clarity   Blood   Leuk Est   Nitrite   Protein   CREAT   Urine HCG        05/28/21 1418 Yellow Clear Negative Negative Negative Negative                 Results for orders placed during the hospital encounter of 05/27/21    Duplex Venous Lower Extremity - Bilateral CAR    Interpretation Summary  · Sub-acute left lower extremity deep vein thrombosis noted in the common femoral, proximal femoral, mid femoral and distal femoral.  · Sub-acute left lower extremity superficial thrombophlebitis noted in the saphenofemoral junction.  · Normal right lower extremity venous duplex scan.  · Not able to evaluate the popliteal or calf vessels due to compliance issues.            Condition on Discharge:      Stable    Vital Signs:    /64 (BP Location: Right arm, Patient Position: Lying)   Pulse 72   Temp 98.2 °F (36.8 °C) (Oral)   Resp 18   Ht 177.8 cm (70\")   Wt 59.9 kg (132 lb)   SpO2 97%   BMI 18.94 kg/m²     Physical Exam:    General: Alert and oriented x0, awake  Heart: Regular rate and rhythm without murmur rub or thrill.  Lungs: Clear to auscultation bilaterally without use of accessory muscles of respiration.  Abdomen: Soft/nontender/nondistended.  No HSM noted.  MSK: Moves all extremities.    Discharge Disposition:    Skilled Nursing Facility (DC - External)    Discharge Medication:       Discharge Medications      New Medications      Instructions Start Date   pantoprazole 40 MG EC tablet  Commonly known as: PROTONIX   40 mg, Oral, 2 Times Daily Before Meals         Changes to Medications      Instructions Start Date   enoxaparin 30 MG/0.3ML solution syringe  Commonly known as: LOVENOX  What changed:   · medication strength  · how much to take   30 mg, Subcutaneous, Every 12 Hours   Start Date: June 2, 2021     Phenytoin Infatabs 50 MG chewable tablet  Generic drug: phenytoin  What changed:   · how much to take  · how to take this  · when to take this  · additional instructions   Take 4 " tablets bid         Continue These Medications      Instructions Start Date   acetaminophen 325 MG tablet  Commonly known as: TYLENOL   650 mg, Oral, Every 6 Hours PRN      atorvastatin 40 MG tablet  Commonly known as: LIPITOR   40 mg, Oral, Nightly      benztropine 0.5 MG tablet  Commonly known as: COGENTIN   0.5 mg, Oral, 2 Times Daily      calcium carbonate 500 MG chewable tablet  Commonly known as: TUMS   1 tablet, Oral, 2 Times Daily      donepezil 10 MG tablet  Commonly known as: ARICEPT   10 mg, Oral, Nightly      LORazepam 0.5 MG tablet  Commonly known as: ATIVAN   0.25 mg, Oral, 2 Times Daily      memantine 10 MG tablet  Commonly known as: NAMENDA   10 mg, Oral, 2 Times Daily      Multivitamin Adult tablet tablet  Generic drug: multivitamin with minerals   1 tablet, Oral, Daily      REMEDY CALAZIME EX   Apply externally, 2 Times Daily      risperiDONE 1 MG tablet  Commonly known as: risperDAL   1 mg, Oral, 2 Times Daily      sucralfate 1 g tablet  Commonly known as: CARAFATE   1 g, Oral, 4 Times Daily Before Meals & Nightly      tamsulosin 0.4 MG capsule 24 hr capsule  Commonly known as: FLOMAX   0.4 mg, Oral, Nightly      thiamine 100 MG tablet  Commonly known as: VITAMIN B-1   100 mg, Oral, Daily      zonisamide 100 MG capsule  Commonly known as: ZONEGRAN   100 mg, Oral, Daily         Stop These Medications    Prevacid SoluTab 30 MG Tablet Delayed Release Dispersible disintegrating tablet  Generic drug: lansoprazole     warfarin 5 MG tablet  Commonly known as: Coumadin            Discharge Diet:     Diet Instructions     Diet: Dysphagia; Pudding Thick Liquids; Pureed      Discharge Diet: Dysphagia    Fluid Consistency: Pudding Thick Liquids    Pureed Options: Pureed          Activity at Discharge:     Activity Instructions     Activity as Tolerated            Follow-up Appointments:    No future appointments.  Additional Instructions for the Follow-ups that You Need to Schedule     Discharge Follow-up  with PCP   As directed       Currently Documented PCP:    Provider, No Known    PCP Phone Number:    [phone]     Follow Up Details: at nh         Discharge Follow-up with Specialty: ISIAH Rothman; 1 Month   As directed      Specialty: ISIAH Rothman    Follow Up: 1 Month               Test Results Pending at Discharge:    35 minutes was spent on discharge including meeting with the patient, examining the patient, review of the chart, meeting with patient's sister, discussion with case management and nursing, charting, with greater than 50% of time spent in coordination of care.       Rashid Rose DO  06/01/21  15:02 EDT

## 2021-06-01 NOTE — NURSING NOTE
Aida, State guardian was notified of patient discharge from [facility] to [facility]. Notified sister of patient return to [facility]. States understanding of plan of care.

## 2021-06-01 NOTE — CASE MANAGEMENT/SOCIAL WORK
Continued Stay Note  [facility]     Patient Name: Drew Day  MRN: [id number]  Today's Date: 6/1/2021    Admit Date: 5/27/2021    Discharge Plan     Row Name 06/01/21 1131       Plan    Plan  [facility]    Plan Comments  attempted to contact facility, left message. Awaiting return call for bed hold status.        Discharge Codes    No documentation.             Gem Levin RN

## 2021-06-01 NOTE — PROGRESS NOTES
"Adult Nutrition  Assessment/PES    Patient Name:  Drew Day  YOB: 1955  MRN: [id number]  Admit Date:  5/27/2021    Assessment Date:  6/1/2021    Comments:  Nutrition Screen: MST 2/B=12. Intake 50-75% on DysphII/PTL. EGD on 5/28 revealed esophagitis and active bleed s/p clipping. Pt resides in LTC, h/o TBI with dementia. Pt is nonverbal at baseline. No noted wt loss per index. Skin intact. Magic cups ordered BID with meals to assist in meeting nutritional needs. Will continue to monitor.     Reason for Assessment     Row Name 06/01/21 1425          Reason for Assessment    Reason For Assessment  identified at risk by screening criteria     Diagnosis  gastrointestinal disease;neurologic conditions Upper GI bleed d/t esophagitis, blood loss anemia, LLE DVT, dysphagia, seizure disorder, TBI, dementia, COPD, hypokalemia, HTN, HLD, CAD.     Identified At Risk by Screening Criteria  difficulty chewing/swallowing;MST SCORE 2+         Nutrition/Diet History     Row Name 06/01/21 1429          Nutrition/Diet History    Typical Food/Fluid Intake  MST 2/B=12. Intake 50-75% on DysphII/PTL. Pt resides in LTC, h/o TBI with dementia. Pt is nonverbal at baseline. No noted wt loss per index. Skin intact.     Factors Affecting Nutritional Intake  impaired cognitive status/motor control;difficulty/impaired swallowing         Anthropometrics     Row Name 06/01/21 1504          Anthropometrics    Height  177.8 cm (70\")        Ideal Body Weight (IBW)    Ideal Body Weight (IBW) (kg)  76.48         Labs/Tests/Procedures/Meds     Row Name 06/01/21 1503          Labs/Procedures/Meds    Lab Results Reviewed  reviewed     Lab Results Comments  Cl, BUN, Cr, AP, Alb, H/H        Diagnostic Tests/Procedures    Diagnostic Test/Procedure Reviewed  reviewed        Medications    Pertinent Medications Reviewed  reviewed, pertinent     Pertinent Medications Comments  Lovenox, MVI, protonix, carafate, thiamine         Physical " "Findings     Row Name 06/01/21 1503          Physical Findings    Overall Physical Appearance  generalized wasting     Oral/Mouth Cavity  poor dentition     Skin  other (see comments) B=12         Estimated/Assessed Needs     Row Name 06/01/21 1504          Calculation Measurements    Weight Used For Calculations  59.9 kg (132 lb 0.9 oz)     Height  177.8 cm (70\")        Estimated/Assessed Needs    Additional Documentation  KCAL/KG (Group);Protein Requirements (Group);Fluid Requirements (Group)        KCAL/KG    KCAL/KG  25 Kcal/Kg (kcal);30 Kcal/Kg (kcal)     25 Kcal/Kg (kcal)  1497.5     30 Kcal/Kg (kcal)  1797        Protein Requirements    Weight Used For Protein Calculations  59.9 kg (132 lb 0.9 oz)     Est Protein Requirement Amount (gms/kg)  1.3 gm protein     Estimated Protein Requirements (gms/day)  77.87        Fluid Requirements    Fluid Requirements (mL/day)  1797     Estimated Fluid Requirement Method  RDA Method     RDA Method (mL)  1797         Nutrition Prescription Ordered     Row Name 06/01/21 1505          Nutrition Prescription PO    Current PO Diet  Dysphagia     Dysphagia Level  2  Pureed     Fluid Consistency  Pudding thick         Problem/Interventions:  Problem 1     Row Name 06/01/21 1505          Nutrition Diagnoses Problem 1    Problem 1  Increased Nutrient Needs     Macronutrient  Kcal;Protein     Micronutrient  Vitamin;Mineral     Etiology (related to)  Functional Diagnosis     Functional Diagnosis  Cognitive deficit;Dysphagia     Signs/Symptoms (evidenced by)  SLP/Swallow eval     Swallow eval status  Done     Type of SLP Evaluation  Bedside           Intervention Goal     Row Name 06/01/21 1505          Intervention Goal    General  Maintain nutrition;Meet nutritional needs for age/condition;Disease management/therapy     PO  Increase intake;Meet estimated needs;Modify texture/consistency     Weight  Maintain weight         Nutrition Intervention     Row Name 06/01/21 1505          " Nutrition Intervention    RD/Tech Action  Care plan reviewd;Follow Tx progress;Encourage intake;Recommend/ordered     Recommended/Ordered  Supplement         Nutrition Prescription     Row Name 06/01/21 1505          Nutrition Prescription PO    PO Prescription  Begin/change supplement     Supplement  Magic Cup     Supplement Frequency  2 times a day     New PO Prescription Ordered?  Yes         Education/Evaluation     Row Name 06/01/21 1506          Education    Education  Education not appropriate at this time     Please explain  Patient nonverbal        Monitor/Evaluation    Monitor  Per protocol;PO intake           Electronically signed by:  Kayla Moreno MS,RD,LD  06/01/21 15:06 EDT

## 2021-06-01 NOTE — PLAN OF CARE
Goal Outcome Evaluation:  Plan of Care Reviewed With: patient  Progress: no change     Patient is nonverbal. Q2 turns. Pt not able to participate in some of the assessment. Room air. Lovenox subq. Monitoring hgb. Incontinence care. Total feed. Purwick in place. Takes pills crushed in applesauce. NSR. Patient currently resting comfortably.

## 2021-06-01 NOTE — CASE MANAGEMENT/SOCIAL WORK
Continued Stay Note  [facility]     Patient Name: Drew Day  MRN: [id number]  Today's Date: 6/1/2021    Admit Date: 5/27/2021    Discharge Plan     Row Name 06/01/21 1540       Plan    Plan Comments  EMS arranged for 6:00pm for pt to return to [facility]    Row Name 06/01/21 1324       Plan    Plan  Discharge to [facility]    Plan Comments  I spoke with [facility] and the patient has a bed hold.  The patient is a keating of the [facility].  I attempted to get in touch with his guardian and left a message.  Pt will return to [place] at discharge.  CM will continue to follow.        Discharge Codes    No documentation.       Expected Discharge Date and Time     Expected Discharge Date Expected Discharge Time    Jun 1, 2021             SUKUMAR Alonso

## 2021-06-03 NOTE — PROGRESS NOTES
Case Management Discharge Note      Final Note: [facility] long term care         Selected Continued Care - Discharged on 6/1/2021 Admission date: 5/27/2021 - Discharge disposition: Skilled Nursing Facility (DC - External)    Destination    No services have been selected for the patient.              Durable Medical Equipment    No services have been selected for the patient.              Dialysis/Infusion    No services have been selected for the patient.              Home Medical Care    No services have been selected for the patient.              Therapy    No services have been selected for the patient.              Community Resources    No services have been selected for the patient.                Selected Continued Care - Prior Encounters Includes selections from prior encounters from 2/26/2021 to 6/1/2021    Discharged on 4/26/2021 Admission date: 4/20/2021 - Discharge disposition: Intermediate Care     Destination     Service Provider Selected Services Address Phone Fax Patient Preferred    [facility]  Skilled Nursing [address] [id number] [phone] --                    Transportation Services  Ambulance: [facility]    Final Discharge Disposition Code: 04 - intermediate care facility

## 2021-06-09 PROBLEM — T45.515A ADVERSE EFFECT OF ANTICOAGULANT: Status: ACTIVE | Noted: 2021-01-01

## 2021-06-09 NOTE — ED PROVIDER NOTES
Pt presents to the ED complaining of coffee-ground emesis this morning at the nursing home.  The patient was recently in the hospital for the same.  He has a DVT and is currently on Lovenox.  The patient's had a traumatic brain injury and no further history is obtainable at this time.    On exam, pt is patient is awake but nonverbal  Pale conjunctiva  Dried coffee-ground emesis on his lips  The rhythm but tachycardic to 110  Abdomen is soft and nontender    I agree with midlevel plan to check labs, give IV fluids and IV Protonix    PPE  Pt does not present with symptoms for COVID19; however, I was wearing a mask and goggles throughout all patient interaction.    The patient's hemoglobin is actually better than what it was 1 week ago.  But with his hematemesis on Lovenox for an extensive lower extremity DVT-he will need to admit him to the hospital for further evaluation and care.  I have discussed the case with Dr. Barnett from [facility] who will admit the patient to a telemetry bed.    The MAIKEL and I have discussed this patient's history, physical exam, and treatment plan.  I have reviewed the documentation and personally had a face to face interaction with the patient. I affirm the documentation and agree with the treatment and plan.  The attached note describes my personal findings.           Everette Alex MD  06/09/21 [id number]

## 2021-06-09 NOTE — CONSULTS
[facility]  Initial Inpatient Consult Note    Referring Provider: [facility]    Reason for Consultation: reported hematemesis    Subjective     History of present illness:    65 y.o. male Seen  By our service 5- for coffee ground emesis, anemia, EGD revealed Grade c esophagitis, prior EGD for April with similar findings.Current labs reflect improvement since last studied in MAY. Patient unable to give any history, all information obtained from the chart and RN.    Past Medical History:  Past Medical History:   Diagnosis Date   • Back pain    • Convulsion disorder (CMS/HCC)    • Coronary artery disease    • Dementia (CMS/HCC)    • Esophageal bleeding    • GERD (gastroesophageal reflux disease)    • Hyperlipidemia    • Hypertension    • Seizures (CMS/HCC)      Past Surgical History:  Past Surgical History:   Procedure Laterality Date   • CARDIAC CATHETERIZATION     • CORONARY ARTERY BYPASS GRAFT Left    • ENDOSCOPY N/A 4/21/2021    Procedure: ESOPHAGOGASTRODUODENOSCOPY;  Surgeon: Paco Abad MD;  Location: [facility] ENDOSCOPY;  Service: Gastroenterology;  Laterality: N/A;  Pre: coffee ground emesis  Post: hiatal hernia, esophagitis   • ENDOSCOPY N/A 5/28/2021    Procedure: ESOPHAGOGASTRODUODENOSCOPY;  Surgeon: Paco Abad MD;  Location: [facility] ENDOSCOPY;  Service: Gastroenterology;  Laterality: N/A;   • VASCULAR SURGERY Left       Social History:   Social History     Tobacco Use   • Smoking status: Former Smoker   Substance Use Topics   • Alcohol use: No      Family History:  Family History   Problem Relation Age of Onset   • Arthritis Mother    • Depression Mother    • Alcohol abuse Mother    • Emphysema Mother    • Alzheimer's disease Mother    • Alzheimer's disease Father    • Alcohol abuse Father    • Bipolar disorder Sister    • Heart disease Sister    • Cancer Brother    • Alcohol abuse Brother    • Drug abuse Brother    • Bipolar disorder Son    • Anxiety disorder Son    • Stroke  Brother    • Cancer Brother    • Hypertension Brother    • Bipolar disorder Brother    • Anxiety disorder Brother    • Neuropathy Brother    • Heart disease Brother    • Drug abuse Brother    • Alcohol abuse Brother        Home Meds:  Medications Prior to Admission   Medication Sig Dispense Refill Last Dose   • acetaminophen (TYLENOL) 325 MG tablet Take 650 mg by mouth Every 6 (Six) Hours As Needed for Mild Pain .      • atorvastatin (LIPITOR) 40 MG tablet Take 1 tablet by mouth Every Night. 30 tablet 0    • benztropine (COGENTIN) 0.5 MG tablet Take 0.5 mg by mouth 2 (Two) Times a Day.      • calcium carbonate (TUMS) 500 MG chewable tablet Chew 1 tablet 2 (Two) Times a Day.      • enoxaparin (LOVENOX) 30 MG/0.3ML solution syringe Inject 0.3 mL under the skin into the appropriate area as directed Every 12 (Twelve) Hours. Indications: Partial treatment for DVT due to ongoing GI bleeds. 8.4 mL     • LORazepam (ATIVAN) 0.5 MG tablet Take 0.5 tablets by mouth 2 (Two) Times a Day. 6 tablet 0    • Menthol-Zinc Oxide (REMEDY CALAZIME EX) Apply  topically 2 (Two) Times a Day. To buttocks      • Multiple Vitamins-Minerals (MULTIVITAMIN ADULT) tablet Take 1 tablet by mouth daily. 30 tablet 5    • PHENYTOIN INFATABS 50 MG chewable tablet Take 4 tablets bid (Patient taking differently: Chew 200 mg 2 (Two) Times a Day.) 240 tablet 5    • risperiDONE (risperDAL) 0.5 MG tablet Take 0.5 mg by mouth 2 (Two) Times a Day.      • sucralfate (CARAFATE) 1 g tablet Take 1 tablet by mouth 4 (Four) Times a Day Before Meals & at Bedtime.      • tamsulosin (FLOMAX) 0.4 MG capsule 24 hr capsule Take 1 capsule by mouth every night. 30 capsule 5    • thiamine (VITAMINE B-1) 100 MG tablet Take 1 tablet by mouth daily. 30 tablet 5    • zonisamide (ZONEGRAN) 100 MG capsule Take 1 capsule by mouth Daily. 3 capsule 0      Current Meds:   atorvastatin, 40 mg, Oral, Nightly  benztropine, 0.5 mg, Oral, BID  calcium carbonate, 1 tablet, Oral,  BID  LORazepam, 0.25 mg, Oral, BID  Menthol-Zinc Oxide, , Topical, BID  multivitamin with minerals, 1 tablet, Oral, Daily  phenytoin, 200 mg, Oral, BID  risperiDONE, 0.5 mg, Oral, BID  senna-docusate sodium, 2 tablet, Oral, BID  sodium chloride, 10 mL, Intravenous, Q12H  sucralfate, 1 g, Oral, 4x Daily AC & at Bedtime  tamsulosin, 0.4 mg, Oral, Nightly  thiamine, 100 mg, Oral, Daily  [START ON 6/10/2021] zonisamide, 100 mg, Oral, Daily      Allergies:  Allergies   Allergen Reactions   • Eggs Or Egg-Derived Products Unknown - High Severity     Per nursing home list   • Fish-Derived Products Unknown - High Severity     Per nursing home list     Review of Systems  Review of systems could not be obtained due to   patient nonverbal.     Objective     Vital Signs  Temp:  [98.5 °F (36.9 °C)-98.8 °F (37.1 °C)] 98.5 °F (36.9 °C)  Heart Rate:  [] 87  Resp:  [16-18] 16  BP: ()/(46-80) 119/72  Physical Exam:  General Appearance:    Alert, cooperative, in no acute distress   Head:    Normocephalic, without obvious abnormality, atraumatic   Eyes:          conjunctivae and sclerae normal, no   icterus   Throat:   no thrush, oral mucosa moist   Neck:   Supple, no adenopathy   Lungs:     Clear to auscultation bilaterally    Heart:    Regular rhythm and normal rate    Chest Wall:    No abnormalities observed   Abdomen:     Soft, nondistended, nontender; normal bowel sounds   Extremities:   no edema, no redness   Skin:   No bruising or rash   Psychiatric:  normal mood and insight     Results Review:   I reviewed the patient's new clinical results.    Results from last 7 days   Lab Units 06/09/21  1028   WBC 10*3/mm3 14.23*   HEMOGLOBIN g/dL 11.3*   HEMATOCRIT % 34.6*   PLATELETS 10*3/mm3 269     Results from last 7 days   Lab Units 06/09/21  1028   SODIUM mmol/L 144   POTASSIUM mmol/L 4.4   CHLORIDE mmol/L 110*   CO2 mmol/L 25.4   BUN mg/dL 25*   CREATININE mg/dL 0.76   CALCIUM mg/dL 9.0   BILIRUBIN mg/dL 0.2   ALK PHOS  U/L 130*   ALT (SGPT) U/L 16   AST (SGOT) U/L 15   GLUCOSE mg/dL 129*         Lab Results   Lab Value Date/Time    LIPASE 22 06/09/2021 1028    LIPASE 29 05/27/2021 1638    LIPASE 25 04/20/2021 1912    LIPASE 27 11/22/2019 0650       Radiology:  XR Chest 1 View   Final Result          Assessment/Plan   Patient Active Problem List   Diagnosis   • Dementia without behavioral disturbance (CMS/[facility])   • Coronary artery disease involving native coronary artery of native heart without angina pectoris   • Seizure disorder (CMS/[facility])   • Cognitive impairment   • Closed head injury   • Acute upper GI bleed   • Hematemesis   • HLD (hyperlipidemia)   • Essential hypertension   • PAD (peripheral artery disease) (CMS/[facility])   • COPD (chronic obstructive pulmonary disease) (CMS/[facility])   • Acute UTI (urinary tract infection)   • Thrombosis of left iliac vein (CMS/[facility])   • GIB (gastrointestinal bleeding)   • Traumatic brain injury (CMS/[facility])   • Gastroesophageal reflux disease with esophagitis and hemorrhage   • Hypokalemia   • Coffee ground emesis   • Acute anemia   • Iron deficiency anemia due to chronic blood loss   • DNR (do not resuscitate)   • Oropharyngeal dysphagia       Assessment:  1. Coffee ground emesis: known history of esophagitis, suspect same source.   2. Acute/ chronic anemia  3. Traumatic brain injury  4. Dementia    Plan:  · Liquid diet  · Reassess in AM to determine if repeat endoscopy warranted.  · Monitor H/H      I discussed the patients findings and my recommendations with patient and nursing staff.    Drew Felix MD

## 2021-06-09 NOTE — H&P
PCP: Provider, No Known    Chief complaint   Chief Complaint   Patient presents with   • Vomiting       HPI  Patient is a 65 y.o. male presents with a history of traumatic brain injury (keating of the [facility]) and dementia and seizure disorder who presents to the ER with coffee-ground emesis.  Patient was just admitted a month and a half ago with the same thing.  Patient underwent an EGD both 4/21 which showed a hiatal hernia and esophagitis however a CT abdomen pelvis showed a an extensive left lower extremity DVT.  Patient was to do Carafate and a twice daily PPI.  He was started on anticoagulation and had no further evidence of bleeding.  Vascular evaluated the patient and recommended anticoagulation only no indication for an IVC filter at that time.  They discussed with the pharmacy and decision was made to use Coumadin instead of Eliquis.      Patient was readmitted for coffee-ground emesis a month later and then ended up going another EGD on 5/28/2021.  EGD showed esophagitis with bleeding which they then clipped.  Vascular saw the patient and they recommended prophylactic dose Lovenox and therefore off Coumadin.  The physician did discuss with the patient's sister about his poor prognosis with multiple medical issues and advanced dementia combined with his GI bleeding and extensive DVT puts him at risk of either a PE or further bleeding.  Gastroenterology recommended colonoscopy if he continues to have problems as an outpatient.    Patient returns with continued coffee-ground emesis and his BUN has increased and his hemoglobin has gone from 9.9-11.3 however he looks dehydrated.    Patient had same problem in November 2019 when he was on Xarelto for peripheral artery disease and vascular procedure.    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[facility])    • Coronary artery disease    • Dementia (CMS/[facility])    • Esophageal bleeding    • GERD (gastroesophageal  reflux disease)    • Hyperlipidemia    • Hypertension    • Seizures (CMS/H[facility])        PAST SURGICAL HISTORY  Past Surgical History:   Procedure Laterality Date   • CARDIAC CATHETERIZATION     • CORONARY ARTERY BYPASS GRAFT Left    • ENDOSCOPY N/A 4/21/2021    Procedure: ESOPHAGOGASTRODUODENOSCOPY;  Surgeon: Paco Abad MD;  Location: [facility] ENDOSCOPY;  Service: Gastroenterology;  Laterality: N/A;  Pre: coffee ground emesis  Post: hiatal hernia, esophagitis   • ENDOSCOPY N/A 5/28/2021    Procedure: ESOPHAGOGASTRODUODENOSCOPY;  Surgeon: Paco Abad MD;  Location: [facility] ENDOSCOPY;  Service: Gastroenterology;  Laterality: N/A;   • VASCULAR SURGERY Left        FAMILY HISTORY  Family History   Problem Relation Age of Onset   • Arthritis Mother    • Depression Mother    • Alcohol abuse Mother    • Emphysema Mother    • Alzheimer's disease Mother    • Alzheimer's disease Father    • Alcohol abuse Father    • Bipolar disorder Sister    • Heart disease Sister    • Cancer Brother    • Alcohol abuse Brother    • Drug abuse Brother    • Bipolar disorder Son    • Anxiety disorder Son    • Stroke Brother    • Cancer Brother    • Hypertension Brother    • Bipolar disorder Brother    • Anxiety disorder Brother    • Neuropathy Brother    • Heart disease Brother    • Drug abuse Brother    • Alcohol abuse Brother        SOCIAL HISTORY  Social History     Tobacco Use   • Smoking status: Former Smoker   Vaping Use   • Vaping Use: Never used   Substance Use Topics   • Alcohol use: No   • Drug use: Not Currently       MEDICATIONS:  Medications Prior to Admission   Medication Sig Dispense Refill Last Dose   • acetaminophen (TYLENOL) 325 MG tablet Take 650 mg by mouth Every 6 (Six) Hours As Needed for Mild Pain .      • atorvastatin (LIPITOR) 40 MG tablet Take 1 tablet by mouth Every Night. 30 tablet 0    • benztropine (COGENTIN) 0.5 MG tablet Take 0.5 mg by mouth 2 (Two) Times a Day.      • calcium carbonate (TUMS) 500 MG chewable  "tablet Chew 1 tablet 2 (Two) Times a Day.      • enoxaparin (LOVENOX) 30 MG/0.3ML solution syringe Inject 0.3 mL under the skin into the appropriate area as directed Every 12 (Twelve) Hours. Indications: Partial treatment for DVT due to ongoing GI bleeds. 8.4 mL     • LORazepam (ATIVAN) 0.5 MG tablet Take 0.5 tablets by mouth 2 (Two) Times a Day. 6 tablet 0    • Menthol-Zinc Oxide (REMEDY CALAZIME EX) Apply  topically 2 (Two) Times a Day. To buttocks      • Multiple Vitamins-Minerals (MULTIVITAMIN ADULT) tablet Take 1 tablet by mouth daily. 30 tablet 5    • PHENYTOIN INFATABS 50 MG chewable tablet Take 4 tablets bid (Patient taking differently: Chew 200 mg 2 (Two) Times a Day.) 240 tablet 5    • risperiDONE (risperDAL) 0.5 MG tablet Take 0.5 mg by mouth 2 (Two) Times a Day.      • sucralfate (CARAFATE) 1 g tablet Take 1 tablet by mouth 4 (Four) Times a Day Before Meals & at Bedtime.      • tamsulosin (FLOMAX) 0.4 MG capsule 24 hr capsule Take 1 capsule by mouth every night. 30 capsule 5    • thiamine (VITAMINE B-1) 100 MG tablet Take 1 tablet by mouth daily. 30 tablet 5    • zonisamide (ZONEGRAN) 100 MG capsule Take 1 capsule by mouth Daily. 3 capsule 0        Allergies:  Eggs or egg-derived products and Fish-derived products    Review of Systems:  Unable to obtain due to the patient is mute/ nonverbal      Vital Signs  Temp:  [98.8 °F (37.1 °C)] 98.8 °F (37.1 °C)  Heart Rate:  [] 83  Resp:  [18] 18  BP: ()/(46-80) 96/46  Flowsheet Rows      First Filed Value   Admission Height  177.8 cm (70\") Documented at 06/09/2021 1019   Admission Weight  58.8 kg (129 lb 9.6 oz) Documented at 06/09/2021 1019         Body mass index is 18.6 kg/m².    Physical Exam:  General Appearance:    awake, uncooperative, in no acute distress   Head:    Normocephalic, without obvious abnormality, atraumatic   Eyes:         conjunctivae and sclerae normal, no icterus, PERRLA   ENT:    Ears grossly intact, oral mucosa dry, " coffee-ground emesis around lips   Neck:   No adenopathy, supple, trachea midline,        Lungs:     Clear to auscultation,respirations regular, even and                   unlabored    Heart:    Regular rhythm but tachycardic,  no murmur, normal S1 and S2,   Abdomen:     Normal bowel sounds, no masses,  soft non-tender, non-distended,    Extremities:    no cyanosis, no edema,             Pulses:   Pulses palpable and equal bilaterally   Skin:   No bleeding, rash, bruising    Neurologic:    Psych:   Cranial nerves 2 - 12 grossly intact but difficult to assess due to TBI, sensation grossly intact,       nonverbal, flat Affect     I used full protective equipment while examining this patient.  This includes face mask /protective eyewear and protective gown when appropriate.  I washed my hands before entering the room and immediately upon leaving the room.    LABS:  Admission on 06/09/2021   Component Date Value Ref Range Status   • Glucose 06/09/2021 129* 65 - 99 mg/dL Final   • BUN 06/09/2021 25* 8 - 23 mg/dL Final   • Creatinine 06/09/2021 0.76  0.76 - 1.27 mg/dL Final   • Sodium 06/09/2021 144  136 - 145 mmol/L Final   • Potassium 06/09/2021 4.4  3.5 - 5.2 mmol/L Final   • Chloride 06/09/2021 110* 98 - 107 mmol/L Final   • CO2 06/09/2021 25.4  22.0 - 29.0 mmol/L Final   • Calcium 06/09/2021 9.0  8.6 - 10.5 mg/dL Final   • Total Protein 06/09/2021 6.6  6.0 - 8.5 g/dL Final   • Albumin 06/09/2021 4.00  3.50 - 5.20 g/dL Final   • ALT (SGPT) 06/09/2021 16  1 - 41 U/L Final   • AST (SGOT) 06/09/2021 15  1 - 40 U/L Final   • Alkaline Phosphatase 06/09/2021 130* 39 - 117 U/L Final   • Total Bilirubin 06/09/2021 0.2  0.0 - 1.2 mg/dL Final   • eGFR Non African Amer 06/09/2021 102  >60 mL/min/1.73 Final   • Globulin 06/09/2021 2.6  gm/dL Final   • A/G Ratio 06/09/2021 1.5  g/dL Final   • BUN/Creatinine Ratio 06/09/2021 32.9* 7.0 - 25.0 Final   • Anion Gap 06/09/2021 8.6  5.0 - 15.0 mmol/L Final   • Lipase 06/09/2021 22  13 -  60 U/L Final   • Color, UA 06/09/2021 Yellow  Yellow, Straw Final   • Appearance, UA 06/09/2021 Clear  Clear Final   • pH, UA 06/09/2021 8.5* 5.0 - 8.0 Final   • Specific Gravity, UA 06/09/2021 1.024  1.005 - 1.030 Final   • Glucose, UA 06/09/2021 Negative  Negative Final   • Ketones, UA 06/09/2021 Negative  Negative Final   • Bilirubin, UA 06/09/2021 Negative  Negative Final   • Blood, UA 06/09/2021 Negative  Negative Final   • Protein, UA 06/09/2021 Trace* Negative Final   • Leuk Esterase, UA 06/09/2021 Trace* Negative Final   • Nitrite, UA 06/09/2021 Negative  Negative Final   • Urobilinogen, UA 06/09/2021 1.0 E.U./dL  0.2 - 1.0 E.U./dL Final   • Fecal Occult Blood 06/09/2021 Positive* Negative Final   • Lot Number 06/09/2021 [id number]   Final   • Expiration Date 06/09/2021 6/30/2021   Final   • Positive Control 06/09/2021 Positive  Positive Final   • Negative Control 06/09/2021 Negative  Negative Final   • Troponin T 06/09/2021 <0.010  0.000 - 0.030 ng/mL Final   • Phenytoin Level 06/09/2021 15.5  10.0 - 20.0 mcg/mL Final   • WBC 06/09/2021 14.23* 3.40 - 10.80 10*3/mm3 Final   • RBC 06/09/2021 4.07* 4.14 - 5.80 10*6/mm3 Final   • Hemoglobin 06/09/2021 11.3* 13.0 - 17.7 g/dL Final   • Hematocrit 06/09/2021 34.6* 37.5 - 51.0 % Final   • MCV 06/09/2021 85.0  79.0 - 97.0 fL Final   • MCH 06/09/2021 27.8  26.6 - 33.0 pg Final   • MCHC 06/09/2021 32.7  31.5 - 35.7 g/dL Final   • RDW 06/09/2021 15.8* 12.3 - 15.4 % Final   • RDW-SD 06/09/2021 47.7  37.0 - 54.0 fl Final   • MPV 06/09/2021 9.7  6.0 - 12.0 fL Final   • Platelets 06/09/2021 269  140 - 450 10*3/mm3 Final   • Neutrophil % 06/09/2021 86.3* 42.7 - 76.0 % Final   • Lymphocyte % 06/09/2021 7.1* 19.6 - 45.3 % Final   • Monocyte % 06/09/2021 5.6  5.0 - 12.0 % Final   • Eosinophil % 06/09/2021 0.1* 0.3 - 6.2 % Final   • Basophil % 06/09/2021 0.3  0.0 - 1.5 % Final   • Immature Grans % 06/09/2021 0.6* 0.0 - 0.5 % Final   • Neutrophils, Absolute 06/09/2021 12.30* 1.70 -  7.00 10*3/mm3 Final   • Lymphocytes, Absolute 06/09/2021 1.01  0.70 - 3.10 10*3/mm3 Final   • Monocytes, Absolute 06/09/2021 0.79  0.10 - 0.90 10*3/mm3 Final   • Eosinophils, Absolute 06/09/2021 0.01  0.00 - 0.40 10*3/mm3 Final   • Basophils, Absolute 06/09/2021 0.04  0.00 - 0.20 10*3/mm3 Final   • Immature Grans, Absolute 06/09/2021 0.08* 0.00 - 0.05 10*3/mm3 Final   • nRBC 06/09/2021 0.1  0.0 - 0.2 /100 WBC Final   • Extra Tube 06/09/2021 hold for add-on   Final    Auto resulted   • Extra Tube 06/09/2021 Hold for add-ons.   Final    Auto resulted.   • Extra Tube 06/09/2021 hold for add-on   Final    Auto resulted   • Extra Tube 06/09/2021 Hold for add-ons.   Final    Auto resulted.   • ABO Type 06/09/2021 AB   Final   • RH type 06/09/2021 Positive   Final   • Antibody Screen 06/09/2021 Negative   Final   • T&S Expiration Date 06/09/2021 6/12/2021 11:59:59 PM   Final   • Lactate 06/09/2021 2.1* 0.5 - 2.0 mmol/L Final   • Lactate 06/09/2021 1.3  0.5 - 2.0 mmol/L Final   • RBC, UA 06/09/2021 None Seen  None Seen, 0-2 /HPF Final   • WBC, UA 06/09/2021 3-5* None Seen, 0-2 /HPF Final   • Bacteria, UA 06/09/2021 None Seen  None Seen /HPF Final   • Squamous Epithelial Cells, UA 06/09/2021 3-6* None Seen, 0-2 /HPF Final   • Hyaline Casts, UA 06/09/2021 None Seen  None Seen /LPF Final   • Methodology 06/09/2021 Manual Light Microscopy   Final       DIAGNOSTICS:  No radiology results for the last day         Results Review:   I reviewed the patient's new clinical results.  Discussed with ER physician  I personally viewed and interpreted the patient's EKG/Telemetry sinus tachycardia  Old records reviewed   Us  deep vein thrombophlebitis; acute; left   Interpretation Summary    · Sub-acute left lower extremity deep vein thrombosis noted in the common femoral, proximal femoral, mid femoral and distal femoral.  · Sub-acute left lower extremity superficial thrombophlebitis noted in the saphenofemoral junction.  · Normal right  "lower extremity venous duplex scan.  · Not able to evaluate the popliteal or calf vessels due to compliance issues.       ASSESSMENT AND PLAN             · GI bleed/ coffee ground emesis/Acute blood loss anemia with a h/o Iron deficiency anemia due to chronic blood loss/ Gastroesophageal reflux disease with esophagitis and hemorrhage/adverse effect of anticoagulation  -This is now the third admission in the month and a half.  Patient was on Coumadin initially and that was stopped and just placed on prophylactic Lovenox 30 twice daily and patient is still having continued bleeding issues.  -Although the patient's hemoglobin is higher than at last discharge it is likely due to hemoconcentration and will likely be lower after patient is given IV fluids  -Risk and benefit was discussed with the sister at last visit although the patient is a keating of the [facility] and looks like there is an extensive alcohol abuse and bipolar disorder and drug abuse in the family.  --IV PPI drip  -Check H&H serially -transfuse if medically necessary  -Monitor vital signs  -No NSAIDS  -IV fluids   -Consult GI   -Carafate ordered on 2nd admission  -May need to consider IV iron    ·   Thrombosis of left iliac vein / (common femoral, proximal femoral, mid femoral and distal femoral.) and Sub-acute left lower extremity superficial thrombophlebitis noted in the saphenofemoral junction.  -Patient was on Coumadin and then on Lovenox 30 twice daily, at this point having to hold due to the coffee-ground emesis.  -Vascular evaluated the patient and they were concerned that it would propagate back into an acute DVT and said \"There is no appropriate option for clot removal and suprarenal IVC filters have significant risk of loss of renal function and thrombosis in this setting.\"   -May need to consider palliative care discussion but that may be difficult due to him being a keating of the [facility].    ·   Traumatic brain injury /dementia with a history of " behavioral disturbances/ Seizure disorder / Cognitive impairment  -Seizure precautions  -Patient is on Cogentin, Dilantin, Risperdal    ·   Essential hypertension / COPD (chronic obstructive pulmonary disease) / PAD (peripheral artery disease)   Coronary artery disease involving native coronary artery of native heart without angina pectoris  -Stable, continue medical management  -    ·   Oropharyngeal dysphagia  - Discharge Diet:          Diet Instructions      Diet: Dysphagia; Pudding Thick Liquids; Pureed       Discharge Diet: Dysphagia     Fluid Consistency: Pudding Thick Liquids     Pureed Options: Pureed       +DVT proph: SCDs  + DNR (do not resuscitate)    I discussed the patients findings and my recommendations with the patient and/or family.  Please reference all orders placed.    Christa Freire MD  06/09/21  14:43 EDT

## 2021-06-09 NOTE — ED NOTES
Pt arrived via EMS from [facility] with c/o of coffee ground emesis. Staff reports seeing pt have one episode of black emesis along with EMS staff en route noted one episode as well. Pt is nonverbal at baseline. Pt receiving Lovenox injections.     Pt placed in mask upon arrival, this RN in mask as well.      Carol Ann Ng, RN  06/09/21 [id number]

## 2021-06-09 NOTE — ED PROVIDER NOTES
EMERGENCY DEPARTMENT ENCOUNTER    Room Number:  17/17  Date of encounter:  6/9/2021  PCP: Provider, No Known  Historian: EMS  Full history not obtainable due to: non verbal, dementia     HPI:  Chief Complaint: Coffee ground emesis     Context: Drew Day is a 65 y.o. male who presents to the ED c/o coffee ground emesis onset this am according to staff at the nursing home where he resides, [facility]. He takes lovenox injections although the indication for this medication is not clear.     The pt has a hx of dementia and is non verbal. The hx is very limited. He is occasionally moaning but no speech is observed. He does not follow commands.       MEDICAL RECORD REVIEW: 5/28/2021 EGD findings :  Impression:  - Use Protonix (pantoprazole) 40 mg PO BID.  - Use sucralfate suspension 1 gram PO QID.  - Return patient to hospital keating for ongoing care.  Recommendation:  Number of Addenda:  Note Initiated On:  --- Professional ---  76476, Esophagogastroduodenoscopy, flexible, transoral; with control of bleeding, any method  Procedure Code(s):  --- Professional ---  K21.0, Gastro-esophageal reflux disease with esophagitis  D50.9, Iron deficiency anemia, unspecified  K92.0, Hematemesis    PAST MEDICAL HISTORY    Active Ambulatory Problems     Diagnosis Date Noted   • Dementia without behavioral disturbance (CMS/[facility]) 09/17/2016   • Coronary artery disease involving native coronary artery of native heart without angina pectoris 09/17/2016   • Seizure disorder (CMS/[facility]) 09/17/2016   • Cognitive impairment 09/17/2016   • Closed head injury 09/17/2016   • Acute upper GI bleed 11/22/2019   • Hematemesis 04/20/2021   • HLD (hyperlipidemia) 04/20/2021   • Essential hypertension 04/20/2021   • PAD (peripheral artery disease) (CMS/[facility]) 04/20/2021   • COPD (chronic obstructive pulmonary disease) (CMS/[facility]) 04/20/2021   • Acute UTI (urinary tract infection) 04/21/2021   • Thrombosis of left iliac vein (CMS/[facility]) 04/21/2021   •  GIB (gastrointestinal bleeding) 04/21/2021   • Traumatic brain injury (CMS/[facility])    • Gastroesophageal reflux disease with esophagitis and hemorrhage    • Hypokalemia    • Coffee ground emesis 05/27/2021   • Acute anemia 05/27/2021   • Iron deficiency anemia due to chronic blood loss 05/29/2021   • DNR (do not resuscitate) 05/29/2021   • Oropharyngeal dysphagia 05/29/2021     Resolved Ambulatory Problems     Diagnosis Date Noted   • No Resolved Ambulatory Problems     Past Medical History:   Diagnosis Date   • Back pain    • Convulsion disorder (CMS/[facility])    • Coronary artery disease    • Dementia (CMS/[facility])    • Esophageal bleeding    • GERD (gastroesophageal reflux disease)    • Hyperlipidemia    • Hypertension    • Seizures (CMS/[facility])          PAST SURGICAL HISTORY  Past Surgical History:   Procedure Laterality Date   • CARDIAC CATHETERIZATION     • CORONARY ARTERY BYPASS GRAFT Left    • ENDOSCOPY N/A 4/21/2021    Procedure: ESOPHAGOGASTRODUODENOSCOPY;  Surgeon: Paco Abad MD;  Location: [facility] ENDOSCOPY;  Service: Gastroenterology;  Laterality: N/A;  Pre: coffee ground emesis  Post: hiatal hernia, esophagitis   • ENDOSCOPY N/A 5/28/2021    Procedure: ESOPHAGOGASTRODUODENOSCOPY;  Surgeon: Paco Abad MD;  Location: [facility] ENDOSCOPY;  Service: Gastroenterology;  Laterality: N/A;   • VASCULAR SURGERY Left          FAMILY HISTORY  Family History   Problem Relation Age of Onset   • Arthritis Mother    • Depression Mother    • Alcohol abuse Mother    • Emphysema Mother    • Alzheimer's disease Mother    • Alzheimer's disease Father    • Alcohol abuse Father    • Bipolar disorder Sister    • Heart disease Sister    • Cancer Brother    • Alcohol abuse Brother    • Drug abuse Brother    • Bipolar disorder Son    • Anxiety disorder Son    • Stroke Brother    • Cancer Brother    • Hypertension Brother    • Bipolar disorder Brother    • Anxiety disorder Brother    • Neuropathy Brother    • Heart disease Brother    •  Drug abuse Brother    • Alcohol abuse Brother          SOCIAL HISTORY  Social History     Socioeconomic History   • Marital status:      Spouse name: Not on file   • Number of children: Not on file   • Years of education: Not on file   • Highest education level: Not on file   Tobacco Use   • Smoking status: Former Smoker   Vaping Use   • Vaping Use: Never used   Substance and Sexual Activity   • Alcohol use: No   • Drug use: Not Currently   • Sexual activity: Defer         ALLERGIES  Eggs or egg-derived products and Fish-derived products        REVIEW OF SYSTEMS  Review of Systems   All systems reviewed and marked as negative except as listed in HPI       PHYSICAL EXAM    I have reviewed the triage vital signs and nursing notes.    ED Triage Vitals [06/09/21 1004]   Temp Heart Rate Resp BP SpO2   98.8 °F (37.1 °C) 116 18 130/80 93 %      Temp src Heart Rate Source Patient Position BP Location FiO2 (%)   Oral Monitor -- -- --       GENERAL: Awake well developed, well nourished in no distress  HENT: NCAT, neck supple, trachea midline. Dark brown/black emesis noted on clothing and towel   EYES: no scleral icterus, PERRL, normal conjunctivae  CV: regular rhythm, regular rate, no murmur  RESPIRATORY: unlabored effort, CTAB  ABDOMEN: soft, nontender, nondistended, bowel sounds present  MUSCULOSKELETAL: no gross deformity  NEURO: alert, moves all extremities. Does not track objects in room. Does not follow commands. Rhythmic lip smacking movements noted.   SKIN: warm, dry, no rash  PSYCH:  Appropriate mood and affect    Vital signs and nursing notes reviewed.          LAB RESULTS  Recent Results (from the past 24 hour(s))   Comprehensive Metabolic Panel    Collection Time: 06/09/21 10:28 AM    Specimen: Arm, Left; Blood   Result Value Ref Range    Glucose 129 (H) 65 - 99 mg/dL    BUN 25 (H) 8 - 23 mg/dL    Creatinine 0.76 0.76 - 1.27 mg/dL    Sodium 144 136 - 145 mmol/L    Potassium 4.4 3.5 - 5.2 mmol/L    Chloride  110 (H) 98 - 107 mmol/L    CO2 25.4 22.0 - 29.0 mmol/L    Calcium 9.0 8.6 - 10.5 mg/dL    Total Protein 6.6 6.0 - 8.5 g/dL    Albumin 4.00 3.50 - 5.20 g/dL    ALT (SGPT) 16 1 - 41 U/L    AST (SGOT) 15 1 - 40 U/L    Alkaline Phosphatase 130 (H) 39 - 117 U/L    Total Bilirubin 0.2 0.0 - 1.2 mg/dL    eGFR Non African Amer 102 >60 mL/min/1.73    Globulin 2.6 gm/dL    A/G Ratio 1.5 g/dL    BUN/Creatinine Ratio 32.9 (H) 7.0 - 25.0    Anion Gap 8.6 5.0 - 15.0 mmol/L   Lipase    Collection Time: 06/09/21 10:28 AM    Specimen: Arm, Left; Blood   Result Value Ref Range    Lipase 22 13 - 60 U/L   Troponin    Collection Time: 06/09/21 10:28 AM    Specimen: Arm, Left; Blood   Result Value Ref Range    Troponin T <0.010 0.000 - 0.030 ng/mL   Phenytoin Level, Total    Collection Time: 06/09/21 10:28 AM    Specimen: Arm, Left; Blood   Result Value Ref Range    Phenytoin Level 15.5 10.0 - 20.0 mcg/mL   CBC Auto Differential    Collection Time: 06/09/21 10:28 AM    Specimen: Arm, Left; Blood   Result Value Ref Range    WBC 14.23 (H) 3.40 - 10.80 10*3/mm3    RBC 4.07 (L) 4.14 - 5.80 10*6/mm3    Hemoglobin 11.3 (L) 13.0 - 17.7 g/dL    Hematocrit 34.6 (L) 37.5 - 51.0 %    MCV 85.0 79.0 - 97.0 fL    MCH 27.8 26.6 - 33.0 pg    MCHC 32.7 31.5 - 35.7 g/dL    RDW 15.8 (H) 12.3 - 15.4 %    RDW-SD 47.7 37.0 - 54.0 fl    MPV 9.7 6.0 - 12.0 fL    Platelets 269 140 - 450 10*3/mm3    Neutrophil % 86.3 (H) 42.7 - 76.0 %    Lymphocyte % 7.1 (L) 19.6 - 45.3 %    Monocyte % 5.6 5.0 - 12.0 %    Eosinophil % 0.1 (L) 0.3 - 6.2 %    Basophil % 0.3 0.0 - 1.5 %    Immature Grans % 0.6 (H) 0.0 - 0.5 %    Neutrophils, Absolute 12.30 (H) 1.70 - 7.00 10*3/mm3    Lymphocytes, Absolute 1.01 0.70 - 3.10 10*3/mm3    Monocytes, Absolute 0.79 0.10 - 0.90 10*3/mm3    Eosinophils, Absolute 0.01 0.00 - 0.40 10*3/mm3    Basophils, Absolute 0.04 0.00 - 0.20 10*3/mm3    Immature Grans, Absolute 0.08 (H) 0.00 - 0.05 10*3/mm3    nRBC 0.1 0.0 - 0.2 /100 WBC   Light Blue Top     Collection Time: 06/09/21 10:28 AM   Result Value Ref Range    Extra Tube hold for add-on    Green Top (Gel)    Collection Time: 06/09/21 10:28 AM   Result Value Ref Range    Extra Tube Hold for add-ons.    Lavender Top    Collection Time: 06/09/21 10:28 AM   Result Value Ref Range    Extra Tube hold for add-on    Gold Top - SST    Collection Time: 06/09/21 10:28 AM   Result Value Ref Range    Extra Tube Hold for add-ons.    Type & Screen    Collection Time: 06/09/21 10:28 AM    Specimen: Arm, Left; Blood   Result Value Ref Range    ABO Type AB     RH type Positive     Antibody Screen Negative     T&S Expiration Date 6/12/2021 11:59:59 PM    Lactic Acid, Plasma    Collection Time: 06/09/21 10:28 AM    Specimen: Arm, Left; Blood   Result Value Ref Range    Lactate 2.1 (C) 0.5 - 2.0 mmol/L   POCT Occult Blood Stool    Collection Time: 06/09/21 11:01 AM    Specimen: Per Rectum; Stool   Result Value Ref Range    Fecal Occult Blood Positive (A) Negative    Lot Number [id number]     Expiration Date 6/30/2021     Positive Control Positive Positive    Negative Control Negative Negative   Urinalysis With Microscopic If Indicated (No Culture) - Urine, Catheter    Collection Time: 06/09/21 12:01 PM    Specimen: Urine, Catheter   Result Value Ref Range    Color, UA Yellow Yellow, Straw    Appearance, UA Clear Clear    pH, UA 8.5 (H) 5.0 - 8.0    Specific Gravity, UA 1.024 1.005 - 1.030    Glucose, UA Negative Negative    Ketones, UA Negative Negative    Bilirubin, UA Negative Negative    Blood, UA Negative Negative    Protein, UA Trace (A) Negative    Leuk Esterase, UA Trace (A) Negative    Nitrite, UA Negative Negative    Urobilinogen, UA 1.0 E.U./dL 0.2 - 1.0 E.U./dL   Urinalysis, Microscopic Only - Urine, Catheter    Collection Time: 06/09/21 12:01 PM    Specimen: Urine, Catheter   Result Value Ref Range    RBC, UA None Seen None Seen, 0-2 /HPF    WBC, UA 3-5 (A) None Seen, 0-2 /HPF    Bacteria, UA None Seen None Seen /HPF     Squamous Epithelial Cells, UA 3-6 (A) None Seen, 0-2 /HPF    Hyaline Casts, UA None Seen None Seen /LPF    Methodology Manual Light Microscopy        Ordered the above labs and independently reviewed the results.        RADIOLOGY  XR Chest 1 View    Result Date: 6/9/2021  XR CHEST 1 VW-  HISTORY:  Dementia, vomiting, coffee-ground emesis.  COMPARISON:  Chest radiograph 04/20/2021  FINDINGS:  A single portable view of the chest was obtained. The cardiac silhouette is normal in size. There are postsurgical changes from CABG. There is calcific aortic atherosclerosis. Lungs and pleural spaces are clear. There is multilevel degenerative disc disease. Median sternotomy wires are intact. There is increased air within multiple bowel loops in the upper abdomen, which are incompletely assessed. Air below the right hemidiaphragm corresponds to colonic interposition on prior CT abdomen and pelvis.  This report was finalized on 6/9/2021 12:13 PM by Dr. Malini DAMON I ordered the above noted radiological studies. Independently reviewed by me and discussed with radiologist.  See dictation above for official radiology interpretation.      PROCEDURES    Procedures        MEDICATIONS GIVEN IN ER    Medications   sodium chloride 0.9 % flush 10 mL (has no administration in time range)   pantoprazole (PROTONIX) 40 mg in 100mL NS IVPB (8 mg/hr Intravenous New Bag 6/9/21 1036)   pantoprazole (PROTONIX) injection 80 mg (80 mg Intravenous Given 6/9/21 1036)   sodium chloride 0.9 % bolus 500 mL (0 mL Intravenous Stopped 6/9/21 1102)         PROGRESS, DATA ANALYSIS, CONSULTS, AND MEDICAL DECISION MAKING    All labs have been independently reviewed by me.  All radiology studies have been reviewed by me.   EKG's independently reviewed by me.  Discussion below represents my analysis of pertinent findings related to patient's condition, differential diagnosis, treatment plan and final disposition.    DIFFERENTIAL DIAGNOSIS INCLUDE  BUT NOT LIMITED TO: GI bleed, bleeding ulcers, esophageal varices, anticoagulation, gastritis, diverticulitis, gastroenteritis anemia    ED Course as of Jun 09 1334   Wed Jun 09, 2021   1100 WBC(!): 14.23 [JS]   1102 Hemoglobin(!): 11.3 [JS]   1115 +occult blood in emesis. Protonix bolus and IV drip ordered .    [JS]   1230 I viewed chest x-ray on PACS system.  My findings are no cardiomegaly.    [JS]   1315 Fecal Occult Blood(!): Positive [JS]   1333 I just discussed the case with Dr. Christa Freire from [facility] she will admit the patient to a telemetry bed.    [GP]      ED Course User Index  [GP] Everette Alex MD  [JS] Flakita German APRN       AS OF 13:34 EDT VITALS:        BP - 101/55  HR - 97  TEMP - 98.8 °F (37.1 °C) (Oral)  O2 SATS - 95%        DIAGNOSIS  Final diagnoses:   Coffee ground emesis   Current use of long term anticoagulation   History of traumatic brain injury         DISPOSITION  Admission    Pt masked in first look. I wore appropriate PPE throughout my encounters with the pt. I performed hand hygiene on entry into the pt room and upon exit.     Dictated utilizing Dragon dictation:  Much of this encounter note is an electronic transcription/translation of spoken language to printed text. The electronic translation of spoken language may permit erroneous, or at times, nonsensical words or phrases to be inadvertently transcribed; Although I have reviewed the note for such errors, some may still exist.     Flakita German APRN  06/09/21 1334       Flakita German APRN  06/09/21 1334

## 2021-06-10 PROBLEM — K92.2 GASTROINTESTINAL HEMORRHAGE: Status: ACTIVE | Noted: 2021-01-01

## 2021-06-10 NOTE — NURSING NOTE
With Dr Little in agreement morning medications given with applesauce to maintain seizure medications.

## 2021-06-10 NOTE — DISCHARGE PLACEMENT REQUEST
"ShayDrew lemos GAIL Sr (65 y.o. Male)     Date of Birth Social Security Number Address Home Phone MRN    1955  [address] [address] [phone] [id number]    [employer] Marital Status          None        Admission Date Admission Type Admitting Provider Attending Provider Department, Room/Bed    6/9/21 Emergency Christa Freire MD Broaddus, Emmett J., MD [address], [address]/1    Discharge Date Discharge Disposition Discharge Destination                       Attending Provider: Abdiel Little MD    Allergies: Eggs Or Egg-derived Products, Fish-derived Products    Isolation: None   Infection: None   Code Status: CPR    Ht: 177.8 cm (70\")   Wt: 58.8 kg (129 lb 9.6 oz)    Admission Cmt: None   Principal Problem: None                Active Insurance as of 6/9/2021     Primary Coverage     Payor Plan Insurance Group Employer/Plan Group    MEDICARE MEDICARE A & B      Payor Plan Address Payor Plan Phone Number Payor Plan Fax Number Effective Dates    [address] [phone]  4/1/2000 - None Entered    [facility] [id number]       Subscriber Name Subscriber Birth Date Member ID       DREW DAY GAIL SR 1955 [id number]           Secondary Coverage     Payor Plan Insurance Group Employer/Plan Group    [place]      Payor Plan Address Payor Plan Phone Number Payor Plan Fax Number Effective Dates    [address] [phone]  10/2/2017 - None Entered    [place] [address]       Subscriber Name Subscriber Birth Date Member ID       DREW DAY GAIL SR 1955 [id number]                 Emergency Contacts      (Rel.) Home Phone Work Phone Mobile Phone    QUINN FRANCOIS (Guardian) [phone]    Liliana Day (Relative) [phone] -- [phone]    Christine Day (Relative) [phone] -- --              "

## 2021-06-10 NOTE — CASE MANAGEMENT/SOCIAL WORK
Discharge Planning Assessment  [facility]     Patient Name: Drew Day  MRN: [id number]  Today's Date: 6/10/2021    Admit Date: 6/9/2021    Discharge Needs Assessment     Row Name 06/10/21 1032       Living Environment    Lives With  facility resident    Name(s) of Who Lives With Patient  Pt from [facility].    Current Living Arrangements  extended care facility    Primary Care Provided by  -- Staff at [facility]    Provides Primary Care For  no one, unable/limited ability to care for self    Family Caregiver if Needed  none    Quality of Family Relationships  unable to assess    Able to Return to Prior Arrangements  yes    Living Arrangement Comments  Pt from [facility]       Resource/Environmental Concerns    Resource/Environmental Concerns  none       Transition Planning    Patient/Family Anticipates Transition to  long-term care facility;inpatient rehabilitation facility    Patient/Family Anticipated Services at Transition  none    Transportation Anticipated  health plan transportation       Discharge Needs Assessment    Readmission Within the Last 30 Days  previous discharge plan unsuccessful    Equipment Currently Used at Home  -- Pt from [facility].    Concerns to be Addressed  no discharge needs identified    Anticipated Changes Related to Illness  none    Equipment Needed After Discharge  -- Pt from [facility].        Discharge Plan     Row Name 06/10/21 103       Plan    Plan  Plan return to [facility] skilled care.  ALISON Raygoza RN    Patient/Family in Agreement with Plan  yes    Plan Comments  FACE SHEET VERIFIED/ IM LETTER SIGNED.  Pt with state guardian  ( Deanna Elliott [phone]).  Guardian called and she states pt has a DNR that was approved on 4/26.  Pt is from [facility] and plan is for pt to return.  Michelle ( [phone]) called to follow for [facility].  Per Michelle is from skilled care.  Plan return to [facility] skilled care. ALISON  SABI Raygoaz        Continued Care and Services - Admitted Since 6/9/2021     Destination     Service Provider Request Status Selected Services Address Phone Fax Patient Preferred    [facility]  Accepted N/A [address] [address] [phone] --            Selected Continued Care - Prior Encounters Includes selections from prior encounters from 3/11/2021 to 6/10/2021    Discharged on 4/26/2021 Admission date: 4/20/2021 - Discharge disposition: Intermediate Care     Destination     Service Provider Selected Services Address Phone Fax Patient Preferred    [facility]  Skilled Nursing [address] [address] [phone] --                      Demographic Summary     Row Name 06/10/21 1030       General Information    Admission Type  inpatient    Arrived From  emergency department    Required Notices Provided  Important Message from Medicare    Referral Source  admission list    Reason for Consult  discharge planning    Preferred Language  English     Used During This Interaction  no        Functional Status     Row Name 06/10/21 1031       Functional Status    Usual Activity Tolerance  moderate    Current Activity Tolerance  moderate       Functional Status, IADL    Medications  completely dependent Staff at [facility]    Meal Preparation  completely dependent    Housekeeping  completely dependent    Laundry  completely dependent    Shopping  completely dependent       Mental Status    General Appearance WDL  WDL       Mental Status Summary    Recent Changes in Mental Status/Cognitive Functioning  no changes        Psychosocial    No documentation.       Abuse/Neglect    No documentation.       Legal    No documentation.       Substance Abuse    No documentation.       Patient Forms    No documentation.           Cait Raygoza, RN

## 2021-06-10 NOTE — PROGRESS NOTES
"DAILY PROGRESS NOTE  [facility]    Patient Identification:  Name: Drew Day  Age: 65 y.o.  Sex: male  :  1955  MRN: [id number]         Primary Care Physician: Provider, No Known      Subjective  Patient does not communicate.    Objective:  General Appearance:  Comfortable, not in pain and in no acute distress.    Vital signs: (most recent): Blood pressure 118/59, pulse 67, temperature 97.1 °F (36.2 °C), temperature source Axillary, resp. rate 16, height 177.8 cm (70\"), weight 58.8 kg (129 lb 9.6 oz), SpO2 100 %.    Lungs:  Normal effort and normal respiratory rate.  Breath sounds clear to auscultation.    Heart: Normal rate.  Regular rhythm.    Abdomen: Abdomen is soft and non-distended.  Bowel sounds are normal.   There is no abdominal tenderness.     Extremities: There is no dependent edema.    Neurological: Patient is alert.  (Nonverbal.  Not oriented.  Repetitive tongue movements.).    Skin:  Warm and dry.                Vital signs in last 24 hours:  Temp:  [97.1 °F (36.2 °C)-98.7 °F (37.1 °C)] 97.1 °F (36.2 °C)  Heart Rate:  [63-97] 67  Resp:  [16] 16  BP: ()/(44-76) 118/59    Intake/Output:    Intake/Output Summary (Last 24 hours) at 6/10/2021 1457  Last data filed at 2021 1548  Gross per 24 hour   Intake 1000 ml   Output --   Net 1000 ml         Results from last 7 days   Lab Units 06/10/21  0847 21  1028   WBC 10*3/mm3 6.33 8.75 14.23*   HEMOGLOBIN g/dL 8.5* 9.9*  9.9* 11.3*   PLATELETS 10*3/mm3 156 197 269     Results from last 7 days   Lab Units 06/10/21  1020 21  19221  1028   SODIUM mmol/L 142 143 144   POTASSIUM mmol/L 3.8 4.3 4.4   CHLORIDE mmol/L 114* 114* 110*   CO2 mmol/L 23.7 20.0* 25.4   BUN mg/dL 13 21 25*   CREATININE mg/dL 0.61* 0.61* 0.76   GLUCOSE mg/dL 105* 89 129*   Estimated Creatinine Clearance: 74.5 mL/min (A) (by C-G formula based on SCr of 0.61 mg/dL (L)).  Results from last 7 days   Lab Units " 06/10/21  1020 06/09/21  1924 06/09/21  1028   CALCIUM mg/dL 8.0* 8.0* 9.0   ALBUMIN g/dL  --   --  4.00     Results from last 7 days   Lab Units 06/09/21  1028   ALBUMIN g/dL 4.00   BILIRUBIN mg/dL 0.2   ALK PHOS U/L 130*   AST (SGOT) U/L 15   ALT (SGPT) U/L 16       Assessment:  Recurrent GI bleed: GI evaluation appreciated.  No apparent active bleeding at this point.  Anticoagulants on hold.  Probable EGD in the morning.  DVT left lower extremity: Anticoagulants presently on hold secondary to above.  Recheck venous Dopplers to evaluate possible progression and risk of PE.  Traumatic brain injury with severe cognitive impairment: Patient is presently a keating of the [facility].  Seizure disorder: Continue home meds  Hypertension: Continue home meds  Anemia: Associated with history of GI bleed.  Previous history of iron deficiency.  We will recheck iron panel  Oral pharyngeal dysphagia: Modified diet  COPD: Stable  Coronary disease: Stable  DNR    All problems new to this examiner.    Plan:  Please see above.    Abdiel Little MD  6/10/2021  14:57 EDT

## 2021-06-10 NOTE — PROGRESS NOTES
[facility]  Inpatient Progress Note    Reason for Follow Up: Hematemesis    Subjective     Interval History:   Patient unable to provide any history.  Drop in H&H noted.  Nurse reports no hematemesis for her today.  No reported melena.    Current Facility-Administered Medications:   •  acetaminophen (TYLENOL) tablet 650 mg, 650 mg, Oral, Q4H PRN **OR** acetaminophen (TYLENOL) 160 MG/5ML solution 650 mg, 650 mg, Oral, Q4H PRN **OR** acetaminophen (TYLENOL) suppository 650 mg, 650 mg, Rectal, Q4H PRN, Christa Freire MD  •  atorvastatin (LIPITOR) tablet 40 mg, 40 mg, Oral, Nightly, Christa Freire MD, 40 mg at 06/09/21 2118  •  benztropine (COGENTIN) tablet 0.5 mg, 0.5 mg, Oral, BID, Christa Freire MD, 0.5 mg at 06/10/21 0942  •  sennosides-docusate (PERICOLACE) 8.6-50 MG per tablet 2 tablet, 2 tablet, Oral, BID, 2 tablet at 06/09/21 2118 **AND** polyethylene glycol (MIRALAX) packet 17 g, 17 g, Oral, Daily PRN **AND** bisacodyl (DULCOLAX) EC tablet 5 mg, 5 mg, Oral, Daily PRN **AND** bisacodyl (DULCOLAX) suppository 10 mg, 10 mg, Rectal, Daily PRN, Christa Freire MD  •  calcium carbonate (TUMS) chewable tablet 500 mg (200 mg elemental), 1 tablet, Oral, BID, Christa Freire MD, 1 tablet at 06/09/21 2118  •  calcium carbonate (TUMS) chewable tablet 500 mg (200 mg elemental), 2 tablet, Oral, BID PRN, Christa Freire MD  •  LORazepam (ATIVAN) tablet 0.25 mg, 0.25 mg, Oral, BID, Christa Freire MD, 0.25 mg at 06/10/21 0942  •  melatonin tablet 1 mg, 1 mg, Oral, Nightly PRN, Christa Freire MD, 1 mg at 06/09/21 2116  •  Menthol-Zinc Oxide, , Topical, BID, Christa Freire MD  •  multivitamin with minerals 1 tablet, 1 tablet, Oral, Daily, Christa Freire MD, 1 tablet at 06/09/21 1821  •  nitroglycerin (NITROSTAT) SL tablet 0.4 mg, 0.4 mg, Sublingual, Q5 Min PRN, Christa Freire MD  •  ondansetron (ZOFRAN) tablet  4 mg, 4 mg, Oral, Q6H PRN **OR** ondansetron (ZOFRAN) injection 4 mg, 4 mg, Intravenous, Q6H PRN, Christa Freire MD  •  pantoprazole (PROTONIX) 40 mg in 100mL NS IVPB, 8 mg/hr, Intravenous, Continuous, Christa Freire MD, Last Rate: 20 mL/hr at 06/09/21 2310, 8 mg/hr at 06/09/21 2310  •  phenytoin (DILANTIN) chewable tablet 200 mg, 200 mg, Oral, BID, Christa Freire MD, 200 mg at 06/10/21 0941  •  risperiDONE (risperDAL) tablet 0.5 mg, 0.5 mg, Oral, BID, Christa Freire MD, 0.5 mg at 06/10/21 0942  •  [COMPLETED] Insert peripheral IV, , , Once **AND** sodium chloride 0.9 % flush 10 mL, 10 mL, Intravenous, PRN, Christa Freire MD  •  sodium chloride 0.9 % flush 10 mL, 10 mL, Intravenous, Q12H, Christa Freire MD, 10 mL at 06/10/21 0942  •  sodium chloride 0.9 % infusion, 125 mL/hr, Intravenous, Continuous, Christa Freire MD, Last Rate: 125 mL/hr at 06/10/21 1005, 125 mL/hr at 06/10/21 1005  •  sucralfate (CARAFATE) tablet 1 g, 1 g, Oral, 4x Daily AC & at Bedtime, Christa Freire MD, 1 g at 06/09/21 2118  •  tamsulosin (FLOMAX) 24 hr capsule 0.4 mg, 0.4 mg, Oral, Nightly, Christa Freire MD, 0.4 mg at 06/09/21 2118  •  Thiamine Mononitrate tablet 100 mg, 100 mg, Oral, Daily, Christa Freire MD, 100 mg at 06/10/21 0101  •  zonisamide (ZONEGRAN) capsule 100 mg, 100 mg, Oral, Daily, Christa Freire MD, 100 mg at 06/10/21 0942  Review of Systems:    Review of systems could not be obtained due to  patient nonverbal.    Objective     Vital Signs  Temp:  [97.1 °F (36.2 °C)-98.7 °F (37.1 °C)] 97.1 °F (36.2 °C)  Heart Rate:  [63-97] 67  Resp:  [16] 16  BP: ()/(44-76) 118/59  Body mass index is 18.6 kg/m².    Intake/Output Summary (Last 24 hours) at 6/10/2021 1515  Last data filed at 6/9/2021 1548  Gross per 24 hour   Intake 1000 ml   Output --   Net 1000 ml     No intake/output data recorded.     Physical Exam:   General: patient  awake, alert and cooperative   Eyes: Normal lids and lashes, no scleral icterus   Neck: supple, normal ROM   Skin: warm and dry, not jaundiced   Cardiovascular: regular rhythm and rate, no murmurs auscultated   Pulm: clear to auscultation bilaterally, regular and unlabored   Abdomen: soft, nontender, nondistended; normal bowel sounds   Extremities: no rash or edema   Psychiatric: Normal mood and behavior; memory intact     Results Review:     I reviewed the patient's new clinical results.    Results from last 7 days   Lab Units 06/10/21  0847 06/09/21 1924 06/09/21  1028   WBC 10*3/mm3 6.33 8.75 14.23*   HEMOGLOBIN g/dL 8.5* 9.9*  9.9* 11.3*   HEMATOCRIT % 27.9* 30.4*  30.4* 34.6*   PLATELETS 10*3/mm3 156 197 269     Results from last 7 days   Lab Units 06/10/21  1020 06/09/21 1924 06/09/21  1028   SODIUM mmol/L 142 143 144   POTASSIUM mmol/L 3.8 4.3 4.4   CHLORIDE mmol/L 114* 114* 110*   CO2 mmol/L 23.7 20.0* 25.4   BUN mg/dL 13 21 25*   CREATININE mg/dL 0.61* 0.61* 0.76   CALCIUM mg/dL 8.0* 8.0* 9.0   BILIRUBIN mg/dL  --   --  0.2   ALK PHOS U/L  --   --  130*   ALT (SGPT) U/L  --   --  16   AST (SGOT) U/L  --   --  15   GLUCOSE mg/dL 105* 89 129*         Lab Results   Lab Value Date/Time    LIPASE 22 06/09/2021 1028    LIPASE 29 05/27/2021 1638    LIPASE 25 04/20/2021 1912    LIPASE 27 11/22/2019 0650       Radiology:  XR Chest 1 View   Final Result          Assessment/Plan     Patient Active Problem List   Diagnosis   • Dementia without behavioral disturbance (CMS/HCC)   • Coronary artery disease involving native coronary artery of native heart without angina pectoris   • Seizure disorder (CMS/HCC)   • Cognitive impairment   • Closed head injury   • Acute upper GI bleed   • Hematemesis   • HLD (hyperlipidemia)   • Essential hypertension   • PAD (peripheral artery disease) (CMS/HCC)   • COPD (chronic obstructive pulmonary disease) (CMS/HCC)   • Acute UTI (urinary tract infection)   • Thrombosis of left iliac  vein (CMS/HCC)   • GIB (gastrointestinal bleeding)   • Traumatic brain injury (CMS/HCC)   • Gastroesophageal reflux disease with esophagitis and hemorrhage   • Hypokalemia   • Coffee ground emesis   • Acute anemia   • Iron deficiency anemia due to chronic blood loss   • DNR (do not resuscitate)   • Oropharyngeal dysphagia   • Adverse effect of anticoagulant       Assessment:  1. Coffee-ground emesis  2. History of esophagitis  3. Acute/chronic anemia  4. Traumatic brain injury  5. Dementia      Plan:  · With drop in hemoglobin would offer upper endoscopic evaluation to assess status of his esophagitis and any other potential source for bleeding.  I discussed the patients findings and my recommendations with nursing staff.    Drew Felix MD

## 2021-06-10 NOTE — PLAN OF CARE
Problem: Fall Injury Risk  Goal: Absence of Fall and Fall-Related Injury  Outcome: Ongoing, Progressing  Intervention: Identify and Manage Contributors to Fall Injury Risk  Description: Reassess fall risk frequently and with change in status or transfer to another level of care.  Communicate fall injury risk to all healthcare team members (e.g., rounds, change of shift/provider, patient transport).  Anticipate needs; perform regular intentional rounding to assess need for position change, pain assessment, personal needs (e.g., toileting) and placement of necessary items.  Provide reorientation, appropriate sensory stimulation and routines with changes in mental status to decrease risk of fall.  Promote use of personal vision and auditory aids (e.g., glasses, hearing aids).  Assess assistance level required for safe and effective care; provide support as needed (e.g., toileting, bathing, mobilization).  Define behavior and activity limits to patient and family.  If fall occurs, assess for and treat injury; determine cause; revise fall injury prevention plan.  Regularly review medication contribution to fall risk; adjust medication administration times to minimize risk of falling.  Consider risk related to polypharmacy and age.  Balance adequate pain management with potential for oversedation.  Recent Flowsheet Documentation  Taken 6/10/2021 0231 by Kamila Lu, RN  Medication Review/Management: medications reviewed  Taken 6/10/2021 0010 by Kamila Lu, RN  Medication Review/Management: medications reviewed  Taken 6/9/2021 2200 by Kamila Lu, RN  Medication Review/Management: medications reviewed  Taken 6/9/2021 2000 by Kamila Lu, RN  Medication Review/Management: medications reviewed  Intervention: Promote Injury-Free Environment  Description: Provide a safe, barrier-free environment that encourages independent activity.  Keep care area uncluttered and well-lighted.  Determine need for increased  observation or auditory alerts (e.g., bed or chair alarm).  Assess equipment and environmental modification needs (e.g., low bed, signage, nonskid footwear, grab bars).  Avoid use of restraints.  Recent Flowsheet Documentation  Taken 6/10/2021 0231 by Kamila Lu, RN  Safety Promotion/Fall Prevention:   assistive device/personal items within reach   clutter free environment maintained   fall prevention program maintained   room organization consistent   safety round/check completed  Taken 6/10/2021 0010 by Kamila uL RN  Safety Promotion/Fall Prevention:   assistive device/personal items within reach   clutter free environment maintained   fall prevention program maintained   lighting adjusted   nonskid shoes/slippers when out of bed   room organization consistent   safety round/check completed  Taken 6/9/2021 2200 by Kamila Lu, RN  Safety Promotion/Fall Prevention:   assistive device/personal items within reach   clutter free environment maintained   fall prevention program maintained   lighting adjusted   room organization consistent   safety round/check completed  Taken 6/9/2021 2000 by Kamila Lu RN  Safety Promotion/Fall Prevention:   clutter free environment maintained   assistive device/personal items within reach   fall prevention program maintained   lighting adjusted   nonskid shoes/slippers when out of bed   room organization consistent   safety round/check completed     Problem: Adult Inpatient Plan of Care  Goal: Plan of Care Review  Outcome: Ongoing, Progressing  Flowsheets  Taken 6/10/2021 0236 by Kamila Lu, RN  Outcome Summary: VITALS AS DOCUMENTED. RESTING IN BED W/O S/SX OF DISTRESS. NONVERBAL YET FOLLOWS SIMPLE COMMANDS. APPEARS CONTRACT YET EXTENDS ALL EXTREMITIES DURING CARE AND REPOSITIONING. NO S/SX OF PAIN. MONITORING  Taken 6/9/2021 1715 by Drew Lomax, RN  Plan of Care Reviewed With: patient  Goal: Patient-Specific Goal (Individualized)  Outcome: Ongoing,  Progressing  Goal: Absence of Hospital-Acquired Illness or Injury  Outcome: Ongoing, Progressing  Intervention: Identify and Manage Fall Risk  Description: Perform standard risk assessment with a validated tool or comprehensive approach appropriate to the patient on admission; reassess fall risk frequently, with change in status or transfer to another level of care.  Communicate fall injury risk to interprofessional healthcare team.  Determine need for increased observation, equipment and environmental modification, such as low bed and signage, as well as supportive, nonskid footwear.  Adjust safety measures to individual developmental age, stage and identified risk factors.  Reinforce the importance of safety and physical activity with patient and family.  Perform regular intentional rounding to assess need for position change, pain assessment, personal needs, including assistance with toileting.  Recent Flowsheet Documentation  Taken 6/10/2021 0231 by Kamila Lu, RN  Safety Promotion/Fall Prevention:   assistive device/personal items within reach   clutter free environment maintained   fall prevention program maintained   room organization consistent   safety round/check completed  Taken 6/10/2021 0010 by Kamila Lu, RN  Safety Promotion/Fall Prevention:   assistive device/personal items within reach   clutter free environment maintained   fall prevention program maintained   lighting adjusted   nonskid shoes/slippers when out of bed   room organization consistent   safety round/check completed  Taken 6/9/2021 2200 by Kamila Lu, RN  Safety Promotion/Fall Prevention:   assistive device/personal items within reach   clutter free environment maintained   fall prevention program maintained   lighting adjusted   room organization consistent   safety round/check completed  Taken 6/9/2021 2000 by Kamila Lu, RN  Safety Promotion/Fall Prevention:   clutter free environment maintained   assistive  device/personal items within reach   fall prevention program maintained   lighting adjusted   nonskid shoes/slippers when out of bed   room organization consistent   safety round/check completed  Intervention: Prevent Skin Injury  Description: Assess skin risk on admission and at regular intervals throughout hospital stay.  Keep all areas of skin (especially folds) clean and dry.  Maintain adequate skin hydration.  Relieve and redistribute pressure and protect bony prominences; implement measures based on patient-specific risk factors.  Match turning and repositioning schedule to clinical condition.  Encourage weight shift frequently; assist with reposition if unable to complete independently.  Float heels off bed. Avoid pressure on the Achilles tendon.  Keep skin free from extended contact with medical devices.  Use aids (e.g., slide boards, mechanical lift) during transfer.  Recent Flowsheet Documentation  Taken 6/10/2021 0231 by Kamila Lu RN  Body Position:   supine   position maintained   neutral body alignment   neutral head position  Taken 6/10/2021 0010 by Kamila Lu RN  Body Position:   tilted, left   weight shift assistance provided   neutral body alignment   neutral head position  Taken 6/9/2021 2200 by Kamila Lu RN  Body Position:   weight shift assistance provided   tilted, left   neutral body alignment   neutral head position  Skin Protection:   adhesive use limited   electrode sites changed   incontinence pads utilized   pulse oximeter probe site changed   skin-to-device areas padded   tubing/devices free from skin contact   skin sealant/moisture barrier applied  Taken 6/9/2021 2000 by Kamila Lu RN  Body Position:   weight shift assistance provided   neutral body alignment   neutral head position  Intervention: Prevent Infection  Description: Maintain skin and mucous membrane integrity; promote hand, oral and pulmonary hygiene.  Optimize fluid balance, nutrition, sleep and  glycemic control to maximize infection resistance.  Identify potential sources of infection early to prevent or mitigate progression of infection (e.g., wound, lines, devices).  Evaluate ongoing need for invasive devices; remove promptly when no longer indicated.  Recent Flowsheet Documentation  Taken 6/10/2021 0231 by Kamila Lu RN  Infection Prevention:   equipment surfaces disinfected   hand hygiene promoted   personal protective equipment utilized   rest/sleep promoted   single patient room provided   visitors restricted/screened  Taken 6/10/2021 0010 by Kamila Lu RN  Infection Prevention:   hand hygiene promoted   equipment surfaces disinfected   personal protective equipment utilized   rest/sleep promoted   single patient room provided   visitors restricted/screened  Taken 6/9/2021 2200 by Kamila Lu RN  Infection Prevention:   equipment surfaces disinfected   hand hygiene promoted   personal protective equipment utilized   rest/sleep promoted   single patient room provided   visitors restricted/screened  Taken 6/9/2021 2000 by Kamila Lu RN  Infection Prevention:   equipment surfaces disinfected   hand hygiene promoted   personal protective equipment utilized   rest/sleep promoted   single patient room provided   visitors restricted/screened  Goal: Optimal Comfort and Wellbeing  Outcome: Ongoing, Progressing  Intervention: Provide Person-Centered Care  Description: Use a family-focused approach to care.  Develop trust and rapport by proactively providing information, encouraging questions, addressing concerns and offering reassurance.  Acknowledge emotional response to hospitalization.  Recognize and utilize personal coping strategies.  Honor spiritual and cultural preferences.  Recent Flowsheet Documentation  Taken 6/10/2021 0010 by Kamila Lu RN  Trust Relationship/Rapport:   care explained   emotional support provided   reassurance provided  Taken 6/9/2021 2200 by Kamila Lu  RN  Trust Relationship/Rapport:   care explained   emotional support provided   reassurance provided  Goal: Readiness for Transition of Care  Outcome: Ongoing, Progressing     Problem: Skin Injury Risk Increased  Goal: Skin Health and Integrity  Outcome: Ongoing, Progressing  Intervention: Optimize Skin Protection  Description: Reassess skin injury risk and inspect skin frequently (e.g., scheduled interval, with turning, with change in condition) to provide optimal prevention.  Maintain adequate tissue perfusion (e.g., encourage fluid balance, avoid crossing legs, constrictive clothing or devices) to promote tissue oxygenation.  Maintain head of bed at lowest degree of elevation tolerated, considering medical condition and other restrictions. Limit amount of time head of bed is elevated greater than 30 degrees to prevent friction/shear injury.  Avoid positioning onto an area that remains reddened.  Minimize incontinence and moisture (e.g., toileting schedule; moisture-wicking pad, diaper or incontinence collection device, skin moisture barrier).  Cleanse skin promptly and gently when soiled utilizing a pH-balanced cleanser.  Relieve and redistribute pressure (e.g., schedule position changes; utilize higher specification foam mattress, chair cushion, constant low-pressure or alternating-pressure support surface; medical device repositioning; protective dressing applicatio  Support increased progressive functional activity (e.g., therapeutic exercise) to decrease risk associated with immobility. Balance rest with activity.  Recent Flowsheet Documentation  Taken 6/10/2021 0231 by Kamila Lu, RN  Head of Bed (HOB): HOB at 20-30 degrees  Taken 6/10/2021 0010 by Kamila Lu RN  Head of Bed (HOB): HOB at 20-30 degrees  Taken 6/9/2021 2200 by Kamila Lu RN  Pressure Reduction Techniques:   weight shift assistance provided   heels elevated off bed  Head of Bed (HOB): HOB at 20-30 degrees  Skin Protection:    adhesive use limited   electrode sites changed   incontinence pads utilized   pulse oximeter probe site changed   skin-to-device areas padded   tubing/devices free from skin contact   skin sealant/moisture barrier applied  Taken 6/9/2021 2000 by Kamila Lu, RN  Head of Bed (HOB): [facility] [place] lowered   Goal Outcome Evaluation:              Outcome Summary: VITALS AS DOCUMENTED. RESTING IN BED W/O S/SX OF DISTRESS. NONVERBAL YET FOLLOWS SIMPLE COMMANDS. APPEARS CONTRACT YET EXTENDS ALL EXTREMITIES DURING CARE AND REPOSITIONING. NO S/SX OF PAIN. MONITORING

## 2021-06-10 NOTE — NURSING NOTE
Nurse called guardian for egd consent- no answer- then called the [phone] (emergency/curgery number). Person who answered the phone said they could only give consent for same day surgery. Will call tomorrow.

## 2021-06-10 NOTE — PLAN OF CARE
Goal Outcome Evaluation:               Patient alert and follows simple commands- patient nonverbal. Constantly chewing on his fingers. EGD ordered per Dr Felix for tomorrow- Friday at 1149. Nurse attempted to obtain consent- unable to obtain until in the morning. Patient can have diet until 8 hours prior to procedure. No acute distress noted, will continue to monitor. Patient on room air and sinus on monitor.

## 2021-06-10 NOTE — CASE MANAGEMENT/SOCIAL WORK
Continued Stay Note  [facility]     Patient Name: Drew Day  MRN: [id number]  Today's Date: 6/10/2021    Admit Date: 6/9/2021    Discharge Plan     Row Name 06/10/21 1039       Plan    Plan  Plan return to [facility] skilled care.  ALISON Raygoza RN    Patient/Family in Agreement with Plan  yes    Plan Comments  FACE SHEET VERIFIED/ IM LETTER SIGNED.  Pt with state guardian  ( Deanna Ephrata [phone]).  Guardian called and she states pt has a DNR that was approved on 4/26.  Pt is from [facility] and plan is for pt to return.  Michelle ( [phone]) called to follow for [facility].  Per Michelle is from skilled care.  Plan return to [facility] skilled care. ALISON Raygoza RN        Discharge Codes    No documentation.             Cait Raygoza, RN

## 2021-06-11 NOTE — PLAN OF CARE
Problem: Adult Inpatient Plan of Care  Goal: Plan of Care Review  Outcome: Ongoing, Progressing  Flowsheets (Taken 6/11/2021 0024)  Progress: no change  Plan of Care Reviewed With: patient  Outcome Summary: VITALS AS DOCUMENTED. RESTING QUIETLY WITH EYES CLOSED INTERMITTENTLY. NO S/SX OF DISTRESS. NO EMESIS. NPO SINCE MN PER ORDERS. TOTAL CARE. NEEDS ANTICIPATED. MONITORING.  Goal: Readiness for Transition of Care  Outcome: Ongoing, Progressing   Goal Outcome Evaluation:  Plan of Care Reviewed With: patient        Progress: no change  Outcome Summary: VITALS AS DOCUMENTED. RESTING QUIETLY WITH EYES CLOSED INTERMITTENTLY. NO S/SX OF DISTRESS. NO EMESIS. NPO SINCE MN PER ORDERS. TOTAL CARE. NEEDS ANTICIPATED. MONITORING.

## 2021-06-11 NOTE — PROGRESS NOTES
[facility]  Inpatient Progress Note    Reason for Follow Up: Hematemesis    Subjective     Interval History:   Plans for EGD today canceled because patient was given breakfast.  Drop in H&H noted.  Anticipate EGD tomorrow to assess for source of bleeding.    Current Facility-Administered Medications:   •  acetaminophen (TYLENOL) tablet 650 mg, 650 mg, Oral, Q4H PRN **OR** acetaminophen (TYLENOL) 160 MG/5ML solution 650 mg, 650 mg, Oral, Q4H PRN **OR** acetaminophen (TYLENOL) suppository 650 mg, 650 mg, Rectal, Q4H PRN, Christa Freire MD  •  atorvastatin (LIPITOR) tablet 40 mg, 40 mg, Oral, Nightly, Christa Freire MD, 40 mg at 06/10/21 2003  •  benztropine (COGENTIN) tablet 0.5 mg, 0.5 mg, Oral, BID, Christa Freire MD, 0.5 mg at 06/11/21 0907  •  sennosides-docusate (PERICOLACE) 8.6-50 MG per tablet 2 tablet, 2 tablet, Oral, BID, 2 tablet at 06/10/21 2003 **AND** polyethylene glycol (MIRALAX) packet 17 g, 17 g, Oral, Daily PRN **AND** bisacodyl (DULCOLAX) EC tablet 5 mg, 5 mg, Oral, Daily PRN **AND** bisacodyl (DULCOLAX) suppository 10 mg, 10 mg, Rectal, Daily PRN, Christa Freire MD  •  calcium carbonate (TUMS) chewable tablet 500 mg (200 mg elemental), 1 tablet, Oral, BID, Christa Freire MD, 1 tablet at 06/10/21 2004  •  calcium carbonate (TUMS) chewable tablet 500 mg (200 mg elemental), 2 tablet, Oral, BID PRN, Christa Freire MD  •  LORazepam (ATIVAN) tablet 0.25 mg, 0.25 mg, Oral, BID, Christa Freire MD, 0.25 mg at 06/11/21 0907  •  melatonin tablet 1 mg, 1 mg, Oral, Nightly PRN, Christa Freire MD, 1 mg at 06/09/21 2118  •  Menthol-Zinc Oxide, , Topical, BID, Christa Freire MD, Given at 06/11/21 0802  •  multivitamin with minerals 1 tablet, 1 tablet, Oral, Daily, Christa Freire MD, 1 tablet at 06/09/21 1820  •  nitroglycerin (NITROSTAT) SL tablet 0.4 mg, 0.4 mg, Sublingual, Q5 Min PRN, Christa Freire  MD Myriam  •  ondansetron (ZOFRAN) tablet 4 mg, 4 mg, Oral, Q6H PRN **OR** ondansetron (ZOFRAN) injection 4 mg, 4 mg, Intravenous, Q6H PRN, Christa Freire MD  •  pantoprazole (PROTONIX) 40 mg in 100mL NS IVPB, 8 mg/hr, Intravenous, Continuous, Christa Freire MD, Last Rate: 20 mL/hr at 06/11/21 1346, 8 mg/hr at 06/11/21 1346  •  phenytoin (DILANTIN) chewable tablet 200 mg, 200 mg, Oral, BID, Christa Freire MD, 200 mg at 06/11/21 0907  •  risperiDONE (risperDAL) tablet 0.5 mg, 0.5 mg, Oral, BID, Christa Freire MD, 0.5 mg at 06/11/21 0907  •  [COMPLETED] Insert peripheral IV, , , Once **AND** sodium chloride 0.9 % flush 10 mL, 10 mL, Intravenous, PRN, Christa Freire MD  •  sodium chloride 0.9 % flush 10 mL, 10 mL, Intravenous, Q12H, Christa Freire MD, 10 mL at 06/11/21 0802  •  sodium chloride 0.9 % infusion, 125 mL/hr, Intravenous, Continuous, Christa Freire MD, Last Rate: 125 mL/hr at 06/11/21 1024, 125 mL/hr at 06/11/21 1024  •  sucralfate (CARAFATE) tablet 1 g, 1 g, Oral, 4x Daily AC & at Bedtime, Christa Freire MD, 1 g at 06/11/21 1211  •  tamsulosin (FLOMAX) 24 hr capsule 0.4 mg, 0.4 mg, Oral, Nightly, Christa Freire MD, 0.4 mg at 06/10/21 2004  •  Thiamine Mononitrate tablet 100 mg, 100 mg, Oral, Daily, Christa Freire MD, 100 mg at 06/10/21 0101  •  zonisamide (ZONEGRAN) capsule 100 mg, 100 mg, Oral, Daily, Christa Freire MD, 100 mg at 06/11/21 0907  Review of Systems:    Review of systems could not be obtained due to  patient nonverbal.    Objective     Vital Signs  Temp:  [97.6 °F (36.4 °C)-98.6 °F (37 °C)] 97.6 °F (36.4 °C)  Heart Rate:  [64-73] 67  Resp:  [16] 16  BP: ()/(55-64) 99/57  Body mass index is 18.78 kg/m².    Intake/Output Summary (Last 24 hours) at 6/11/2021 1404  Last data filed at 6/11/2021 1200  Gross per 24 hour   Intake 3100 ml   Output --   Net 3100 ml     I/O this shift:  In: 2030  [P.O.:1080; I.V.:950]  Out: -      Physical Exam:   General: patient awake, alert and cooperative   Eyes: Normal lids and lashes, no scleral icterus   Neck: supple, normal ROM   Skin: warm and dry, not jaundiced   Cardiovascular: regular rhythm and rate, no murmurs auscultated   Pulm: clear to auscultation bilaterally, regular and unlabored   Abdomen: soft, nontender, nondistended; normal bowel sounds   Extremities: no rash or edema   Psychiatric: Normal mood and behavior; memory intact     Results Review:     I reviewed the patient's new clinical results.    Results from last 7 days   Lab Units 06/11/21  0541 06/10/21  0847 06/09/21  1924   WBC 10*3/mm3 5.27 6.33 8.75   HEMOGLOBIN g/dL 7.5* 8.5* 9.9*  9.9*   HEMATOCRIT % 23.2* 27.9* 30.4*  30.4*   PLATELETS 10*3/mm3 135* 156 197     Results from last 7 days   Lab Units 06/11/21  0541 06/10/21  1020 06/09/21  1924 06/09/21  1028   SODIUM mmol/L 142 142 143 144   POTASSIUM mmol/L 3.5 3.8 4.3 4.4   CHLORIDE mmol/L 113* 114* 114* 110*   CO2 mmol/L 23.0 23.7 20.0* 25.4   BUN mg/dL 7* 13 21 25*   CREATININE mg/dL 0.56* 0.61* 0.61* 0.76   CALCIUM mg/dL 7.8* 8.0* 8.0* 9.0   BILIRUBIN mg/dL  --   --   --  0.2   ALK PHOS U/L  --   --   --  130*   ALT (SGPT) U/L  --   --   --  16   AST (SGOT) U/L  --   --   --  15   GLUCOSE mg/dL 86 105* 89 129*         Lab Results   Lab Value Date/Time    LIPASE 22 06/09/2021 1028    LIPASE 29 05/27/2021 1638    LIPASE 25 04/20/2021 1912    LIPASE 27 11/22/2019 0650       Radiology:  XR Chest 1 View   Final Result          Assessment/Plan     Patient Active Problem List   Diagnosis   • Dementia without behavioral disturbance (CMS/HCC)   • Coronary artery disease involving native coronary artery of native heart without angina pectoris   • Seizure disorder (CMS/HCC)   • Cognitive impairment   • Closed head injury   • Acute upper GI bleed   • Hematemesis   • HLD (hyperlipidemia)   • Essential hypertension   • PAD (peripheral artery disease)  (CMS/[facility])   • COPD (chronic obstructive pulmonary disease) (CMS/HCC)   • Acute UTI (urinary tract infection)   • Thrombosis of left iliac vein (CMS/HCC)   • GIB (gastrointestinal bleeding)   • Traumatic brain injury (CMS/[facility])   • Gastroesophageal reflux disease with esophagitis and hemorrhage   • Hypokalemia   • Coffee ground emesis   • Acute anemia   • Iron deficiency anemia due to chronic blood loss   • DNR (do not resuscitate)   • Oropharyngeal dysphagia   • Adverse effect of anticoagulant   • Gastrointestinal hemorrhage       Assessment:  1. Coffee-ground emesis  2. History of grade C esophagitis  3. Acute/chronic anemia  4. Traumatic brain injury  5. Dementia        Plan:  · Reschedule EGD for tomorrow  · Monitor H&H  I discussed the patients findings and my recommendations with nursing staff.    Drew Felix MD

## 2021-06-11 NOTE — PROGRESS NOTES
"DAILY PROGRESS NOTE  [facility]    Patient Identification:  Name: rDew Day  Age: 65 y.o.  Sex: male  :  1955  MRN: [id number]         Primary Care Physician: Provider, No Known      Subjective  Patient is nonverbal    Objective:  General Appearance:  Comfortable, in no acute distress and not in pain.    Vital signs: (most recent): Blood pressure 99/57, pulse 67, temperature 97.6 °F (36.4 °C), temperature source Axillary, resp. rate 16, height 177.8 cm (70\"), weight 59.4 kg (130 lb 14.4 oz), SpO2 100 %.    Lungs:  Normal effort and normal respiratory rate.  Breath sounds clear to auscultation.  (Clear to auscultation as far as I can tell.  Patient not fully capable of cooperating with the exam.)  Heart: Normal rate.    Abdomen: Abdomen is soft and non-distended.  Bowel sounds are normal.   There is no abdominal tenderness.     Extremities: There is no dependent edema.    Neurological: (Awake but not communicative.  Persistent tongue moving continues.).    Skin:  Warm and dry.                Vital signs in last 24 hours:  Temp:  [97.6 °F (36.4 °C)-98.6 °F (37 °C)] 97.6 °F (36.4 °C)  Heart Rate:  [64-73] 67  Resp:  [16] 16  BP: ()/(55-64) 99/57    Intake/Output:    Intake/Output Summary (Last 24 hours) at 2021 192  Last data filed at 2021 1842  Gross per 24 hour   Intake 3100 ml   Output --   Net 3100 ml         Results from last 7 days   Lab Units 21  0541 06/10/21  0847 21  1924 21  1028   WBC 10*3/mm3 5.27 6.33 8.75 14.23*   HEMOGLOBIN g/dL 7.5* 8.5* 9.9*  9.9* 11.3*   PLATELETS 10*3/mm3 135* 156 197 269     Results from last 7 days   Lab Units 21  0541 06/10/21  1020 21  19221  1028   SODIUM mmol/L 142 142 143 144   POTASSIUM mmol/L 3.5 3.8 4.3 4.4   CHLORIDE mmol/L 113* 114* 114* 110*   CO2 mmol/L 23.0 23.7 20.0* 25.4   BUN mg/dL 7* 13 21 25*   CREATININE mg/dL 0.56* 0.61* 0.61* 0.76   GLUCOSE mg/dL 86 105* 89 129* "   Estimated Creatinine Clearance: 75.3 mL/min (A) (by C-G formula based on SCr of 0.56 mg/dL (L)).  Results from last 7 days   Lab Units 06/11/21  0541 06/10/21  1020 06/09/21  1924 06/09/21  1028   CALCIUM mg/dL 7.8* 8.0* 8.0* 9.0   ALBUMIN g/dL  --   --   --  4.00     Results from last 7 days   Lab Units 06/09/21  1028   ALBUMIN g/dL 4.00   BILIRUBIN mg/dL 0.2   ALK PHOS U/L 130*   AST (SGOT) U/L 15   ALT (SGPT) U/L 16       Assessment:  Recurrent GI bleed: GI evaluation appreciated.  No apparent active bleeding at this point.  Anticoagulants on hold.  Probable EGD in the morning.  Hemoglobin down this morning.  We will recheck.    Iron deficiency: Moderate iron deficiency on studies.  We will give a dose of intravenous iron sucrose.  DVT left lower extremity: Anticoagulants presently on hold secondary to above.    Repeat venous Doppler if no apparent progression.    Traumatic brain injury with severe cognitive impairment: Patient is presently a keating of the [facility].  Seizure disorder: Continue home meds  Hypertension: Continue home meds  Anemia: Associated with history of GI bleed.  Previous history of iron deficiency.  We will recheck iron panel  Oral pharyngeal dysphagia: Modified diet  COPD: Stable  Coronary disease: Stable  DNR      Plan:  Please see above.    Abdiel Little MD  6/11/2021  19:20 EDT

## 2021-06-11 NOTE — PLAN OF CARE
Goal Outcome Evaluation:               Patient alert- sleeping on and off between care with FLD- pudding thick- patient total feeder. EGD rescheduled for tomorrow- 6/12/21- consent in chart. IV fluids and Protonix continues to infuse. Patient turned frequently but does shift his own weight in the bed. Patient voids large amounts when he urinates- brief being changed as needed. No acute distress noted, will continue to monitor.

## 2021-06-11 NOTE — NURSING NOTE
Nurses maggie, Sabrina, unknowning patient was NPO for breakfast at bedside- fed patient breakfast.  Nurse called endoscopy and notified Vivi patient has consumed food since last talking.   Nurse went ahead and administered AM med's for seizure prevention.

## 2021-06-11 NOTE — NURSING NOTE
Sister, Liliana Day, called wanting update on patient. Sister informed that we are not allowed to release information to her due to patient being keating of state earlier today. Liliana called back again and said we could call the guardian to give her updates. Nurse called guardian Deanna Elliott and she said ok to give information to Liliana; however, Liliana is not able to make any medical decisions.

## 2021-06-12 NOTE — PLAN OF CARE
Goal Outcome Evaluation:               Patient alert- nonverbal. Patient had EGD done today- tolerated well. Potassium decreased to 3.3 today- Dr Little aware and ordered replacement. IV fluids and Protonix infusing per orders. No acute distress noted, will continue to monitor.

## 2021-06-12 NOTE — PROGRESS NOTES
"DAILY PROGRESS NOTE  [facility]    Patient Identification:  Name: Drew Day  Age: 65 y.o.  Sex: male  :  1955  MRN: [id number]         Primary Care Physician: Provider, No Known      Subjective  Patient remains nonverbal    Objective:  General Appearance:  Comfortable, in no acute distress and not in pain.    Vital signs: (most recent): Blood pressure 105/67, pulse 77, temperature 97.6 °F (36.4 °C), temperature source Axillary, resp. rate 16, height 177.8 cm (70\"), weight 63.2 kg (139 lb 6.4 oz), SpO2 97 %.    Lungs:  Normal effort and normal respiratory rate.  Breath sounds clear to auscultation.    Heart: Normal rate.  Regular rhythm.    Extremities: There is no dependent edema.    Neurological: (Still sleeping comfortably post endoscopy.).    Skin:  Warm and dry.                Vital signs in last 24 hours:  Temp:  [97.6 °F (36.4 °C)-98.4 °F (36.9 °C)] 97.6 °F (36.4 °C)  Heart Rate:  [60-77] 77  Resp:  [16] 16  BP: ()/(45-79) 105/67    Intake/Output:    Intake/Output Summary (Last 24 hours) at 2021 1655  Last data filed at 2021 0818  Gross per 24 hour   Intake 1900 ml   Output --   Net 1900 ml         Results from last 7 days   Lab Units 21  0645 21  1948 21  0541 06/10/21  0847 21  1028   WBC 10*3/mm3 3.85  --  5.27 6.33 8.75 14.23*   HEMOGLOBIN g/dL 8.1* 7.7* 7.5* 8.5* 9.9*  9.9* 11.3*   PLATELETS 10*3/mm3 148  --  135* 156 197 269     Results from last 7 days   Lab Units 21  0645 21  0541 06/10/21  1020 21  1028   SODIUM mmol/L 143 142 142 143 144   POTASSIUM mmol/L 3.3* 3.5 3.8 4.3 4.4   CHLORIDE mmol/L 113* 113* 114* 114* 110*   CO2 mmol/L 23.8 23.0 23.7 20.0* 25.4   BUN mg/dL 2* 7* 13 21 25*   CREATININE mg/dL 0.56* 0.56* 0.61* 0.61* 0.76   GLUCOSE mg/dL 95 86 105* 89 129*   Estimated Creatinine Clearance: 80.1 mL/min (A) (by C-G formula based on SCr of 0.56 mg/dL (L)).  Results from " last 7 days   Lab Units 06/12/21  0645 06/11/21  0541 06/10/21  1020 06/09/21  1924 06/09/21  1028   CALCIUM mg/dL 8.0* 7.8* 8.0* 8.0* 9.0   ALBUMIN g/dL  --   --   --   --  4.00     Results from last 7 days   Lab Units 06/09/21  1028   ALBUMIN g/dL 4.00   BILIRUBIN mg/dL 0.2   ALK PHOS U/L 130*   AST (SGOT) U/L 15   ALT (SGPT) U/L 16       Assessment:  Recurrent GI bleed: GI evaluation appreciated.  No apparent active bleeding at this point.  EGD fairly unremarkable.  Resume Lovenox.  Resume diet  Iron deficiency: Moderate iron deficiency on studies.    Will give a second dose of IV iron sucrose today.    DVT left lower extremity: See above.  Resume Lovenox.    Traumatic brain injury with severe cognitive impairment: Patient is presently a keating of the [facility].  Seizure disorder: Continue home meds  Hypertension: Continue home meds  Anemia: Associated with history of GI bleed.  Previous history of iron deficiency.  We will recheck iron panel  Oral pharyngeal dysphagia: Modified diet  COPD: Stable  Coronary disease: Stable  DNR      Plan:  Please see above.  Discharge planning.    Abdiel Little MD  6/12/2021  16:55 EDT

## 2021-06-12 NOTE — ANESTHESIA PREPROCEDURE EVALUATION
Anesthesia Evaluation     Patient summary reviewed and Nursing notes reviewed   no history of anesthetic complications:  NPO Solid Status: > 8 hours  NPO Liquid Status: > 8 hours           Airway   Mallampati: II  Dental      Pulmonary - normal exam   (+) COPD,   Cardiovascular - normal exam    (+) hypertension less than 2 medications, CAD, CABG >6 Months, PVD, DVT, hyperlipidemia,       Neuro/Psych  (+) seizures, psychiatric history, dementia,     GI/Hepatic/Renal/Endo    (+)  GERD, GI bleeding ,     Musculoskeletal     (+) back pain,   Abdominal    Substance History      OB/GYN          Other                        Anesthesia Plan    ASA 4     MAC     intravenous induction     Anesthetic plan, all risks, benefits, and alternatives have been provided, discussed and informed consent has been obtained with: patient.

## 2021-06-12 NOTE — PLAN OF CARE
Goal Outcome Evaluation:      Patient alert confused, nonverbal, does not follow commands. Fetal position mostly turning q2h. Incont provided. Iv fluids infusing, iv iron given, iv protonix gtt infusing. NPO for EGD today, education/consent signed over the phone with guardian. No acute distress noted will continue to monitor

## 2021-06-12 NOTE — ANESTHESIA POSTPROCEDURE EVALUATION
"Patient: Drew Day    Procedure Summary     Date: 06/12/21 Room / Location:  [facility]    Anesthesia Start: 1029 Anesthesia Stop: 1044    Procedure: ESOPHAGOGASTRODUODENOSCOPY WITH COLD BIOPSIES (N/A Esophagus) Diagnosis:       Gastroesophageal reflux disease with esophagitis and hemorrhage      Gastrointestinal hemorrhage, unspecified gastrointestinal hemorrhage type      (Gastroesophageal reflux disease with esophagitis and hemorrhage [K21.01])      (Gastrointestinal hemorrhage, unspecified gastrointestinal hemorrhage type [K92.2])    Surgeons: Paco Abad MD Provider: Juan Owens MD    Anesthesia Type: MAC ASA Status: 4          Anesthesia Type: MAC    Vitals  No vitals data found for the desired time range.          Post Anesthesia Care and Evaluation    Patient location during evaluation: bedside  Patient participation: complete - patient participated  Level of consciousness: awake and alert  Pain management: adequate  Airway patency: patent  Anesthetic complications: No anesthetic complications    Cardiovascular status: acceptable  Respiratory status: acceptable  Hydration status: acceptable    Comments: /52 (BP Location: Right leg, Patient Position: Sitting)   Pulse 66   Temp 36.7 °C (98 °F) (Axillary)   Resp 16   Ht 177.8 cm (70\")   Wt 63.2 kg (139 lb 6.4 oz)   SpO2 98%   BMI 20.00 kg/m²       "

## 2021-06-13 NOTE — PROGRESS NOTES
[facility]  Inpatient Progress Note    Reason for Follow Up: Coffee-ground emesis    Subjective     Interval History:   No further bleeding, esophagitis noted on EGD yesterday    Current Facility-Administered Medications:   •  acetaminophen (TYLENOL) tablet 650 mg, 650 mg, Oral, Q4H PRN, 650 mg at 06/12/21 2215 **OR** acetaminophen (TYLENOL) 160 MG/5ML solution 650 mg, 650 mg, Oral, Q4H PRN **OR** acetaminophen (TYLENOL) suppository 650 mg, 650 mg, Rectal, Q4H PRN, Paco Abad MD  •  atorvastatin (LIPITOR) tablet 40 mg, 40 mg, Oral, Nightly, Paco Abad MD, 40 mg at 06/12/21 2158  •  benztropine (COGENTIN) tablet 0.5 mg, 0.5 mg, Oral, BID, Paco Abad MD, 0.5 mg at 06/13/21 0854  •  sennosides-docusate (PERICOLACE) 8.6-50 MG per tablet 2 tablet, 2 tablet, Oral, BID, 2 tablet at 06/13/21 0854 **AND** polyethylene glycol (MIRALAX) packet 17 g, 17 g, Oral, Daily PRN **AND** bisacodyl (DULCOLAX) EC tablet 5 mg, 5 mg, Oral, Daily PRN **AND** bisacodyl (DULCOLAX) suppository 10 mg, 10 mg, Rectal, Daily PRN, Paco Abad MD  •  calcium carbonate (TUMS) chewable tablet 500 mg (200 mg elemental), 1 tablet, Oral, BID, Paco Abad MD, 1 tablet at 06/13/21 0853  •  calcium carbonate (TUMS) chewable tablet 500 mg (200 mg elemental), 2 tablet, Oral, BID PRN, Paco Abad MD  •  enoxaparin (LOVENOX) syringe 30 mg, 30 mg, Subcutaneous, Q12H, Abdiel Little MD, 30 mg at 06/13/21 0540  •  LORazepam (ATIVAN) tablet 0.25 mg, 0.25 mg, Oral, BID, Paco Abad MD, 0.25 mg at 06/13/21 0853  •  melatonin tablet 1 mg, 1 mg, Oral, Nightly PRN, Paco Abad MD, 1 mg at 06/12/21 2215  •  Menthol-Zinc Oxide, , Topical, BID, Paco Abad MD, Given at 06/13/21 0854  •  multivitamin with minerals 1 tablet, 1 tablet, Oral, Daily, Paco Abad MD, 1 tablet at 06/13/21 0854  •  nitroglycerin (NITROSTAT) SL tablet 0.4 mg, 0.4 mg, Sublingual, Q5 Min PRN, Paco Abad MD  •   ondansetron (ZOFRAN) tablet 4 mg, 4 mg, Oral, Q6H PRN **OR** ondansetron (ZOFRAN) injection 4 mg, 4 mg, Intravenous, Q6H PRN, Paco Abad MD  •  pantoprazole (PROTONIX) EC tablet 40 mg, 40 mg, Oral, Q AM, Abdiel Little MD, 40 mg at 06/13/21 0540  •  phenytoin (DILANTIN) chewable tablet 200 mg, 200 mg, Oral, BID, Paco Abad MD, 200 mg at 06/13/21 0854  •  risperiDONE (risperDAL) tablet 0.5 mg, 0.5 mg, Oral, BID, Paco Abad MD, 0.5 mg at 06/13/21 0854  •  [COMPLETED] Insert peripheral IV, , , Once **AND** sodium chloride 0.9 % flush 10 mL, 10 mL, Intravenous, PRN, Paco Abad MD  •  sodium chloride 0.9 % flush 10 mL, 10 mL, Intravenous, Q12H, Paco Abad MD, 10 mL at 06/13/21 0854  •  sodium chloride 0.9 % infusion, 30 mL/hr, Intravenous, Continuous PRN, Paco Abad MD  •  sucralfate (CARAFATE) tablet 1 g, 1 g, Oral, 4x Daily AC & at Bedtime, Paco Abad MD, 1 g at 06/13/21 1229  •  tamsulosin (FLOMAX) 24 hr capsule 0.4 mg, 0.4 mg, Oral, Nightly, Paco Abad MD, 0.4 mg at 06/12/21 2158  •  Thiamine Mononitrate tablet 100 mg, 100 mg, Oral, Daily, Paco Aabd MD, 100 mg at 06/13/21 0854  •  zonisamide (ZONEGRAN) capsule 100 mg, 100 mg, Oral, Daily, Paco Abad MD, 100 mg at 06/13/21 0857  Review of Systems:    Review of systems could not be obtained due to  patient nonverbal.    Objective     Vital Signs  Temp:  [97.6 °F (36.4 °C)-98.8 °F (37.1 °C)] 98.2 °F (36.8 °C)  Heart Rate:  [55-77] 57  Resp:  [16] 16  BP: (102-111)/(50-67) 102/50  Body mass index is 19.16 kg/m².    Intake/Output Summary (Last 24 hours) at 6/13/2021 1245  Last data filed at 6/13/2021 1147  Gross per 24 hour   Intake 3950.42 ml   Output --   Net 3950.42 ml     I/O this shift:  In: 1480 [P.O.:680; I.V.:800]  Out: -      Physical Exam:   General: patient awake, alert and cooperative   Eyes: Normal lids and lashes, no scleral icterus   Neck: supple, normal ROM   Skin: warm and dry, not  jaundiced   Cardiovascular: regular rhythm and rate, no murmurs auscultated   Pulm: clear to auscultation bilaterally, regular and unlabored   Abdomen: soft, nontender, nondistended; normal bowel sounds   Extremities: no rash or edema   Psychiatric: Normal mood and behavior; memory intact     Results Review:     I reviewed the patient's new clinical results.    Results from last 7 days   Lab Units 06/13/21  0640 06/12/21  0645 06/11/21  1948 06/11/21  0541   WBC 10*3/mm3 2.65* 3.85  --  5.27   HEMOGLOBIN g/dL 9.1* 8.1* 7.7* 7.5*   HEMATOCRIT % 29.1* 24.9* 24.5* 23.2*   PLATELETS 10*3/mm3 167 148  --  135*     Results from last 7 days   Lab Units 06/13/21  0640 06/12/21  0645 06/11/21  0541 06/09/21  1028   SODIUM mmol/L 141 143 142 144   POTASSIUM mmol/L 4.1 3.3* 3.5 4.4   CHLORIDE mmol/L 114* 113* 113* 110*   CO2 mmol/L 19.4* 23.8 23.0 25.4   BUN mg/dL <2* 2* 7* 25*   CREATININE mg/dL 0.51* 0.56* 0.56* 0.76   CALCIUM mg/dL 7.9* 8.0* 7.8* 9.0   BILIRUBIN mg/dL  --   --   --  0.2   ALK PHOS U/L  --   --   --  130*   ALT (SGPT) U/L  --   --   --  16   AST (SGOT) U/L  --   --   --  15   GLUCOSE mg/dL 95 95 86 129*         Lab Results   Lab Value Date/Time    LIPASE 22 06/09/2021 1028    LIPASE 29 05/27/2021 1638    LIPASE 25 04/20/2021 1912    LIPASE 27 11/22/2019 0650       Radiology:  XR Chest 1 View   Final Result          Assessment/Plan     Patient Active Problem List   Diagnosis   • Dementia without behavioral disturbance (CMS/HCC)   • Coronary artery disease involving native coronary artery of native heart without angina pectoris   • Seizure disorder (CMS/HCC)   • Cognitive impairment   • Closed head injury   • Acute upper GI bleed   • Hematemesis   • HLD (hyperlipidemia)   • Essential hypertension   • PAD (peripheral artery disease) (CMS/HCC)   • COPD (chronic obstructive pulmonary disease) (CMS/HCC)   • Acute UTI (urinary tract infection)   • Thrombosis of left iliac vein (CMS/HCC)   • GIB (gastrointestinal  bleeding)   • Traumatic brain injury (CMS/HCC)   • Gastroesophageal reflux disease with esophagitis and hemorrhage   • Hypokalemia   • Coffee ground emesis   • Acute anemia   • Iron deficiency anemia due to chronic blood loss   • DNR (do not resuscitate)   • Oropharyngeal dysphagia   • Adverse effect of anticoagulant   • Gastrointestinal hemorrhage          Assessment:  1. Coffee-ground emesis  2. grade C esophagitis  3. Acute/chronic anemia  4. Traumatic brain injury  5. Dementia      Plan:  · Continue PPI  · Follow-up EGD 3 months  · Hemoglobin stable  · Okay with discharge home  · We will follow peripherally  I discussed the patients findings and my recommendations with patient and nursing staff.    Paco Abad MD

## 2021-06-13 NOTE — PLAN OF CARE
Goal Outcome Evaluation:      Patient alert follows no commands feeder pudding thick liquids, meds crushed and given with pudding thick liquids. Incont and skin care provided. Prn tylenol given, patient seemed to be in some discomfort, which looked improved when reassessed. Turned q2h. No acute distress noted will continue to monitor

## 2021-06-13 NOTE — CASE MANAGEMENT/SOCIAL WORK
"Physicians Statement of Medical Necessity for  Ambulance Transportation    GENERAL INFORMATION     Name: Drew Day  YOB: 1955  Medicare #: [id number]  Transport Date: 6/13/21 (Valid for round trips this date, or for scheduled repetitive trips for 60 days from the date signed below.)  Origin: [facility]  Destination: [facility] [address], [facility] [address]  Is the Patient's stay covered under Medicare Part A (PPS/DRG?)Yes  Closest appropriate facility? Yes  If this a hosp-hosp transfer? No  Is this a hospice patient? No    MEDICAL NECESSITY QUESTIONAIRE    Ambulance Transportation is medically necessary only if other means of transportation are contraindicated or would be potentially harmful to the patient.  To meet this requirement, the patient must be either \"bed confined\" or suffer from a condition such that transport by means other than an ambulance is contraindicated by the patient's condition.  The following questions must be answered by the healthcare professional signing below for this form to be valid:     1) Describe the MEDICAL CONDITION (physical and/or mental) of this patient AT THE TIME OF AMBULANCE TRANSPORT that requires the patient to be transported in an ambulance, and why transport by other means is contraindicated by the patient's condition:      Past Medical History:   Diagnosis Date   • Back pain    • Convulsion disorder (CMS/HCC)    • Coronary artery disease    • Dementia (CMS/HCC)    • Esophageal bleeding    • GERD (gastroesophageal reflux disease)    • Hyperlipidemia    • Hypertension    • Seizures (CMS/HCC)       Past Surgical History:   Procedure Laterality Date   • CARDIAC CATHETERIZATION     • CORONARY ARTERY BYPASS GRAFT Left    • ENDOSCOPY N/A 4/21/2021    Procedure: ESOPHAGOGASTRODUODENOSCOPY;  Surgeon: Paco Abad MD;  Location: [facility] ENDOSCOPY;  Service: Gastroenterology;  Laterality: N/A;  Pre: coffee ground emesis  Post: hiatal hernia, " "esophagitis   • ENDOSCOPY N/A 5/28/2021    Procedure: ESOPHAGOGASTRODUODENOSCOPY;  Surgeon: Paco Abad MD;  Location: [facility] ENDOSCOPY;  Service: Gastroenterology;  Laterality: N/A;   • VASCULAR SURGERY Left       2) Is this patient \"bed confined\" as defined below?Yes   To be \"bed confined\" the patient must satisfy all three of the following criteria:  (1) unable to get up from bed without assistance; AND (2) unable to ambulate;  AND (3) unable to sit in a chair or wheelchair.  3) Can this patient safely be transported by car or wheelchair van (I.e., may safely sit during transport, without an attendant or monitoring?)No   4. In addition to completing questions 1-3 above, please check any of the following conditions that apply*:          *Note: supporting documentation for any boxes checked must be maintained in the patient's medical records Patient is confused, Medical attendant required, Special handling/isolation/infection control precautions required and Unable to tolerate seated position for time needed to transport      SIGNATURE OF PHYSICIAN OR OTHER AUTHORIZED HEALTHCARE PROFESSIONAL    I certify that the above information is true and correct based on my evaluation of this patient, and represent that the patient requires transport by ambulance and that other forms of transport are contraindicated.  I understand that this information will be used by the Centers for Medicare and Medicaid Services (CMS) to support the determiniation of medical necessity for ambulance services, and I represent that I have personal knowledge of the patient's condition at the time of transport.     X   If this box is checked, I also certify that the patient is physically or mentally incapable of signing the ambulance service's claim form and that the institution with which I am affiliated has furnished care, services or assistance to the patient.  My signature below is made on behalf of the patient pursuant to 42 CFR " 424.36(b)(4). In accordance with 42 .37, the specific reason(s) that the patient is physically or mentally incapable of signing the claim for is as follows: Hx traumatic Brain Injury    Signature of Physician or Healthcare Professional  Date/Time:    6/13/21 1415     (For Scheduled repetitive transport, this form is not valid for transports performed more than 60 days after this date).                                                                                                                                            --------------------------------------------------------------------------------------------  Printed Name and Credentials of Physician or Authorized Healthcare Professional     *Form must be signed by patient's attending physician for scheduled, repetitive transports,.  For non-repetitive ambulance transports, if unable to obtain the signature of the attending physician, any of the following may sign (please select below):     Physician  Clinical Nurse Specialist  Registered Nurse     Physician Assistant  Discharge Planner  Licensed Practical Nurse     Nurse Practitioner

## 2021-06-13 NOTE — CASE MANAGEMENT/SOCIAL WORK
Continued Stay Note  [facility]     Patient Name: Drew Day  MRN: [id number]  Today's Date: 6/13/2021    Admit Date: 6/9/2021    Discharge Plan     Row Name 06/13/21 1417       Plan    Plan  return to [facility] skilled care bed via ambulance    Plan Comments  Inbound call from staff RN who states MD would like to DC pt back to [facility] today and she wanted to confirm they will take pt back. Called and spoke with Ruba at [facility] and she states the patient can return. Called and updated staff RN. DC order noted. Ambulance necessity form in EPIC to be printed on the unit. Called and spoke with [facility] and ambulance transport arranged for 1930. CCP to follow..........JW        Discharge Codes    No documentation.       Expected Discharge Date and Time     Expected Discharge Date Expected Discharge Time    Jun 13, 2021             Tish Conklin, RN

## 2021-06-13 NOTE — PLAN OF CARE
Goal Outcome Evaluation:               Patient with discharge orders. Ambulance arranged for transport at 1930 per CCP. Report called to Abby Carr RN. No acute distress noted at this time, will continue to monitor until discharge complete.

## 2021-06-14 NOTE — CASE MANAGEMENT/SOCIAL WORK
Case Management Discharge Note      Final Note: Pt discharged back to [facility] on 6/13.   ALISON Raygoza RN         Selected Continued Care - Discharged on 6/13/2021 Admission date: 6/9/2021 - Discharge disposition: Skilled Nursing Facility (DC - External)    Destination Coordination complete    Service Provider Selected Services Address Phone Fax Patient Preferred    [facility]  Skilled Nursing [address] [address] [phone] --          Durable Medical Equipment    No services have been selected for the patient.              Dialysis/Infusion    No services have been selected for the patient.              Home Medical Care    No services have been selected for the patient.              Therapy    No services have been selected for the patient.              Community Resources    No services have been selected for the patient.              Community & DME    No services have been selected for the patient.                Selected Continued Care - Prior Encounters Includes selections from prior encounters from 3/11/2021 to 6/13/2021    Discharged on 4/26/2021 Admission date: 4/20/2021 - Discharge disposition: Intermediate Care     Destination     Service Provider Selected Services Address Phone Fax Patient Preferred    [facility]  Skilled Nursing [address] [address] [phone] --                    Transportation Services  Ambulance: [facility]    Final Discharge Disposition Code: 04 - intermediate care facility

## 2021-06-23 NOTE — TELEPHONE ENCOUNTER
----- Message from Paco Abad MD sent at 6/21/2021  9:37 AM EDT -----  Pathology benignOffice visit nurse practitioner 6 to 8 weeks

## 2021-09-06 PROBLEM — J96.01 ACUTE RESPIRATORY FAILURE WITH HYPOXIA (HCC): Status: ACTIVE | Noted: 2021-01-01

## 2021-09-08 PROBLEM — G31.9 CEREBRAL ATROPHY (HCC): Status: ACTIVE | Noted: 2021-01-01

## 2021-09-08 PROBLEM — E88.09 HYPOALBUMINEMIA DUE TO PROTEIN-CALORIE MALNUTRITION (HCC): Status: ACTIVE | Noted: 2021-01-01

## 2021-09-08 PROBLEM — E87.0 HYPERNATREMIA: Status: ACTIVE | Noted: 2021-01-01

## 2021-09-08 PROBLEM — U07.1 PNEUMONIA DUE TO COVID-19 VIRUS: Status: ACTIVE | Noted: 2021-01-01

## 2021-09-08 PROBLEM — J12.82 PNEUMONIA DUE TO COVID-19 VIRUS: Status: ACTIVE | Noted: 2021-01-01

## 2021-09-08 PROBLEM — I67.9 CEREBROVASCULAR SMALL VESSEL DISEASE: Status: ACTIVE | Noted: 2021-01-01

## 2021-09-08 PROBLEM — E46 HYPOALBUMINEMIA DUE TO PROTEIN-CALORIE MALNUTRITION (HCC): Status: ACTIVE | Noted: 2021-01-01

## 2021-09-08 PROBLEM — G93.89 ENCEPHALOMALACIA: Status: ACTIVE | Noted: 2021-01-01

## 2021-09-13 PROBLEM — D89.833 CYTOKINE RELEASE SYNDROME, GRADE 3: Status: ACTIVE | Noted: 2021-01-01

## 2021-09-14 PROBLEM — U07.1 CLINICAL DIAGNOSIS OF COVID-19: Status: ACTIVE | Noted: 2021-01-01

## 2021-09-14 NOTE — PROGRESS NOTES
Dr. LATRICIA Moreira    [address]    9/15/2021    Patient ID:  Name:  Drew Day  MRN:  [id number]  1955  65 y.o.  male            CC/Reason for visit: Pneumonia due to COVID-19    Interval hx: Patient unresponsive, receiving comfort measures    ROS: Unobtainable    Vitals:  Vitals:    09/14/21 0500 09/14/21 1741 09/15/21 0506   BP: 108/52 110/55 (!) 70/42   BP Location: Left arm  Left arm   Patient Position: Lying  Lying   Pulse: 94 67 78   Resp: 16 18 12   Temp: 99.7 °F (37.6 °C) 97.4 °F (36.3 °C) 96.5 °F (35.8 °C)   TempSrc: Axillary Axillary Oral   SpO2: 94% 94% 96%           There is no height or weight on file to calculate BMI.    Intake/Output Summary (Last 24 hours) at 9/15/2021 1748  Last data filed at 9/15/2021 1345  Gross per 24 hour   Intake --   Output 850 ml   Net -850 ml       Exam:  Calm, unresponsive, nonverbal      Scheduled meds:     IV meds:                           Data Review:   I reviewed the patient's medications and new clinical results.    COVID19   Date Value Ref Range Status   09/06/2021 Detected (C) Not Detected - Ref. Range Final     Assessment:  Acute hypoxic respiratory failure  Pneumonia due to COVID-19 virus    Dementia without behavioral disturbance (CMS/HCC)    Seizure disorder (CMS/HCC)    Essential hypertension    Traumatic brain injury (CMS/HCC)    Oropharyngeal dysphagia    Acute respiratory failure with hypoxia (CMS/HCC)    Hypernatremia    Hypoalbuminemia due to protein-calorie malnutrition (CMS/HCC)    Cerebral atrophy (CMS/HCC)    Cerebrovascular small vessel disease    Encephalomalacia left temporal lobe    Plan:  Continue comfort measures.  Patient is calm and in no distress    Richy Moreira MD  9/15/2021

## 2021-09-14 NOTE — PLAN OF CARE
Goal Outcome Evaluation:  Plan of Care Reviewed With: patient        Progress: declining   Pt continues to decline slowly-he is receiving dilaudid 1mg, ativan 2mgs and robinul 0.2mgs prior to turns with good results. Opens eyes with care-non- verbal-remains very stiff during repositioning. Will continue to monitor and offer comfort and support per palliative POC.

## 2021-09-14 NOTE — PLAN OF CARE
Goal Outcome Evaluation:  Plan of Care Reviewed With: family        Progress: declining  Outcome Summary: PPS 10%, Pt awake at times but is nonverbal. Pt receiving meds via subcut line d/t inability to place IV. Pt tolerating subcut meds well. Pt has received Dilaudid and Ativan before cares, robinul given x1. Secretions are not audible at this time. Pt repositioned side to side for ease of breathing. Pt has contractures to arms and legs. Pt's sister and niece came to visit Pt today, held phone up for Pt's sister to talk to him. Pt's niece entered the room when she arrived. Additional subcut line placed so that Pt could be easily medicated when laying on either side. Family updated on Pt condition and is aware and accepting of Pt's declining status. Pt restless at times but PRN meds effective. Pt currently appears calm and comfortable.

## 2021-09-15 NOTE — PROGRESS NOTES
[facility] Visit Report    Drew Day  [id number]  9/15/2021    Admission R/T [facility] Dx: YES      Reason for [facility] Admission: Covid 19      Symptom  Management: Pain, soa, restlessness and congestion      Nursing/Medication Recommendations: Please contact [facility] at [phone] for any questions or concerns and continue to provide comfort care per orders.      Psychosocial Issues and Recommendations: Provide support to patient and family      Spiritual Concerns and Recommendations: None at present      [facility] Discharge Plans:  None, patient in dying process and is not safe for transport. Requires daily [facility] RN visit for symptom management of pain, soa, restlessness and congestion using IV medications, titrating doses as needed to maintain comfort. Meeting criteria for [facility] level of care.      Review of Visit: Arrived on unit. Spoke to staff and reviewed Epic notes. Patient is in isolation for Covid 19. Entered room after donning appropriate PPE with eye protection. Patient lying in bed, on his side,  Bilateral arms and legs are contracted. Unresponsive today to touch or to call of name. Color pale to ashen, nailbeds dusky. Bilateral feet with mottling noted and cool. Breathing shallow with 02 sat 96% on 2L. PPS 10%, oral care only. VS 96.5-78-12 with BP70/42. F/C to BSD with dark yellow urine noted. Close monitoring for safety, requires daily [facility] RN visit, comfort care for patient in dying process who requires IV medication for symptom control. Called and left message with taya Huertaan. \[facility] [place] [facility]\"" spoke to maia earlier. Discussed care needs with staff and patient has received IV Dilaudid 1mg x 6 doses, IV Ativan 1mg x 2 doses and IV Ativan 2mg x 4 doses, all in the last 24 hours. Patient appears comfortable and peaceful during visit with IV administration of medication, comfort is the goal. Will continue to see daily  to assess needs, monitor status and offer support.      Abril Abernathy RN  [facility] Visit Nurse

## 2021-09-15 NOTE — PROGRESS NOTES
[facility] Visit Report    Drew REYNOLDS Sr Shay  [id number]  9/15/2021     This PT was admitted to [facility] Scattered Bed program. This MSW provided an initial assessment.     This MSW reviewed the chart and consulted with the RN, Aniyah and the  RN, Bhavya. Per the review- the pts PPS is at 10% and he is unresponsive. His primary diagnosis is COVID and he is on isolation protocols. Family has been visiting both inside and outside of the room. The Pts son, yamel Perez, sister, Liliana and niece, Christine. The Pt has a court appointed guardian as well.     This MSW reached out to the Pts guardian-Deanna Elliott. Deanna has been working with the pt for a year and a half. She is supportive of family visits. This MSW provided an introduction and education about our program. Deanna suggested this MSW speak with the Pts nieceChristine, to gather historical information. This MSW did reach out to Christine. Christine had just left the hospital. This MSW provided an introduction and education with an emphasis on our bereavement program. This MSW provided supportive listening as Christine shared the pts story.     This MSW donned and doffed the appropriate PPE. This MSW offered a visit. The Pt was observed lying in bed in the recovery position. This MSW offered an introduction and comforting words and assurances to the Pt so that he may hear that family is supported and that he is being well cared for. This MSW played a Beatles song for the Pt as Christine noted that is his favorite band. The pt was unresponsive and appears to be resting peacefully.     The PTs discharge plan is to remain for EOL care.     Family has made final arrangements with [facility] at [phone].     This MSW will be available as needed.   SUKUMAR Scanlon

## 2021-09-15 NOTE — PROGRESS NOTES
[facility] Visit Report    Drew REYNOLDS  Shay  [id number]  9/14/2021    Admission R/T [facility] Dx: Yes    Reason for [facility] Admission: COVID 19    Symptom Management: Pain, SOA, restlessness and congestion.    Review of Visit: Patient is a 64yo male with a [facility] diagnosis of COVID 19. Admitted to [facility] for EOL care and symptom management of pain, SOA, restlessness and congestion. Patient tested positive for 9/6/21, currently in contact and droplet isolation. [facility] RN donned appropriate garb prior to entering the pts room.     PPS:10%, minimally responsive during [facility] RN visit, bed bound, oral care only.    VS: 99.7, 94, 16, 108/52, 94% on RA    Medications in 24 hours:  -IV Robinul 0.2mg x3  -SC Robinul 0.2mg x1  -SC Robinul 0.4mg x1  -IV Dilaudid 1mg x3  -SC Dilaudid 1mg x4  -IV Ativan 2mg x3  -SC Ativan 2mg x4    Recommendations:  Continue to monitor for signs of decline and provide comfort measures. Contact [facility] at [phone] with any questions or concerns.    Assessment:  Pt is minimally responsive during [facility] RN visit, bed bound, oral care only, in recovery position, PPS 10%. Respirations are shallow and unlabored with diminished lung sounds and terminal congestion. Abdomen is soft with hypoactive bowel sounds. Color is pale with mottling to the bilateral feet. Hands, feet and knees are cool to touch. Bilateral arms and legs are contracted. F/C to BSD with diminished reyna UOP. Per report, patient appeared restless earlier today but medications have been managing symptoms well since. No signs of pain or distress during visit.     Family:  Spoke with pts son outside of the room d/t pts covid positive status. Discussed symptom management, disease progression, assessment findings and recent decline. Son v/u of pts condition and all questions were answered. Pts son plans to visit at the bedside prior to leaving the hospital tonight. Call placed to pts state appointed guardian with no  answer, brief voicemail left. Collaborated with SABI Lopez about symptom management and pts condition.    Disposition:  Patient meets GIP criteria d/t frequent administration and continued titration of medications to achieve and maintain symptom management. If patient were to stabilize, would likely return to LTC. Will continue [facility] RN visits to monitor for changes, assess needs and provide support.      Bhavya Hebert RN

## 2021-09-15 NOTE — PROGRESS NOTES
"                                                                           \[facility]\""T  Visit Report    Drew REYNOLDS  Shay  [id number]  9/15/2021    Review of Visit (Include All Collaboration- including names of hospital and family involved during admission/visit):  SBT CHP met with pt sister Liliana in the hallway, Liliana shared grief that she feels the risk is too high to don all the PPE required to visit the pt in person, but also voiced being thankful at the observation window where she can see her brother and knows his status; (pt is Covid-19 positive, GIP in isolation); pt is [employer], and had received [employer] on 9/10; Liliana thanked CHP for being wiling to go in and have prayers at bedside, and when CHP visited, CHP relayed sister's message given before she left: \"tell him his favorite sister was outside the window and sends her love\"; CHP arrived at pt door already wearing N95, surgical mask and goggles, and at the door donned additional PPE of gown, gloves and head covering.  CHP relayed sister's message to pt, who did not respond to CHP, and CHP provided prayers at bedside.      Shailesh Moseley, BCC    "

## 2021-09-15 NOTE — PLAN OF CARE
Problem: Adult Inpatient Plan of Care  Goal: Patient-Specific Goal (Individualized)  Outcome: Ongoing, Progressing  Flowsheets (Taken 9/15/2021 4050)  Patient-Specific Goals (Include Timeframe): MARA  Individualized Care Needs: MARA  Anxieties, Fears or Concerns: MARA   Goal Outcome Evaluation:  Plan of Care Reviewed With: patient        Progress: no change  Outcome Summary: Patient responsive to pain. Premedicating with SQ Dilaudid and SQ ativan. FC in place. Pt extremely contracted in lower ext. Pt currently resting comfortably. Will continue to monitor for increased pain/discomfort.

## 2021-09-15 NOTE — PROGRESS NOTES
CC/Reason for visit: Pneumonia due to COVID-19     Interval hx: Patient unresponsive, receiving comfort measures     ROS: Unobtainable     Exam:  Calm, unresponsive, nonverbal       Assessment:  Acute hypoxic respiratory failure  Pneumonia due to COVID-19 virus    Dementia without behavioral disturbance (CMS/HCC)    Seizure disorder (CMS/HCC)    Essential hypertension    Traumatic brain injury (CMS/HCC)    Oropharyngeal dysphagia    Acute respiratory failure with hypoxia (CMS/HCC)    Hypernatremia    Hypoalbuminemia due to protein-calorie malnutrition (CMS/HCC)    Cerebral atrophy (CMS/HCC)    Cerebrovascular small vessel disease    Encephalomalacia left temporal lobe     Plan:  Continue comfort measures.  Patient is calm and in no distress

## 2021-09-15 NOTE — H&P
Group: [facility]         History and physical    Patient Identification:  Drew Day  65 y.o.  male  1955  [id number]                CC: Unresponsive    History of Present Illness:  65-year-old male patient who presented to the hospital due to respiratory distress.  He was diagnosed with COVID-19 pneumonia.  He was intubated and sent to the intensive care unit. There he received mechanical ventilation and subsequently state guardian as well as family were contacted.  The patient has multiple chronic medical conditions and at baseline does not communicate.  He is nonverbal due to cerebrovascular small vessel disease and significant dementia and traumatic brain injury.  All physicians involved in his care as well as family and guardian agreed and recommended that the patient receive comfort measures only and that all medical treatment be discontinued.  The patient is being kept comfortable and he is being transferred to the hospice inpatient scattered bed.      Review of Systems   Unable to perform ROS: Patient nonverbal       Past Medical History:  Past Medical History:   Diagnosis Date   • Back pain    • Convulsion disorder (CMS/HCC)    • Coronary artery disease    • Dementia (CMS/HCC)    • Esophageal bleeding    • GERD (gastroesophageal reflux disease)    • Hyperlipidemia    • Hypertension    • Seizures (CMS/HCC)        Past Surgical History:  Past Surgical History:   Procedure Laterality Date   • CARDIAC CATHETERIZATION     • CORONARY ARTERY BYPASS GRAFT Left    • ENDOSCOPY N/A 4/21/2021    Procedure: ESOPHAGOGASTRODUODENOSCOPY;  Surgeon: Paco Abad MD;  Location: [facility] ENDOSCOPY;  Service: Gastroenterology;  Laterality: N/A;  Pre: coffee ground emesis  Post: hiatal hernia, esophagitis   • ENDOSCOPY N/A 5/28/2021    Procedure: ESOPHAGOGASTRODUODENOSCOPY;  Surgeon: Paco Abad MD;  Location: [facility] ENDOSCOPY;  Service: Gastroenterology;  Laterality: N/A;   • ENDOSCOPY N/A  6/12/2021    Procedure: ESOPHAGOGASTRODUODENOSCOPY WITH COLD BIOPSIES;  Surgeon: Paco Abad MD;  Location: [facility] ENDOSCOPY;  Service: Gastroenterology;  Laterality: N/A;  PRE- H/O ESOPHAGITIS  POST- FOREIGN BODY(CLIP), ESOPHAGEAL NODULARITY, HIATAL HERNIA   • VASCULAR SURGERY Left         Home Meds:  Medications Prior to Admission   Medication Sig Dispense Refill Last Dose   • acetaminophen (TYLENOL) 325 MG tablet Take 650 mg by mouth Every 6 (Six) Hours As Needed for Mild Pain .      • atorvastatin (LIPITOR) 40 MG tablet Take 1 tablet by mouth Every Night. 30 tablet 0    • benztropine (COGENTIN) 0.5 MG tablet Take 0.5 mg by mouth 2 (Two) Times a Day.      • calcium carbonate (TUMS) 500 MG chewable tablet Chew 1 tablet 2 (Two) Times a Day.      • enoxaparin (LOVENOX) 30 MG/0.3ML solution syringe Inject 0.3 mL under the skin into the appropriate area as directed Every 12 (Twelve) Hours. Indications: Partial treatment for DVT due to ongoing GI bleeds. 8.4 mL     • LORazepam (ATIVAN) 0.5 MG tablet Take 0.5 tablets by mouth 2 (Two) Times a Day. 6 tablet 0    • Menthol-Zinc Oxide (REMEDY CALAZIME EX) Apply  topically 2 (Two) Times a Day. To buttocks      • Multiple Vitamins-Minerals (MULTIVITAMIN ADULT) tablet Take 1 tablet by mouth daily. 30 tablet 5    • pantoprazole (PROTONIX) 40 MG EC tablet Take 1 tablet by mouth Daily.      • PHENYTOIN INFATABS 50 MG chewable tablet Take 4 tablets bid (Patient taking differently: Chew 200 mg 2 (Two) Times a Day.) 240 tablet 5    • risperiDONE (risperDAL) 0.5 MG tablet Take 0.5 mg by mouth 2 (Two) Times a Day.      • sucralfate (CARAFATE) 1 g tablet Take 1 tablet by mouth 4 (Four) Times a Day Before Meals & at Bedtime.      • tamsulosin (FLOMAX) 0.4 MG capsule 24 hr capsule Take 1 capsule by mouth every night. 30 capsule 5    • thiamine (VITAMINE B-1) 100 MG tablet Take 1 tablet by mouth daily. 30 tablet 5    • zonisamide (ZONEGRAN) 100 MG capsule Take 1 capsule by mouth  Daily. 3 capsule 0        Allergies:  Allergies   Allergen Reactions   • Eggs Or Egg-Derived Products Unknown - High Severity     Per nursing home list   • Fish-Derived Products Unknown - High Severity     Per nursing home list       Social History:   Social History     Socioeconomic History   • Marital status:      Spouse name: Not on file   • Number of children: Not on file   • Years of education: Not on file   • Highest education level: Not on file   Tobacco Use   • Smoking status: Former Smoker   Vaping Use   • Vaping Use: Never used   Substance and Sexual Activity   • Alcohol use: No   • Drug use: Not Currently   • Sexual activity: Defer       Family History:  Family History   Problem Relation Age of Onset   • Arthritis Mother    • Depression Mother    • Alcohol abuse Mother    • Emphysema Mother    • Alzheimer's disease Mother    • Alzheimer's disease Father    • Alcohol abuse Father    • Bipolar disorder Sister    • Heart disease Sister    • Cancer Brother    • Alcohol abuse Brother    • Drug abuse Brother    • Bipolar disorder Son    • Anxiety disorder Son    • Stroke Brother    • Cancer Brother    • Hypertension Brother    • Bipolar disorder Brother    • Anxiety disorder Brother    • Neuropathy Brother    • Heart disease Brother    • Drug abuse Brother    • Alcohol abuse Brother        Physical Exam:  BP (!) 70/42 (BP Location: Left arm, Patient Position: Lying)   Pulse 78   Temp 96.5 °F (35.8 °C) (Oral)   Resp 12   SpO2 96%  There is no height or weight on file to calculate BMI. 96%    Physical Exam  Patient is unresponsive.  Does not follow commands.  Nonverbal.    LABS:  COVID19   Date Value Ref Range Status   09/06/2021 Detected (C) Not Detected - Ref. Range Final       Imaging: I personally visualized the images of scans/x-rays performed within last 3 days.      Assessment:  Acute hypoxic respiratory failure  Pneumonia due to COVID-19 virus    Dementia without behavioral disturbance  (CMS/HCC)    Seizure disorder (CMS/HCC)    Essential hypertension    Traumatic brain injury (CMS/HCC)    Oropharyngeal dysphagia    Acute respiratory failure with hypoxia (CMS/HCC)    Hypernatremia    Hypoalbuminemia due to protein-calorie malnutrition (CMS/HCC)    Cerebral atrophy (CMS/HCC)    Cerebrovascular small vessel disease    Encephalomalacia left temporal lobe      Recommendations:  Admit to hospice scattered bed.  Continue comfort measures with palliative care.  Continue benzodiazepines and narcotics for comfort measures.  Allow natural death      Patient was placed in face mask upon entering room and kept mask on throughout our encounter. I wore full protective equipment throughout this patient encounter including a face mask, gown and gloves. Hand hygiene was performed before donning protective equipment and after removal when leaving the room.    Richy Moreira MD  9/15/2021  17:53 EDT      Much of this encounter note is an electronic transcription/translation of spoken language to printed text using Dragon Software.

## 2021-09-16 NOTE — PROGRESS NOTES
[facility] Visit Report    Drew Day  [id number]  9/16/2021    Admission R/T [facility] Dx: Yes    Reason for [facility] Admission: Covid 19    Symptom Management: Pain, SOA, restlessness and congestion    Review of Visit: Patient is a 66yo male with a [facility] diagnosis of COVID 19. Admitted to [facility] for GIP for EOL care and symptom management of pain, SOA, restlessness and congestion. Patient tested positive for 9/6/21, currently in contact and droplet isolation. [facility] RN donned appropriate garb prior to entering the pts room.     PPS:10%, minimally responsive to pain, bed bound, oral care only.    VS: , RR 12, 94% on 1L via NC    Medications in 24 hours:  -SC Dilaudid 1mg x7  -SC Ativan 1mg x1  -SC Ativan 2mg x6 (9/15)    Recommendations:  Recommend SC Dilaudid 1.5mg as pt is tense with facial grimacing and moaning when extremities are touched/moved. Continue to monitor for signs of decline and provide comfort measures. Contact [facility] at [phone] with any questions or concerns.     Assessment:  Patient is minimally responsive to pain, bed bound, oral care only, in recovery position, PPS 10%. Respirations are shallow and unlabored with diminished lung sounds and terminal congestion. Abdomen is soft with hypoactive bowel sounds. Color is pale with redness to the hands and feet. Extremities are warm to touch. Bilateral arms and legs are contracted. F/C to BSD with diminished reyna UOP. Pt is tense with facial grimacing and moaning when extremities are touched/moved.    Family:  Call placed to pts state guardian with no answer, brief voicemail left. Collaborated with SABI Lopez about recommendations and assessment findings.     Disposition:  Patient meets GIP criteria d/t frequent administration and continued titration of medications to achieve and maintain symptom management. If patient were to stabilize, would likely return to LTC. Will continue [facility] RN visits to monitor for changes, assess needs  and provide support.      Bhavya Hebert RN

## 2021-09-16 NOTE — PLAN OF CARE
Goal Outcome Evaluation:  Plan of Care Reviewed With: patient  Progress: no change  Outcome summary  PRN meds admin for sx mgmt & prior to care/repositioning to maintain comfort. Pt sx of pain/discomfort with care, ativan increased to 2mg to maintain comfort, sx improved/resolved after meds. Continue to monitor and tx per POC and MD orders.

## 2021-09-16 NOTE — PROGRESS NOTES
CC/Reason for visit: Pneumonia due to COVID-19     Interval hx: Discussed with nurse today.  Patient occasionally opens his eyes during repositioning and bathing.     ROS: Unobtainable     Exam:  Patient will at times open his eyes.  He is nonverbal.        Assessment:  Acute hypoxic respiratory failure  Pneumonia due to COVID-19 virus    Dementia without behavioral disturbance (CMS/HCC)    Seizure disorder (CMS/HCC)    Essential hypertension    Traumatic brain injury (CMS/HCC)    Oropharyngeal dysphagia    Acute respiratory failure with hypoxia (CMS/HCC)    Hypernatremia    Hypoalbuminemia due to protein-calorie malnutrition (CMS/HCC)    Cerebral atrophy (CMS/HCC)    Cerebrovascular small vessel disease    Encephalomalacia left temporal lobe     Plan:  Continue comfort measures.  Patient is calm and in no distress.  Allow for natural death

## 2021-09-17 NOTE — PLAN OF CARE
Goal Outcome Evaluation:  Plan of Care Reviewed With: family        Progress: declining  Outcome Summary: PPS 10%, Pt responsive to pain, opens eyes during cares. Pt premedicated with Dilaudid 1.5mg and ativan 2mg prior to cares. Pt tolerating cares well. Pt less responsive today. Breathing shallow. Family in to visit today, Pt's son went into room. No needs at this time.

## 2021-09-17 NOTE — PROGRESS NOTES
CC/Reason for visit: Pneumonia due to COVID-19     Interval hx: Discussed with nurse today.  No new issues.  Patient receiving subcutaneous medications.  Still opens eyes occasionally. No distress.    Exam:  Patient will at times open his eyes.  He is nonverbal., No distress.        Assessment:  Acute hypoxic respiratory failure  Pneumonia due to COVID-19 virus    Dementia without behavioral disturbance (CMS/HCC)    Seizure disorder (CMS/HCC)    Essential hypertension    Traumatic brain injury (CMS/HCC)    Oropharyngeal dysphagia    Acute respiratory failure with hypoxia (CMS/HCC)    Hypernatremia    Hypoalbuminemia due to protein-calorie malnutrition (CMS/HCC)    Cerebral atrophy (CMS/HCC)    Cerebrovascular small vessel disease    Encephalomalacia left temporal lobe     Plan:  Continue with hospice. Goal is comfort measures and to allow natural death.

## 2021-09-17 NOTE — PROGRESS NOTES
[facility] Visit Report    Drew Day  [id number]  9/17/2021    Admission R/T [facility] Dx: Yes    Reason for [facility] Admission: Covid 19    Symptom Management: Pain, SOA and restlessness    Review of Visit: Patient is a 66yo male with a [facility] diagnosis of COVID 19. Admitted to [facility] for GIP for EOL care and symptom management of pain, SOA and restlessness. Patient tested positive for 9/6/21, currently in contact and droplet isolation. [facility] RN donned appropriate garb prior to entering the pts room.     PPS:10%, minimally responsive to pain, bed bound, oral care only.    VS: , RR 12, 95% on 1L via NC    Medications in 24 hours:  -SC Dilaudid 1mg x7  -SC Ativan 2mg x7 (9/15)    Recommendations:  Recommend SC Dilaudid 1.5mg as pts extremities are tense/rigid during [facility] RN visit. Continue to monitor for signs of decline and provide comfort measures. Contact [facility] at [phone] with any questions or concerns.     Assessment:  Patient is minimally responsive to pain, bed bound, oral care only, in recovery position, PPS 10%. Respirations are shallow and unlabored with diminished lung sounds. Abdomen is soft with hypoactive bowel sounds. Color is pale with redness to the hands and feet. Extremities are tense/rigid and warm to touch. Bilateral arms and legs are contracted. F/C to BSD with diminished reyna UOP.     Collaboration:  Call placed to pts state guardian with no answer, brief voicemail left. Collaborated with SABI Lopez about recommendations and assessment findings.     Disposition:  Patient meets GIP criteria d/t frequent administration and continued titration of medications to achieve and maintain symptom management. If patient were to stabilize, would likely return to LTC. Will continue [facility] RN visits to monitor for changes, assess needs and provide support.      Bhavya Hebert RN

## 2021-09-17 NOTE — PLAN OF CARE
Goal Outcome Evaluation:  Plan of Care Reviewed With: patient, family  Progress: declining  Pt responsive to painful stimuli only, PRN meds admin prior to care/repositioning to maintain comfort. Pt asymptomatic and appears to have rested comfortably. Continue to monitor and tx per POC and MD orders.

## 2021-09-18 NOTE — PROGRESS NOTES
Daily Progress Note.   [address]  9/18/2021    Patient:  Name:  Drew Day  MRN:  [id number]  1955  65 y.o.  male           Interval History:  No acute events.  Prn medications givne.  Physical Exam:  /62 (BP Location: Left arm, Patient Position: Lying)   Pulse 111   Temp 96.7 °F (35.9 °C) (Oral)   Resp 20   SpO2 91%       Laying in bed       ASSESSMENT  /  PLAN:  Acute hypoxic respiratory failure  Pneumonia due to COVID-19 virus    Dementia without behavioral disturbance (CMS/HCC)    Seizure disorder (CMS/HCC)    Essential hypertension    Traumatic brain injury (CMS/HCC)    Oropharyngeal dysphagia    Acute respiratory failure with hypoxia (CMS/HCC)    Hypernatremia    Hypoalbuminemia due to protein-calorie malnutrition (CMS/HCC)    Cerebral atrophy (CMS/HCC)    Cerebrovascular small vessel disease    Encephalomalacia left temporal lobe       Cont prn benzo and narcotic medications for anxiety and pain control    Discussed with family in hallway.  They had no concerns, were complimentary of excellent nursing care being given.  D/w RN no needs.  Cont comfort care.      Sumanth Kat MD  [facility]  09/18/21  17:26 EDT

## 2021-09-18 NOTE — PLAN OF CARE
Problem: Adult Inpatient Plan of Care  Goal: Plan of Care Review  Outcome: Ongoing, Progressing  Flowsheets (Taken 9/18/2021 6782)  Progress: declining  Plan of Care Reviewed With:   patient   son   family  Outcome Summary: Palliative patient rested well throughout shift. HSB. PPS 10%. He responds to pain and opens eyes. Tompkins maintained. Family visited through window. COVID precautions maintained. Premedicated with 1.5 dilaudid and 2 ativan. Will continue comfort care.

## 2021-09-18 NOTE — PROGRESS NOTES
[facility] Visit Report    Drew Day  [id number]  9/18/2021    Admission R/T [facility] Dx: YES      Reason for [facility] Admission: Covid 19      Symptom  Management: Pain, soa and restlessness      Nursing/Medication Recommendations: Please contact [facility] at [phone] for any questions or concerns.      Psychosocial Issues and Recommendations: Provide support to patient and family      Spiritual Concerns and Recommendations:None at present      [facility] Discharge Plans:  None, patient in dying process and is not safe for transport. Requires daily [facility] RN assessment for symptom management of pain, soa and restlessness using prn IV medications, titrating doses as needed to maintain comfort. Meeting criteria for [facility] level of care.      Review of Visit: Arrived on unit. Spoke to staff SABI Vieyra and reviewed Epic notes, patient is in isolation for Covid 19. Started to make in person visit and family member garbing up to enter room to spend time with dying patient. Observed patient via window and patient lying in bed, color pale to ashen, lying on his side. Bilateral arms and legs contracted. Unresponsive to touch or call of name. Breathing shallow with 02 sat 94%. VS 99.3-104-18-96/60. PPS 10%, oral care only. F/C to BSD with dark yellow urine noted, minimal amount. Close monitoring for safety, requires daily RN assessment, comfort care for patient in dying process who requires IV medication for symptom control. Discussed care needs with staff RN and patient has received IV Dilaudid 1.5mg and IV Ativan 2mg x 6 doses each in the last 24 hours. Patient appears comfortable and peaceful during visit with IV administration of medication, comfort is the goal per family. Will continue to see daily to assess needs, monitor status and offer support.        Abril Abernathy RN  [facility] Visit Nurse

## 2021-09-18 NOTE — PLAN OF CARE
Problem: Adult Inpatient Plan of Care  Goal: Patient-Specific Goal (Individualized)  9/18/2021 0623 by Suly Fernandez, RN  Flowsheets (Taken 9/15/2021 0418)  Patient-Specific Goals (Include Timeframe): MARA  Individualized Care Needs: MARA  Anxieties, Fears or Concerns: MARA  9/18/2021 0622 by Suly Fernandez, RN  Outcome: Ongoing, Progressing   Goal Outcome Evaluation:  Plan of Care Reviewed With: patient        Progress: no change  Outcome Summary: Patient responsive to pain. Premedicating prior to turns with 1.5mg of dilaudid and 2mg of ativan SUBQ. FC in place. Pt very contracted. Pt currently resting comfortably. Will continue to monitor for any discomfort.     COVID + precautions in place.

## 2021-09-19 NOTE — PLAN OF CARE
Problem: Adult Inpatient Plan of Care  Goal: Plan of Care Review  Outcome: Ongoing, Progressing  Flowsheets (Taken 9/19/2021 1727)  Progress: declining  Plan of Care Reviewed With:   patient   family  Outcome Summary: Palliative patient. HSB. PPS 10%. Minimally responsive. Opens eyes to pain. Premedicating prior to turns with 1.5 dilaudid and 2 ativan. Tompkins maintained. Son and niece visited at bedside and attentive to patient. Oral care performed. Will continue comfort care.

## 2021-09-19 NOTE — PROGRESS NOTES
[facility] Visit Report    Drew Day  [id number]  9/19/2021    Admission R/T [facility] Dx: YES      [facility]: Covid 19      Symptom  Management: Pain, soa and restlessness      Nursing/Medication Recommendations: Please contact [facility] t [phone] for any questions or concerns and continue to provide comfort care per orders.      Psychosocial Issues and Recommendations: Provide support to patient and family      Spiritual Concerns and Recommendations: None at present      [facility] Discharge Plans:  None, patient in dying process and is not safe for transport. Requires daily [facility] RN assessment for symptom management of pain, soa and restlessness using IV medications, titrating doses as needed to maintain comfort. Meeting criteria for [facility] level of care.      Review of Visit: Arrived on unit. Spoke to staff SABI Vieyra and reviewed Epic notes. Patient is in isolation for Covid 19, entered room after donning appropriate PPE with eye protection and patient lying in bed. Unresponsive to touch and to call of name. Color ashen, nailbeds dusky. Bilateral arms and legs contracted. Breathing shallow with 02 sat 91%. PPS 10%, oral care only. F/C to BSD with dark urine noted. VS 99.6-122-20-80/32. Close monitoring for safety, requires daily [facility] RN assessment, comfort care for patient in dying process who requires IV medication for symptom control. Son visited throughout the afternoon, but left before I could visit with him. Will leave voicemail message to guardian. Discussed care needs with staff SABI Vieyra and patient has received IV Dilaudid 1.5mg and IV Ativan 2mg x 6 doses each in the last 24 hours. Patient appears comfortable and peaceful during visit with IV administration of medication, comfort is the goal. Will continue to see daily to assess needs, monitor status and offer support.        Abril Abernathy,  RN  [facility] Visit Nurse

## 2021-09-19 NOTE — PLAN OF CARE
Problem: Adult Inpatient Plan of Care  Goal: Patient-Specific Goal (Individualized)  Outcome: Ongoing, Progressing  Flowsheets (Taken 9/15/2021 5811)  Patient-Specific Goals (Include Timeframe): MARA  Individualized Care Needs: MARA  Anxieties, Fears or Concerns: MARA   Goal Outcome Evaluation:  Plan of Care Reviewed With: patient        Progress: declining  Outcome Summary: Patient minimally responsive. Premedicating prior to turns with 1.5mg of dilaudid and 2mg of ativan. FC in place. New SUBQ line placed in left arm this shift. Pt currently resting. Will continue to monitor for any discomfort.O2 decreased to 0.5 NC this shift.

## 2021-09-19 NOTE — PROGRESS NOTES
Daily Progress Note.   [address]  9/19/2021    Patient:  Name:  Drew Day  MRN:  [id number]  1955  65 y.o.  male           Interval History:  No acute events.  Prn medications given  RN just repositioning in bed  Physical Exam:  BP (!) 80/32 (BP Location: Left arm, Patient Position: Lying)   Pulse 117   Temp (!) 101.3 °F (38.5 °C) (Oral)   Resp 18   SpO2 91%       Laying in bed  Contracted ext  Unresponsive  Appears calm  Bilateral air entry no rhonchi         ASSESSMENT  /  PLAN:  Acute hypoxic respiratory failure  Pneumonia due to COVID-19 virus    Dementia without behavioral disturbance (CMS/HCC)    Seizure disorder (CMS/HCC)    Essential hypertension    Traumatic brain injury (CMS/HCC)    Oropharyngeal dysphagia    Acute respiratory failure with hypoxia (CMS/HCC)    Hypernatremia    Hypoalbuminemia due to protein-calorie malnutrition (CMS/HCC)    Cerebral atrophy (CMS/HCC)    Cerebrovascular small vessel disease    Encephalomalacia left temporal lobe       Cont prn benzo and narcotic medications for anxiety and pain control    Discussed with family     D/w RN no needs.    Cont comfort care.      Sumanth Kat MD  [facility]  09/19/21  3524 EDT

## 2021-09-20 NOTE — PROGRESS NOTES
[facility] Visit Report    Drew Day  [id number]  9/20/2021    Admission R/T [facility] Dx: Yes    Reason for [facility] Admission: Covid 19    Symptom Management: Pain, SOA and restlessness    Review of Visit: Patient is a 66yo male with a [facility] diagnosis of COVID 19. Admitted to [facility] for [facility] for EOL care and symptom management of pain, SOA and restlessness. Patient tested positive for Covid 9/6/21, currently in Enhanced contact and droplet isolation. [facility] RN donned appropriate garb prior to entering the pts room.     PPS: 10%, minimally responsive to pain, bed bound, oral care only.     VS: 99.5, 117, 18, 118/80, 84% on room air.     Medications in 24 hours:  -SC Dilaudid 1mg x1  -IV Dilaudid 1mg x4  -SC Dilaudid 1.5mg x1  -IV Ativan 2mg x4  -SC Ativan 2mg x2    Recommendations:  Recommend IV Dilaudid 1.5mg, pt noted with facial grimace and slight moaning with touch during [facility] RN visit. Discussed recommendation with SABI Lopez and pts son. Continue to monitor for signs of decline and provide comfort measures. Contact [facility] at [phone] with any questions or concerns.     Assessment:  Patient is minimally responsive to pain, bed bound, oral care only, in recovery position, PPS 10%. Respirations are shallow and unlabored with diminished lung sounds. Abdomen is sot with hypoactive bowel sounds. Color is pale with redness to the hands and feet. Extremities are cool to touch. Bilateral arms and legs are contracted and tense during [facility] RN visit. F/C to BSD with diminished reyna UOP. Signs of discomfort during visit were reported to SABI Lopez.     Collaboration:  Spoke with pts son and niece outside of the room prior to visit and then updated the pts son via phone after the visit was made. Family v/u of pts condition and all questions were answered. Call placed to pts guardian, Deanna with no answer, brief voicemail was left. Collaborated with SABI Lopez about pts condition and recommendations. John was  going in to medicate the pt at the end of the visit.     Disposition:  Patient meets GIP criteria d/t frequent administration and continued titration of medications to achieve and maintain symptom management. If patient were to stabilize, would likely return to LTC. Will continue [facility] RN visits to monitor for changes, assess needs and provide support.      Bhavya Hebert RN

## 2021-09-20 NOTE — PLAN OF CARE
Goal Outcome Evaluation:  Plan of Care Reviewed With: family        Progress: declining  Outcome Summary: PPS 10%, hospice scatter bed. Pt minimally responsive, responds to pain. Pt premedicated with dilaudid and ativan. Pt has denney, pinkish cloudy output. Pt repositioned q 4 hours. Family in to visit today, entered room. Pt's breathing is shallow, irregular at times. Pt appears calm and comfortable at this time.

## 2021-09-20 NOTE — PLAN OF CARE
Goal Outcome Evaluation:  Plan of Care Reviewed With: patient        Progress: no change  Outcome Summary: Appears comfortable at rest. Medicated q4 prior to turns. Will continue palliative measures

## 2021-09-20 NOTE — PROGRESS NOTES
Daily Progress Note.   [address]  9/20/2021    Patient:  Name:  Drew Day  MRN:  [id number]  1955  65 y.o.  male           Interval History: Patient appears to be comfortable and is being given medications per orders.  No new concerns overnight reported.  Appreciate hospice team input.    Physical Exam:  /80 (BP Location: Left arm, Patient Position: Lying)   Pulse 117   Temp 99.5 °F (37.5 °C) (Axillary)   Resp 18   SpO2 (!) 84%     Laying in bed  Contracted extremities  Unresponsive  Appears calm  Bilateral air entry no rhonchi         ASSESSMENT  /  PLAN:  Acute hypoxic respiratory failure  Pneumonia due to COVID-19 virus    Dementia without behavioral disturbance (CMS/HCC)    Seizure disorder (CMS/HCC)    Essential hypertension    Traumatic brain injury (CMS/HCC)    Oropharyngeal dysphagia    Acute respiratory failure with hypoxia (CMS/HCC)    Hypernatremia    Hypoalbuminemia due to protein-calorie malnutrition (CMS/HCC)    Cerebral atrophy (CMS/HCC)    Cerebrovascular small vessel disease    Encephalomalacia left temporal lobe     Cont prn benzo and narcotic medications for anxiety and pain control  Appreciate hospice team input.  No family at bedside during my evaluation.  Family being updated previously. Cont comfort care.    Joel Lei MD  [facility]  09/20/21  1351 EDT

## 2021-09-20 NOTE — PROGRESS NOTES
Addendum to earlier notes dated 9/19 and 9/18 Medications given SQ instead of IV.      Abril Abernathy RN  [facility] Visit Nurse

## 2021-09-21 NOTE — PROGRESS NOTES
[facility] Visit Report    Drew REYNOLDS  Shay  [id number]  9/21/2021    Admission [facility]/T [facility] [place] Dx: Yes    Reason for [facility] Admission: Covid 19    Symptom Management: Pain, SOA and restlessness    Review of Visit:  Patient is a 66yo male with a [facility] diagnosis of COVID 19. Admitted to [facility] for [facility] for EOL care and symptom management of pain, SOA and restlessness. Patient tested positive for Covid 9/6/21, currently in Enhanced contact and droplet isolation. [facility] RN donned appropriate garb prior to entering the pts room.     PPS:10%, minimally responsive to pain, bed bound, oral care only.     VS: 100.1, 122, 16, 102/66, 91% on room air.     Medications in 24 hours:  -IV Dilaudid 1mg x2  -SC Dilaudid 1mg x4  -IV Ativan 2mg x2  -SC Ativan 2mg x3    Recommendations:  Continue to monitor for signs of decline and provide comfort measures. Contact [facility] at [phone] with any questions or concerns.     Assessment:  Patient is minimally responsive to pain, bed bound, oral care only, in recovery position, PPS 10%. Respirations are shallow and unlabored with diminished lung sounds. Abdomen is sot with hypoactive bowel sounds. Color is pale with redness to the hands and feet and a DTI to the right foot. Feet are cool to touch with mottling to the toes. Bilateral arms and legs are contracted. F/C to BSD with diminished reyna UOP. No signs of pain or distress present during [facility] RN visit.     Family:  Call placed to pts guardian, Deanna with no answer. Call placed to pts son, Drew with condition update and support provided. Discussed symptom management, comfort care and assessment findings. Drew v/u of pts condition and reports that goal is for comfort. Collaborated with SABI Bang about pts condition.     Disposition:  Patient meets GIP criteria d/t frequent administration and continued titration of medications to achieve and maintain symptom management. If patient were to stabilize, would likely return  to LTC. Will continue [facility] RN visits to monitor for changes, assess needs and provide support.      Bhavya Hebert RN

## 2021-09-21 NOTE — PROGRESS NOTES
Daily Progress Note.   [address]  9/21/2021    Patient:  Name:  Drew Day  MRN:  [id number]  1955  65 y.o.  male           Interval History: Patient appears to be comfortable and is being given medications per orders.  No new concerns overnight reported.  Appreciate hospice team input.    Physical Exam:  /72 (BP Location: Right arm, Patient Position: Lying)   Pulse 117   Temp 98.5 °F (36.9 °C) (Axillary)   Resp 16   SpO2 99%     Laying in bed  Contracted extremities  Unresponsive  Appears calm  Bilateral air entry no rhonchi         ASSESSMENT  /  PLAN:  Acute hypoxic respiratory failure  Pneumonia due to COVID-19 virus    Dementia without behavioral disturbance (CMS/HCC)    Seizure disorder (CMS/HCC)    Essential hypertension    Traumatic brain injury (CMS/HCC)    Oropharyngeal dysphagia    Acute respiratory failure with hypoxia (CMS/HCC)    Hypernatremia    Hypoalbuminemia due to protein-calorie malnutrition (CMS/HCC)    Cerebral atrophy (CMS/HCC)    Cerebrovascular small vessel disease    Encephalomalacia left temporal lobe     Cont prn benzo and narcotic medications for anxiety and pain control  Appreciate hospice team input.  D/w RN. Cont comfort care.    Joel Lei MD  [facility]  09/21/21  1351 EDT

## 2021-09-21 NOTE — PLAN OF CARE
Goal Outcome Evaluation:  Plan of Care Reviewed With: son        Progress: declining  Outcome Summary: Pt has been premedicated for turns and bath. Pt receiving dilaudid 1 mg and 2 mg of ativan SQ and is tolerating well. Son visited at bedside wearing appropriate PPE. Pt appears comfortable at this time, will continue to provide comfort care.

## 2021-09-22 NOTE — PLAN OF CARE
Goal Outcome Evaluation:  Plan of Care Reviewed With: patient        Progress: declining  Outcome Summary: Pt has had a drastic decline today, pt has become more unresponsive, no longer responding to pain. Modeling was noted on the pt's feet and legs up to his knees. Pt is cool to touch and has little urine output. Family is at bedside. Pt appears comfortable, will continue to provide comfort measures.

## 2021-09-22 NOTE — PROGRESS NOTES
[facility] Visit Report    Drew Day  [id number]  9/22/2021    Admission R/T [facility] Dx: yes    Reason for [facility] Admission:COVID-19    Symptom  Management: pain, dyspnea, restlessness and congestion    Nursing/Medication Recommendations:increasing dilaudid and adding robinu    Psychosocial Issues and Recommendations:    Spiritual Concerns and Recommendations:    [facility] Discharge Plans:  Nothing at this time, patient is actively dying    Review of Visit (Include All Collaboration- including names of hospital and family involved during admission/visit):RN arrived on the unit and received report from SABI Bang. RN also reviewed Epic notes. RN arrived at bedside. Niece is at bedside. Patient is laying in bed on his right side. Patient is unresponsive with a PPS of 10%. Congestion is present. Patient is pale in color. Respirations are rapid with upper accessory muscle usage during each respiration. Nailbeds are cyanotic. Urine is dark. Mottling is present to knees and feet. Feet and knees are cold to touch. RN reviewed patient's declining condition with maia. She verbalizes understanding. She has no questions, needs or concerns at this time. RN reviewed visit with Btei. RN suggested robinul for congestion and increase to dilaudid due to patient's rapid respiratory rate along with signs of labored respirations.        Joseph Resendiz RN

## 2021-09-22 NOTE — PROGRESS NOTES
[facility]  Subsequent Visit Report    Drew REYNOLDS Sr Varmak  [id number]  9/22/2021    Review of Visit (Include All Collaboration- including names of hospital and family involved during admission/visit):  [facility] collab with [facility] RN prior to visit, pt has had a significant decline since yesterday, now appears imminent; CHP arrived on the unit wearing N95, surgical mask and goggles, and prior to entering room CHP donned additional PPE of gown, gloves and head covering; pt did not respond during [facility] visit, CHP provided presence and prayer aloud at bedside for pt and family; CHP closed visit praying The Lord's Prayer; CHP later called maia King and left voicemail; Christine had been present when RN visited earlier and was aware of pt status.      Shailesh Moseley, BCC

## 2021-09-22 NOTE — PLAN OF CARE
Goal Outcome Evaluation:  Plan of Care Reviewed With: patient        Progress: declining  Outcome Summary: Appears comfortable at rest. Medicated q4 prior to turns. No family present. Will continue palliative care

## 2021-09-22 NOTE — PROGRESS NOTES
Daily Progress Note.   [address]  9/22/2021    Patient:  Name:  Drew Day  MRN:  [id number]  1955  65 y.o.  male           Interval History: Patient appears to be comfortable and is being given medications per orders.  No new concerns overnight reported.  Appreciate hospice team input.    Physical Exam:  /78 (BP Location: Right arm, Patient Position: Lying)   Pulse (!) 121   Temp 100.2 °F (37.9 °C) (Axillary)   Resp 26   SpO2 94%     Laying in bed  Contracted extremities  Unresponsive  Appears calm  Tachypneic         ASSESSMENT  /  PLAN:  Acute hypoxic respiratory failure  Pneumonia due to COVID-19 virus    Dementia without behavioral disturbance (CMS/HCC)    Seizure disorder (CMS/HCC)    Essential hypertension    Traumatic brain injury (CMS/HCC)    Oropharyngeal dysphagia    Acute respiratory failure with hypoxia (CMS/HCC)    Hypernatremia    Hypoalbuminemia due to protein-calorie malnutrition (CMS/HCC)    Cerebral atrophy (CMS/HCC)    Cerebrovascular small vessel disease    Encephalomalacia left temporal lobe     Cont prn benzo and narcotic medications for anxiety and pain control  Appreciate hospice team input.   Cont comfort care.    Joel Lei MD  [facility]  09/22/21  1356 EDT

## 2021-09-23 NOTE — DISCHARGE SUMMARY
DISCHARGE SUMMARY    Patient Name: Drew Day  Age/Sex: 65 y.o. male  : 1955  MRN: [id number]  Patient Care Team:  Provider, No Known as PCP - General       Date of Admit: 2021  Date of Discharge:  2021    Date of Death:  2021    Disposition: Mo     Final Diagnoses:   Acute hypoxic respiratory failure  Pneumonia due to COVID-19 virus    Dementia without behavioral disturbance (CMS/HCC)    Seizure disorder (CMS/HCC)    Essential hypertension    Traumatic brain injury (CMS/HCC)    Oropharyngeal dysphagia    Acute respiratory failure with hypoxia (CMS/HCC)    Hypernatremia    Hypoalbuminemia due to protein-calorie malnutrition (CMS/HCC)    Cerebral atrophy (CMS/HCC)    Cerebrovascular small vessel disease    Encephalomalacia left temporal lobe    History of Present Illness  65-year-old male patient who presented to the hospital due to respiratory distress.  He was diagnosed with COVID-19 pneumonia.  He was intubated and sent to the intensive care unit. There he received mechanical ventilation and subsequently state guardian as well as family were contacted.  The patient has multiple chronic medical conditions and at baseline does not communicate.  He is nonverbal due to cerebrovascular small vessel disease and significant dementia and traumatic brain injury.  All physicians involved in his care as well as family and guardian agreed and recommended that the patient receive comfort measures only and that all medical treatment be discontinued.  The patient is being kept comfortable and he is being transferred to the hospice inpatient scattered bed.    Hospital Course   Patient remains on mechanical ventilator and not making progress post intubation for Covid pneumonia and eventually was transitioned to comfort measures after discussion of the state guardian and patient was treated with hospice care and comfort measures only and remain comfortable and eventually passed away  comfortably.  Family was updated by the team and questions were answered as appropriate.    Procedures Performed         Consults:   Consults     Date and Time Order Name Status Description    9/8/2021 12:34 AM Inpatient Palliative Care MD Consult Completed     9/6/2021  4:26 AM Pulmonology (on-call MD unless specified) Completed           Pertinent Test Results:                             Invalid input(s): LDLCALC                                            Imaging Results:  Imaging Results (All)     None          Joel Lei MD  09/23/21  09:15 EDT

## 2022-01-01 NOTE — CONSULTS
[facility]  Initial Inpatient Consult Note    Referring Provider: KEYSHA Stanley    Reason for Consultation: Coffee ground emesis    Subjective     History of present illness:    65 y.o. male unknown to our service.  The patient is nonverbal and all history was obtained through review of the chart.  He was brought into the emergency room early this morning from nursing home after staff noticed coffee-ground vomiting.  He has had at least 3 episodes since midnight.  Hemoglobin in the emergency room was 11.7 is down to 9.1 today.  CT abdomen and pelvis shows large volume of thrombus in the left common iliac vein extending into the inferior vena cava there is also an element of constipation and possible fecal impaction.  The patient has been started on Lovenox with dose of 60mg given this am around 9am.      Past Medical History:  Past Medical History:   Diagnosis Date   • Back pain    • Convulsion disorder (CMS/HCC)    • Coronary artery disease    • Dementia (CMS/HCC)    • Hyperlipidemia    • Hypertension    • Seizures (CMS/HCC)      Past Surgical History:  Past Surgical History:   Procedure Laterality Date   • CARDIAC CATHETERIZATION     • CORONARY ARTERY BYPASS GRAFT Left    • VASCULAR SURGERY Left       Social History:   Social History     Tobacco Use   • Smoking status: Former Smoker   Substance Use Topics   • Alcohol use: No      Family History:  Family History   Problem Relation Age of Onset   • Arthritis Mother    • Depression Mother    • Alcohol abuse Mother    • Emphysema Mother    • Alzheimer's disease Mother    • Alzheimer's disease Father    • Alcohol abuse Father    • Bipolar disorder Sister    • Heart disease Sister    • Cancer Brother    • Alcohol abuse Brother    • Drug abuse Brother    • Bipolar disorder Son    • Anxiety disorder Son    • Stroke Brother    • Cancer Brother    • Hypertension Brother    • Bipolar disorder Brother    • Anxiety disorder Brother    • Neuropathy  Brother    • Heart disease Brother    • Drug abuse Brother    • Alcohol abuse Brother        Home Meds:  Medications Prior to Admission   Medication Sig Dispense Refill Last Dose   • acetaminophen (TYLENOL) 325 MG tablet Take 650 mg by mouth Every 6 (Six) Hours As Needed for Mild Pain .      • atorvastatin (LIPITOR) 40 MG tablet Take 1 tablet by mouth Every Night. 30 tablet 0    • benztropine (COGENTIN) 0.5 MG tablet Take 0.5 mg by mouth 2 (Two) Times a Day.      • donepezil (ARICEPT) 10 MG tablet Take 1 tablet by mouth Every Night. 30 tablet 1    • LORazepam (ATIVAN) 0.5 MG tablet Take 0.25 mg by mouth 2 (Two) Times a Day.      • memantine (NAMENDA) 10 MG tablet Take 10 mg by mouth 2 (Two) Times a Day.      • Multiple Vitamins-Minerals (MULTIVITAMIN ADULT) tablet Take 1 tablet by mouth daily. 30 tablet 5    • risperiDONE (risperDAL) 1 MG tablet Take 1 mg by mouth 2 (Two) Times a Day.      • tamsulosin (FLOMAX) 0.4 MG capsule 24 hr capsule Take 1 capsule by mouth every night. 30 capsule 5    • calcium carbonate (TUMS) 500 MG chewable tablet Chew 1 tablet 2 (Two) Times a Day.      • Menthol-Zinc Oxide (REMEDY CALAZIME EX) Apply  topically 2 (Two) Times a Day.      • PHENYTOIN INFATABS 50 MG chewable tablet Take 4 tablets bid (Patient taking differently: Chew 300 mg 2 (Two) Times a Day.) 240 tablet 5    • raNITIdine (ZANTAC) 150 MG tablet Take 150 mg by mouth Daily.      • thiamine (VITAMINE B-1) 100 MG tablet Take 1 tablet by mouth daily. 30 tablet 5    • zonisamide (ZONEGRAN) 100 MG capsule Take 1 capsule by mouth daily. 30 capsule 5      Current Meds:      Allergies:  Allergies   Allergen Reactions   • Eggs Or Egg-Derived Products Unknown - High Severity     Per nursing home list   • Fish-Derived Products Unknown - High Severity     Per nursing home list     Review of Systems  Review of systems could not be obtained due to   patient nonverbal.     Objective     Vital Signs  Temp:  [96.7 °F (35.9 °C)-99.4 °F (37.4  °C)] 97.4 °F (36.3 °C)  Heart Rate:  [] 88  Resp:  [14-16] 16  BP: ()/(43-91) 113/72  Physical Exam:  General Appearance:     Resting, no distress, contractures noted   Head:    Normocephalic, without obvious abnormality, atraumatic   Eyes:            Lids and lashes normal, conjunctivae and sclerae normal, no icterus   Throat:   No oral lesions, no thrush, oral mucosa moist   Neck:   No adenopathy, supple, trachea midline, no thyromegaly, no carotid bruit, no JVD   Lungs:     Clear to auscultation,respirations regular, even and            unlabored    Heart:    Regular rhythm and normal rate, normal S1 and S2, no        murmur, no gallop, no rub, no click   Chest Wall:    No abnormalities observed   Abdomen:     Normal bowel sounds, no masses, no organomegaly, soft     nontender, nondistended, no guarding, no rebound                 tenderness   Rectal:     Deferred   Extremities:   no edema, no cyanosis, no redness   Skin:   No bleeding, bruising or rash   Lymph nodes:   No palpable adenopathy   Psychiatric:  Judgement and insight: normal   Orientation to person place and time: normal   Mood and affect: normal   Results Review:   I reviewed the patient's new clinical results.  I reviewed the patient's new imaging results and agree with the interpretation.    Results from last 7 days   Lab Units 04/21/21  0645 04/21/21  0020 04/20/21 1912   WBC 10*3/mm3 15.51*  --  14.36*   HEMOGLOBIN g/dL 9.1* 9.6* 11.7*   HEMATOCRIT % 28.2* 29.9* 35.8*   PLATELETS 10*3/mm3 134*  --  186     Results from last 7 days   Lab Units 04/21/21  0645 04/20/21  1912   SODIUM mmol/L 139 143   POTASSIUM mmol/L 3.6 4.0   CHLORIDE mmol/L 108* 105   CO2 mmol/L 22.4 25.4   BUN mg/dL 19 20   CREATININE mg/dL 0.89 0.83   CALCIUM mg/dL 8.5* 9.2   BILIRUBIN mg/dL 0.2 <0.2   ALK PHOS U/L 102 139*   ALT (SGPT) U/L 10 14   AST (SGOT) U/L 19 20   GLUCOSE mg/dL 116* 131*         Lab Results   Lab Value Date/Time    LIPASE 25 04/20/2021 1912     LIPASE 27 11/22/2019 0650       Radiology:  CT Abdomen Pelvis With Contrast   Final Result       1. The patient does have hiatal hernia. Full assessment is degraded by   motion artifact. No other abnormality of the stomach or duodenum can be   seen.   2. Large volume of thrombus, extending from the left common iliac vein   into the inferior vena cava to the level of the left renal vein.   3. Large volume of stool seen throughout the colon, suggesting   constipation, with additional increased stool within the rectal vault,   which may reflect fecal impaction.       FINDINGS were called to Physician Assistant Trista Gupta at 10:42 PM.       Radiation dose reduction techniques were utilized, including automated   exposure control and exposure modulation based on body size.       This report was finalized on 4/20/2021 10:45 PM by Dr. Allie Lakhani M.D.          XR Chest 1 View   Final Result   No focal pulmonary consolidation. Indeterminate nodular   density at the right lung base.       This report was finalized on 4/20/2021 8:20 PM by Dr. Jaime Guzmán M.D.              Assessment/Plan   Assessment:   1.  Hematemesis/coffee ground emesis  2.  ABLA  3.  Iliac thrombosis  4.  Anticoagulated  5.  Chronic constipation    Plan:   Will administer tap water enema for his constipation/fecal impaction  I am going to put him on for EGD today to evaluate his hematemesis/coffee ground emesis. This will be diagnostic as he has received therapeutic dose Lovenox this morning, but I think it is important to r/o a high risk lesion prior to continuing with therapeutic anticoagulation for his pelvic DVT.  Continue NPO for now.    I discussed the patients findings and my recommendations with patient.         Paco Vuong M.D.  [facility]  [address] [place] [address]  Office: [phone]     yes

## 2022-10-11 NOTE — PROGRESS NOTES
Clinical Pharmacy Services: Medication History    Drew Day is a 65 y.o. male presenting to [facility] for   Chief Complaint   Patient presents with   • Vomiting       He  has a past medical history of Back pain, Convulsion disorder (CMS/[facility]), Coronary artery disease, Dementia (CMS/[facility]), Esophageal bleeding, GERD (gastroesophageal reflux disease), Hyperlipidemia, Hypertension, and Seizures (CMS/[facility]).    Allergies as of 06/09/2021 - Reviewed 06/09/2021   Allergen Reaction Noted   • Eggs or egg-derived products Unknown - High Severity 11/22/2019   • Fish-derived products Unknown - High Severity 11/22/2019       Medication information was obtained from: [facility] paperwork  Pharmacy and Phone Number:     Prior to Admission Medications     Prescriptions Last Dose Informant Patient Reported? Taking?    acetaminophen (TYLENOL) 325 MG tablet  Nursing Home Yes Yes    Take 650 mg by mouth Every 6 (Six) Hours As Needed for Mild Pain .    atorvastatin (LIPITOR) 40 MG tablet  Nursing Home No Yes    Take 1 tablet by mouth Every Night.    benztropine (COGENTIN) 0.5 MG tablet  Nursing Home Yes Yes    Take 0.5 mg by mouth 2 (Two) Times a Day.    calcium carbonate (TUMS) 500 MG chewable tablet  Nursing Home Yes Yes    Chew 1 tablet 2 (Two) Times a Day.    enoxaparin (LOVENOX) 30 MG/0.3ML solution syringe  Nursing Home No Yes    Inject 0.3 mL under the skin into the appropriate area as directed Every 12 (Twelve) Hours. Indications: Partial treatment for DVT due to ongoing GI bleeds.    LORazepam (ATIVAN) 0.5 MG tablet  Nursing Home No Yes    Take 0.5 tablets by mouth 2 (Two) Times a Day.    Menthol-Zinc Oxide (REMEDY CALAZIME EX)  Nursing Home Yes Yes    Apply  topically 2 (Two) Times a Day. To buttocks    Multiple Vitamins-Minerals (MULTIVITAMIN ADULT) tablet  Nursing Home No Yes    Take 1 tablet by mouth daily.    PHENYTOIN INFATABS 50 MG chewable tablet  Nursing Home No Yes    Take 4 tablets bid     Patient taking differently:  Chew 200 mg 2 (Two) Times a Day.    risperiDONE (risperDAL) 0.5 MG tablet  Nursing Home Yes Yes    Take 0.5 mg by mouth 2 (Two) Times a Day.    sucralfate (CARAFATE) 1 g tablet  Nursing Home No Yes    Take 1 tablet by mouth 4 (Four) Times a Day Before Meals & at Bedtime.    tamsulosin (FLOMAX) 0.4 MG capsule 24 hr capsule  Nursing Home No Yes    Take 1 capsule by mouth every night.    thiamine (VITAMINE B-1) 100 MG tablet  Nursing Home No Yes    Take 1 tablet by mouth daily.    zonisamide (ZONEGRAN) 100 MG capsule  Nursing Home No Yes    Take 1 capsule by mouth Daily.            Medication notes:     This medication list is complete to the best of my knowledge as of 6/9/2021    Please call if questions.    Flakita Ma [facility]  Medication History Technician  [phone]    6/9/2021 13:54 EDT       Alternatives Discussed Intro (Do Not Add Period): I discussed alternative treatments to Mohs surgery and specifically discussed the risks and benefits of

## 2023-05-09 NOTE — H&P
Anesthesia Evaluation     Patient summary reviewed and Nursing notes reviewed   history of anesthetic complications: PONV  NPO Solid Status: > 8 hours  NPO Liquid Status: > 8 hours           Airway   Mallampati: II  TM distance: >3 FB  Neck ROM: full  No difficulty expected  Dental    (+) poor dentition    Pulmonary - normal exam    breath sounds clear to auscultation  (+) a smoker Former, asthma (mild),sleep apnea (resolved with weight loss),   Cardiovascular - normal exam  Exercise tolerance: good (4-7 METS)    NYHA Classification: II  ECG reviewed  Rhythm: regular  Rate: normal    (+) hypertension well controlled, valvular problems/murmurs MVP, hyperlipidemia,     ROS comment: Cardiac stress test 1/28/2020  -Left ventricular ejection fraction is normal (Calculated EF = 68%).  -Myocardial perfusion imaging indicates a normal myocardial perfusion study with no evidence of ischemia.  -Impressions are consistent with a low risk study.  -Findings consistent with a normal ECG stress test.      Neuro/Psych  (+) psychiatric history Anxiety and Depression,    GI/Hepatic/Renal/Endo    (+) obesity, morbid obesity, GERD,      Musculoskeletal     (+) back pain,   Abdominal  - normal exam   Substance History - negative use     OB/GYN negative ob/gyn ROS         Other   arthritis,      ROS/Med Hx Other: Patient states her sister has pseudocholinesterase deficiency                    Anesthesia Plan    ASA 2     general   total IV anesthesia  intravenous induction     Anesthetic plan, risks, benefits, and alternatives have been provided, discussed and informed consent has been obtained with: patient.    Plan discussed with CRNA.       [facility] HOSPITALIST     Tami Escalante MD    CHIEF COMPLAINT: dark emesis/stools    HISTORY OF PRESENT ILLNESS:  Patient is a 63-year-old male that presented from [facility] to the emergency department secondary to sudden onset of dark emesis, dark stools that were found to be Hemoccult positive in the ER.  ER provider notes patient is keating of the [facility] and has had a traumatic brain injury and is nonverbal at his baseline, therefore patient is noncontributory to HPI, all information taken from chart,  although on exam patient makes noises, makes eye contact.  ER provider notes no recent illness noted, no history of GI bleed.  The patient appears in no apparent distress at time of exam. Patient is noted to be on Xarelto for PAD and vascular procedures in the past.    He has a known history of dementia with behavioral disturbance, traumatic brain injury, CAD/HLD with history of CABG x4 vessels, PAD, seizure disorder, hypertension, COPD, GERD, BPH.    There is no other mention of f/c/headache/rhinorrhea/nasal congestion/lightheadedness/syncopal sensation/cough/soa/chest pain/abdominal pain/recent illness/sick exposures/change in bladder habits/no weight change/change in medications or any other new concerns.    Past Medical History:   Diagnosis Date   • Back pain    • Convulsion disorder (CMS/HCC)    • Coronary artery disease    • Dementia (CMS/HCC)    • Hyperlipidemia    • Hypertension    • Seizures (CMS/HCC)      Past Surgical History:   Procedure Laterality Date   • CARDIAC CATHETERIZATION     • CORONARY ARTERY BYPASS GRAFT Left    • VASCULAR SURGERY Left      Family History   Problem Relation Age of Onset   • Arthritis Mother    • Depression Mother    • Alcohol abuse Mother    • Emphysema Mother    • Alzheimer's disease Mother    • Alzheimer's disease Father    • Alcohol abuse Father    • Bipolar disorder Sister    • Heart disease Sister    • Cancer Brother    • Alcohol  abuse Brother    • Drug abuse Brother    • Bipolar disorder Son    • Anxiety disorder Son    • Stroke Brother    • Cancer Brother    • Hypertension Brother    • Bipolar disorder Brother    • Anxiety disorder Brother    • Neuropathy Brother    • Heart disease Brother    • Drug abuse Brother    • Alcohol abuse Brother      Social History     Tobacco Use   • Smoking status: Former Smoker   Substance Use Topics   • Alcohol use: No   • Drug use: Not on file     Medications Prior to Admission   Medication Sig Dispense Refill Last Dose   • acetaminophen (TYLENOL) 325 MG tablet Take 650 mg by mouth Every 6 (Six) Hours As Needed for Mild Pain .   11/21/2019 at 1330   • atorvastatin (LIPITOR) 40 MG tablet Take 1 tablet by mouth Every Night. 30 tablet 0 11/21/2019 at 2100   • benztropine (COGENTIN) 0.5 MG tablet Take 0.5 mg by mouth 2 (Two) Times a Day.   11/21/2019 at 2100   • calcium carbonate (TUMS) 500 MG chewable tablet Chew 1 tablet 2 (Two) Times a Day.   11/21/2019 at 2100   • donepezil (ARICEPT) 10 MG tablet Take 1 tablet by mouth Every Night. 30 tablet 1 11/21/2019 at 2100   • LORazepam (ATIVAN) 0.5 MG tablet Take 0.25 mg by mouth 2 (Two) Times a Day.   11/21/2019 at 2100   • memantine (NAMENDA) 10 MG tablet Take 10 mg by mouth 2 (Two) Times a Day.   11/21/2019 at 2100   • Menthol-Zinc Oxide (REMEDY CALAZIME EX) Apply  topically 2 (Two) Times a Day.   11/21/2019 at 2100   • Multiple Vitamins-Minerals (MULTIVITAMIN ADULT) tablet Take 1 tablet by mouth daily. 30 tablet 5 11/21/2019 at 0800   • PHENYTOIN INFATABS 50 MG chewable tablet Take 4 tablets bid (Patient taking differently: Chew 300 mg 2 (Two) Times a Day.) 240 tablet 5 11/21/2019 at 2100   • raNITIdine (ZANTAC) 150 MG tablet Take 150 mg by mouth Daily.   11/21/2019 at 0730   • risperiDONE (risperDAL) 2 MG tablet Take 1 tablet by mouth Every 12 (Twelve) Hours. (Patient taking differently: Take 1 mg by mouth Every 12 (Twelve) Hours.) 60 tablet 1 11/21/2019 at 2100  "  • tamsulosin (FLOMAX) 0.4 MG capsule 24 hr capsule Take 1 capsule by mouth every night. 30 capsule 5 11/21/2019 at 2100   • XARELTO 20 MG tablet Take 1 tablet by mouth daily with dinner. 30 tablet 5 11/21/2019 at 0900   • zonisamide (ZONEGRAN) 100 MG capsule Take 1 capsule by mouth daily. 30 capsule 5 11/21/2019 at 2100   • thiamine (VITAMINE B-1) 100 MG tablet Take 1 tablet by mouth daily. 30 tablet 5      Allergies:  Eggs or egg-derived products and Fish-derived products      There is no immunization history on file for this patient.    REVIEW OF SYSTEMS:  Please see the above history of present illness for pertinent positives and negatives.  The remainder of the patient's systems have been reviewed and are negative.     Vital Signs  Temp:  [97.9 °F (36.6 °C)-98.7 °F (37.1 °C)] 98.7 °F (37.1 °C)  Heart Rate:  [60-72] 69  Resp:  [20-26] 20  BP: (123-136)/(66-77) 131/73   Body mass index is 23.35 kg/m².   Body mass index is 23.35 kg/m².    Flowsheet Rows      First Filed Value   Admission Height  177.8 cm (70\") Documented at 11/22/2019 0630   Admission Weight  81.5 kg (179 lb 11.2 oz) Documented at 11/22/2019 0630           Physical Exam   Constitutional: He appears well-developed and well-nourished.   HENT:   Head: Normocephalic and atraumatic.   Edentulous   Eyes: EOM are normal. Pupils are equal, round, and reactive to light.   Cardiovascular: Normal rate and regular rhythm.   Pulmonary/Chest: Effort normal and breath sounds normal. No stridor. No respiratory distress. He has no wheezes.   Abdominal: Soft. Bowel sounds are normal. He exhibits no distension. There is no tenderness. There is no guarding.   Musculoskeletal: He exhibits no edema.   Left arm and both legs appear contracted   Neurological: He is alert.   Unable to determine orientation, right upper extremity movement noted, no other extremity movement witnessed   Skin: Skin is warm and dry. No erythema.   Psychiatric:   Calm   Vitals " reviewed.    Emotional Behavior:    Judgement and Insight: Unable to determine   Mental Status:  Alertness alert   Memory: Unable to determine   Mood and Affect:         Depression none apparent               Anxiety none apparent    Debilities:   Physical Weakness chronic bedbound with contractures   Handicaps history of traumatic brain injury   Disabilities as above   Agitation none    Results Review:    I reviewed the patient's new clinical results.  Lab Results (most recent)     Procedure Component Value Units Date/Time    aPTT [[id number]]  (Normal) Collected:  11/22/19 0650    Specimen:  Blood Updated:  11/22/19 0733     PTT 27.7 seconds     Narrative:       PTT = The equivalent PTT values for the therapeutic range of heparin levels at 0.1 to 0.7 U/ml are 53 to 110 seconds.    Protime-INR [[id number]]  (Abnormal) Collected:  11/22/19 0650    Specimen:  Blood Updated:  11/22/19 0733     Protime 14.0 Seconds      INR 1.11    Narrative:       Therapeutic Ranges for INR: 2.0-3.0 (PT 20-30)                              2.5-3.5 (PT 25-34)    Comprehensive Metabolic Panel [[id number]]  (Abnormal) Collected:  11/22/19 0650    Specimen:  Blood Updated:  11/22/19 0719     Glucose 99 mg/dL      BUN 13 mg/dL      Creatinine 0.85 mg/dL      Sodium 145 mmol/L      Potassium 4.3 mmol/L      Chloride 107 mmol/L      CO2 29.6 mmol/L      Calcium 9.0 mg/dL      Total Protein 7.2 g/dL      Albumin 4.00 g/dL      ALT (SGPT) 15 U/L      AST (SGOT) 21 U/L      Alkaline Phosphatase 122 U/L      Total Bilirubin 0.2 mg/dL      eGFR Non African Amer 90 mL/min/1.73      Globulin 3.2 gm/dL      A/G Ratio 1.3 g/dL      BUN/Creatinine Ratio 15.3     Anion Gap 8.4 mmol/L     Narrative:       GFR Normal >60  Chronic Kidney Disease <60  Kidney Failure <15    Lipase [[id number]]  (Normal) Collected:  11/22/19 0650    Specimen:  Blood Updated:  11/22/19 0719     Lipase 27 U/L     CBC & Differential [[id number]] Collected:  11/22/19 0650    Specimen:   Blood Updated:  11/22/19 0700    Narrative:       The following orders were created for panel order CBC & Differential.  Procedure                               Abnormality         Status                     ---------                               -----------         ------                     CBC Auto Differential[[id number]]        Abnormal            Final result                 Please view results for these tests on the individual orders.    CBC Auto Differential [[id number]]  (Abnormal) Collected:  11/22/19 0650    Specimen:  Blood Updated:  11/22/19 0700     WBC 7.89 10*3/mm3      RBC 4.41 10*6/mm3      Hemoglobin 14.3 g/dL      Hematocrit 43.4 %      MCV 98.4 fL      MCH 32.4 pg      MCHC 32.9 g/dL      RDW 12.6 %      RDW-SD 45.8 fl      MPV 9.9 fL      Platelets 156 10*3/mm3      Neutrophil % 76.6 %      Lymphocyte % 16.2 %      Monocyte % 6.8 %      Eosinophil % 0.3 %      Basophil % 0.1 %      Immature Grans % 0.0 %      Neutrophils, Absolute 6.04 10*3/mm3      Lymphocytes, Absolute 1.28 10*3/mm3      Monocytes, Absolute 0.54 10*3/mm3      Eosinophils, Absolute 0.02 10*3/mm3      Basophils, Absolute 0.01 10*3/mm3      Immature Grans, Absolute 0.00 10*3/mm3           Imaging Results (Most Recent)     Procedure Component Value Units Date/Time    XR Abdomen 2 View With Chest 1 View [[id number]] Collected:  11/22/19 0728     Updated:  11/22/19 0736    Narrative:       ACUTE ABDOMEN SERIES, 11/22/2019         HISTORY:  63-year-old male in the ED after being found down at nursing care  facility. Blood in stool. Emesis.     TECHNIQUE:  Flat and upright abdomen series with AP portable chest x-ray.     FINDINGS:  Bowel gas pattern is within normal limits. No bowel dilatation to  suggest obstruction or significant adynamic ileus. Moderately large  volume stool within the colon, greatest in the lower rectum. No visible  free intraperitoneal air. The stomach is nondistended.     Chest x-ray shows no active disease.  CABG. Heart size and pulmonary  vascularity are normal. Old healed left rib fractures. No visible  pulmonary edema, focal infiltrate, pleural effusion or pneumothorax.       Impression:       1. Negative abdomen. Normal bowel gas pattern.  2. Moderately large volume stool, greatest in the low rectum.  3. No active disease in the chest. CABG. Old healed left rib fractures.     This report was finalized on 11/22/2019 7:30 AM by Dr. Mo Gayle MD.           reviewed    ECG/EMG Results (most recent)     None        Pending    Assessment/Plan   GI bleeding, upper and lower, unclear cause:   Elevated alk phos: GI consulted per request of SNF  Alk phos mildly elevated, likely secondary to fluid losses, recheck in a.m.  Hemoglobin stable, no active blood loss noted  Amylase and lipase normal  Check GI panel  Add IV Protonix, continue IV hydration, NPO except for meds  HOLD xarelto, add lovenox for DVT prophy only  Recheck lab in am unless having active episodes,then check more frequently  Abdominal films show large stool burden, no other acute findings    History of TBI and primary dementia mixed with behavior disturbance:  History of seizure disorder:  Previous admissions at [facility] unit  Acute issues currently   Allow home Aricept 10 mg nightly, Ativan 0.25 mg twice daily, Namenda 10 mg twice daily, risperidone 1 g every 12 hours, Cogentin 0.5 mg twice daily, Dilantin 300 mg twice daily, Zonegran 100 mg daily    GERD: Add Protonix 40 mg IV daily    CAD/HLD, H/O NSTEMI and CABG x4 vessel:  PAD with h/o left thrombectomy/vascular graft, pop-tib bypass, AIF bilateral: POA no current acute issues  Allow home Lipitor 40 mg daily, Xarelto 20 mg daily    History of hypertension: No current acute issues, no home medications noted, monitor    COPD:  Previous tobacco abuse:  No current acute issues  Add duo nebs as needed    BPH: Allow home Flomax 0.4 mg nightly    I discussed the patients findings and my  "recommendations with patient and staff. (patient made eye contact and noises, unclear what he understands. Followed command to shake my hand, nodded and made noise \"yes\" that he is able to write, unsure baseline)    Karen Abdullahi, APRN  11/22/19  10:18 AM          "

## 2024-01-24 NOTE — PLAN OF CARE
Goal Outcome Evaluation:  Plan of Care Reviewed With: patient  Progress: improving  Outcome Summary: No changes this shift. Lovenox and all other medications administered as ordered. VSS on RA. Will CTM.   Other (Free Text): this history was previously documented under \"skin infection: on 5/16/23\\nthen documented under \"erosion\" on 5/22/23 and 5/31/23\\nhx of leg edema - has a compressive sleeve\\nDr. Altagracia said her veins are fine, per patient\\n\\n9/22 silver dollar sized wound treated at [facility] wound clinic - healed\\naround 4/23 - the wound recurred \\n5/3/23 outside path showed: Bx L Lower leg - SCCis, ulcerated, margins involved\\n5/16/23 The wound was a rectangular (the shape of her bandage), moist, red plaque with a lot of exudate and dried crust withoug LE edema\\nbacterial culture done - was negative\\n5/22/23 The wound was slightly better, still very red, granulated, moist, oozing, with yellow crust\\nstarted Clobetasol ointment\\ntold to wear compression sleeve. elevate legs\\ncon't Vinegar soaks and dressing changes QD with a Non-stick pad and Wrap\\n5/31/2023 wound was much better - about 1/3 of the wound is normal skin color again, less weepy, less red with the above treatments\\n7/13/23 she stopped clobetasol after two weeks because the wound was clear, not red, with leathery skin\\nbut she stopped stopped wearing the Velcro compression sleeves about 1-2 weeks prior to coming down to [place]\\nThe leg started getting red again 4-5 days prior to the visit (still in a rectangular shape) with some LE edema\\nDx'd with \"stasis dermatitis\" in the 7/13/23 visit note\\ntold to re-start compression sleeves, clobetasol, and elevating legs\\n8/21/23 Saw [facility] - recommended to continue clobetasol and compression socks \\n\\n1/24/24\\nShe has not worn the compression socks for a few weeks because her leg was not swelling\\nHas not needed clobetasol\\nOnly using aquaphor, which helps\\nBut the skin is just staying rough\\n\\nThere is a mild patch of stasis dermatitis - Re-start clobetasol BID prn\\nCon't compression stockings and leg elevation for prevention, not just when she is swollen, to prevent edema and stasis derm\\nOK to con't aquaphor Detail Level: Detailed Note Text (......Xxx Chief Complaint.): This diagnosis correlates with the Render Risk Assessment In Note?: no Other (Free Text): 5/3/23 Bx L Lower leg (outside path) - SCCis, ulcerated, margins involved\\nthis biopsy was taken when she had a red, moist, draining wound\\ndetails previously recorded under \"erosion,\" and \"stasis dermatitis\" in prior notes\\nI was never able to identify any SCCis in the original biopsy location\\nit was never treated\\nnow that the surrounding wound is healed, I don't see any evidence of SCCis Other (Free Text): 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 Dempsey

## (undated) DEVICE — TUBING, SUCTION, 1/4" X 10', STRAIGHT: Brand: MEDLINE

## (undated) DEVICE — SENSR O2 OXIMAX FNGR A/ 18IN NONSTR

## (undated) DEVICE — KT ORCA ORCAPOD DISP STRL

## (undated) DEVICE — BITEBLOCK OMNI BLOC

## (undated) DEVICE — MSK PROC CURAPLEX O2 2/ADAPT 7FT

## (undated) DEVICE — ADAPT CLN BIOGUARD AIR/H2O DISP

## (undated) DEVICE — FRCP BX RADJAW4 NDL 2.8 240CM LG OG BX40

## (undated) DEVICE — CANN O2 ETCO2 FITS ALL CONN CO2 SMPL A/ 7IN DISP LF

## (undated) DEVICE — LN SMPL CO2 SHTRM SD STREAM W/M LUER